# Patient Record
Sex: FEMALE | Race: WHITE | Employment: OTHER | URBAN - METROPOLITAN AREA
[De-identification: names, ages, dates, MRNs, and addresses within clinical notes are randomized per-mention and may not be internally consistent; named-entity substitution may affect disease eponyms.]

---

## 2017-11-10 ENCOUNTER — HOSPITAL ENCOUNTER (OUTPATIENT)
Dept: MAMMOGRAPHY | Age: 77
Discharge: HOME | End: 2017-11-10
Attending: PHYSICIAN ASSISTANT
Payer: MEDICARE

## 2017-11-10 DIAGNOSIS — N63.10 BREAST MASS, RIGHT: ICD-10-CM

## 2017-11-10 DIAGNOSIS — Z87.898 HISTORY OF ABNORMAL MAMMOGRAM: ICD-10-CM

## 2017-11-10 DIAGNOSIS — N60.01 BREAST CYST, RIGHT: ICD-10-CM

## 2017-11-10 DIAGNOSIS — Z87.2 HISTORY OF CYST OF BREAST: ICD-10-CM

## 2017-11-10 PROCEDURE — 76642 ULTRASOUND BREAST LIMITED: CPT

## 2017-11-10 PROCEDURE — 77066 DX MAMMO INCL CAD BI: CPT

## 2017-11-12 NOTE — PROGRESS NOTES
Patient notified of results per radiology, appointment with general surgery already scheduled at this time.

## 2018-01-02 PROBLEM — N64.59 ABNORMAL BREAST EXAM: Status: ACTIVE | Noted: 2018-01-02

## 2018-02-20 ENCOUNTER — HOSPITAL ENCOUNTER (OUTPATIENT)
Dept: SURGERY | Age: 78
Discharge: HOME | End: 2018-02-20
Payer: MEDICARE

## 2018-02-20 ENCOUNTER — HOSPITAL ENCOUNTER (OUTPATIENT)
Dept: PHYSICAL THERAPY | Age: 78
Discharge: HOME | End: 2018-02-20
Payer: MEDICARE

## 2018-02-20 VITALS
WEIGHT: 173.1 LBS | DIASTOLIC BLOOD PRESSURE: 72 MMHG | BODY MASS INDEX: 27.82 KG/M2 | SYSTOLIC BLOOD PRESSURE: 149 MMHG | OXYGEN SATURATION: 97 % | HEIGHT: 66 IN | HEART RATE: 69 BPM | TEMPERATURE: 96.3 F

## 2018-02-20 LAB
ANION GAP SERPL CALC-SCNC: 14 MMOL/L (ref 7–16)
APPEARANCE UR: CLEAR
APTT PPP: 39.8 SEC (ref 23.2–35.3)
ATRIAL RATE: 66 BPM
BACTERIA SPEC CULT: NORMAL
BASOPHILS # BLD: 0 K/UL (ref 0–0.2)
BASOPHILS NFR BLD: 0 % (ref 0–2)
BILIRUB UR QL: NEGATIVE
BUN SERPL-MCNC: 12 MG/DL (ref 8–23)
CALCIUM SERPL-MCNC: 9.6 MG/DL (ref 8.3–10.4)
CALCULATED P AXIS, ECG09: 57 DEGREES
CALCULATED R AXIS, ECG10: -12 DEGREES
CALCULATED T AXIS, ECG11: 12 DEGREES
CHLORIDE SERPL-SCNC: 101 MMOL/L (ref 98–107)
CO2 SERPL-SCNC: 24 MMOL/L (ref 21–32)
COLOR UR: YELLOW
CREAT SERPL-MCNC: 0.77 MG/DL (ref 0.6–1)
DIAGNOSIS, 93000: NORMAL
DIFFERENTIAL METHOD BLD: ABNORMAL
EOSINOPHIL # BLD: 0.2 K/UL (ref 0–0.8)
EOSINOPHIL NFR BLD: 2 % (ref 0.5–7.8)
ERYTHROCYTE [DISTWIDTH] IN BLOOD BY AUTOMATED COUNT: 13.7 % (ref 11.9–14.6)
GLUCOSE SERPL-MCNC: 106 MG/DL (ref 65–100)
GLUCOSE UR STRIP.AUTO-MCNC: NEGATIVE MG/DL
HCT VFR BLD AUTO: 44.9 % (ref 35.8–46.3)
HGB BLD-MCNC: 14.5 G/DL (ref 11.7–15.4)
HGB UR QL STRIP: NEGATIVE
IMM GRANULOCYTES # BLD: 0 K/UL (ref 0–0.5)
IMM GRANULOCYTES NFR BLD AUTO: 0 % (ref 0–5)
INR PPP: 1
KETONES UR QL STRIP.AUTO: NEGATIVE MG/DL
LEUKOCYTE ESTERASE UR QL STRIP.AUTO: NEGATIVE
LYMPHOCYTES # BLD: 2.5 K/UL (ref 0.5–4.6)
LYMPHOCYTES NFR BLD: 36 % (ref 13–44)
MCH RBC QN AUTO: 29.1 PG (ref 26.1–32.9)
MCHC RBC AUTO-ENTMCNC: 32.3 G/DL (ref 31.4–35)
MCV RBC AUTO: 90.2 FL (ref 79.6–97.8)
MONOCYTES # BLD: 0.4 K/UL (ref 0.1–1.3)
MONOCYTES NFR BLD: 5 % (ref 4–12)
NEUTS SEG # BLD: 3.9 K/UL (ref 1.7–8.2)
NEUTS SEG NFR BLD: 57 % (ref 43–78)
NITRITE UR QL STRIP.AUTO: NEGATIVE
P-R INTERVAL, ECG05: 182 MS
PH UR STRIP: 6.5 [PH] (ref 5–9)
PLATELET # BLD AUTO: 261 K/UL (ref 150–450)
PMV BLD AUTO: 9.4 FL (ref 10.8–14.1)
POTASSIUM SERPL-SCNC: 4.5 MMOL/L (ref 3.5–5.1)
PROT UR STRIP-MCNC: NEGATIVE MG/DL
PROTHROMBIN TIME: 12.8 SEC (ref 11.5–14.5)
Q-T INTERVAL, ECG07: 448 MS
QRS DURATION, ECG06: 128 MS
QTC CALCULATION (BEZET), ECG08: 469 MS
RBC # BLD AUTO: 4.98 M/UL (ref 4.05–5.25)
SERVICE CMNT-IMP: NORMAL
SODIUM SERPL-SCNC: 139 MMOL/L (ref 136–145)
SP GR UR REFRACTOMETRY: 1.01 (ref 1–1.02)
UROBILINOGEN UR QL STRIP.AUTO: 0.2 EU/DL (ref 0.2–1)
VENTRICULAR RATE, ECG03: 66 BPM
WBC # BLD AUTO: 7 K/UL (ref 4.3–11.1)

## 2018-02-20 PROCEDURE — 87641 MR-STAPH DNA AMP PROBE: CPT | Performed by: PHYSICIAN ASSISTANT

## 2018-02-20 PROCEDURE — 85730 THROMBOPLASTIN TIME PARTIAL: CPT | Performed by: PHYSICIAN ASSISTANT

## 2018-02-20 PROCEDURE — 85025 COMPLETE CBC W/AUTO DIFF WBC: CPT | Performed by: PHYSICIAN ASSISTANT

## 2018-02-20 PROCEDURE — 77030027138 HC INCENT SPIROMETER -A

## 2018-02-20 PROCEDURE — 93005 ELECTROCARDIOGRAM TRACING: CPT | Performed by: PHYSICIAN ASSISTANT

## 2018-02-20 PROCEDURE — 81003 URINALYSIS AUTO W/O SCOPE: CPT | Performed by: PHYSICIAN ASSISTANT

## 2018-02-20 PROCEDURE — G8979 MOBILITY GOAL STATUS: HCPCS

## 2018-02-20 PROCEDURE — 80048 BASIC METABOLIC PNL TOTAL CA: CPT | Performed by: PHYSICIAN ASSISTANT

## 2018-02-20 PROCEDURE — 85610 PROTHROMBIN TIME: CPT | Performed by: PHYSICIAN ASSISTANT

## 2018-02-20 PROCEDURE — G8980 MOBILITY D/C STATUS: HCPCS

## 2018-02-20 PROCEDURE — G8978 MOBILITY CURRENT STATUS: HCPCS

## 2018-02-20 PROCEDURE — 97162 PT EVAL MOD COMPLEX 30 MIN: CPT

## 2018-02-20 RX ORDER — MELATONIN
2000 DAILY
COMMUNITY
End: 2022-02-17

## 2018-02-20 NOTE — ADVANCED PRACTICE NURSE
Total Joint Surgery Preoperative Chart Review      Patient ID:  Ashlyn Kiran  [id number]  87 y.o.  1940  Surgeon: Dr. Sarah Herron  Date of Surgery: 3/6/2018  Procedure: Total Left Hip Arthroplasty  Primary Care Physician: Jane Davis [phone]  Specialty Physician(s):      Subjective:   Ashlyn Kiran is a 68 y.o. WHITE OR  female who presents for preoperative evaluation for Total Left Hip arthroplasty. This is a preoperative chart review note based on data collected by the nurse at the surgical Pre-Assessment visit. Past Medical History:   Diagnosis Date    Anemia, unspecified     patient denies     Arthritis     osteoarthritis    Autoimmune disease ([facility]     rheumatoid arthritis; saw Dr. Quincy Chavez in the past. Patient does not currently see rheumatologist.     [address] [place] [address] Breast cancer ([facility] [address]     Cancer ([facility] 07/2013    right breast    Chronic pain     d/t rheumatoid arthritis    HTN (hypertension) 03/18/2012    patient states not a current problem, no medication.  Hypercholesterolemia     no meds    Ill-defined condition     \"I can't take anything strong\"    Menopause     Nausea & vomiting     patient denies post-op N/V    Osteoarthritis of both shoulders     Osteoarthritis of right hip     Followed by Dr. Eleanor Shannon Rheumatoid arthritis [facility])     Patient does not see Rheumatologist; no prescription medication. 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        Past Surgical History:   Procedure Laterality Date    HX APPENDECTOMY      HX BREAST LUMPECTOMY  7/31/2013    BREAST NEEDLE LOCALIZATION WITH MASS EXCISION AND SENTINAL NODE BIOPSY performed by Leandra Batista MD at [address] [place] [address] AND CURETTAGE      HX HIP REPLACEMENT Right 2016    HX HYSTERECTOMY  1987    HX KNEE REPLACEMENT Left 8/2010    left    HX KNEE REPLACEMENT Right 3/2012    right     Family History   Problem Relation Age of Onset   [facility] Arthritis-osteo Mother     Hypertension Mother     No Known Problems Father     Other Other      family hx of HTN    Breast Cancer Neg Hx       Social History   Substance Use Topics    Smoking status: Never Smoker    Smokeless tobacco: Never Used    Alcohol use No       Prior to Admission medications    Medication Sig Start Date End Date Taking? Authorizing Provider   cholecalciferol (VITAMIN D3) 1,000 unit tablet Take 2,000 Units by mouth daily. Yes Historical Provider   ACETAMINOPHEN/DIPHENHYDRAMINE (TYLENOL PM PO) Take 2 Tabs by mouth nightly. Yes Historical Provider   ammonium lactate (LAC-HYDRIN) 12 % lotion rub in to affected area well daily after bathing 9/25/17  Yes Melanie Garnett PA-C   magnesium oxide (MAG-OX) 400 mg tablet Take 800 mg by mouth daily. Yes Historical Provider   DISABLED PLACARD (DISABLED PLACARD) DMV To use daily 5/18/17  Yes Roselia [employer], DO     Allergies   Allergen Reactions    Ciprofloxacin Other (comments)     Elevated BP    Codeine Palpitations    Demerol [Meperidine] Unknown (comments)     hallucinations    Dilaudid [Hydromorphone (Bulk)] Other (comments)     hallucinations    Gluten Other (comments)    Hydromorphone Other (comments)    Ketoconazole Unknown (comments)     Intolerance/caused burning sensation    Plaquenil [Hydroxychloroquine] Other (comments)     Pt could stand up or get up. Eyes got really big but pt could not see ? Pt states that it was 2-3 days before she got her senses back.      Prednisone Other (comments)     Irritability      Yellow Dye Itching     Yellow  Dye in cheddar cheese- can eat white cheddar          Objective:     Physical Exam:   Patient Vitals for the past 24 hrs:   Temp Pulse BP SpO2   02/20/18 1241 - 69 149/72 -   02/20/18 1209 96.3 °F (35.7 °C) 74 (!) 188/94 97 %       ECG:    EKG Results     Procedure 720 Value Units Date/Time    EKG, 12 LEAD, INITIAL [[id number]] Collected:  02/20/18 1249    Order Status:  Completed Updated:  02/20/18 1254     Ventricular Rate 66 BPM      Atrial Rate 66 BPM      P-R Interval 182 ms      QRS Duration 128 ms      Q-T Interval 448 ms      QTC Calculation (Bezet) 469 ms      Calculated P Axis 57 degrees      Calculated R Axis -12 degrees      Calculated T Axis 12 degrees      Diagnosis --     Normal sinus rhythm  Right bundle branch block  Abnormal ECG  When compared with ECG of 29-AUG-2016 13:49,  No significant change was found            Data Review:   Labs:   Recent Results (from the past 24 hour(s))   CBC WITH AUTOMATED DIFF    Collection Time: 02/20/18 10:30 AM   Result Value Ref Range    WBC 7.0 4.3 - 11.1 K/uL    RBC 4.98 4.05 - 5.25 M/uL    HGB 14.5 11.7 - 15.4 g/dL    HCT 44.9 35.8 - 46.3 %    MCV 90.2 79.6 - 97.8 FL    MCH 29.1 26.1 - 32.9 PG    MCHC 32.3 31.4 - 35.0 g/dL    RDW 13.7 11.9 - 14.6 %    PLATELET 595 803 - 469 K/uL    MPV 9.4 (L) 10.8 - 14.1 FL    DF AUTOMATED      NEUTROPHILS 57 43 - 78 %    LYMPHOCYTES 36 13 - 44 %    MONOCYTES 5 4.0 - 12.0 %    EOSINOPHILS 2 0.5 - 7.8 %    BASOPHILS 0 0.0 - 2.0 %    IMMATURE GRANULOCYTES 0 0.0 - 5.0 %    ABS. NEUTROPHILS 3.9 1.7 - 8.2 K/UL    ABS. LYMPHOCYTES 2.5 0.5 - 4.6 K/UL    ABS. MONOCYTES 0.4 0.1 - 1.3 K/UL    ABS. EOSINOPHILS 0.2 0.0 - 0.8 K/UL    ABS. BASOPHILS 0.0 0.0 - 0.2 K/UL    ABS. IMM.  GRANS. 0.0 0.0 - 0.5 K/UL   PROTHROMBIN TIME + INR    Collection Time: 02/20/18 10:30 AM   Result Value Ref Range    Prothrombin time 12.8 11.5 - 14.5 sec    INR 1.0     PTT    Collection Time: 02/20/18 10:30 AM   Result Value Ref Range    aPTT 39.8 (H) 23.2 - 75.8 SEC   METABOLIC PANEL, BASIC    Collection Time: 02/20/18 10:30 AM   Result Value Ref Range    Sodium 139 136 - 145 mmol/L    Potassium 4.5 3.5 - 5.1 mmol/L    Chloride 101 98 - 107 mmol/L    CO2 24 21 - 32 mmol/L    Anion gap 14 7 - 16 mmol/L    Glucose 106 (H) 65 - 100 mg/dL    BUN 12 8 - 23 MG/DL    Creatinine 0.77 0.6 - 1.0 MG/DL    GFR est AA >60 >60 ml/min/1.73m2    GFR est non-AA >60 >60 ml/min/1.73m2    Calcium 9.6 8.3 - 10.4 MG/DL   URINALYSIS W/ RFLX MICROSCOPIC    Collection Time: 02/20/18 10:30 AM   Result Value Ref Range    Color YELLOW      Appearance CLEAR      Specific gravity 1.006 1.001 - 1.023      pH (UA) 6.5 5.0 - 9.0      Protein NEGATIVE  NEG mg/dL    Glucose NEGATIVE  mg/dL    Ketone NEGATIVE  NEG mg/dL    Bilirubin NEGATIVE  NEG      Blood NEGATIVE  NEG      Urobilinogen 0.2 0.2 - 1.0 EU/dL    Nitrites NEGATIVE  NEG      Leukocyte Esterase NEGATIVE  NEG     MSSA/MRSA SC BY PCR, NASAL SWAB    Collection Time: 02/20/18 12:39 PM   Result Value Ref Range    Special Requests: NO SPECIAL REQUESTS      Culture result:        SA target not detected. A MRSA NEGATIVE, SA NEGATIVE test result does not preclude MRSA or SA nasal colonization.    EKG, 12 LEAD, INITIAL    Collection Time: 02/20/18 12:49 PM   Result Value Ref Range    Ventricular Rate 66 BPM    Atrial Rate 66 BPM    P-R Interval 182 ms    QRS Duration 128 ms    Q-T Interval 448 ms    QTC Calculation (Bezet) 469 ms    Calculated P Axis 57 degrees    Calculated R Axis -12 degrees    Calculated T Axis 12 degrees    Diagnosis       Normal sinus rhythm  Right bundle branch block  Abnormal ECG  When compared with ECG of 29-AUG-2016 13:49,  No significant change was found           Problem List:  )  Patient Active Problem List   Diagnosis Code    Osteoarthritis of left knee M17.12    Total knee replacement status Z96.659    Osteoarthritis of right knee M17.11    Right Total knee replacement status Z96.659    Rheumatoid arthritis involving multiple sites with positive rheumatoid factor (HCC) M05.79    Osteoarthritis of both shoulders M19.011, M19.012    Anemia, unspecified D64.9    Hormone receptor positive breast cancer, unspecified laterality (HCC) C50.919    Low back pain M54.5    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

## 2018-02-20 NOTE — PROGRESS NOTES
02/20/18 1030   Oxygen Therapy   O2 Sat (%) 98 %   Pulse via Oximetry 73 beats per minute   O2 Device Room air   Pre-Treatment   Breath Sounds Bilateral Clear   Pre FEV1 (liters) 1.6 liters   % Predicted 70     Initial respiratory Assessment completed with pt. Pt was interviewed and evaluated in Joint camp prior to surgery. Patient ID:  Leonel Stahl  [id number]  82 y.o.  1940  Surgeon: Dr. Hortencia Mcpherson  Date of Surgery: 3/6/2018  Procedure: Total Left Hip Arthroplasty  Primary Care Physician: Yuliet Medina [phone]  Specialists:                                  Pt instructed in the use of Incentive Spirometry. Pt instructed to bring Incentive Spirometer back on date of surgery & to start using Is upon return to pt room. Pt taught proper cough technique    History of smoking:   NONE                                                       Quit date:            Secondhand smoke:      Past procedures with Oxygen desaturation:NONE    Past Medical History:   Diagnosis Date    Anemia, unspecified     patient denies     Arthritis     osteoarthritis    Autoimmune disease ([facility] [address]     rheumatoid arthritis; saw Dr. Pilar Jaquez in the past. Patient does not currently see rheumatologist.      Breast cancer ([facility]     Cancer ([facility] [address] 07/2013    right breast    Chronic pain     d/t rheumatoid arthritis    HTN (hypertension) 03/18/2012    patient states not a current problem, no medication.  Hypercholesterolemia     no meds    Ill-defined condition     \"I can't take anything strong\"    Menopause     Nausea & vomiting     patient denies post-op N/V    Osteoarthritis of both shoulders     Osteoarthritis of right hip     Followed by Dr. Orville Sheridan Rheumatoid arthritis [facility])     Patient does not see Rheumatologist; no prescription medication.     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 HX OF PNA AFTER THE FLU                                                                                                            Respiratory history:DENIES SOB                                                                Respiratory meds:                                         FAMILY PRESENT:            SPOUSE,                                                                                 PAST SLEEP STUDY:                            NO  HX OF GINETTE:                                          NO                                     GINETTE assessment:                                               SLEEPS ON         BACK                                                              PHYSICAL EXAM   Body mass index is 27.94 kg/(m^2).    Visit Vitals    /72    Pulse 69    Temp 96.3 °F (35.7 °C)    Ht 5' 6\" (1.676 m)    Wt 78.5 kg (173 lb 1.6 oz)    SpO2 97%    BMI 27.94 kg/m2     Neck circumference:  37    cm    Loud snoring:                           MINIMAL  Witnessed apnea or wakening gasping or choking:,             DENIES,                                                                                       Awakens with headaches:                                                  DENIES    Morning or daytime tiredness/ sleepiness:                    DENIES                                                                                       Dry mouth or sore throat in morning:                                                                                DENIES    Jensen stage:  4    SACS score:9    STOP/BAN                              CPAP:                       NONE                                                Referrals:    Pt. Phone Number:

## 2018-02-20 NOTE — PERIOP NOTES
Mee Howell PA-C    Your patient recently had labs drawn during a hospital appointment due to an upcoming surgery. The results are attached. If you have any questions or concerns please reach out to your patient for a follow-up in your office. Please do not respond to this message as my mailbox is not monitored. You may call [phone] with questions or concerns. Recent Results (from the past 12 hour(s))   CBC WITH AUTOMATED DIFF    Collection Time: 02/20/18 10:30 AM   Result Value Ref Range    WBC 7.0 4.3 - 11.1 K/uL    RBC 4.98 4.05 - 5.25 M/uL    HGB 14.5 11.7 - 15.4 g/dL    HCT 44.9 35.8 - 46.3 %    MCV 90.2 79.6 - 97.8 FL    MCH 29.1 26.1 - 32.9 PG    MCHC 32.3 31.4 - 35.0 g/dL    RDW 13.7 11.9 - 14.6 %    PLATELET 870 578 - 027 K/uL    MPV 9.4 (L) 10.8 - 14.1 FL    DF AUTOMATED      NEUTROPHILS 57 43 - 78 %    LYMPHOCYTES 36 13 - 44 %    MONOCYTES 5 4.0 - 12.0 %    EOSINOPHILS 2 0.5 - 7.8 %    BASOPHILS 0 0.0 - 2.0 %    IMMATURE GRANULOCYTES 0 0.0 - 5.0 %    ABS. NEUTROPHILS 3.9 1.7 - 8.2 K/UL    ABS. LYMPHOCYTES 2.5 0.5 - 4.6 K/UL    ABS. MONOCYTES 0.4 0.1 - 1.3 K/UL    ABS. EOSINOPHILS 0.2 0.0 - 0.8 K/UL    ABS. BASOPHILS 0.0 0.0 - 0.2 K/UL    ABS. IMM.  GRANS. 0.0 0.0 - 0.5 K/UL   PROTHROMBIN TIME + INR    Collection Time: 02/20/18 10:30 AM   Result Value Ref Range    Prothrombin time 12.8 11.5 - 14.5 sec    INR 1.0     PTT    Collection Time: 02/20/18 10:30 AM   Result Value Ref Range    aPTT 39.8 (H) 23.2 - 20.5 SEC   METABOLIC PANEL, BASIC    Collection Time: 02/20/18 10:30 AM   Result Value Ref Range    Sodium 139 136 - 145 mmol/L    Potassium 4.5 3.5 - 5.1 mmol/L    Chloride 101 98 - 107 mmol/L    CO2 24 21 - 32 mmol/L    Anion gap 14 7 - 16 mmol/L    Glucose 106 (H) 65 - 100 mg/dL    BUN 12 8 - 23 MG/DL    Creatinine 0.77 0.6 - 1.0 MG/DL    GFR est AA >60 >60 ml/min/1.73m2    GFR est non-AA >60 >60 ml/min/1.73m2    Calcium 9.6 8.3 - 10.4 MG/DL   URINALYSIS W/ RFLX MICROSCOPIC    Collection Time: 02/20/18 10:30 AM   Result Value Ref Range    Color YELLOW      Appearance CLEAR      Specific gravity 1.006 1.001 - 1.023      pH (UA) 6.5 5.0 - 9.0      Protein NEGATIVE  NEG mg/dL    Glucose NEGATIVE  mg/dL    Ketone NEGATIVE  NEG mg/dL    Bilirubin NEGATIVE  NEG      Blood NEGATIVE  NEG      Urobilinogen 0.2 0.2 - 1.0 EU/dL    Nitrites NEGATIVE  NEG      Leukocyte Esterase NEGATIVE  NEG     EKG, 12 LEAD, INITIAL    Collection Time: 02/20/18 12:49 PM   Result Value Ref Range    Ventricular Rate 66 BPM    Atrial Rate 66 BPM    P-R Interval 182 ms    QRS Duration 128 ms    Q-T Interval 448 ms    QTC Calculation (Bezet) 469 ms    Calculated P Axis 57 degrees    Calculated R Axis -12 degrees    Calculated T Axis 12 degrees    Diagnosis       Normal sinus rhythm  Right bundle branch block  Abnormal ECG  When compared with ECG of 29-AUG-2016 13:49,  No significant change was found

## 2018-02-20 NOTE — PERIOP NOTES
Patient verified name, , and surgery as listed in [facility]. Type 3 surgery, walk in assessment complete. Labs per surgeon: CBC, BMP, U/A, PT ; results within [facility] protocols. PTT level elevated; MDA to review. MRSA nasal swab pending. Labs per anesthesia protocol: included in surgeons orders. EKG: done today; within [facility] protocols. Hibiclens and instructions to return bottle on DOS given per hospital policy. Nasal Swab collected per MD order and instructions for Mupirocin nasal ointment if required. Patient provided with handouts including Guide to Surgery, Pain Management, Hand Hygiene, Blood Transfusion Education, and [facility] Brochure. Patient answered medical/surgical history questions at their best of ability. All prior to admission medications documented in [facility]. Original medication prescription bottles visualized during patient appointment. Patient instructed to hold all vitamins 7 days prior to surgery and NSAIDS 5 days prior to surgery. Medications to be held: vitamins. Patient instructed to continue previous medications as prescribed prior to surgery and to take the following medications the day of surgery according to anesthesia guidelines with a small sip of water: none. Patient teach back successful and patient demonstrates knowledge of instruction.

## 2018-02-20 NOTE — PROGRESS NOTES
Yeimi Sultana  : (86 y.o.) [address] at [place] [address], [address] [facility]  [phone]       Physical Therapy Prehab Plan of Treatment and Evaluation Summary:2018    ICD-10: Treatment Diagnosis:   · Pain in left hip (M25.552)  · Stiffness of Left Hip, Not elsewhere classified (M25.652)  Precautions/Allergies:   Ciprofloxacin; Codeine; Demerol [meperidine]; Dilaudid [hydromorphone (bulk)]; Gluten; Hydromorphone; Ketoconazole; Plaquenil [hydroxychloroquine]; Prednisone; and Yellow dye  MEDICAL/REFERRING DIAGNOSIS:  Unilateral primary osteoarthritis, left hip [M16.12]  REFERRING PHYSICIAN: Randell Alfaro,*  DATE OF SURGERY: 3/6/18   Assessment:   Comments:  She has RA and has multiple deformities in hands and feet. She is here with her spouse who is supportive. He assists her with transfers and bathing. She has had B TKas and R Salima in the past. She plans on going home at [place] with his assist.     PROBLEM LIST (Impacting functional limitations):  Ms. Horacio Monsalve presents with the following left lower extremity(s) problems:  1. Strength  2. Range of Motion  3. Home Exercise Program  4. Pain   INTERVENTIONS PLANNED:  1. Home Exercise Program  2. Educational Discussion     TREATMENT PLAN: Effective Dates: 2018 TO 2018. Frequency/Duration: Patient to continue to perform home exercise program at least twice per day up until her surgery. GOALS: (Goals have been discussed and agreed upon with patient.)  Discharge Goals: Time Frame: 1 Day  1. Patient will demonstrate independence with a home exercise program designed to increase strength, range of motion and pain control to minimize functional deficits and optimize patient for total joint replacement. Rehabilitation Potential For Stated Goals: 65 Ana Luisa Ferrari's therapy, I certify that the treatment plan above will be carried out by a therapist or under their direction.   Thank you for this referral,  William Cárdenas PT               HISTORY:   Present Symptoms:  Pain Intensity 1: 8 (at its worst)  Pain Location 1: Groin, Hip  Pain Orientation 1: Anterior, Medial, Left   History of Present Injury/Illness (Reason for Referral):  Medical/Referring Diagnosis: Unilateral primary osteoarthritis, left hip [M16.12]   Past Medical History/Comorbidities:   Ms. Claire Bazan  has a past medical history of Anemia, unspecified; Arthritis; Autoimmune disease ([address] Breast cancer ([address] Cancer ([address] (07/2013); Chronic pain; HTN (hypertension) (03/18/2012); Hypercholesterolemia; Menopause; Nausea & vomiting; Osteoarthritis of both shoulders; Osteoarthritis of right hip; Poor historian; Rheumatoid arthritis ([address] Right Total knee replacement status (3/15/2012); Unspecified adverse effect of anesthesia; and Vitamin D deficiency disease. She also has no past medical history of Adverse effect of anesthesia; Aneurysm ([address] Coagulation defects; Difficult intubation; Endocarditis; Heart failure ([address] Malignant hyperthermia due to anesthesia; Morbid obesity ([address] Pseudocholinesterase deficiency; Psychiatric disorder; Rheumatic fever; or Unspecified sleep apnea. Ms. Claire Bazan  has a past surgical history that includes hx hysterectomy (1987); hx dilation and curettage; hx cholecystectomy (1987); hx appendectomy; hx breast lumpectomy (7/31/2013); hx knee replacement (Left, 8/2010); hx knee replacement (Right, 3/2012); hx orthopaedic (Right); and hx orthopaedic (Right).   Social History/Living Environment:   Home Environment: Private residence  # Steps to Enter: 3  Hand Rails : Right  One/Two Story Residence: One story  Living Alone: No  Support Systems: Spouse/Significant Other/Partner  Patient Expects to be Discharged to[de-identified] Private residence  Current DME Used/Available at Home: Yemi Ladd, Erica Plascencia, straight, [employer], bedside  Tub or Shower Type: Shower  Work/Activity:  retired  Dominant Side:  RIGHT  Current Medications:  See Pre-assessment nursing note   Number of Personal Factors/Comorbidities that affect the Plan of Care: 3+: HIGH COMPLEXITY   EXAMINATION:   ADLs (Current Functional Status):   Ambulation:  [] Independent  [x] Walk Indoors Only  [] Walk Outdoors  [] Use Assistive Device  [] Use Wheelchair Only Dressing:  [] Independent  Requires Assistance from Someone for:  [] Sock/Shoes  [] Pants  [x] Everything   Bathing/Showering:   [] Independent  [x] Requires Assistance from Someone  [] Sponge Bath Only Household Activities:  [] Routine house and yard work  [x] Light Housework Only  [] None   Observation/Orthostatic Postural Assessment:    Leg length discrepancy, left (L LE 1/8\" shorter than R)  ROM/Flexibility:   AROM: Generally decreased, functional (R LE decreased R knee flexion)                LLE AROM  L Hip Flexion: 80  L Hip ABduction: 10          Strength:   Strength: Generally decreased, functional (R LE 3+/5)       LLE Strength  L Hip Flexion: 3  L Hip ABduction: 3  L Knee Extension: 3  L Ankle Dorsiflexion: 3+          Functional Mobility:    Sensation: Intact (R LE)    Stand to Sit: Contact guard assistance  Sit to Stand: Moderate assistance  Stand Pivot Transfers: Contact guard assistance  Distance (ft): 10 Feet (ft)  Ambulation - Level of Assistance: Supervision  Base of Support: Narrowed  Speed/Chen: Shuffled; Slow  Gait Abnormalities: Antalgic; Shuffling gait          Balance:    Sitting: Intact  Standing: Pull to stand   Body Structures Involved:  1. Bones  2. Joints  3. Muscles Body Functions Affected:  1. Neuromusculoskeletal  2. Movement Related Activities and Participation Affected:  1. Mobility  2. Self Care   Number of elements that affect the Plan of Care: 4+: HIGH COMPLEXITY   CLINICAL PRESENTATION:   Presentation: Evolving clinical presentation with changing clinical characteristics: MODERATE COMPLEXITY   CLINICAL DECISION MAKING:   Outcome Measure:    Tool Used: Lower Extremity Functional Scale (LEFS)  Score:  Initial: 22/80 Most Recent: X/80 (Date: -- )   Interpretation of Score: 20 questions each scored on a 5 point scale with 0 representing \"extreme difficulty or unable to perform\" and 4 representing \"no difficulty\". The lower the score, the greater the functional disability. 80/80 represents no disability. Minimal detectable change is 9 points. Score 80 79-65 64-49 48-33 32-17 16-1 0   Modifier CH CI CJ CK CL CM CN     ? Mobility - Walking and Moving Around:     - CURRENT STATUS: CL - 60%-79% impaired, limited or restricted    - GOAL STATUS: CL - 60%-79% impaired, limited or restricted    - D/C STATUS:  CL - 60%-79% impaired, limited or restricted  Medical Necessity:   · Ms. Dunia Justin is expected to optimize her lower extremity strength and ROM in preparation for joint replacement surgery. Reason for Services/Other Comments:  · Achieve baseline assesment of musculoskeletal system, functional mobility and home environment. , educate in PT HEP in preparation for surgery, educate in hospital plan of care. Use of outcome tool(s) and clinical judgement create a POC that gives a: Questionable prediction of patient's progress: MODERATE COMPLEXITY   TREATMENT:   Treatment/Session Assessment:  Patient was instructed in PT- HEP to increase strength and ROM in LEs. Answered all questions. · Post session pain:  1  · Compliance with Program/Exercises: compliant most of the time.   Total Treatment Duration:  PT Patient Time In/Time Out  Time In: 1015  Time Out: [address] [place], [place]

## 2018-02-26 ENCOUNTER — ANESTHESIA EVENT (OUTPATIENT)
Dept: SURGERY | Age: 78
DRG: 470 | End: 2018-02-26
Payer: MEDICARE

## 2018-02-26 NOTE — H&P
[address] [facility]  History and Physical Exam    Patient ID:  Maryellen Kim  [id number]    45 y.o.  1940    Today: February 26, 2018    Vitals Signs: Reviewed as noted in medical record. Allergies: Allergies   Allergen Reactions    Ciprofloxacin Other (comments)     Elevated BP    Codeine Palpitations    Demerol [Meperidine] Unknown (comments)     hallucinations    Dilaudid [Hydromorphone (Bulk)] Other (comments)     hallucinations    Gluten Other (comments)    Hydromorphone Other (comments)    Ketoconazole Unknown (comments)     Intolerance/caused burning sensation    Plaquenil [Hydroxychloroquine] Other (comments)     Pt could stand up or get up. Eyes got really big but pt could not see ? Pt states that it was 2-3 days before she got her senses back.  Prednisone Other (comments)     Irritability      Yellow Dye Itching     Yellow  Dye in cheddar cheese- can eat white cheddar       CC: Left hip pain    HPI:  Pt complains of left hip pain and with difficulty ambulating. Relevant PMH:   Past Medical History:   Diagnosis Date    Anemia, unspecified     patient denies     Arthritis     osteoarthritis    Autoimmune disease ([facility]     rheumatoid arthritis; saw Dr. Homa Lynne in the past. Patient does not currently see rheumatologist.      Breast cancer ([facility]     Cancer ([facility] 07/2013    right breast    Chronic pain     d/t rheumatoid arthritis    HTN (hypertension) 03/18/2012    patient states not a current problem, no medication.  Hypercholesterolemia     no meds    Ill-defined condition     \"I can't take anything strong\"    Menopause     Nausea & vomiting     patient denies post-op N/V    Osteoarthritis of both shoulders     Osteoarthritis of right hip     Followed by Dr. Nicholas Barlow Rheumatoid arthritis [facility])     Patient does not see Rheumatologist; no prescription medication.     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 Objective:                    HEENT: NC/AT                   Lungs:  clear                   Heart:   rrr                   Abdomen: soft                   Extremities:  Pain with rom of the left hip joint    Radiographs: reveal osteoarthritis with loss of joint space and bone spurs. Assessment: Unilateral primary osteoarthritis, left hip [M16.12]    Plan:  Proceed with scheduled Procedure(s) (LRB):  LEFT HIP ARTHROPLASTY TOTAL / Dari Rickers (Left) . The patient has failed conservative treatment including NSAIDS, and injections. Due to the amount of pain the patient is experiencing we will proceed with scheduled procedure.       Signed By: ERICA Umanzor  February 26, 2018

## 2018-02-27 ENCOUNTER — HOME HEALTH ADMISSION (OUTPATIENT)
Dept: HOME HEALTH SERVICES | Facility: HOME HEALTH | Age: 78
End: 2018-02-27
Payer: MEDICARE

## 2018-02-27 ENCOUNTER — ANESTHESIA (OUTPATIENT)
Dept: SURGERY | Age: 78
DRG: 470 | End: 2018-02-27
Payer: MEDICARE

## 2018-02-27 ENCOUNTER — HOSPITAL ENCOUNTER (INPATIENT)
Age: 78
LOS: 1 days | Discharge: HOME HEALTH | DRG: 470 | End: 2018-02-28
Attending: ORTHOPAEDIC SURGERY | Admitting: ORTHOPAEDIC SURGERY
Payer: MEDICARE

## 2018-02-27 ENCOUNTER — APPOINTMENT (OUTPATIENT)
Dept: GENERAL RADIOLOGY | Age: 78
DRG: 470 | End: 2018-02-27
Attending: PHYSICIAN ASSISTANT
Payer: MEDICARE

## 2018-02-27 DIAGNOSIS — Z96.642 STATUS POST TOTAL REPLACEMENT OF LEFT HIP: Primary | ICD-10-CM

## 2018-02-27 LAB
ABO + RH BLD: NORMAL
BLOOD GROUP ANTIBODIES SERPL: NORMAL
GLUCOSE BLD STRIP.AUTO-MCNC: 110 MG/DL (ref 65–100)
HGB BLD-MCNC: 12.7 G/DL (ref 11.7–15.4)
SPECIMEN EXP DATE BLD: NORMAL

## 2018-02-27 PROCEDURE — 94760 N-INVAS EAR/PLS OXIMETRY 1: CPT

## 2018-02-27 PROCEDURE — 74011250637 HC RX REV CODE- 250/637: Performed by: ANESTHESIOLOGY

## 2018-02-27 PROCEDURE — 74011000302 HC RX REV CODE- 302: Performed by: ORTHOPAEDIC SURGERY

## 2018-02-27 PROCEDURE — 74011000250 HC RX REV CODE- 250

## 2018-02-27 PROCEDURE — 97161 PT EVAL LOW COMPLEX 20 MIN: CPT

## 2018-02-27 PROCEDURE — 77030013727 HC IRR FAN PULSVC ZIMM -B: Performed by: ORTHOPAEDIC SURGERY

## 2018-02-27 PROCEDURE — 77030018074 HC RTVR SUT ARTH4 S&N -B: Performed by: ORTHOPAEDIC SURGERY

## 2018-02-27 PROCEDURE — 77030003602 HC NDL NRV BLK BBMI -B: Performed by: ANESTHESIOLOGY

## 2018-02-27 PROCEDURE — 74011000250 HC RX REV CODE- 250: Performed by: ORTHOPAEDIC SURGERY

## 2018-02-27 PROCEDURE — 74011250636 HC RX REV CODE- 250/636: Performed by: ANESTHESIOLOGY

## 2018-02-27 PROCEDURE — 77030032490 HC SLV COMPR SCD KNE COVD -B

## 2018-02-27 PROCEDURE — 74011000250 HC RX REV CODE- 250: Performed by: ANESTHESIOLOGY

## 2018-02-27 PROCEDURE — 77030018836 HC SOL IRR NACL ICUM -A: Performed by: ORTHOPAEDIC SURGERY

## 2018-02-27 PROCEDURE — 74011250636 HC RX REV CODE- 250/636: Performed by: ORTHOPAEDIC SURGERY

## 2018-02-27 PROCEDURE — 74011250636 HC RX REV CODE- 250/636

## 2018-02-27 PROCEDURE — 76010000161 HC OR TIME 1 TO 1.5 HR INTENSV-TIER 1: Performed by: ORTHOPAEDIC SURGERY

## 2018-02-27 PROCEDURE — 77030018547 HC SUT ETHBND1 J&J -B: Performed by: ORTHOPAEDIC SURGERY

## 2018-02-27 PROCEDURE — 85018 HEMOGLOBIN: CPT | Performed by: PHYSICIAN ASSISTANT

## 2018-02-27 PROCEDURE — 82962 GLUCOSE BLOOD TEST: CPT

## 2018-02-27 PROCEDURE — 77030011640 HC PAD GRND REM COVD -A: Performed by: ORTHOPAEDIC SURGERY

## 2018-02-27 PROCEDURE — 74011000258 HC RX REV CODE- 258: Performed by: ORTHOPAEDIC SURGERY

## 2018-02-27 PROCEDURE — 86900 BLOOD TYPING SEROLOGIC ABO: CPT | Performed by: PHYSICIAN ASSISTANT

## 2018-02-27 PROCEDURE — 77030020782 HC GWN BAIR PAWS FLX 3M -B: Performed by: ANESTHESIOLOGY

## 2018-02-27 PROCEDURE — 74011250637 HC RX REV CODE- 250/637: Performed by: PHYSICIAN ASSISTANT

## 2018-02-27 PROCEDURE — 76210000016 HC OR PH I REC 1 TO 1.5 HR: Performed by: ORTHOPAEDIC SURGERY

## 2018-02-27 PROCEDURE — 77030034479 HC ADH SKN CLSR PRINEO J&J -B: Performed by: ORTHOPAEDIC SURGERY

## 2018-02-27 PROCEDURE — 72170 X-RAY EXAM OF PELVIS: CPT

## 2018-02-27 PROCEDURE — 77030002933 HC SUT MCRYL J&J -A: Performed by: ORTHOPAEDIC SURGERY

## 2018-02-27 PROCEDURE — 0SRB04A REPLACEMENT OF LEFT HIP JOINT WITH CERAMIC ON POLYETHYLENE SYNTHETIC SUBSTITUTE, UNCEMENTED, OPEN APPROACH: ICD-10-PCS | Performed by: ORTHOPAEDIC SURGERY

## 2018-02-27 PROCEDURE — 86580 TB INTRADERMAL TEST: CPT | Performed by: ORTHOPAEDIC SURGERY

## 2018-02-27 PROCEDURE — 74011250636 HC RX REV CODE- 250/636: Performed by: PHYSICIAN ASSISTANT

## 2018-02-27 PROCEDURE — 77030035236 HC SUT PDS STRATFX BARB J&J -B: Performed by: ORTHOPAEDIC SURGERY

## 2018-02-27 PROCEDURE — 36415 COLL VENOUS BLD VENIPUNCTURE: CPT | Performed by: PHYSICIAN ASSISTANT

## 2018-02-27 PROCEDURE — 77030006790 HC BLD SAW OSC ZIMM -B: Performed by: ORTHOPAEDIC SURGERY

## 2018-02-27 PROCEDURE — 76060000034 HC ANESTHESIA 1.5 TO 2 HR: Performed by: ORTHOPAEDIC SURGERY

## 2018-02-27 PROCEDURE — 65270000029 HC RM PRIVATE

## 2018-02-27 PROCEDURE — 97165 OT EVAL LOW COMPLEX 30 MIN: CPT

## 2018-02-27 PROCEDURE — C1776 JOINT DEVICE (IMPLANTABLE): HCPCS | Performed by: ORTHOPAEDIC SURGERY

## 2018-02-27 PROCEDURE — 77030034849: Performed by: ORTHOPAEDIC SURGERY

## 2018-02-27 PROCEDURE — 77030002966 HC SUT PDS J&J -A: Performed by: ORTHOPAEDIC SURGERY

## 2018-02-27 PROCEDURE — 77030003665 HC NDL SPN BBMI -A: Performed by: ANESTHESIOLOGY

## 2018-02-27 PROCEDURE — 77030007880 HC KT SPN EPDRL BBMI -B: Performed by: ANESTHESIOLOGY

## 2018-02-27 DEVICE — PINNACLE CANCELLOUS BONE SCREW 6.5MM X 25MM
Type: IMPLANTABLE DEVICE | Site: HIP | Status: FUNCTIONAL
Brand: PINNACLE

## 2018-02-27 DEVICE — PINNACLE GRIPTION ACETABULAR SHELL MULTI-HOLE 52MM OD
Type: IMPLANTABLE DEVICE | Site: HIP | Status: FUNCTIONAL
Brand: PINNACLE GRIPTION

## 2018-02-27 DEVICE — BIOLOX DELTA CERAMIC FEMORAL HEAD +5.0 36MM DIA 12/14 TAPER
Type: IMPLANTABLE DEVICE | Site: HIP | Status: FUNCTIONAL
Brand: BIOLOX DELTA

## 2018-02-27 DEVICE — PINNACLE HIP SOLUTIONS ALTRX POLYETHYLENE ACETABULAR LINER NEUTRAL 36MM ID 52MM OD
Type: IMPLANTABLE DEVICE | Site: HIP | Status: FUNCTIONAL
Brand: PINNACLE ALTRX

## 2018-02-27 DEVICE — SUMMIT FEMORAL STEM 12/14 TAPER TAPER ED W/POROCOAT SIZE 5 STD 145MM
Type: IMPLANTABLE DEVICE | Site: HIP | Status: FUNCTIONAL
Brand: SUMMIT POROCOAT

## 2018-02-27 RX ORDER — SODIUM CHLORIDE 0.9 % (FLUSH) 0.9 %
5-10 SYRINGE (ML) INJECTION AS NEEDED
Status: DISCONTINUED | OUTPATIENT
Start: 2018-02-27 | End: 2018-02-27 | Stop reason: HOSPADM

## 2018-02-27 RX ORDER — ACETAMINOPHEN 500 MG
1000 TABLET ORAL EVERY 6 HOURS
Status: DISCONTINUED | OUTPATIENT
Start: 2018-02-28 | End: 2018-02-28 | Stop reason: HOSPADM

## 2018-02-27 RX ORDER — ZOLPIDEM TARTRATE 5 MG/1
5 TABLET ORAL
Status: DISCONTINUED | OUTPATIENT
Start: 2018-02-27 | End: 2018-02-28 | Stop reason: HOSPADM

## 2018-02-27 RX ORDER — MIDAZOLAM HYDROCHLORIDE 1 MG/ML
2 INJECTION, SOLUTION INTRAMUSCULAR; INTRAVENOUS
Status: DISCONTINUED | OUTPATIENT
Start: 2018-02-27 | End: 2018-02-27 | Stop reason: HOSPADM

## 2018-02-27 RX ORDER — PROPOFOL 10 MG/ML
INJECTION, EMULSION INTRAVENOUS AS NEEDED
Status: DISCONTINUED | OUTPATIENT
Start: 2018-02-27 | End: 2018-02-27 | Stop reason: HOSPADM

## 2018-02-27 RX ORDER — ASPIRIN 81 MG/1
81 TABLET ORAL EVERY 12 HOURS
Status: DISCONTINUED | OUTPATIENT
Start: 2018-02-27 | End: 2018-02-28 | Stop reason: HOSPADM

## 2018-02-27 RX ORDER — SODIUM CHLORIDE 9 MG/ML
50 INJECTION, SOLUTION INTRAVENOUS CONTINUOUS
Status: DISCONTINUED | OUTPATIENT
Start: 2018-02-27 | End: 2018-02-27 | Stop reason: HOSPADM

## 2018-02-27 RX ORDER — NALOXONE HYDROCHLORIDE 0.4 MG/ML
.2-.4 INJECTION, SOLUTION INTRAMUSCULAR; INTRAVENOUS; SUBCUTANEOUS
Status: DISCONTINUED | OUTPATIENT
Start: 2018-02-27 | End: 2018-02-28 | Stop reason: HOSPADM

## 2018-02-27 RX ORDER — SODIUM CHLORIDE 0.9 % (FLUSH) 0.9 %
5-10 SYRINGE (ML) INJECTION EVERY 8 HOURS
Status: DISCONTINUED | OUTPATIENT
Start: 2018-02-27 | End: 2018-02-27 | Stop reason: HOSPADM

## 2018-02-27 RX ORDER — SODIUM CHLORIDE 9 MG/ML
100 INJECTION, SOLUTION INTRAVENOUS CONTINUOUS
Status: DISCONTINUED | OUTPATIENT
Start: 2018-02-27 | End: 2018-02-28 | Stop reason: HOSPADM

## 2018-02-27 RX ORDER — AMOXICILLIN 250 MG
2 CAPSULE ORAL DAILY
Status: DISCONTINUED | OUTPATIENT
Start: 2018-02-28 | End: 2018-02-28 | Stop reason: HOSPADM

## 2018-02-27 RX ORDER — OXYCODONE AND ACETAMINOPHEN 5; 325 MG/1; MG/1
1 TABLET ORAL AS NEEDED
Status: DISCONTINUED | OUTPATIENT
Start: 2018-02-27 | End: 2018-02-27 | Stop reason: HOSPADM

## 2018-02-27 RX ORDER — DIPHENHYDRAMINE HCL 25 MG
25 CAPSULE ORAL
Status: DISCONTINUED | OUTPATIENT
Start: 2018-02-27 | End: 2018-02-28 | Stop reason: HOSPADM

## 2018-02-27 RX ORDER — ONDANSETRON 2 MG/ML
INJECTION INTRAMUSCULAR; INTRAVENOUS AS NEEDED
Status: DISCONTINUED | OUTPATIENT
Start: 2018-02-27 | End: 2018-02-27 | Stop reason: HOSPADM

## 2018-02-27 RX ORDER — ROPIVACAINE HYDROCHLORIDE 5 MG/ML
INJECTION, SOLUTION EPIDURAL; INFILTRATION; PERINEURAL AS NEEDED
Status: DISCONTINUED | OUTPATIENT
Start: 2018-02-27 | End: 2018-02-27 | Stop reason: HOSPADM

## 2018-02-27 RX ORDER — FAMOTIDINE 20 MG/1
20 TABLET, FILM COATED ORAL ONCE
Status: COMPLETED | OUTPATIENT
Start: 2018-02-27 | End: 2018-02-27

## 2018-02-27 RX ORDER — DIPHENHYDRAMINE HYDROCHLORIDE 50 MG/ML
12.5 INJECTION, SOLUTION INTRAMUSCULAR; INTRAVENOUS ONCE
Status: DISCONTINUED | OUTPATIENT
Start: 2018-02-27 | End: 2018-02-27 | Stop reason: HOSPADM

## 2018-02-27 RX ORDER — LIDOCAINE HYDROCHLORIDE 10 MG/ML
0.1 INJECTION INFILTRATION; PERINEURAL AS NEEDED
Status: DISCONTINUED | OUTPATIENT
Start: 2018-02-27 | End: 2018-02-27 | Stop reason: HOSPADM

## 2018-02-27 RX ORDER — BUPIVACAINE HYDROCHLORIDE 7.5 MG/ML
INJECTION, SOLUTION EPIDURAL; RETROBULBAR AS NEEDED
Status: DISCONTINUED | OUTPATIENT
Start: 2018-02-27 | End: 2018-02-27 | Stop reason: HOSPADM

## 2018-02-27 RX ORDER — TRANEXAMIC ACID 100 MG/ML
INJECTION, SOLUTION INTRAVENOUS AS NEEDED
Status: DISCONTINUED | OUTPATIENT
Start: 2018-02-27 | End: 2018-02-27 | Stop reason: HOSPADM

## 2018-02-27 RX ORDER — CELECOXIB 200 MG/1
200 CAPSULE ORAL EVERY 12 HOURS
Status: DISCONTINUED | OUTPATIENT
Start: 2018-02-27 | End: 2018-02-28 | Stop reason: HOSPADM

## 2018-02-27 RX ORDER — OXYCODONE HYDROCHLORIDE 5 MG/1
5 TABLET ORAL
Status: DISCONTINUED | OUTPATIENT
Start: 2018-02-27 | End: 2018-02-27 | Stop reason: HOSPADM

## 2018-02-27 RX ORDER — SODIUM CHLORIDE, SODIUM LACTATE, POTASSIUM CHLORIDE, CALCIUM CHLORIDE 600; 310; 30; 20 MG/100ML; MG/100ML; MG/100ML; MG/100ML
100 INJECTION, SOLUTION INTRAVENOUS CONTINUOUS
Status: DISCONTINUED | OUTPATIENT
Start: 2018-02-27 | End: 2018-02-27 | Stop reason: HOSPADM

## 2018-02-27 RX ORDER — NEOMYCIN AND POLYMYXIN B SULFATES 40; 200000 MG/ML; [USP'U]/ML
SOLUTION IRRIGATION AS NEEDED
Status: DISCONTINUED | OUTPATIENT
Start: 2018-02-27 | End: 2018-02-27 | Stop reason: HOSPADM

## 2018-02-27 RX ORDER — MIDAZOLAM HYDROCHLORIDE 1 MG/ML
2 INJECTION, SOLUTION INTRAMUSCULAR; INTRAVENOUS ONCE
Status: DISCONTINUED | OUTPATIENT
Start: 2018-02-27 | End: 2018-02-27 | Stop reason: HOSPADM

## 2018-02-27 RX ORDER — DEXAMETHASONE SODIUM PHOSPHATE 4 MG/ML
INJECTION, SOLUTION INTRA-ARTICULAR; INTRALESIONAL; INTRAMUSCULAR; INTRAVENOUS; SOFT TISSUE AS NEEDED
Status: DISCONTINUED | OUTPATIENT
Start: 2018-02-27 | End: 2018-02-27 | Stop reason: HOSPADM

## 2018-02-27 RX ORDER — HYDROMORPHONE HYDROCHLORIDE 2 MG/ML
0.5 INJECTION, SOLUTION INTRAMUSCULAR; INTRAVENOUS; SUBCUTANEOUS
Status: DISCONTINUED | OUTPATIENT
Start: 2018-02-27 | End: 2018-02-27 | Stop reason: HOSPADM

## 2018-02-27 RX ORDER — MORPHINE SULFATE 8 MG/ML
6 INJECTION, SOLUTION INTRAMUSCULAR; INTRAVENOUS
Status: DISCONTINUED | OUTPATIENT
Start: 2018-02-27 | End: 2018-02-28 | Stop reason: HOSPADM

## 2018-02-27 RX ORDER — ACETAMINOPHEN 10 MG/ML
1000 INJECTION, SOLUTION INTRAVENOUS ONCE
Status: COMPLETED | OUTPATIENT
Start: 2018-02-27 | End: 2018-02-27

## 2018-02-27 RX ORDER — PROPOFOL 10 MG/ML
INJECTION, EMULSION INTRAVENOUS
Status: DISCONTINUED | OUTPATIENT
Start: 2018-02-27 | End: 2018-02-27 | Stop reason: HOSPADM

## 2018-02-27 RX ORDER — OXYCODONE HYDROCHLORIDE 5 MG/1
10 TABLET ORAL
Status: DISCONTINUED | OUTPATIENT
Start: 2018-02-27 | End: 2018-02-28 | Stop reason: HOSPADM

## 2018-02-27 RX ORDER — FENTANYL CITRATE 50 UG/ML
25 INJECTION, SOLUTION INTRAMUSCULAR; INTRAVENOUS ONCE
Status: DISCONTINUED | OUTPATIENT
Start: 2018-02-27 | End: 2018-02-27 | Stop reason: HOSPADM

## 2018-02-27 RX ORDER — CEFAZOLIN SODIUM/WATER 2 G/20 ML
2 SYRINGE (ML) INTRAVENOUS ONCE
Status: COMPLETED | OUTPATIENT
Start: 2018-02-27 | End: 2018-02-27

## 2018-02-27 RX ORDER — KETOROLAC TROMETHAMINE 30 MG/ML
INJECTION, SOLUTION INTRAMUSCULAR; INTRAVENOUS AS NEEDED
Status: DISCONTINUED | OUTPATIENT
Start: 2018-02-27 | End: 2018-02-27 | Stop reason: HOSPADM

## 2018-02-27 RX ORDER — CEFAZOLIN SODIUM/WATER 2 G/20 ML
2 SYRINGE (ML) INTRAVENOUS EVERY 8 HOURS
Status: COMPLETED | OUTPATIENT
Start: 2018-02-27 | End: 2018-02-28

## 2018-02-27 RX ORDER — SODIUM CHLORIDE 0.9 % (FLUSH) 0.9 %
5-10 SYRINGE (ML) INJECTION EVERY 8 HOURS
Status: CANCELLED | OUTPATIENT
Start: 2018-02-27

## 2018-02-27 RX ORDER — DEXAMETHASONE SODIUM PHOSPHATE 100 MG/10ML
10 INJECTION INTRAMUSCULAR; INTRAVENOUS ONCE
Status: DISCONTINUED | OUTPATIENT
Start: 2018-02-28 | End: 2018-02-28 | Stop reason: HOSPADM

## 2018-02-27 RX ORDER — SODIUM CHLORIDE 0.9 % (FLUSH) 0.9 %
5-10 SYRINGE (ML) INJECTION AS NEEDED
Status: DISCONTINUED | OUTPATIENT
Start: 2018-02-27 | End: 2018-02-28 | Stop reason: HOSPADM

## 2018-02-27 RX ADMIN — TUBERCULIN PURIFIED PROTEIN DERIVATIVE 5 UNITS: 5 INJECTION, SOLUTION INTRADERMAL at 09:47

## 2018-02-27 RX ADMIN — TRANEXAMIC ACID 1000 MG: 100 INJECTION, SOLUTION INTRAVENOUS at 12:11

## 2018-02-27 RX ADMIN — Medication 2 G: at 20:23

## 2018-02-27 RX ADMIN — FAMOTIDINE 20 MG: 20 TABLET ORAL at 09:46

## 2018-02-27 RX ADMIN — SODIUM CHLORIDE, SODIUM LACTATE, POTASSIUM CHLORIDE, AND CALCIUM CHLORIDE 1000 ML: 600; 310; 30; 20 INJECTION, SOLUTION INTRAVENOUS at 09:47

## 2018-02-27 RX ADMIN — LIDOCAINE HYDROCHLORIDE 0.1 ML: 10 INJECTION, SOLUTION INFILTRATION; PERINEURAL at 09:46

## 2018-02-27 RX ADMIN — SODIUM CHLORIDE 100 ML/HR: 900 INJECTION, SOLUTION INTRAVENOUS at 15:00

## 2018-02-27 RX ADMIN — ASPIRIN 81 MG: 81 TABLET, COATED ORAL at 22:02

## 2018-02-27 RX ADMIN — SODIUM CHLORIDE, SODIUM LACTATE, POTASSIUM CHLORIDE, AND CALCIUM CHLORIDE: 600; 310; 30; 20 INJECTION, SOLUTION INTRAVENOUS at 11:58

## 2018-02-27 RX ADMIN — DEXAMETHASONE SODIUM PHOSPHATE 5 MG: 4 INJECTION, SOLUTION INTRA-ARTICULAR; INTRALESIONAL; INTRAMUSCULAR; INTRAVENOUS; SOFT TISSUE at 12:24

## 2018-02-27 RX ADMIN — PROPOFOL 40 MG: 10 INJECTION, EMULSION INTRAVENOUS at 12:17

## 2018-02-27 RX ADMIN — PROPOFOL 100 MCG/KG/MIN: 10 INJECTION, EMULSION INTRAVENOUS at 12:17

## 2018-02-27 RX ADMIN — Medication 2 G: at 12:05

## 2018-02-27 RX ADMIN — BUPIVACAINE HYDROCHLORIDE 1.4 ML: 7.5 INJECTION, SOLUTION EPIDURAL; RETROBULBAR at 12:06

## 2018-02-27 RX ADMIN — CELECOXIB 200 MG: 200 CAPSULE ORAL at 22:02

## 2018-02-27 RX ADMIN — ACETAMINOPHEN 1000 MG: 10 INJECTION, SOLUTION INTRAVENOUS at 18:13

## 2018-02-27 RX ADMIN — SODIUM CHLORIDE, SODIUM LACTATE, POTASSIUM CHLORIDE, AND CALCIUM CHLORIDE: 600; 310; 30; 20 INJECTION, SOLUTION INTRAVENOUS at 12:38

## 2018-02-27 RX ADMIN — ONDANSETRON 4 MG: 2 INJECTION INTRAMUSCULAR; INTRAVENOUS at 12:21

## 2018-02-27 RX ADMIN — OXYCODONE HYDROCHLORIDE 10 MG: 5 TABLET ORAL at 22:06

## 2018-02-27 RX ADMIN — OXYCODONE HYDROCHLORIDE 10 MG: 5 TABLET ORAL at 18:14

## 2018-02-27 NOTE — PERIOP NOTES
TRANSFER - IN REPORT:    Verbal report received from [id number]  on Nancy Barrier  being received from 3rd floor for routine progression of care      Report consisted of patients Situation, Background, Assessment and   Recommendations(SBAR). Information from the following report(s) Kardex was reviewed with the receiving nurse. Opportunity for questions and clarification was provided. Assessment completed upon patients arrival to unit and care assumed.

## 2018-02-27 NOTE — IP AVS SNAPSHOT
Lavaun Jeans 
 
 
 [address] [place]  
[phone] Patient: Jordi San MRN: [id number] About your hospitalization You were admitted on:  February 27, 2018 You last received care in the:  [facility] You were discharged on:  February 28, 2018 Why you were hospitalized Your primary diagnosis was:  Not on File Your diagnoses also included: Arthritis Of Left Hip Follow-up Information Follow up With Details Comments Contact Info En Tavarez PA-C  As needed [address] [place] [facility] [address] 
[phone] Jyothi Dover MD  As scheduled by office [address] [address] 
[phone] [address]  Will contact you within 48 hrs 27[address] [facility] [address] [place] [address] 
[phone] Discharge Orders None A check sid indicates which time of day the medication should be taken. My Medications START taking these medications Instructions Each Dose to Equal  
 Morning Noon Evening Bedtime  
 aspirin delayed-release 81 mg tablet Your next dose is: Today Take 1 Tab by mouth every twelve (12) hours every twelve (12) hours. 81 mg  
    
   
   
  
   
  
 oxyCODONE IR 10 mg Tab immediate release tablet Commonly known as:  Dundas Boyds Your next dose is: Today Take 1 Tab by mouth every three (3) hours as needed. Max Daily Amount: 80 mg.  
 10 mg CONTINUE taking these medications Instructions Each Dose to Equal  
 Morning Noon Evening Bedtime  
 ammonium lactate 12 % lotion Commonly known as:  LAC-HYDRIN Your next dose is:  Tomorrow  
   
 rub in to affected area well daily after bathing DISABLED PLACARD DMV Commonly known as:  DISABLED PLACARD To use daily magnesium oxide 400 mg tablet Commonly known as:  MAG-OX Your next dose is:  Tomorrow Take 800 mg by mouth daily. 800 mg  
    
  
   
   
   
  
 TYLENOL PM PO Your next dose is: Today Take 2 Tabs by mouth nightly. 2 Tab  
    
   
   
   
  
  
 VITAMIN D3 1,000 unit tablet Generic drug:  cholecalciferol Your next dose is:  Tomorrow Take 2,000 Units by mouth daily. 2000 Units Where to Get Your Medications These medications were sent to [facility],  [address], [address] [facility] [address], [address] [facility] [place] Phone:  [phone]  
  aspirin delayed-release 81 mg tablet Information on where to get these meds will be given to you by the nurse or doctor. ! Ask your nurse or doctor about these medications  
  oxyCODONE IR 10 mg Tab immediate release tablet Discharge Instructions [facility] [employer] Patient Discharge Instructions Mallory Nicholas / [id number] : 1940 Admitted 2018 Discharged: 2018 IF YOU HAVE ANY PROBLEMS ONCE YOU ARE AT HOME CALL THE FOLLOWING NUMBERS:  
Main office number: [phone] Take Home Medications · It is important that you take the medication exactly as they are prescribed. · Keep your medication in the bottles provided by the pharmacist and keep a list of the medication names, dosages, and times to be taken in your wallet. · Do not take other medications without consulting your doctor. What to do at [facility] Resume your prehospital diet. If you have excessive nausea or vomitting call your doctor's office Home Physical Therapy is arranged. Use rolling walker when walking. Patients who have had a joint replacement should not drive until you are seen for your follow up appointment by Dr. Ally Weller. When to Call - Call if you have a temperature greater then 101 - Unable to keep food down - Loose control of your bladder or bowel function - Are unable to bear any weight  
- Need a pain medication refill DISCHARGE SUMMARY from Nurse The following personal items collected during your admission are returned to you:  
Dental Appliance: Dental Appliances: At home, Lowers, Uppers Vision: Visual Aid: None Hearing Aid:   na 
Jewelry: Jewelry: None Clothing:   self Other Valuables: Other Valuables: None (pt denies valuables except ID) Valuables sent to safe:   na 
 
PATIENT INSTRUCTIONS: 
 
After general anesthesia or intravenous sedation, for 24 hours or while taking prescription Narcotics: · Limit your activities · Do not drive and operate hazardous machinery · Do not make important personal or business decisions · Do  not drink alcoholic beverages · If you have not urinated within 8 hours after discharge, please contact your surgeon on call. Report the following to your surgeon: 
· Excessive pain, swelling, redness or odor of or around the surgical area · Temperature over 101 · Nausea and vomiting lasting longer than 4 hours or if unable to take medications · Any signs of decreased circulation or nerve impairment to extremity: change in color, persistent  numbness, tingling, coldness or increase pain · Follow hip precautions @ all times! · Any questions, call office @ [phone] Keep scheduled follow up appointment. If need to change, call office @ [phone]. *  Please give a list of your current medications to your Primary Care Provider. *  Please update this list whenever your medications are discontinued, doses are 
    changed, or new medications (including over-the-counter products) are added. *  Please carry medication information at all times in case of emergency situations. Hip Replacement Surgery (Posterior): What to Expect at [facility] Your Recovery Hip replacement surgery replaces the worn parts of your hip joint.  When you leave the hospital, you will probably be walking with crutches or a walker. You may be able to climb a few stairs and get in and out of bed and chairs. But you will need someone to help you at home for the next few weeks or until you have more energy and can move around better. If there is no one to help you at home, you may go to a rehabilitation center or long-term care center. You will go home with a bandage and stitches or staples. You can remove the bandage when your doctor tells you to. Your doctor will remove your stitches or staples 10 days to 3 weeks after your surgery. You may still have some mild pain, and the area may be swollen for 3 to 4 months after surgery. Your doctor will give you medicine for the pain. You will continue the rehabilitation program (rehab) you started in the hospital. The better you do with your rehab exercises, the sooner you will get your strength and movement back. Most people are able to return to work 4 weeks to 4 months after surgery. This care sheet gives you a general idea about how long it will take for you to recover. But each person recovers at a different pace. Follow the steps below to get better as quickly as possible. How can you care for yourself at home? Activity ? · Your doctor may not want your affected leg to cross the center of your body toward the other leg. If so, your therapist may suggest these ideas: ¨ Do not cross your legs. ¨ Be very careful as you get in or out of bed or a car, so your leg does not cross that imaginary line in the middle of your body. ? · Rest when you feel tired. You may take a nap, but do not stay in bed all day. ? · Work with your physical therapist to learn the best way to exercise. You may be able to take frequent, short walks using crutches or a walker. You will probably have to use crutches or a walker for at least 4 to 6 weeks.   
? · Your doctor may advise you to stay away from activities that put stress on the joint. This includes sports such as tennis, football, and jogging. ? · Try not to sit for too long at one time. You will feel less stiff if you take a short walk about every hour. When you sit, use chairs with arms, and do not sit in low chairs. ? · Do not bend over more than 90 degrees (like the angle in a letter \"L\"). ? · Sleep on your back with your legs slightly apart or on your side with a pillow between your knees for about 6 weeks or as your doctor tells you. Do not sleep on your stomach or affected leg. ? · You may need to take sponge baths until your stitches or staples have been removed. You will probably be able to shower 24 hours after they are removed. ? · Ask your doctor when you can drive again. ? · Most people are able to return to work 4 weeks to 4 months after surgery. ? · Ask your doctor when it is okay for you to have sex. Diet ? · By the time you leave the hospital, you will probably be eating your normal diet. If your stomach is upset, try bland, low-fat foods like plain rice, broiled chicken, toast, and yogurt. Your doctor may recommend that you take iron and vitamin supplements. ? · Drink plenty of fluids (unless your doctor tells you not to). ? · Eat healthy foods, and watch your portion sizes. Try to stay at your ideal weight. Too much weight puts more stress on your new hip joint. ? · You may notice that your bowel movements are not regular right after your surgery. This is common. Try to avoid constipation and straining with bowel movements. You may want to take a fiber supplement every day. If you have not had a bowel movement after a couple of days, ask your doctor about taking a mild laxative. Medicines ? · Your doctor will tell you if and when you can restart your medicines. He or she will also give you instructions about taking any new medicines.   
? · If you take blood thinners, such as warfarin (Coumadin), clopidogrel (Plavix), or aspirin, be sure to talk to your doctor. He or she will tell you if and when to start taking those medicines again. Make sure that you understand exactly what your doctor wants you to do.  
? · Your doctor may give you a blood-thinning medicine to prevent blood clots. If you take a blood thinner, be sure you get instructions about how to take your medicine safely. Blood thinners can cause serious bleeding problems. This medicine could be in pill form or as a shot (injection). If a shot is necessary, your doctor will tell you how to do this. ? · Be safe with medicines. Take pain medicines exactly as directed. ¨ If the doctor gave you a prescription medicine for pain, take it as prescribed. ¨ If you are not taking a prescription pain medicine, ask your doctor if you can take an over-the-counter medicine. ? · If you think your pain medicine is making you sick to your stomach: 
¨ Take your medicine after meals (unless your doctor has told you not to). ¨ Ask your doctor for a different pain medicine. ? · If your doctor prescribed antibiotics, take them as directed. Do not stop taking them just because you feel better. You need to take the full course of antibiotics. Incision care ? · You will have a bandage over the cut (incision) and staples or stitches. Follow your doctor's instructions on when to take the bandage off. Giving the incision air will help it heal.  
? · Your doctor will remove the staples or stitches 10 days to 3 weeks after the surgery and replace them with strips of tape. Leave the strips on for a week or until they fall off. Exercise ? · Your rehab program will include a number of exercises to do. Always do them as your therapist tells you. Ice and elevation ? · For pain, put ice or a cold pack on the area for 10 to 20 minutes at a time. Put a thin cloth between the ice and your skin. ? · Your ankle may swell for about 3 months.  Prop up your ankle when you ice it or anytime you sit or lie down. Try to keep it above the level of your heart. This will help reduce swelling. Other instructions ? Continue to wear your support stockings as your doctor says. These help to prevent blood clots. The length of time that you will have to wear them depends on your activity level and the amount of swelling you have. Most people wear these stockings for 4 to 6 weeks after surgery. ?Preventing falls is also very important. To prevent falls: 
? · Arrange furniture so that you will not trip on it. ? · Get rid of throw rugs, and move electrical cords out of the way. ? · Walk only in areas with plenty of light. ? · Put grab bars in showers and bathtubs. ? · Avoid icy or snowy sidewalks. ? · Wear shoes with sturdy, flat soles. Follow-up care is a key part of your treatment and safety. Be sure to make and go to all appointments, and call your doctor if you are having problems. It's also a good idea to know your test results and keep a list of the medicines you take. When should you call for help? Call 911 anytime you think you may need emergency care. For example, call if: 
? · You passed out (lost consciousness). ? · You have severe trouble breathing. ? · You have sudden chest pain and shortness of breath, or you cough up blood. ?Call your doctor now or seek immediate medical care if: 
? · You have signs that your hip may be dislocated, including: ¨ Severe pain and not being able to stand. ¨ A crooked leg that looks like your hip is out of position. ¨ Not being able to bend or straighten your leg. ? · Your leg or foot is cool or pale or changes color. ? · You cannot feel or move your leg. ? · You have signs of a blood clot, such as: 
¨ Pain in your calf, back of the knee, thigh, or groin. ¨ Redness and swelling in your leg or groin. ? · Your incision comes open and begins to bleed, or the bleeding increases. ? · You feel like your heart is racing or beating irregularly. ? · You have signs of infection, such as: 
¨ Increased pain, swelling, warmth, or redness. ¨ Red streaks leading from the incision. ¨ Pus draining from the incision. ¨ A fever. ? Watch closely for changes in your health, and be sure to contact your doctor if: 
? · You do not have a bowel movement after taking a laxative. ? · You do not get better as expected. Where can you learn more? Go to http://luna-esa.info/. Enter V264 in the search box to learn more about \"Hip Replacement Surgery (Posterior): What to Expect at Home. \" Current as of: March 21, 2017 Content Version: 11.4 © 8285-6838 Access Point. Care instructions adapted under license by [employer] (which disclaims liability or warranty for this information). If you have questions about a medical condition or this instruction, always ask your healthcare professional. [place] [address] any warranty or liability for your use of this information. These are general instructions for a healthy lifestyle: No smoking/ No tobacco products/ Avoid exposure to second hand smoke Surgeon General's Warning:  Quitting smoking now greatly reduces serious risk to your health. Obesity, smoking, and sedentary lifestyle greatly increases your risk for illness A healthy diet, regular physical exercise & weight monitoring are important for maintaining a healthy lifestyle You may be retaining fluid if you have a history of heart failure or if you experience any of the following symptoms:  Weight gain of 3 pounds or more overnight or 5 pounds in a week, increased swelling in our hands or feet or shortness of breath while lying flat in bed. Please call your doctor as soon as you notice any of these symptoms; do not wait until your next office visit. Recognize signs and symptoms of STROKE: 
 
F-face looks uneven A-arms unable to move or move even S-speech slurred or non-existent T-time-call 911 as soon as signs and symptoms begin-DO NOT go Back to bed or wait to see if you get better-TIME IS BRAIN. The discharge information has been reviewed with the patient. The patient verbalized understanding. Information obtained by : 
I understand that if any problems occur once I am at home I am to contact my physician. I understand and acknowledge receipt of the instructions indicated above. Physician's or R.N.'s Signature                                                                  Date/Time Patient or Representative Signature                                                          Date/Time 
 
 
 
ACO Transitions of Care Introducing [employer] [address] Stella Westley offers a voluntary care coordination program to provide high quality service and care to [facility] fee-for-service beneficiaries. Author Cullen was designed to help you enhance your health and well-being through the following services: ? Transitions of Care  support for individuals who are transitioning from one care setting to another (example: Hospital to home). ? Chronic and Complex Care Coordination  support for individuals and caregivers of those with serious or chronic illnesses or with more than one chronic (ongoing) condition and those who take a number of different medications. If you meet specific medical criteria, a [facility] [address] may call you directly to coordinate your care with your primary care physician and your other care providers. For questions about the [facility] programs, please, contact your physicians office. For general questions or additional information about Accountable Care Organizations: 
Please visit www.medicare.gov/acos. html or call 1-800-MEDICARE (6-763.138.8535) TTY users should call 2-591.111.4593. Introducing [facility] SERVICES! [place] introduces [employer] Best patient portal. Now you can access parts of your medical record, email your doctor's office, and request medication refills online. 1. In your internet browser, go to [email or web address] 2. Click on the First Time User? Click Here link in the Sign In box. You will see the New Member Sign Up page. 3. Enter your CAD Best Access Code exactly as it appears below. You will not need to use this code after youve completed the sign-up process. If you do not sign up before the expiration date, you must request a new code. · CAD Best Access Code: [email or web address] Expires: 5/13/2018 11:02 AM 
 
4. Enter the last four digits of your Social Security Number (xxxx) and Date of Birth (mm/dd/yyyy) as indicated and click Submit. You will be taken to the next sign-up page. 5. Create a CAD Best ID. This will be your CAD Best login ID and cannot be changed, so think of one that is secure and easy to remember. 6. Create a CAD Best password. You can change your password at any time. 7. Enter your Password Reset Question and Answer. This can be used at a later time if you forget your password. 8. Enter your e-mail address. You will receive e-mail notification when new information is available in [address] 9. Click Sign Up. You can now view and download portions of your medical record. 10. Click the Download Summary menu link to download a portable copy of your medical information. If you have questions, please visit the Frequently Asked Questions section of the CAD Best website.  Remember, CAD Best is NOT to be used for urgent needs. For medical emergencies, dial 911. Now available from your iPhone and Android! Providers Seen During Your Hospitalization Provider Specialty Primary office phone Krishan Thomas MD Orthopedic Surgery [phone] Immunizations Administered for This Admission Name Date  
 TB Skin Test (PPD) Intradermal 2/27/2018 Your Primary Care Physician (PCP) Primary Care Physician Office Phone Office Fax Diane Solis [phone] You are allergic to the following Allergen Reactions Ciprofloxacin Other (comments) Elevated BP Codeine Palpitations Demerol (Meperidine) Unknown (comments)  
 hallucinations Dilaudid (Hydromorphone (Bulk)) Other (comments)  
 hallucinations Gluten Other (comments) Hydromorphone Other (comments) Ketoconazole Unknown (comments) Intolerance/caused burning sensation Plaquenil (Hydroxychloroquine) Other (comments) Pt could stand up or get up. Eyes got really big but pt could not see ? Pt states that it was 2-3 days before she got her senses back. Prednisone Other (comments) Irritability Yellow Dye Itching Yellow  Dye in cheddar cheese- can eat white cheddar Recent Documentation Height Weight BMI OB Status Smoking Status 1.676 m 78.5 kg 27.94 kg/m2 Hysterectomy Never Smoker Emergency Contacts Name Discharge Info Relation Home Work Mobile [address] CAREGIVER [3] Spouse [3] [phone] Current  Child [2] [phone] Patient Belongings The following personal items are in your possession at time of discharge: 
  Dental Appliances: At home, Lowers, Uppers  Visual Aid: None      Home Medications: None   Jewelry: None       Other Valuables: None (pt denies valuables except ID) Please provide this summary of care documentation to your next provider. Signatures-by signing, you are acknowledging that this After Visit Summary has been reviewed with you and you have received a copy. Patient Signature:  ____________________________________________________________ Date:  ____________________________________________________________  
  
[facility] Esteban Provider Signature:  ____________________________________________________________ Date:  ____________________________________________________________

## 2018-02-27 NOTE — PERIOP NOTES
TRANSFER - OUT REPORT:    Verbal report given to receiving nurse Donaldo(name) on Jovita Champange  being transferred to [facility](unit) for routine progression of care       Report consisted of patients Situation, Background, Assessment and   Recommendations(SBAR). Information from the following report(s) OR Summary, Procedure Summary, Intake/Output and MAR was reviewed with the receiving nurse. Opportunity for questions and clarification was provided.       Patient transported with:   [employer]

## 2018-02-27 NOTE — OP NOTES
Estrada Yanez, [id number], 1940    Pre-operative Diagnosis: Unilateral primary rheumatoid arthritis, left hip [M16.12]      Post-operative Diagnosis: Unilateral rheumatoid, left hip [M16.12]     Procedure: Left Total Hip Arthroplasty     BMI: Body mass index is 27.94 kg/(m^2). Jesica Rand Location: [address] [place] [address]      Surgeon: Bairon Jackson MD      Assistant:  ERICA Carmen    Anesthesia: Spinal      EBL: 250  cc     Procedure: Total Hip Arthroplasty    The complexity of total joint surgery requires the use of a skilled first assistant for positioning, retraction and assistance in closure. Estrada Yanez was brought to the operating room and positioned on the operating table. Anesthesia was administered. A savage catheter was placed preoperatively and IV antibiotics were administered, along with IV transexamic acid. A time out identifying the patient, procedure, operative side and surgeon was administered and charted by the OR Nurse. Estrada Yanez was positioned on left lateral decubitus position. The limb was prepped and draped in the usual sterile fashion. The Posterior approach was utilized to expose the hip. The incision was carried through the subcutaneous tissue and underlying fascia with hemostasis obtained using the bovie cauterization. A Charmley retractor was inserted. The short external rotators were divided at their insertion. The sciatic nerve was palpated and identified. Next, a T-shaped capsular incision was accomplished and femoral head was dislocated. The articular surface revealed loss of cartilage, exposed bone and bone spurring. The neck was osteotomized in the appropriate position just above the lesser trochanteric region. The neck cut was measured to be 11 mm. Acetabular retractors were placed and the appropriate capsulotomy performed. Soft tissue was removed from the acetabulum. The acetabular surface revealed loss of cartilage with exposed bone.  The acetabulum was sequentially reamed. Using trial components, the acetabulum was sized to a 52 mm [place] acetabular cup. The acetabular component was impacted to achieve appropriate horizontal tilt, anteversion, coverage, and stability. 1 screw was  used to stabilize the cup. The polyethelene liner was impacted into position. Utilizing the femoral retractor, the canal was prepared with appropriate lateralization and reamed with the starter reamer. The canal was then broached progressively. The broach was positioned with the appropriate anatomic femoral anteversion. A calcar planar was utilized. A trial reduction with a +5 neck length was utilized. This was found to be the most stable to flexion greater than 90 degrees and on internal and external rotation. Limb lengths were thought to be equilibrated and with appropriate stability as mentioned above. All trial components were removed. The cementless permanent size 5 std [place] stem was impacted into place. A trial reduction was performed and the above neck length was selected. The permanent femoral head was impacted into place. Eliel Ferrari's hip was reduced and stability was as mentioned above. Copious irrigation was accomplished about the surgical site. The sciatic nerve was palpated and noted to be intact. The capsule was repaired with a #1 PDS and the external rotators were reattached with a #5 Mersilene. A lavage of diluted betadine solution of 17.5 ml Betadine in 500 of 0.9% Normal Saline was allowed to soak in the wound for 3 minutes after implanting of the prosthesis. The wound was irrigated with Saline again before closure. Prior to the final skin closure, full strength betadine was applied to the skin surrounding the skin incision. The operative hip was injected prior to closure for post operative pain management. A #1 PDS suture was used to re-approximate the fascia.  60 cc of transexamic acid was injected under the fascia for hemostasis. Monocryl sutures were used to approximate the subcutaneous layers. Staples were used to reapproximate the skin edges and then a sterile bandage was placed. The patient was then rolled to a supine position. The sponge and needle counts were correct. The patient tolerated the procedure without difficulty and left the operating room in satisfactory condition. Implants:   Implant Name Type Inv.  Item Serial No.  Lot No. LRB No. Used   SCR ACET CANC PINN [id number] SS --  - [id number]  SCR ACET CANC PINN 6.5X25MM SS --  [id number] [facility] [id number] Left 1   LINER ACET PINN NEUT [id number] -- [employer] - [id number]   LINER ACET PINN NEUT [id number] -- [employer]TR[employer] [id number] [facility] [id number] Left 1        By: Johann Waddell MD

## 2018-02-27 NOTE — ANESTHESIA POSTPROCEDURE EVALUATION
Post-Anesthesia Evaluation and Assessment    Patient: Ann Dubon MRN: [id number]  SSN: [id number]    YOB: 1940  Age: 68 y.o. Sex: female       Cardiovascular Function/Vital Signs  Visit Vitals    /68    Pulse (!) 55    Temp 36.8 °C (98.2 °F)    Resp 15    Ht 5' 6\" (1.676 m)    Wt 78.5 kg (173 lb 1.6 oz)    SpO2 95%    BMI 27.94 kg/m2       Patient is status post spinal anesthesia for Procedure(s):  LEFT HIP ARTHROPLASTY TOTAL / Hobert Browner. Nausea/Vomiting: None    Postoperative hydration reviewed and adequate. Pain:  Pain Scale 1: Numeric (0 - 10) (02/27/18 1411)  Pain Intensity 1: 0 (02/27/18 1411)   Managed    Neurological Status:   Neuro (WDL): Within Defined Limits (02/27/18 1411)  Neuro  Neurologic State: Alert (02/27/18 1411)  Orientation Level: Oriented X4 (02/27/18 1411)  Speech: Clear (02/27/18 1411)  LUE Motor Response: Purposeful (02/27/18 1326)  LLE Motor Response: Pharmacologically paralyzed (02/27/18 1326)  RUE Motor Response: Purposeful (02/27/18 1326)  RLE Motor Response: Pharmacologically paralyzed (02/27/18 1326)   At baseline    Mental Status and Level of Consciousness: Arousable    Pulmonary Status:   O2 Device: Room air (02/27/18 1426)   Adequate oxygenation and airway patent    Complications related to anesthesia: None    Post-anesthesia assessment completed.  No concerns    Signed By: Jyoti Mcintosh MD     February 27, 2018

## 2018-02-27 NOTE — ANESTHESIA PREPROCEDURE EVALUATION
Anesthetic History     PONV          Review of Systems / Medical History  Patient summary reviewed and pertinent labs reviewed    Pulmonary                   Neuro/Psych              Cardiovascular    Hypertension          Hyperlipidemia         GI/Hepatic/Renal  Within defined limits              Endo/Other        Arthritis, cancer (breast) and anemia     Other Findings                 Anesthetic Plan    ASA: 2  Anesthesia type: spinal

## 2018-02-27 NOTE — PROGRESS NOTES
TRANSFER - IN REPORT:    Verbal report received from Milan RN(name) on Vanessa Burnett  being received from PACU (unit) for routine progression of care      Report consisted of patients Situation, Background, Assessment and   Recommendations(SBAR). Information from the following report(s) SBAR, Kardex, OR Summary, Procedure Summary, Intake/Output, MAR, Accordion, Recent Results and Med Rec Status was reviewed with the receiving nurse. Opportunity for questions and clarification was provided. Assessment completed upon patients arrival to unit and care assumed.

## 2018-02-27 NOTE — PROGRESS NOTES
Problem: Mobility Impaired (Adult and Pediatric)  Goal: *Acute Goals and Plan of Care (Insert Text)  GOALS (1-4 days):  (1.)Ms. Edith Coffey will move from supine to sit and sit to supine  in bed with CONTACT GUARD ASSIST.    (2.)Ms. Edith Coffey will transfer from bed to chair and chair to bed with CONTACT GUARD ASSIST using the least restrictive device. (3.)Ms. Edith Coffey will ambulate with CONTACT GUARD ASSIST for 75 feet with the least restrictive device. (4.)Ms. Edith Coffey will state/observe RHINA precautions with 0 verbal cues. ________________________________________________________________________________________________      PHYSICAL THERAPY Joint camp Rhina: Initial Assessment, Treatment Day: Day of Assessment, PM 2/27/2018  INPATIENT: Hospital Day: 1  [place] / Product Type: Medicare /      NAME/AGE/GENDER: Adam Marmolejo is a 68 y.o. female   PRIMARY DIAGNOSIS:  Unilateral primary osteoarthritis, left hip [M16.12]   Procedure(s) and Anesthesia Type:     * LEFT HIP ARTHROPLASTY TOTAL / Arva Furnish - Spinal (Left)  ICD-10: Treatment Diagnosis:    · Pain in left hip (M25.552)  · Stiffness of Left Hip, Not elsewhere classified (M25.652)  · Difficulty in walking, Not elsewhere classified (R26.2)      ASSESSMENT:     Ms. Edith Coffey presents supine on contact and agreeable to therapy assessment. She has RA and has multiple deformities in hands and feet. She is here with her spouse who is supportive. At home, he provided assist with her with transfers and bathing. She has had B TKas and R Rhina in the past. She plans on going home at [place] with his assist. She was still a little numb from surgery but was able to stand and take one step at bedside. Used hand held assist due to her hands, may try a bilateral platform walker in the morning. When returning supine she asked her spouse to get out the plastic bag to use to help her slide easier into supine.  She will benefit from skilled PT interventions to maximize independence with functional mobility, ensure safety and help increase her independence with RHINA HEP. Hope to progress in the morning. This section established at most recent assessment   PROBLEM LIST (Impairments causing functional limitations):  1. Decreased Strength  2. Decreased ADL/Functional Activities  3. Decreased Transfer Abilities  4. Decreased Ambulation Ability/Technique  5. Increased Pain  6. Decreased Activity Tolerance  7. Decreased Flexibility/Joint Mobility  8. Edema/Girth  9. Decreased [place] with Home Exercise Program   INTERVENTIONS PLANNED: (Benefits and precautions of physical therapy have been discussed with the patient.)  1. Bed Mobility  2. Cold  3. Gait Training  4. Home Exercise Program (HEP)  5. Therapeutic Exercise/Strengthening  6. Transfer Training  7. Range of Motion: active/assisted/passive  8. Therapeutic Activities  9. Group Therapy     TREATMENT PLAN: Frequency/Duration: Follow patient BID   to address above goals. Rehabilitation Potential For Stated Goals: Good     RECOMMENDED REHABILITATION/EQUIPMENT: (at time of discharge pending progress): Continue Skilled Therapy and Home Health: Physical Therapy. HISTORY:   History of Present Injury/Illness (Reason for Referral):  Pt s/p left RHINA on 2/27/18  Past Medical History/Comorbidities:   Ms. Radha Esqueda  has a past medical history of Anemia, unspecified; Arthritis; Autoimmune disease ([address] Breast cancer ([address] Cancer ([address] (07/2013); Chronic pain; HTN (hypertension) (03/18/2012); Hypercholesterolemia; Ill-defined condition; Menopause; Nausea & vomiting; Osteoarthritis of both shoulders; Osteoarthritis of right hip; Poor historian; Rheumatoid arthritis ([address] Right Total knee replacement status (3/15/2012); Unspecified adverse effect of anesthesia; and Vitamin D deficiency disease. She also has no past medical history of Adverse effect of anesthesia; Aneurysm ([address] Arrhythmia;  Asthma; CAD (coronary artery disease); Chronic kidney disease; Chronic obstructive pulmonary disease ([facility] Coagulation defects; Coagulation disorder ([facility] Diabetes ([facility] Difficult intubation; Endocarditis; GERD (gastroesophageal reflux disease); Heart failure ([facility] Liver disease; Malignant hyperthermia due to anesthesia; Morbid obesity ([facility] Nicotine vapor product user; Non-nicotine vapor product user; Pseudocholinesterase deficiency; Psychiatric disorder; PUD (peptic ulcer disease); Rheumatic fever; Seizures ([facility] Stroke [facility]); Thromboembolus ([facility] Thyroid disease; or Unspecified sleep apnea. Ms. Syed Hendrickson  has a past surgical history that includes hx hysterectomy (1987); hx dilation and curettage; hx cholecystectomy (1987); hx appendectomy; hx breast lumpectomy (7/31/2013); hx knee replacement (Left, 8/2010); hx knee replacement (Right, 3/2012); and hx hip replacement (Right, 2016). Social History/Living Environment:   Home Environment: Private residence  # Steps to Enter: 3  Hand Rails : Right  One/Two Story Residence: One story  Living Alone: No  Support Systems: Spouse/Significant Other/Partner  Patient Expects to be Discharged to[de-identified] Private residence  Current DME Used/Available at Home: Mulu Nestle, straight, Commode, bedside, Walker, rolling  Tub or Shower Type: Shower  Prior Level of Function/Work/Activity:  Pt with RA, has assist with transfers and bathing and dressing from her spouse   Number of Personal Factors/Comorbidities that affect the Plan of Care: 1-2: MODERATE COMPLEXITY   EXAMINATION:   Most Recent Physical Functioning:   Gross Assessment: Yes  Gross Assessment  AROM: Generally decreased, functional (right lower extremity, decreased knee flexion)  Strength: Generally decreased, functional                     Bed Mobility  Supine to Sit: Moderate assistance  Sit to Supine: Moderate assistance    Transfers  Sit to Stand: Minimum assistance; Moderate assistance;Assist x2  Stand to Sit: Minimum assistance; Moderate assistance;Assist x2    Balance  Sitting: Intact  Standing: Pull to stand; With support    Posture  Posture (WDL): Exceptions to WDL  Posture Assessment: Forward head;Rounded shoulders         Weight Bearing Status  Left Side Weight Bearing: As tolerated  Distance (ft): 1 Feet (ft)  Ambulation - Level of Assistance: Minimal assistance;Assist x2  Assistive Device:  (hand held assist)  Base of Support: Center of gravity altered;Narrowed  Speed/Chen: Shuffled; Slow  Step Length: Left shortened;Right shortened  Stance: Left decreased  Gait Abnormalities: Antalgic;Decreased step clearance; Step to gait        Braces/Orthotics: none           Body Structures Involved:  1. Bones  2. Joints  3. Muscles Body Functions Affected:  1. Movement Related Activities and Participation Affected:  1. Mobility  2. Self Care   Number of elements that affect the Plan of Care: 4+: HIGH COMPLEXITY   CLINICAL PRESENTATION:   Presentation: Stable and uncomplicated: LOW COMPLEXITY   CLINICAL DECISION MAKIN[address] [place] [id number] AM-PAC 6 Clicks   Basic Mobility Inpatient Short Form  How much difficulty does the patient currently have. .. Unable A Lot A Little None   1. Turning over in bed (including adjusting bedclothes, sheets and blankets)? [] 1   [x] 2   [] 3   [] 4   2. Sitting down on and standing up from a chair with arms ( e.g., wheelchair, bedside commode, etc.)   [] 1   [x] 2   [] 3   [] 4   3. Moving from lying on back to sitting on the side of the bed? [] 1   [x] 2   [] 3   [] 4   How much help from another person does the patient currently need. .. Total A Lot A Little None   4. Moving to and from a bed to a chair (including a wheelchair)? [] 1   [x] 2   [] 3   [] 4   5. Need to walk in hospital room? [] 1   [x] 2   [] 3   [] 4   6. Climbing 3-5 steps with a railing? [] 1   [x] 2   [] 3   [] 4   © , Trustees of [address] [place] [address], under license to [employer].  All rights reserved Score:  Initial: 12 Most Recent: X (Date: -- )    Interpretation of Tool:  Represents activities that are increasingly more difficult (i.e. Bed mobility, Transfers, Gait). Score 24 23 22-20 19-15 14-10 9-7 6     Modifier CH CI CJ CK CL CM CN      ? Mobility - Walking and Moving Around:     - CURRENT STATUS: CL - 60%-79% impaired, limited or restricted    - GOAL STATUS: CJ - 20%-39% impaired, limited or restricted    - D/C STATUS:  ---------------To be determined---------------  [place] / Product Type: Medicare /      Medical Necessity:     · Patient is expected to demonstrate progress in strength, range of motion and functional technique to decrease assistance required with functional mobility and RHINA exercises. Reason for Services/Other Comments:  · Patient continues to require skilled intervention due to inability to complete functional mobility and RHINA exercises independently. Use of outcome tool(s) and clinical judgement create a POC that gives a: Clear prediction of patient's progress: LOW COMPLEXITY            TREATMENT:   (In addition to Assessment/Re-Assessment sessions the following treatments were rendered)     Pre-treatment Symptoms/Complaints:  none  Pain: Initial:   Pain Intensity 1: 2  Post Session:  2/10     Assessment/Reassessment only, no treatment provided today    Date:   Date:   Date:     ACTIVITY/EXERCISE AM PM AM PM AM PM   GROUP THERAPY  []  []  []  []  []  []   Ankle Pumps         Quad Sets         Gluteal Sets         Hip ABd/ADduction         Straight Leg Raises         Knee Slides         Short Arc Quads         Long Arc Quads         Chair Slides                  B = bilateral; AA = active assistive; A = active; P = passive      Treatment/Session Assessment:     Response to Treatment:  Tolerated well although still too numb to take lots of steps.     Education:  [] Home Exercises  [x] Fall Precautions  [] Hip Precautions [] D/C Instruction Review  [] Knee/Hip Prosthesis Review  [] Walker Management/Safety [] Adaptive Equipment as Needed       Interdisciplinary Collaboration:   o Occupational Therapist  o Registered Nurse    After treatment position/precautions:   o Supine in bed  o Bed/Chair-wheels locked  o Bed in low position  o Call light within reach  o Family at bedside    Compliance with Program/Exercises: compliant all of the time. Recommendations/Intent for next treatment session:  Treatment next visit will focus on increasing Ms. Ferrari's independence with bed mobility, transfers, gait training, strength/ROM exercises, modalities for pain, and patient education.       Total Treatment Duration:  PT Patient Time In/Time Out  Time In: 1600  Time Out: Jaiden Somers 70, PT

## 2018-02-27 NOTE — PROGRESS NOTES
02/27/18 1651   Oxygen Therapy   O2 Sat (%) 97 %   Pulse via Oximetry 77 beats per minute   O2 Device Room air   Incentive Spirometry Treatment   Actual Volume (ml) 1500 ml   Number of Attempts 2   Patient achieved 1500 Ml/sec on IS. Patient encouraged to do 10 breaths every hour while awake-patient agreed and demonstrated. No shortness of breath or distress noted. BS are clear b/l. Joint Camp notes reviewed- no other Respiratory orders noted.

## 2018-02-27 NOTE — ANESTHESIA PREPROCEDURE EVALUATION
Anesthetic History     PONV          Review of Systems / Medical History  Patient summary reviewed and pertinent labs reviewed    Pulmonary  Within defined limits                 Neuro/Psych   Within defined limits           Cardiovascular    Hypertension: well controlled          Hyperlipidemia    Exercise tolerance: <4 METS     GI/Hepatic/Renal  Within defined limits              Endo/Other        Arthritis and cancer (breast)     Other Findings              Physical Exam    Airway  Mallampati: I  TM Distance: 4 - 6 cm  Neck ROM: normal range of motion   Mouth opening: Normal     Cardiovascular  Regular rate and rhythm,  S1 and S2 normal,  no murmur, click, rub, or gallop  Rhythm: regular  Rate: normal         Dental  No notable dental hx       Pulmonary  Breath sounds clear to auscultation               Abdominal  GI exam deferred       Other Findings            Anesthetic Plan    ASA: 2  Anesthesia type: spinal            Anesthetic plan and risks discussed with: Patient

## 2018-02-27 NOTE — ANESTHESIA PROCEDURE NOTES
Spinal Block    Start time: 2/27/2018 12:03 PM  End time: 2/27/2018 12:06 PM  Performed by: James Roy  Authorized by: James Roy     Pre-procedure:   Indications: at surgeon's request and primary anesthetic  Preanesthetic Checklist: patient identified, risks and benefits discussed, anesthesia consent, patient being monitored and timeout performed    Timeout Time: 12:03          Spinal Block:   Patient Position:  Seated  Prep Region:  Lumbar  Prep: chlorhexidine      Location:  L3-4  Technique:  Single shot  Local:  Lidocaine 1%  Local Dose (mL):  5    Needle:   Needle Type:  Pencan  Needle Gauge:  25 G  Attempts:  1      Events: CSF confirmed, no blood with aspiration and no paresthesia        Assessment:  Insertion:  Uncomplicated  Patient tolerance:  Patient tolerated the procedure well with no immediate complications

## 2018-02-27 NOTE — PROGRESS NOTES
Problem: Self Care Deficits Care Plan (Adult)  Goal: *Acute Goals and Plan of Care (Insert Text)  GOALS:   DISCHARGE GOALS (in preparation for going home/rehab):  3 days  1. Ms. Ilir Stauffer will perform toileting/toilet transfer with contact guard assistance with adaptive equipment to demonstrate improved functional mobility and safety. 2.  Ms. Ilir Stauffer will perform shower transfer with contact guard assistance with adaptive equipment to demonstrate improved functional mobility and safety. 3.  Ms. Ilir Stauffer will state RHINA precautions with two verbal cues to demonstrate improved functional mobility and safety. JOINT CAMP OCCUPATIONAL THERAPY RHINA: Initial Assessment 2/27/2018  INPATIENT: Hospital Day: 1  [place] / Product Type: Medicare /      NAME/AGE/GENDER: Sunny Cao is a 68 y.o. female   PRIMARY DIAGNOSIS:  Unilateral primary osteoarthritis, left hip [M16.12]   Procedure(s) and Anesthesia Type:     * LEFT HIP ARTHROPLASTY TOTAL / Barnes Deysi - Spinal (Left)  ICD-10: Treatment Diagnosis:    · Pain in left hip (M25.552)  · Stiffness of Left Hip, Not elsewhere classified (I86.881)      ASSESSMENT:     Ms. Ilir Stauffer is s/p left RHINA and presents with decreased weight bearing on left LE and decreased independence with functional mobility and activities of daily living. Patient has RA with deformities in bilateral hands.  completes bathing and dressing. Patient would benefit from skilled Occupational Therapy to maximize independence and safety during functional mobility. Patient plans for further rehab at home with home health services and good family support, . OT reviewed therapy schedule and plan of care with patient. Patient was able to transfer as charted below. Patient instructed to call for assistance when needing to get up from the bed and all needs in reach. Patient verbalized understanding of call light.     This section established at most recent assessment   PROBLEM LIST (Impairments causing functional limitations):  1. Decreased Strength  2. Decreased ADL/Functional Activities  3. Decreased Transfer Abilities  4. Increased Pain  5. Increased Fatigue  6. Decreased Flexibility/Joint Mobility  7. Decreased Knowledge of Precautions   INTERVENTIONS PLANNED: (Benefits and precautions of occupational therapy have been discussed with the patient.)  1. Activities of daily living training  2. Adaptive equipment training  3. Balance training  4. Clothing management  5. Donning&doffing training  6. Theraputic activity     TREATMENT PLAN: Frequency/Duration: Follow patient 1-2 times to address above goals. Rehabilitation Potential For Stated Goals: Good     RECOMMENDED REHABILITATION/EQUIPMENT: (at time of discharge pending progress): Continue Skilled Therapy and Home Health: Physical Therapy. OCCUPATIONAL PROFILE AND HISTORY:   History of Present Injury/Illness (Reason for Referral): Pt presents this date s/p (left) RHINA. Past Medical History/Comorbidities:   Ms. Nhi Gross  has a past medical history of Anemia, unspecified; Arthritis; Autoimmune disease ([address] Breast cancer ([address] Cancer ([address] (07/2013); Chronic pain; HTN (hypertension) (03/18/2012); Hypercholesterolemia; Ill-defined condition; Menopause; Nausea & vomiting; Osteoarthritis of both shoulders; Osteoarthritis of right hip; Poor historian; Rheumatoid arthritis ([address] Right Total knee replacement status (3/15/2012); Unspecified adverse effect of anesthesia; and Vitamin D deficiency disease. She also has no past medical history of Adverse effect of anesthesia; Aneurysm ([address] Arrhythmia; Asthma; CAD (coronary artery disease); Chronic kidney disease; Chronic obstructive pulmonary disease ([address] Coagulation defects; Coagulation disorder ([address] Diabetes ([address] Difficult intubation; Endocarditis; GERD (gastroesophageal reflux disease); Heart failure ([address]  Liver disease; Malignant hyperthermia due to anesthesia; Morbid obesity ([facility] Nicotine vapor product user; Non-nicotine vapor product user; Pseudocholinesterase deficiency; Psychiatric disorder; PUD (peptic ulcer disease); Rheumatic fever; Seizures ([facility] Stroke [facility]); Thromboembolus ([address] Thyroid disease; or Unspecified sleep apnea. Ms. Kate Dye  has a past surgical history that includes hx hysterectomy (1987); hx dilation and curettage; hx cholecystectomy (1987); hx appendectomy; hx breast lumpectomy (7/31/2013); hx knee replacement (Left, 8/2010); hx knee replacement (Right, 3/2012); and hx hip replacement (Right, 2016). Social History/Living Environment:   Home Environment: Private residence  # Steps to Enter: 3  Hand Rails : Right  One/Two Story Residence: One story  Living Alone: No  Support Systems: Spouse/Significant Other/Partner  Patient Expects to be Discharged to[de-identified] Private residence  Current DME Used/Available at Home: Cane, straight, Commode, bedside, Walker, rolling  Tub or Shower Type: Shower  Prior Level of Function/Work/Activity:  Pt has RA that limits her ability to complete bathing and dressing,  assist with that but she transfers and ambulates on her own      Number of Personal Factors/Comorbidities that affect the Plan of Care: Brief history (0):  LOW COMPLEXITY   ASSESSMENT OF OCCUPATIONAL PERFORMANCE[de-identified]   Most Recent Physical Functioning:   Balance  Sitting: Intact  Standing: Pull to stand; With support       Patient Vitals for the past 6 hrs:   BP BP Patient Position SpO2 O2 Flow Rate (L/min) Pulse   02/27/18 1326 136/60 Supine 97 % - 82   02/27/18 1331 127/55 - 100 % 3 l/min 79   02/27/18 1336 126/57 - 100 % 3 l/min 77   02/27/18 1341 128/60 - 100 % 3 l/min 70   02/27/18 1356 129/60 - 100 % 2 l/min 75   02/27/18 1411 137/53 - 100 % 1 l/min 68   02/27/18 1426 148/68 - 95 % - (!) 55   02/27/18 1441 139/56 - 97 % - 72   02/27/18 1522 150/74 - 94 % - 80   02/27/18 1651 - - 97 % - -       Gross Assessment: Yes  Gross Assessment  AROM: Generally decreased, functional (right lower extremity, decreased knee flexion)  Strength: Generally decreased, functional                   Mental Status  Neurologic State: Alert  Orientation Level: Oriented X4  Cognition: Appropriate decision making  Perception: Appears intact  Perseveration: No perseveration noted  Safety/Judgement: Awareness of environment                Basic ADLs (From Assessment) Complex ADLs (From Assessment)   Basic ADL  Feeding: Setup  Oral Facial Hygiene/Grooming: Moderate assistance  Bathing: Total assistance  Upper Body Dressing: Total assistance  Lower Body Dressing: Total assistance  Toileting: Maximum assistance     Grooming/Bathing/Dressing Activities of Daily Living     Cognitive Retraining  Safety/Judgement: Awareness of environment                 Functional Transfers  Toilet Transfer : Maximum assistance  Shower Transfer: Maximum assistance     Bed/Mat Mobility  Supine to Sit: Moderate assistance  Sit to Supine: Moderate assistance  Sit to Stand: Minimum assistance; Moderate assistance;Assist x2         Physical Skills Involved:  1. Range of Motion  2. Balance  3. Strength Cognitive Skills Affected (resulting in the inability to perform in a timely and safe manner): 1. none Psychosocial Skills Affected:  1. Environmental Adaptation   Number of elements that affect the Plan of Care: 3-5:  MODERATE COMPLEXITY   CLINICAL DECISION MAKIN[address] [place] [address] AM-PAC 6 Clicks   Daily Activity Inpatient Short Form  How much help from another person does the patient currently need. .. Total A Lot A Little None   1. Putting on and taking off regular lower body clothing? [x] 1   [] 2   [] 3   [] 4   2. Bathing (including washing, rinsing, drying)? [x] 1   [] 2   [] 3   [] 4   3. Toileting, which includes using toilet, bedpan or urinal?   [] 1   [x] 2   [] 3   [] 4   4. Putting on and taking off regular upper body clothing?    [x] 1   [] 2   [] 3   [] 4   5.  Taking care of personal grooming such as brushing teeth? [] 1   [] 2   [x] 3   [] 4   6. Eating meals? [] 1   [] 2   [x] 3   [] 4   © 2007, Trustees of [address] [place] [address] [address] [address], under license to [employer]. All rights reserved     Score:  Initial:11 Most Recent: X (Date: -- )    Interpretation of Tool:  Represents activities that are increasingly more difficult (i.e. Bed mobility, Transfers, Gait). Score 24 23 22-20 19-15 14-10 9-7 6     Modifier CH CI CJ CK CL CM CN      ? Self Care:     - CURRENT STATUS: CL - 60%-79% impaired, limited or restricted    - GOAL STATUS: CL - 60%-79% impaired, limited or restricted    - D/C STATUS:  ---------------To be determined---------------  [place] / Product Type: Medicare /      Medical Necessity:     · Patient is expected to demonstrate progress in balance, coordination and functional technique to increase independence with functional mobility . Reason for Services/Other Comments:  · Patient  would benefit from skilled Occupational Therapy to maximize independence and safety during functional mobility. .   Use of outcome tool(s) and clinical judgement create a POC that gives a: LOW COMPLEXITY            TREATMENT:   (In addition to Assessment/Re-Assessment sessions the following treatments were rendered)     Pre-treatment Symptoms/Complaints:  Pt without complaint  Pain: Initial:      Post Session:  0     Assessment/Reassessment only, no treatment provided today    Treatment/Session Assessment:     Response to Treatment:  Pt up to edge of bed  and therapist assisted pt tolerated well.     Education:  [] Home Exercises  [x] Fall Precautions  [x] Hip Precautions [] Going Home Video  [] Knee/Hip Prosthesis Review  [x] Walker Management/Safety [x] Adaptive Equipment as Needed       Interdisciplinary Collaboration:   o Physical Therapist  o Occupational Therapist  o Registered Nurse    After treatment position/precautions:   o Supine in bed  o Bed/Chair-wheels locked  o Call light within reach  o RN notified  o Family at bedside     Compliance with Program/Exercises: compliant all of the time. Recommendations/Intent for next treatment session:  Treatment next visit will focus on increasing Ms. Vega's independence with bed mobility, transfers, functional mobility, modalities for pain, and patient education.       Total Treatment Duration:  OT Patient Time In/Time Out  Time In: 1610  Time Out: [address]

## 2018-02-27 NOTE — PERIOP NOTES
TRANSFER - OUT REPORT:    Verbal report given to Britta Souza RN on sIabella Dupont  being transferred to preop [facility] for routine progression of care       Report consisted of patients Situation, Background, Assessment and   Recommendations(SBAR). Information from the following report(s) SBAR, MAR and Recent Results was reviewed with the receiving nurse. Lines:   Peripheral IV 02/27/18 Right Hand (Active)   Site Assessment Clean, dry, & intact 2/27/2018  9:43 AM   Phlebitis Assessment 0 2/27/2018  9:43 AM   Dressing Status Clean, dry, & intact 2/27/2018  9:43 AM   Dressing Type Transparent;Tape 2/27/2018  9:43 AM   Hub Color/Line Status Pink; Infusing 2/27/2018  9:43 AM   Action Taken Blood drawn 2/27/2018  9:43 AM        Opportunity for questions and clarification was provided.       Patient transported with:   [employer]

## 2018-02-27 NOTE — PROGRESS NOTES
Admission Assessment Complete. Pt . Pt is A&Ox 3.  +2 pedal pulses. BLE pharmacologically paralyzed. Dressing is clean, dry and intact. IVF inusing. Barraza is draining straw yellow urine. Pt denies any pain or need at this time. Bed low and locked. Side rails x3. Call light with in reach. Pt verbalizes understanding of call light.

## 2018-02-27 NOTE — H&P
The patient has end stage arthritis of the left hip. The patient was seen and examined and there are no changes to the patient's orthopedic condition. They have tried conservative treatment for this condition; including antiinflammatories and lifestyle modifications and have failed. The necessity for the joint replacement is still present, and the H&P from the office is still current. The patient will be admitted today forProcedure(s) (LRB):  LEFT HIP ARTHROPLASTY TOTAL / Kyle Maynard (Left) .

## 2018-02-28 VITALS
HEART RATE: 72 BPM | DIASTOLIC BLOOD PRESSURE: 70 MMHG | HEIGHT: 66 IN | WEIGHT: 173.1 LBS | TEMPERATURE: 98 F | BODY MASS INDEX: 27.82 KG/M2 | SYSTOLIC BLOOD PRESSURE: 120 MMHG | OXYGEN SATURATION: 97 % | RESPIRATION RATE: 16 BRPM

## 2018-02-28 LAB
ANION GAP SERPL CALC-SCNC: 10 MMOL/L (ref 7–16)
BUN SERPL-MCNC: 10 MG/DL (ref 8–23)
CALCIUM SERPL-MCNC: 8.4 MG/DL (ref 8.3–10.4)
CHLORIDE SERPL-SCNC: 106 MMOL/L (ref 98–107)
CO2 SERPL-SCNC: 26 MMOL/L (ref 21–32)
CREAT SERPL-MCNC: 0.69 MG/DL (ref 0.6–1)
GLUCOSE SERPL-MCNC: 116 MG/DL (ref 65–100)
HGB BLD-MCNC: 11.2 G/DL (ref 11.7–15.4)
POTASSIUM SERPL-SCNC: 4.5 MMOL/L (ref 3.5–5.1)
SODIUM SERPL-SCNC: 142 MMOL/L (ref 136–145)

## 2018-02-28 PROCEDURE — 97116 GAIT TRAINING THERAPY: CPT

## 2018-02-28 PROCEDURE — 74011250637 HC RX REV CODE- 250/637: Performed by: PHYSICIAN ASSISTANT

## 2018-02-28 PROCEDURE — 97110 THERAPEUTIC EXERCISES: CPT

## 2018-02-28 PROCEDURE — 80048 BASIC METABOLIC PNL TOTAL CA: CPT | Performed by: PHYSICIAN ASSISTANT

## 2018-02-28 PROCEDURE — 85018 HEMOGLOBIN: CPT | Performed by: PHYSICIAN ASSISTANT

## 2018-02-28 PROCEDURE — 36415 COLL VENOUS BLD VENIPUNCTURE: CPT | Performed by: PHYSICIAN ASSISTANT

## 2018-02-28 PROCEDURE — 74011250636 HC RX REV CODE- 250/636: Performed by: PHYSICIAN ASSISTANT

## 2018-02-28 PROCEDURE — 97535 SELF CARE MNGMENT TRAINING: CPT

## 2018-02-28 PROCEDURE — 97150 GROUP THERAPEUTIC PROCEDURES: CPT

## 2018-02-28 RX ORDER — ASPIRIN 81 MG/1
81 TABLET ORAL EVERY 12 HOURS
Qty: 120 TAB | Refills: 0 | Status: SHIPPED | OUTPATIENT
Start: 2018-02-28 | End: 2022-02-17

## 2018-02-28 RX ORDER — OXYCODONE HYDROCHLORIDE 10 MG/1
10 TABLET ORAL
Qty: 50 TAB | Refills: 0 | Status: SHIPPED | OUTPATIENT
Start: 2018-02-28 | End: 2019-03-26 | Stop reason: CLARIF

## 2018-02-28 RX ADMIN — OXYCODONE HYDROCHLORIDE 10 MG: 5 TABLET ORAL at 08:37

## 2018-02-28 RX ADMIN — CELECOXIB 200 MG: 200 CAPSULE ORAL at 08:37

## 2018-02-28 RX ADMIN — SENNOSIDES AND DOCUSATE SODIUM 2 TABLET: 8.6; 5 TABLET ORAL at 08:37

## 2018-02-28 RX ADMIN — Medication 2 G: at 04:41

## 2018-02-28 RX ADMIN — ASPIRIN 81 MG: 81 TABLET, COATED ORAL at 08:37

## 2018-02-28 RX ADMIN — ACETAMINOPHEN 1000 MG: 500 TABLET, FILM COATED ORAL at 06:55

## 2018-02-28 RX ADMIN — OXYCODONE HYDROCHLORIDE 10 MG: 5 TABLET ORAL at 14:17

## 2018-02-28 NOTE — PROGRESS NOTES
Post op interview conducted following left total hip arthroplasty dated 2/27/18, no anesthetic complications noted

## 2018-02-28 NOTE — PROGRESS NOTES
Problem: Self Care Deficits Care Plan (Adult)  Goal: *Acute Goals and Plan of Care (Insert Text)  GOALS:   DISCHARGE GOALS (in preparation for going home/rehab):  3 days  1. Ms. Saint Clair will perform toileting/toilet transfer with contact guard assistance with adaptive equipment to demonstrate improved functional mobility and safety. 2.  Ms. Saint Clair will perform shower transfer with contact guard assistance with adaptive equipment to demonstrate improved functional mobility and safety. 3.  Ms. Saint Clair will state RHINA precautions with two verbal cues to demonstrate improved functional mobility and safety. JOINT CAMP OCCUPATIONAL THERAPY RHINA: Daily Note, Treatment Day: 1st and AM 2/28/2018  INPATIENT: Hospital Day: 2  [place] / Product Type: Medicare /      NAME/AGE/GENDER: Fernando Nguyen is a 68 y.o. female   PRIMARY DIAGNOSIS:  Unilateral primary osteoarthritis, left hip [M16.12]   Procedure(s) and Anesthesia Type:     * LEFT HIP ARTHROPLASTY TOTAL / Soto Alexandra - Spinal (Left)  ICD-10: Treatment Diagnosis:    · Pain in left hip (M25.552)  · Stiffness of Left Hip, Not elsewhere classified (A47.333)      ASSESSMENT:     Ms. Saint Clair is s/p left RHINA and presents with decreased weight bearing on left LE and decreased independence with functional mobility and activities of daily living. Patient has RA with deformities in bilateral hands. Pt required the assistance listed below for functional mobility and functional transfers. Pt's wife completed bath and dressing. Continue POC. This section established at most recent assessment   PROBLEM LIST (Impairments causing functional limitations):  1. Decreased Strength  2. Decreased ADL/Functional Activities  3. Decreased Transfer Abilities  4. Increased Pain  5. Increased Fatigue  6. Decreased Flexibility/Joint Mobility  7.  Decreased Knowledge of Precautions   INTERVENTIONS PLANNED: (Benefits and precautions of occupational therapy have been discussed with the patient.)  1. Activities of daily living training  2. Adaptive equipment training  3. Balance training  4. Clothing management  5. Donning&doffing training  6. Theraputic activity     TREATMENT PLAN: Frequency/Duration: Follow patient 1-2 times to address above goals. Rehabilitation Potential For Stated Goals: Good     RECOMMENDED REHABILITATION/EQUIPMENT: (at time of discharge pending progress): Continue Skilled Therapy and Home Health: Physical Therapy. OCCUPATIONAL PROFILE AND HISTORY:   History of Present Injury/Illness (Reason for Referral): Pt presents this date s/p (left) RHINA. Past Medical History/Comorbidities:   Ms. [facility]  has a past medical history of Anemia, unspecified; Arthritis; Autoimmune disease ([address] Breast cancer ([address] Cancer ([address] (07/2013); Chronic pain; HTN (hypertension) (03/18/2012); Hypercholesterolemia; Ill-defined condition; Menopause; Nausea & vomiting; Osteoarthritis of both shoulders; Osteoarthritis of right hip; Poor historian; Rheumatoid arthritis ([address] Right Total knee replacement status (3/15/2012); Unspecified adverse effect of anesthesia; and Vitamin D deficiency disease. She also has no past medical history of Adverse effect of anesthesia; Aneurysm ([address] Arrhythmia; Asthma; CAD (coronary artery disease); Chronic kidney disease; Chronic obstructive pulmonary disease ([address] Coagulation defects; Coagulation disorder ([address] Diabetes ([address] Difficult intubation; Endocarditis; GERD (gastroesophageal reflux disease); Heart failure ([address] Liver disease; Malignant hyperthermia due to anesthesia; Morbid obesity ([address] Nicotine vapor product user; Non-nicotine vapor product user; Pseudocholinesterase deficiency; Psychiatric disorder; PUD (peptic ulcer disease); Rheumatic fever; Seizures ([address] Stroke [facility]); Thromboembolus ([address] Thyroid disease; or Unspecified sleep apnea. Ms.  [facility]  has a past surgical history that includes hx hysterectomy (1987); hx dilation and curettage; hx cholecystectomy (1987); hx appendectomy; hx breast lumpectomy (7/31/2013); hx knee replacement (Left, 8/2010); hx knee replacement (Right, 3/2012); and hx hip replacement (Right, 2016). Social History/Living Environment:   Home Environment: Private residence  # Steps to Enter: 3  Hand Rails : Right  One/Two Story Residence: One story  Living Alone: No  Support Systems: Spouse/Significant Other/Partner  Patient Expects to be Discharged to[de-identified] Private residence  Current DME Used/Available at Home: Cane, straight, Commode, bedside, Walker, rolling  Tub or Shower Type: Shower  Prior Level of Function/Work/Activity:  Pt has RA that limits her ability to complete bathing and dressing,  assist with that but she transfers and ambulates on her own      Number of Personal Factors/Comorbidities that affect the Plan of Care: Brief history (0):  LOW COMPLEXITY   ASSESSMENT OF OCCUPATIONAL PERFORMANCE[de-identified]   Most Recent Physical Functioning:   Balance  Sitting: Intact  Standing: With support;Pull to stand       Patient Vitals for the past 6 hrs:   BP BP Patient Position SpO2 Pulse   02/28/18 0637 136/74 At rest 96 % 73                           Mental Status  Neurologic State: Alert  Orientation Level: Oriented to person  Cognition: Follows commands  Perception: Verbal;Tactile  Perseveration: Tactile cues provided;Verbal cues provided  Safety/Judgement: Fall prevention                Basic ADLs (From Assessment) Complex ADLs (From Assessment)   Basic ADL  Feeding: Setup  Oral Facial Hygiene/Grooming: Moderate assistance  Bathing: Total assistance  Upper Body Dressing: Total assistance  Lower Body Dressing: Total assistance  Toileting: Maximum assistance     Grooming/Bathing/Dressing Activities of Daily Living     Cognitive Retraining  Safety/Judgement: Fall prevention                 Functional Transfers  Shower Transfer:  Moderate assistance;Assist x2     Bed/Mat Mobility  Supine to Sit: Maximum assistance  Sit to Stand: Maximum assistance  Bed to Chair: Minimum assistance;Assist x2         Physical Skills Involved:  1. Range of Motion  2. Balance  3. Strength Cognitive Skills Affected (resulting in the inability to perform in a timely and safe manner): 1. none Psychosocial Skills Affected:  1. Environmental Adaptation   Number of elements that affect the Plan of Care: 3-5:  MODERATE COMPLEXITY   CLINICAL DECISION MAKIN[address] [place] [address] AM-PAC 6 Clicks   Daily Activity Inpatient Short Form  How much help from another person does the patient currently need. .. Total A Lot A Little None   1. Putting on and taking off regular lower body clothing? [x] 1   [] 2   [] 3   [] 4   2. Bathing (including washing, rinsing, drying)? [x] 1   [] 2   [] 3   [] 4   3. Toileting, which includes using toilet, bedpan or urinal?   [] 1   [x] 2   [] 3   [] 4   4. Putting on and taking off regular upper body clothing? [x] 1   [] 2   [] 3   [] 4   5. Taking care of personal grooming such as brushing teeth? [] 1   [] 2   [x] 3   [] 4   6. Eating meals? [] 1   [] 2   [x] 3   [] 4   © , Trustees of [address] [place] [address], under license to [employer]. All rights reserved     Score:  Initial:11 Most Recent: X (Date: -- )    Interpretation of Tool:  Represents activities that are increasingly more difficult (i.e. Bed mobility, Transfers, Gait). Score 24 23 22-20 19-15 14-10 9-7 6     Modifier CH CI CJ CK CL CM CN      ?  Self Care:     - CURRENT STATUS: CL - 60%-79% impaired, limited or restricted    - GOAL STATUS: CL - 60%-79% impaired, limited or restricted    - D/C STATUS:  ---------------To be determined---------------  [place] / Product Type: Medicare /      Medical Necessity:     · Patient is expected to demonstrate progress in balance, coordination and functional technique to increase independence with functional mobility . Reason for Services/Other Comments:  · Patient  would benefit from skilled Occupational Therapy to maximize independence and safety during functional mobility. .   Use of outcome tool(s) and clinical judgement create a POC that gives a: LOW COMPLEXITY            TREATMENT:   (In addition to Assessment/Re-Assessment sessions the following treatments were rendered)     Pre-treatment Symptoms/Complaints:  Pt without complaint  Pain: Initial:   Pain Intensity 1: 3  Pain Location 1: Hip  Pain Intervention(s) 1: Repositioned, Shower  Post Session:  0     Self Care: (25): Procedure(s) (per grid) utilized to improve and/or restore self-care/home management as related to dressing and bathing. Required max verbal and manual cueing to facilitate activities of daily living skills. Treatment/Session Assessment:     Response to Treatment:  Pt up to edge of bed  and therapist assisted pt tolerated well. Education:  [] Home Exercises  [x] Fall Precautions  [x] Hip Precautions [] Going Home Video  [] Knee/Hip Prosthesis Review  [x] Walker Management/Safety [x] Adaptive Equipment as Needed       Interdisciplinary Collaboration:   o Physical Therapist  o Certified Occupational Therapy Assistant  o Registered Nurse    After treatment position/precautions:   o Up in chair  o Bed/Chair-wheels locked  o Call light within reach  o RN notified  o Family at bedside     Compliance with Program/Exercises: compliant all of the time. Recommendations/Intent for next treatment session:  Treatment next visit will focus on increasing Ms. Ferrari's independence with bed mobility, transfers, functional mobility, modalities for pain, and patient education.       Total Treatment Duration:  OT Patient Time In/Time Out  Time In: 1010  Time Out: 1 [facility] Zuleika Cervantes

## 2018-02-28 NOTE — PROGRESS NOTES
Pt resting quietly in the bed  is at the bedside. Pt denies pian at t his ti me  Dressing to left  Hip clean dry and intact. NV status WNL's bilaterally. No  Noted distress. Bed  In low locked position and call light within reach.

## 2018-02-28 NOTE — PROGRESS NOTES
Care Management Interventions  Transition of Care Consult (CM Consult): [address]: Yes  Physical Therapy Consult: Yes  Occupational Therapy Consult: Yes  Current Support Network: Lives with Spouse  Confirm Follow Up Transport: Family  Plan discussed with Pt/Family/Caregiver: Yes  Freedom of Choice Offered: Yes  Discharge Location  Discharge Placement: Home with home health  Patient is a 68y.o. year old female admitted for Left RHINA . Patient lives with Her spouse and plans to return home on discharge. Order received to arrange home health. Patient without preference towards agency. Referral sent to [facility]. Patient denies any equipment needs as he has a walker and bedside commode. Will follow until discharge.   Nikolas Roca

## 2018-02-28 NOTE — PROGRESS NOTES
2018         Post Op day: 1 Day Post-Op   Admit Diagnosis: Unilateral primary osteoarthritis, left hip [M16.12]  LAB:    Recent Results (from the past 24 hour(s))   TYPE & SCREEN    Collection Time: 18  9:44 AM   Result Value Ref Range    Crossmatch Expiration 2018     ABO/Rh(D) Venida Peto POSITIVE     Antibody screen NEG    GLUCOSE, POC    Collection Time: 18  9:58 AM   Result Value Ref Range    Glucose (POC) 110 (H) 65 - 100 mg/dL   HEMOGLOBIN    Collection Time: 18  7:58 PM   Result Value Ref Range    HGB 12.7 11.7 - 15.4 g/dL   HEMOGLOBIN    Collection Time: 18  5:43 AM   Result Value Ref Range    HGB 11.2 (L) 11.7 - 66.6 g/dL   METABOLIC PANEL, BASIC    Collection Time: 18  5:43 AM   Result Value Ref Range    Sodium 142 136 - 145 mmol/L    Potassium 4.5 3.5 - 5.1 mmol/L    Chloride 106 98 - 107 mmol/L    CO2 26 21 - 32 mmol/L    Anion gap 10 7 - 16 mmol/L    Glucose 116 (H) 65 - 100 mg/dL    BUN 10 8 - 23 MG/DL    Creatinine 0.69 0.6 - 1.0 MG/DL    GFR est AA >60 >60 ml/min/1.73m2    GFR est non-AA >60 >60 ml/min/1.73m2    Calcium 8.4 8.3 - 10.4 MG/DL     Vital Signs:    Patient Vitals for the past 8 hrs:   BP Temp Pulse Resp SpO2   18 0637 136/74 97.5 °F (36.4 °C) 73 16 96 %   18 0443 139/74 96 °F (35.6 °C) 74 16 92 %     Temp (24hrs), Av.3 °F (36.3 °C), Min:95.4 °F (35.2 °C), Max:98.3 °F (36.8 °C)    Pain Control:   Pain Assessment  Pain Scale 1: Numeric (0 - 10)  Pain Intensity 1: 6  Pain Onset 1: years ago  Pain Location 1: Hip  Pain Orientation 1: Left  Pain Description 1: Aching  Pain Intervention(s) 1: Medication (see MAR)  Subjective: Doing well, pain is well controlled, no complaints     Objective:  No Acute Distress, Alert and Oriented, left hip dressing is C/D/I. Posterior thigh and calves are soft, NT.   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 ASA/SCDs for postop DVT prophylaxis. Plan to d/c to home with home health today.    Signed By: ERICA Prajapati

## 2018-02-28 NOTE — PROGRESS NOTES
[facility] - INPATIENT  Face to Face Encounter    Patients Name: Ashlyn Kiran    YOB: 1940    Ordering Physician: Dr. Sarah Herron    Primary Diagnosis: Lt RHINA    Date of Face to Face:   2/27/2018                               Face to Face Encounter findings are related to primary reason for home care:   yes. 1. I certify that the patient needs intermittent care as follows: physical therapy: gait/stair training    2. I certify that this patient is homebound, that is: 1) patient requires the use of a walker device, special transportation, or assistance of another to leave the home; or 2) patient's condition makes leaving the home medically contraindicated; and 3) patient has a normal inability to leave the home and leaving the home requires considerable and taxing effort. Patient may leave the home for infrequent and short duration for medical reasons, and occasional absences for non-medical reasons. Homebound status is due to the following functional limitations: Patient's ambulation limited secondary to severe pain and requires the use of an assistive device and the assistance of a caregiver for safe completion. Patient with strength and ROM deficits limiting ambulation endurance requiring the use of an assistive device and the assistance of a caregiver. Patient deemed temporarily homebound secondary to increased risk for infection when leaving home and going out into the community. 3. I certify that this patient is under my care and that I, or a nurse practitioner or  [id number], or clinical nurse specialist, or certified nurse midwife, working with me, had a Face-to-Face Encounter that meets the physician Face-to-Face Encounter requirements.   The following are the clinical findings from the [address] [place] [address] encounter that support the need for skilled services and is a summary of the encounter: see hospital chart          Stacey Shelton RN  2/28/2018      THE FOLLOWING TO BE COMPLETED BY THE COMMUNITY PHYSICIAN:    I concur with the findings described above from the F2F encounter that this patient is homebound and in need of a skilled service.     Certifying Physician: _____________________________________      Printed Certifying Physician Name: _____________________________________    Date: _________________

## 2018-02-28 NOTE — PROGRESS NOTES
Problem: Mobility Impaired (Adult and Pediatric)  Goal: *Acute Goals and Plan of Care (Insert Text)  GOALS (1-4 days):  (1.)Ms. Justo Loza will move from supine to sit and sit to supine  in bed with CONTACT GUARD ASSIST.    (2.)Ms. Justo Loza will transfer from bed to chair and chair to bed with CONTACT GUARD ASSIST using the least restrictive device. (3.)Ms. Justo Loza will ambulate with CONTACT GUARD ASSIST for 75 feet with the least restrictive device. (4.)Ms. Justo Loza will state/observe RHINA precautions with 0 verbal cues. ________________________________________________________________________________________________      PHYSICAL THERAPY [facility] Rhina: Daily Note, Treatment Day: 1st, PM 2/28/2018  INPATIENT: Hospital Day: 2  [place] / Product Type: Medicare /      NAME/AGE/GENDER: Ana Lilia Carrasco is a 68 y.o. female   PRIMARY DIAGNOSIS:  Unilateral primary osteoarthritis, left hip [M16.12]   Procedure(s) and Anesthesia Type:     * LEFT HIP ARTHROPLASTY TOTAL / Dari Rickers - Spinal (Left)  ICD-10: Treatment Diagnosis:    · Pain in left hip (M25.552)  · Stiffness of Left Hip, Not elsewhere classified (M25.652)  · Difficulty in walking, Not elsewhere classified (R26.2)      ASSESSMENT:     Ms. Justo Loza presents supine on contact and agreeable to therapy assessment. She has RA and has multiple deformities in hands and feet. She is here with her spouse who is supportive. At home, he provided assist with her with transfers and bathing. She has had B TKas and R Rhina in the past. She plans on going home at [place] with his assist. She was still a little numb from surgery but was able to stand and take one step at bedside. Used hand held assist due to her hands, may try a bilateral platform walker in the morning. When returning supine she asked her spouse to get out the plastic bag to use to help her slide easier into supine.  She will benefit from skilled PT interventions to maximize independence with functional mobility, ensure safety and help increase her independence with RHINA HEP. Hope to progress in the morning. Pt progressing with ambulation. Pt progressing with out of bed activity. This section established at most recent assessment   PROBLEM LIST (Impairments causing functional limitations):  1. Decreased Strength  2. Decreased ADL/Functional Activities  3. Decreased Transfer Abilities  4. Decreased Ambulation Ability/Technique  5. Increased Pain  6. Decreased Activity Tolerance  7. Decreased Flexibility/Joint Mobility  8. Edema/Girth  9. Decreased [place] with Home Exercise Program   INTERVENTIONS PLANNED: (Benefits and precautions of physical therapy have been discussed with the patient.)  1. Bed Mobility  2. Cold  3. Gait Training  4. Home Exercise Program (HEP)  5. Therapeutic Exercise/Strengthening  6. Transfer Training  7. Range of Motion: active/assisted/passive  8. Therapeutic Activities  9. Group Therapy     TREATMENT PLAN: Frequency/Duration: Follow patient BID   to address above goals. Rehabilitation Potential For Stated Goals: Good     RECOMMENDED REHABILITATION/EQUIPMENT: (at time of discharge pending progress): Continue Skilled Therapy and Home Health: Physical Therapy. HISTORY:   History of Present Injury/Illness (Reason for Referral):  Pt s/p left RHINA on 2/27/18  Past Medical History/Comorbidities:   Ms. Benita Schneider  has a past medical history of Anemia, unspecified; Arthritis; Autoimmune disease ([address] Breast cancer ([address] Cancer ([address] (07/2013); Chronic pain; HTN (hypertension) (03/18/2012); Hypercholesterolemia; Ill-defined condition; Menopause; Nausea & vomiting; Osteoarthritis of both shoulders; Osteoarthritis of right hip; Poor historian; Rheumatoid arthritis ([address] Right Total knee replacement status (3/15/2012); Unspecified adverse effect of anesthesia; and Vitamin D deficiency disease.  She also has no past medical history of Adverse effect of anesthesia; Aneurysm ([facility] Arrhythmia; Asthma; CAD (coronary artery disease); Chronic kidney disease; Chronic obstructive pulmonary disease ([facility] Coagulation defects; Coagulation disorder ([facility] Diabetes ([facility] Difficult intubation; Endocarditis; GERD (gastroesophageal reflux disease); Heart failure ([facility] Liver disease; Malignant hyperthermia due to anesthesia; Morbid obesity ([facility] Nicotine vapor product user; Non-nicotine vapor product user; Pseudocholinesterase deficiency; Psychiatric disorder; PUD (peptic ulcer disease); Rheumatic fever; Seizures ([facility] Stroke [facility]); Thromboembolus ([facility] Thyroid disease; or Unspecified sleep apnea. Ms. Whit Biswas  has a past surgical history that includes hx hysterectomy (1987); hx dilation and curettage; hx cholecystectomy (1987); hx appendectomy; hx breast lumpectomy (7/31/2013); hx knee replacement (Left, 8/2010); hx knee replacement (Right, 3/2012); and hx hip replacement (Right, 2016). Social History/Living Environment:   Home Environment: Private residence  # Steps to Enter: 3  Hand Rails : Right  One/Two Story Residence: One story  Living Alone: No  Support Systems: Spouse/Significant Other/Partner  Patient Expects to be Discharged to[de-identified] Private residence  Current DME Used/Available at Home: Cane, straight, Commode, bedside, Walker, rolling  Tub or Shower Type: Shower  Prior Level of Function/Work/Activity:  Pt with RA, has assist with transfers and bathing and dressing from her spouse   Number of Personal Factors/Comorbidities that affect the Plan of Care: 1-2: MODERATE COMPLEXITY   EXAMINATION:   Most Recent Physical Functioning:                 LLE AROM  L Hip Flexion: 90          Bed Mobility  Supine to Sit: Maximum assistance    Transfers  Sit to Stand: Minimum assistance  Stand to Sit: Minimum assistance  Bed to Chair: Minimum assistance;Assist x2    Balance  Sitting: Intact  Standing: Pull to stand; With support              Weight Bearing Status  Left Side Weight Bearing: As tolerated  Distance (ft): 45 Feet (ft)  Ambulation - Level of Assistance: Minimal assistance  Assistive Device: Walker, rolling; Other (comment) (with bilateral platforms)  Gait Abnormalities: Antalgic        Braces/Orthotics: none           Body Structures Involved:  1. Bones  2. Joints  3. Muscles Body Functions Affected:  1. Movement Related Activities and Participation Affected:  1. Mobility  2. Self Care   Number of elements that affect the Plan of Care: 4+: HIGH COMPLEXITY   CLINICAL PRESENTATION:   Presentation: Stable and uncomplicated: LOW COMPLEXITY   CLINICAL DECISION MAKIN[address] [place] [address] AM-PAC 6 Clicks   Basic Mobility Inpatient Short Form  How much difficulty does the patient currently have. .. Unable A Lot A Little None   1. Turning over in bed (including adjusting bedclothes, sheets and blankets)? [] 1   [x] 2   [] 3   [] 4   2. Sitting down on and standing up from a chair with arms ( e.g., wheelchair, bedside commode, etc.)   [] 1   [x] 2   [] 3   [] 4   3. Moving from lying on back to sitting on the side of the bed? [] 1   [x] 2   [] 3   [] 4   How much help from another person does the patient currently need. .. Total A Lot A Little None   4. Moving to and from a bed to a chair (including a wheelchair)? [] 1   [x] 2   [] 3   [] 4   5. Need to walk in hospital room? [] 1   [x] 2   [] 3   [] 4   6. Climbing 3-5 steps with a railing? [] 1   [x] 2   [] 3   [] 4   © , Trustees of [address] [place] [id number], under license to [employer]. All rights reserved        Score:  Initial: 12 Most Recent: X (Date: -- )    Interpretation of Tool:  Represents activities that are increasingly more difficult (i.e. Bed mobility, Transfers, Gait). Score 24 23 22-20 19-15 14-10 9-7 6     Modifier CH CI CJ CK CL CM CN      ?  Mobility - Walking and Moving Around:     - CURRENT STATUS: CL - 60%-79% impaired, limited or restricted    - GOAL STATUS: CJ - 20%-39% impaired, limited or restricted    - D/C STATUS:  ---------------To be determined---------------  [place] / Product Type: Medicare /      Medical Necessity:     · Patient is expected to demonstrate progress in strength, range of motion and functional technique to decrease assistance required with functional mobility and RHINA exercises. Reason for Services/Other Comments:  · Patient continues to require skilled intervention due to inability to complete functional mobility and RHINA exercises independently. Use of outcome tool(s) and clinical judgement create a POC that gives a: Clear prediction of patient's progress: LOW COMPLEXITY            TREATMENT:   (In addition to Assessment/Re-Assessment sessions the following treatments were rendered)     Pre-treatment Symptoms/Complaints:  none  Pain: Initial:   Pain Intensity 1: 4  Pain Location 1: Hip  Pain Orientation 1: Left  Post Session:  2/10     Gait Training (  15 minutes):  Gait training to improve and/or restore physical functioning as related to mobility. Ambulated 45 Feet (ft) with Minimal assistance using a Walker, rolling; Other (comment) (with bilateral platforms)   Therapeutic Exercise: (  45 minutes--group):  Exercises per grid below to improve mobility. Date:  2/28/18 Date:   Date:     ACTIVITY/EXERCISE AM PM AM PM AM PM   GROUP THERAPY  []  [x]  []  []  []  []   Ankle Pumps 10 15       Quad Sets 10 15       Gluteal Sets 10 15       Hip ABd/ADduction 10 15       Straight Leg Raises         Knee Slides 10 15       Short Arc Quads  15       Long Arc Quads  15       Chair Slides                  B = bilateral; AA = active assistive; A = active; P = passive      Treatment/Session Assessment:     Response to Treatment:  Pt agreeable to therapy.     Education:  [x] Home Exercises  [x] Fall Precautions  [x] Hip Precautions [] D/C Instruction Review  [x] Knee/Hip Prosthesis Review  [x] Walker Management/Safety [] Adaptive Equipment as Needed       Interdisciplinary Collaboration:   o Physical Therapist  o Registered Nurse  o Rehabilitation Attendant    After treatment position/precautions:   o Up in chair  o Bed/Chair-wheels locked  o Bed in low position  o Call light within reach  o RN notified  o Family at bedside    Compliance with Program/Exercises: compliant all of the time. Recommendations/Intent for next treatment session:  Treatment next visit will focus on increasing Ms. Ferrari's independence with bed mobility, transfers, gait training, strength/ROM exercises, modalities for pain, and patient education.       Total Treatment Duration:  PT Patient Time In/Time Out  Time In: 1300  Time Out: [address], PT

## 2018-02-28 NOTE — DISCHARGE INSTRUCTIONS
[facility]   Patient Discharge Instructions    Bharath Benites / [id number] : 1940    Admitted 2018 Discharged: 2018     IF YOU HAVE ANY PROBLEMS ONCE YOU ARE AT HOME CALL THE FOLLOWING NUMBERS:   Main office number: [phone]    Take Home Medications     · It is important that you take the medication exactly as they are prescribed. · Keep your medication in the bottles provided by the pharmacist and keep a list of the medication names, dosages, and times to be taken in your wallet. · Do not take other medications without consulting your doctor. What to do at [address] your prehospital diet. If you have excessive nausea or vomitting call your doctor's office     Home Physical Therapy is arranged. Use rolling walker when walking. Patients who have had a joint replacement should not drive until you are seen for your follow up appointment by Dr. Ana Laura Hayes. When to Call    - Call if you have a temperature greater then 101  - Unable to keep food down  - Loose control of your bladder or bowel function  - Are unable to bear any weight   - Need a pain medication refill       DISCHARGE SUMMARY from Nurse    The following personal items collected during your admission are returned to you:   Dental Appliance: Dental Appliances: At home, Lowers, Uppers  Vision: Visual Aid: None  Hearing Aid:   na  Jewelry: Jewelry: None  Clothing:   self  Other Valuables: Other Valuables: None (pt denies valuables except ID)  Valuables sent to safe:   na    PATIENT INSTRUCTIONS:    After general anesthesia or intravenous sedation, for 24 hours or while taking prescription Narcotics:  · Limit your activities  · Do not drive and operate hazardous machinery  · Do not make important personal or business decisions  · Do  not drink alcoholic beverages  · If you have not urinated within 8 hours after discharge, please contact your surgeon on call.     Report the following to your surgeon:  · Excessive pain, swelling, redness or odor of or around the surgical area  · Temperature over 101  · Nausea and vomiting lasting longer than 4 hours or if unable to take medications  · Any signs of decreased circulation or nerve impairment to extremity: change in color, persistent  numbness, tingling, coldness or increase pain  · Follow hip precautions @ all times! · Any questions, call office @ [phone]      Keep scheduled follow up appointment. If need to change, call office @ [phone]. *  Please give a list of your current medications to your Primary Care Provider. *  Please update this list whenever your medications are discontinued, doses are      changed, or new medications (including over-the-counter products) are added. *  Please carry medication information at all times in case of emergency situations. Hip Replacement Surgery (Posterior): What to Expect at Home  Your Recovery  Hip replacement surgery replaces the worn parts of your hip joint. When you leave the hospital, you will probably be walking with crutches or a walker. You may be able to climb a few stairs and get in and out of bed and chairs. But you will need someone to help you at home for the next few weeks or until you have more energy and can move around better. If there is no one to help you at home, you may go to a rehabilitation center or long-term care center. You will go home with a bandage and stitches or staples. You can remove the bandage when your doctor tells you to. Your doctor will remove your stitches or staples 10 days to 3 weeks after your surgery. You may still have some mild pain, and the area may be swollen for 3 to 4 months after surgery. Your doctor will give you medicine for the pain. You will continue the rehabilitation program (rehab) you started in the hospital. The better you do with your rehab exercises, the sooner you will get your strength and movement back.  Most people are able to return to work 4 weeks to 4 months after surgery. This care sheet gives you a general idea about how long it will take for you to recover. But each person recovers at a different pace. Follow the steps below to get better as quickly as possible. How can you care for yourself at home? Activity  ? · Your doctor may not want your affected leg to cross the center of your body toward the other leg. If so, your therapist may suggest these ideas:  ¨ Do not cross your legs. ¨ Be very careful as you get in or out of bed or a car, so your leg does not cross that imaginary line in the middle of your body. ? · Rest when you feel tired. You may take a nap, but do not stay in bed all day. ? · Work with your physical therapist to learn the best way to exercise. You may be able to take frequent, short walks using crutches or a walker. You will probably have to use crutches or a walker for at least 4 to 6 weeks. ? · Your doctor may advise you to stay away from activities that put stress on the joint. This includes sports such as tennis, football, and jogging. ? · Try not to sit for too long at one time. You will feel less stiff if you take a short walk about every hour. When you sit, use chairs with arms, and do not sit in low chairs. ? · Do not bend over more than 90 degrees (like the angle in a letter \"L\"). ? · Sleep on your back with your legs slightly apart or on your side with a pillow between your knees for about 6 weeks or as your doctor tells you. Do not sleep on your stomach or affected leg. ? · You may need to take sponge baths until your stitches or staples have been removed. You will probably be able to shower 24 hours after they are removed. ? · Ask your doctor when you can drive again. ? · Most people are able to return to work 4 weeks to 4 months after surgery. ? · Ask your doctor when it is okay for you to have sex. Diet  ?  · By the time you leave the hospital, you will probably be eating your normal diet. If your stomach is upset, try bland, low-fat foods like plain rice, broiled chicken, toast, and yogurt. Your doctor may recommend that you take iron and vitamin supplements. ? · Drink plenty of fluids (unless your doctor tells you not to). ? · Eat healthy foods, and watch your portion sizes. Try to stay at your ideal weight. Too much weight puts more stress on your new hip joint. ? · You may notice that your bowel movements are not regular right after your surgery. This is common. Try to avoid constipation and straining with bowel movements. You may want to take a fiber supplement every day. If you have not had a bowel movement after a couple of days, ask your doctor about taking a mild laxative. Medicines  ? · Your doctor will tell you if and when you can restart your medicines. He or she will also give you instructions about taking any new medicines. ? · If you take blood thinners, such as warfarin (Coumadin), clopidogrel (Plavix), or aspirin, be sure to talk to your doctor. He or she will tell you if and when to start taking those medicines again. Make sure that you understand exactly what your doctor wants you to do.   ? · Your doctor may give you a blood-thinning medicine to prevent blood clots. If you take a blood thinner, be sure you get instructions about how to take your medicine safely. Blood thinners can cause serious bleeding problems. This medicine could be in pill form or as a shot (injection). If a shot is necessary, your doctor will tell you how to do this. ? · Be safe with medicines. Take pain medicines exactly as directed. ¨ If the doctor gave you a prescription medicine for pain, take it as prescribed. ¨ If you are not taking a prescription pain medicine, ask your doctor if you can take an over-the-counter medicine.    ? · If you think your pain medicine is making you sick to your stomach:  ¨ Take your medicine after meals (unless your doctor has told you not to).  ¨ Ask your doctor for a different pain medicine. ? · If your doctor prescribed antibiotics, take them as directed. Do not stop taking them just because you feel better. You need to take the full course of antibiotics. Incision care  ? · You will have a bandage over the cut (incision) and staples or stitches. Follow your doctor's instructions on when to take the bandage off. Giving the incision air will help it heal.   ? · Your doctor will remove the staples or stitches 10 days to 3 weeks after the surgery and replace them with strips of tape. Leave the strips on for a week or until they fall off. Exercise  ? · Your rehab program will include a number of exercises to do. Always do them as your therapist tells you. Ice and elevation  ? · For pain, put ice or a cold pack on the area for 10 to 20 minutes at a time. Put a thin cloth between the ice and your skin. ? · Your ankle may swell for about 3 months. Prop up your ankle when you ice it or anytime you sit or lie down. Try to keep it above the level of your heart. This will help reduce swelling. Other instructions  ? Continue to wear your support stockings as your doctor says. These help to prevent blood clots. The length of time that you will have to wear them depends on your activity level and the amount of swelling you have. Most people wear these stockings for 4 to 6 weeks after surgery. ?Preventing falls is also very important. To prevent falls:  ? · Arrange furniture so that you will not trip on it. ? · Get rid of throw rugs, and move electrical cords out of the way. ? · Walk only in areas with plenty of light. ? · Put grab bars in showers and bathtubs. ? · Avoid icy or snowy sidewalks. ? · Wear shoes with sturdy, flat soles. Follow-up care is a key part of your treatment and safety. Be sure to make and go to all appointments, and call your doctor if you are having problems.  It's also a good idea to know your test results and keep a list of the medicines you take. When should you call for help? Call 911 anytime you think you may need emergency care. For example, call if:  ? · You passed out (lost consciousness). ? · You have severe trouble breathing. ? · You have sudden chest pain and shortness of breath, or you cough up blood. ?Call your doctor now or seek immediate medical care if:  ? · You have signs that your hip may be dislocated, including:  ¨ Severe pain and not being able to stand. ¨ A crooked leg that looks like your hip is out of position. ¨ Not being able to bend or straighten your leg. ? · Your leg or foot is cool or pale or changes color. ? · You cannot feel or move your leg. ? · You have signs of a blood clot, such as:  ¨ Pain in your calf, back of the knee, thigh, or groin. ¨ Redness and swelling in your leg or groin. ? · Your incision comes open and begins to bleed, or the bleeding increases. ? · You feel like your heart is racing or beating irregularly. ? · You have signs of infection, such as:  ¨ Increased pain, swelling, warmth, or redness. ¨ Red streaks leading from the incision. ¨ Pus draining from the incision. ¨ A fever. ? Watch closely for changes in your health, and be sure to contact your doctor if:  ? · You do not have a bowel movement after taking a laxative. ? · You do not get better as expected. Where can you learn more? Go to http://lnua-esa.info/. Enter B788 in the search box to learn more about \"Hip Replacement Surgery (Posterior): What to Expect at Home. \"  Current as of: March 21, 2017  Content Version: 11.4  © 8344-6111 [employer]. Care instructions adapted under license by [employer] (which disclaims liability or warranty for this information).  If you have questions about a medical condition or this instruction, always ask your healthcare professional. [address] any warranty or liability for your use of this information. These are general instructions for a healthy lifestyle:    No smoking/ No tobacco products/ Avoid exposure to second hand smoke    Surgeon General's Warning:  Quitting smoking now greatly reduces serious risk to your health. Obesity, smoking, and sedentary lifestyle greatly increases your risk for illness    A healthy diet, regular physical exercise & weight monitoring are important for maintaining a healthy lifestyle    You may be retaining fluid if you have a history of heart failure or if you experience any of the following symptoms:  Weight gain of 3 pounds or more overnight or 5 pounds in a week, increased swelling in our hands or feet or shortness of breath while lying flat in bed. Please call your doctor as soon as you notice any of these symptoms; do not wait until your next office visit. Recognize signs and symptoms of STROKE:    F-face looks uneven    A-arms unable to move or move even    S-speech slurred or non-existent    T-time-call 911 as soon as signs and symptoms begin-DO NOT go       Back to bed or wait to see if you get better-TIME IS BRAIN. The discharge information has been reviewed with the patient. The patient verbalized understanding. Information obtained by :  I understand that if any problems occur once I am at home I am to contact my physician. I understand and acknowledge receipt of the instructions indicated above.                                                                                                                                            Physician's or R.N.'s Signature                                                                  Date/Time                                                                                                                                              Patient or Representative Signature                                                          Date/Time

## 2018-02-28 NOTE — PROGRESS NOTES
Problem: Mobility Impaired (Adult and Pediatric)  Goal: *Acute Goals and Plan of Care (Insert Text)  GOALS (1-4 days):  (1.)Ms. Mary Jo Yin will move from supine to sit and sit to supine  in bed with CONTACT GUARD ASSIST.    (2.)Ms. Mary Jo Yin will transfer from bed to chair and chair to bed with CONTACT GUARD ASSIST using the least restrictive device. (3.)Ms. Mary Jo Yin will ambulate with CONTACT GUARD ASSIST for 75 feet with the least restrictive device. (4.)Ms. Mary Jo Yin will state/observe RHINA precautions with 0 verbal cues. ________________________________________________________________________________________________      PHYSICAL THERAPY Joint camp Rhina: Daily Note, Treatment Day: 1st, AM 2/28/2018  INPATIENT: Hospital Day: 2  [place] / Product Type: Medicare /      NAME/AGE/GENDER: Leonel Stahl is a 68 y.o. female   PRIMARY DIAGNOSIS:  Unilateral primary osteoarthritis, left hip [M16.12]   Procedure(s) and Anesthesia Type:     * LEFT HIP ARTHROPLASTY TOTAL / Rexine Abu - Spinal (Left)  ICD-10: Treatment Diagnosis:    · Pain in left hip (M25.552)  · Stiffness of Left Hip, Not elsewhere classified (M25.652)  · Difficulty in walking, Not elsewhere classified (R26.2)      ASSESSMENT:     Ms. Mary Jo Yin presents supine on contact and agreeable to therapy assessment. She has RA and has multiple deformities in hands and feet. She is here with her spouse who is supportive. At home, he provided assist with her with transfers and bathing. She has had B TKas and R Rhina in the past. She plans on going home at [place] with his assist. She was still a little numb from surgery but was able to stand and take one step at bedside. Used hand held assist due to her hands, may try a bilateral platform walker in the morning. When returning supine she asked her spouse to get out the plastic bag to use to help her slide easier into supine.  She will benefit from skilled PT interventions to maximize independence with functional mobility, ensure safety and help increase her independence with RHINA HEP. Hope to progress in the morning. Pt progressing with ambulation. This section established at most recent assessment   PROBLEM LIST (Impairments causing functional limitations):  1. Decreased Strength  2. Decreased ADL/Functional Activities  3. Decreased Transfer Abilities  4. Decreased Ambulation Ability/Technique  5. Increased Pain  6. Decreased Activity Tolerance  7. Decreased Flexibility/Joint Mobility  8. Edema/Girth  9. Decreased [place] with Home Exercise Program   INTERVENTIONS PLANNED: (Benefits and precautions of physical therapy have been discussed with the patient.)  1. Bed Mobility  2. Cold  3. Gait Training  4. Home Exercise Program (HEP)  5. Therapeutic Exercise/Strengthening  6. Transfer Training  7. Range of Motion: active/assisted/passive  8. Therapeutic Activities  9. Group Therapy     TREATMENT PLAN: Frequency/Duration: Follow patient BID   to address above goals. Rehabilitation Potential For Stated Goals: Good     RECOMMENDED REHABILITATION/EQUIPMENT: (at time of discharge pending progress): Continue Skilled Therapy and Home Health: Physical Therapy. HISTORY:   History of Present Injury/Illness (Reason for Referral):  Pt s/p left RHINA on 2/27/18  Past Medical History/Comorbidities:   Ms. 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 She also has no past medical history of Adverse effect of anesthesia; Aneurysm ([address]  Arrhythmia; Asthma; CAD (coronary artery disease); Chronic kidney disease; Chronic obstructive pulmonary disease ([facility] Coagulation defects; Coagulation disorder ([facility] Diabetes ([facility] Difficult intubation; Endocarditis; GERD (gastroesophageal reflux disease); Heart failure ([facility] Liver disease; Malignant hyperthermia due to anesthesia; Morbid obesity ([facility] Nicotine vapor product user; Non-nicotine vapor product user; Pseudocholinesterase deficiency; Psychiatric disorder; PUD (peptic ulcer disease); Rheumatic fever; Seizures ([facility] Stroke [facility]); Thromboembolus ([facility] Thyroid disease; or Unspecified sleep apnea. Ms. Saint Clair  has a past surgical history that includes hx hysterectomy (1987); hx dilation and curettage; hx cholecystectomy (1987); hx appendectomy; hx breast lumpectomy (7/31/2013); hx knee replacement (Left, 8/2010); hx knee replacement (Right, 3/2012); and hx hip replacement (Right, 2016).   Social History/Living Environment:   Home Environment: Private residence  # Steps to Enter: 3  Hand Rails : Right  One/Two Story Residence: One story  Living Alone: No  Support Systems: Spouse/Significant Other/Partner  Patient Expects to be Discharged to[de-identified] Private residence  Current DME Used/Available at Home: Cane, straight, Commode, bedside, Walker, rolling  Tub or Shower Type: Shower  Prior Level of Function/Work/Activity:  Pt with RA, has assist with transfers and bathing and dressing from her spouse   Number of Personal Factors/Comorbidities that affect the Plan of Care: 1-2: MODERATE COMPLEXITY   EXAMINATION:   Most Recent Physical Functioning:                            Bed Mobility  Supine to Sit: Maximum assistance    Transfers  Sit to Stand: Maximum assistance  Stand to Sit: Minimum assistance  Bed to Chair: Minimum assistance;Assist x2    Balance  Sitting: Intact  Standing: With support;Pull to stand              Weight Bearing Status  Left Side Weight Bearing: As tolerated  Distance (ft): 25 Feet (ft)  Ambulation - Level of Assistance: Minimal assistance  Assistive Device: Walker, rolling  Gait Abnormalities: Antalgic        Braces/Orthotics: none           Body Structures Involved:  1. Bones  2. Joints  3. Muscles Body Functions Affected:  1. Movement Related Activities and Participation Affected:  1. Mobility  2. Self Care   Number of elements that affect the Plan of Care: 4+: HIGH COMPLEXITY   CLINICAL PRESENTATION:   Presentation: Stable and uncomplicated: LOW COMPLEXITY   CLINICAL DECISION MAKIN[address] [place] [address] AM-PAC 6 Clicks   Basic Mobility Inpatient Short Form  How much difficulty does the patient currently have. .. Unable A Lot A Little None   1. Turning over in bed (including adjusting bedclothes, sheets and blankets)? [] 1   [x] 2   [] 3   [] 4   2. Sitting down on and standing up from a chair with arms ( e.g., wheelchair, bedside commode, etc.)   [] 1   [x] 2   [] 3   [] 4   3. Moving from lying on back to sitting on the side of the bed? [] 1   [x] 2   [] 3   [] 4   How much help from another person does the patient currently need. .. Total A Lot A Little None   4. Moving to and from a bed to a chair (including a wheelchair)? [] 1   [x] 2   [] 3   [] 4   5. Need to walk in hospital room? [] 1   [x] 2   [] 3   [] 4   6. Climbing 3-5 steps with a railing? [] 1   [x] 2   [] 3   [] 4   © , Trustees of [address] [place] [address], under license to [employer]. All rights reserved        Score:  Initial: 12 Most Recent: X (Date: -- )    Interpretation of Tool:  Represents activities that are increasingly more difficult (i.e. Bed mobility, Transfers, Gait). Score 24 23 22-20 19-15 14-10 9-7 6     Modifier CH CI CJ CK CL CM CN      ?  Mobility - Walking and Moving Around:     - CURRENT STATUS: CL - 60%-79% impaired, limited or restricted    - GOAL STATUS: CJ - 20%-39% impaired, limited or restricted    - D/C STATUS:  ---------------To be determined---------------  [place] / Product Type: Medicare /      Medical Necessity:     · Patient is expected to demonstrate progress in strength, range of motion and functional technique to decrease assistance required with functional mobility and RHINA exercises. Reason for Services/Other Comments:  · Patient continues to require skilled intervention due to inability to complete functional mobility and RHINA exercises independently. Use of outcome tool(s) and clinical judgement create a POC that gives a: Clear prediction of patient's progress: LOW COMPLEXITY            TREATMENT:   (In addition to Assessment/Re-Assessment sessions the following treatments were rendered)     Pre-treatment Symptoms/Complaints:  none  Pain: Initial:   Pain Intensity 1: 2  Pain Location 1: Hip  Pain Orientation 1: Left  Post Session:  2/10     Gait Training (  15 minutes):  Gait training to improve and/or restore physical functioning as related to mobility. Ambulated 25 Feet (ft) with Minimal assistance using a Walker, rolling   Therapeutic Exercise: (  10 minutes):  Exercises per grid below to improve mobility. Date:  2/28/18 Date:   Date:     ACTIVITY/EXERCISE AM PM AM PM AM PM   GROUP THERAPY  []  []  []  []  []  []   Ankle Pumps 10        Quad Sets 10        Gluteal Sets 10        Hip ABd/ADduction 10        Straight Leg Raises         Knee Slides 10        Short Arc Quads         Long Arc Quads         Chair Slides                  B = bilateral; AA = active assistive; A = active; P = passive      Treatment/Session Assessment:     Response to Treatment:  Pt agreeable to ambulate.     Education:  [x] Home Exercises  [x] Fall Precautions  [] Hip Precautions [] D/C Instruction Review  [] Knee/Hip Prosthesis Review  [x] Walker Management/Safety [] Adaptive Equipment as Needed       Interdisciplinary Collaboration:   o Physical Therapist  o Certified Occupational Therapy Assistant  o Registered Nurse    After treatment position/precautions:   o Up in chair  o Bed/Chair-wheels locked  o Bed in low position  o Call light within reach  o RN notified  o Family at bedside    Compliance with Program/Exercises: compliant all of the time. Recommendations/Intent for next treatment session:  Treatment next visit will focus on increasing Ms. Ferrari's independence with bed mobility, transfers, gait training, strength/ROM exercises, modalities for pain, and patient education.       Total Treatment Duration:  PT Patient Time In/Time Out  Time In: 1030  Time Out: Bertrand Martinez 6, PT

## 2018-03-01 ENCOUNTER — PATIENT OUTREACH (OUTPATIENT)
Dept: CASE MANAGEMENT | Age: 78
End: 2018-03-01

## 2018-03-01 ENCOUNTER — HOME CARE VISIT (OUTPATIENT)
Dept: SCHEDULING | Facility: HOME HEALTH | Age: 78
End: 2018-03-01
Payer: MEDICARE

## 2018-03-01 VITALS
DIASTOLIC BLOOD PRESSURE: 68 MMHG | SYSTOLIC BLOOD PRESSURE: 150 MMHG | HEART RATE: 72 BPM | TEMPERATURE: 98 F | RESPIRATION RATE: 19 BRPM

## 2018-03-01 PROCEDURE — G0151 HHCP-SERV OF PT,EA 15 MIN: HCPCS

## 2018-03-01 PROCEDURE — 400013 HH SOC

## 2018-03-01 PROCEDURE — 3331090002 HH PPS REVENUE DEBIT

## 2018-03-01 PROCEDURE — 3331090001 HH PPS REVENUE CREDIT

## 2018-03-01 NOTE — PROGRESS NOTES
This note will not be viewable in [address] KYA OUTREACH #1  Initial outreach attempt unsuccessful. Left message for call back. Will attempt 2nd outreach within 24 hrs.

## 2018-03-02 ENCOUNTER — PATIENT OUTREACH (OUTPATIENT)
Dept: CASE MANAGEMENT | Age: 78
End: 2018-03-02

## 2018-03-02 PROCEDURE — 3331090001 HH PPS REVENUE CREDIT

## 2018-03-02 PROCEDURE — 3331090002 HH PPS REVENUE DEBIT

## 2018-03-02 NOTE — PROGRESS NOTES
This note will not be viewable in [address] Transition of Care Discharge Follow-up Questionnaire     Date/Time of Call:     03/2/2018   10:25 am     What was the patient seen in the hospital for? Left RHINA   Does the patient understand his/her diagnosis and/or treatment and what happened during the hospitalization? This Care Coordinator spoke with patient who consented to completing KYA with spouse, Olesya Steven spouse verbalized understanding of treatment and diagnosis. Did the patient receive discharge instructions? Yes. Review any discharge instructions (see notes in [facility]). Ask patient if they have any questions? Patients spouse verbalized understanding of discharge instructions. Were home services ordered (nursing, PT, OT, ST, etc.)? Yes. [facility], PT     If so, has the first visit occurred? If not, why? (Assist with coordination of services if necessary.)   Yes  Spouse is unable to provide PT schedule , but states PT will be back on Monday. Was any DME ordered? No DME ordered. If so, has it been received? If not, why?  (Assist with coordination of arranging DME orders if necessary.)   n/a   Complete a review of all medications (new, continued and discontinued meds per the D/C instructions and medication tab in [facility]). [facility], confirmed and are being taken as directed upon discharge. Were all new prescriptions filled? If not, why?  (Assist with obtainment of medications if necessary Yes. New:  aspirin delayed-release 81 mg tab    Take 1 Tab by mouth every twelve (12) hours every twelve (12) hours. oxyCODONE IR/  ROXICODONE   10 mg immediate release tab  Take 1 Tab by mouth every three (3) hours as needed. Max Daily Amount: 80 mg. Does the patient understand the purpose and dosing instructions for all medications?   (If patient has questions, provide explanation and education.)   Patients spouse voiced understanding of medication dosing and instructions. Does the patient have any problems in performing ADLs? (If patient is unable to perform ADLs  what is the limiting factor(s)? Do they have a support system that can assist? If no support system is present, discuss possible assistance that they may be able to obtain.)   Patients spouse reports patient needing some assistance with ADLs. Patient has bedside commode and walker to help with transfers, eventual walking. Spouse & Family is available to help if/when needed. Does the patient have all follow-up appointments scheduled? 7 day f/up with PCP?    7-14 day f/up with specialist?    If f/up has not been made  what actions has the care coordinator made to accomplish this? Has transportation been arranged? [facility] follow-up should be within 7 days of discharge; all others should have PCP follow-up within 7 days of discharge; follow-ups with other specialists should be within 7-14 days of discharge.)  Yes  Patients spouse declines 3 way call with Care Coordinator to schedule hospital follow up appointment with PCP. This Care Coordinator also offered to obtain an appointment on behalf of the patient with a return call with appointment date and time. Spouse in agreement    Call placed to [facility] by this Care Coordinator. No answer. Detailed message regarding need for hospital follow up appointment left on voicemail. Call back with appointment information directly to patient requested. Patients spouse made aware. CONFIRMED APPTS:    Monday March 26, 2018 @ 10:30 AM   Dr. Sheryl Peguero     Patient has transportation to/from appointments. Any other questions or concerns expressed by the patient? Patients spouse had no questions at time of call. Contact information provided for any questions/concerns prior to follow up call.    Schedule next appointment with KAITLIN RUBIN Coordinator or refer to RN Case Manager/  per the workflow guidelines. If referred for CCM  what RN care manager was the referral assigned? This Care Coordinator will schedule chart review and/or follow up calls.      KYA Call Completed By:   Milagros Smiley, [address] [place] [address] Coordinator /  [facility]

## 2018-03-03 PROCEDURE — 3331090002 HH PPS REVENUE DEBIT

## 2018-03-03 PROCEDURE — 3331090001 HH PPS REVENUE CREDIT

## 2018-03-04 PROCEDURE — 3331090002 HH PPS REVENUE DEBIT

## 2018-03-04 PROCEDURE — 3331090001 HH PPS REVENUE CREDIT

## 2018-03-05 ENCOUNTER — HOME CARE VISIT (OUTPATIENT)
Dept: SCHEDULING | Facility: HOME HEALTH | Age: 78
End: 2018-03-05
Payer: MEDICARE

## 2018-03-05 VITALS
TEMPERATURE: 97.3 F | RESPIRATION RATE: 16 BRPM | SYSTOLIC BLOOD PRESSURE: 110 MMHG | HEART RATE: 81 BPM | DIASTOLIC BLOOD PRESSURE: 60 MMHG

## 2018-03-05 PROCEDURE — 3331090001 HH PPS REVENUE CREDIT

## 2018-03-05 PROCEDURE — 3331090002 HH PPS REVENUE DEBIT

## 2018-03-05 PROCEDURE — G0157 HHC PT ASSISTANT EA 15: HCPCS

## 2018-03-06 ENCOUNTER — PATIENT OUTREACH (OUTPATIENT)
Dept: CASE MANAGEMENT | Age: 78
End: 2018-03-06

## 2018-03-06 PROCEDURE — 3331090002 HH PPS REVENUE DEBIT

## 2018-03-06 PROCEDURE — 3331090001 HH PPS REVENUE CREDIT

## 2018-03-07 ENCOUNTER — HOME CARE VISIT (OUTPATIENT)
Dept: SCHEDULING | Facility: HOME HEALTH | Age: 78
End: 2018-03-07
Payer: MEDICARE

## 2018-03-07 VITALS
HEART RATE: 84 BPM | DIASTOLIC BLOOD PRESSURE: 64 MMHG | TEMPERATURE: 97.7 F | SYSTOLIC BLOOD PRESSURE: 120 MMHG | RESPIRATION RATE: 16 BRPM

## 2018-03-07 PROCEDURE — G0157 HHC PT ASSISTANT EA 15: HCPCS

## 2018-03-07 PROCEDURE — 3331090002 HH PPS REVENUE DEBIT

## 2018-03-07 PROCEDURE — 3331090001 HH PPS REVENUE CREDIT

## 2018-03-08 PROCEDURE — 3331090002 HH PPS REVENUE DEBIT

## 2018-03-08 PROCEDURE — 3331090001 HH PPS REVENUE CREDIT

## 2018-03-09 ENCOUNTER — HOME CARE VISIT (OUTPATIENT)
Dept: SCHEDULING | Facility: HOME HEALTH | Age: 78
End: 2018-03-09
Payer: MEDICARE

## 2018-03-09 VITALS
DIASTOLIC BLOOD PRESSURE: 64 MMHG | HEART RATE: 70 BPM | RESPIRATION RATE: 16 BRPM | SYSTOLIC BLOOD PRESSURE: 120 MMHG | TEMPERATURE: 97.7 F

## 2018-03-09 PROCEDURE — 3331090002 HH PPS REVENUE DEBIT

## 2018-03-09 PROCEDURE — 3331090001 HH PPS REVENUE CREDIT

## 2018-03-09 PROCEDURE — G0157 HHC PT ASSISTANT EA 15: HCPCS

## 2018-03-10 PROCEDURE — 3331090002 HH PPS REVENUE DEBIT

## 2018-03-10 PROCEDURE — 3331090001 HH PPS REVENUE CREDIT

## 2018-03-11 PROCEDURE — 3331090001 HH PPS REVENUE CREDIT

## 2018-03-11 PROCEDURE — 3331090002 HH PPS REVENUE DEBIT

## 2018-03-12 ENCOUNTER — PATIENT OUTREACH (OUTPATIENT)
Dept: CASE MANAGEMENT | Age: 78
End: 2018-03-12

## 2018-03-12 ENCOUNTER — HOME CARE VISIT (OUTPATIENT)
Dept: SCHEDULING | Facility: HOME HEALTH | Age: 78
End: 2018-03-12
Payer: MEDICARE

## 2018-03-12 VITALS
TEMPERATURE: 97.5 F | DIASTOLIC BLOOD PRESSURE: 68 MMHG | HEART RATE: 68 BPM | SYSTOLIC BLOOD PRESSURE: 122 MMHG | RESPIRATION RATE: 17 BRPM

## 2018-03-12 PROCEDURE — 3331090002 HH PPS REVENUE DEBIT

## 2018-03-12 PROCEDURE — G0157 HHC PT ASSISTANT EA 15: HCPCS

## 2018-03-12 PROCEDURE — 3331090001 HH PPS REVENUE CREDIT

## 2018-03-13 PROCEDURE — 3331090002 HH PPS REVENUE DEBIT

## 2018-03-13 PROCEDURE — 3331090001 HH PPS REVENUE CREDIT

## 2018-03-14 PROCEDURE — 3331090002 HH PPS REVENUE DEBIT

## 2018-03-14 PROCEDURE — 3331090001 HH PPS REVENUE CREDIT

## 2018-03-14 NOTE — PROGRESS NOTES
This note will not be viewable in [address] FOLLOW UP    Follow up call made. Patient reports following up with PCP and continuing with [facility] for PT. Patient declines assistance with scheduling hospital follow up appointment with PCP. Patient states hshe I can call. This Care Coordinator proceeded in educating the patient on the importance of the PCP follow up visit for continuity of care and safety, patient voiced acknowledgement. No other n eeds identified at this time. Contact information provided for any questions/concerns prior to follow up call. Will schedule final outreach call.

## 2018-03-15 ENCOUNTER — HOME CARE VISIT (OUTPATIENT)
Dept: SCHEDULING | Facility: HOME HEALTH | Age: 78
End: 2018-03-15
Payer: MEDICARE

## 2018-03-15 VITALS
RESPIRATION RATE: 16 BRPM | SYSTOLIC BLOOD PRESSURE: 116 MMHG | TEMPERATURE: 97.2 F | HEART RATE: 68 BPM | DIASTOLIC BLOOD PRESSURE: 70 MMHG

## 2018-03-15 PROCEDURE — 3331090001 HH PPS REVENUE CREDIT

## 2018-03-15 PROCEDURE — G0157 HHC PT ASSISTANT EA 15: HCPCS

## 2018-03-15 PROCEDURE — 3331090002 HH PPS REVENUE DEBIT

## 2018-03-15 NOTE — PROGRESS NOTES
This note will not be viewable in [address] Final outreach call for follow up. Patient reports feeling better and continues with PT @ home. No other needs or problems identified. Patient expressed gratitude for call. Will close case.

## 2018-03-16 PROCEDURE — 3331090002 HH PPS REVENUE DEBIT

## 2018-03-16 PROCEDURE — 3331090001 HH PPS REVENUE CREDIT

## 2018-03-17 PROCEDURE — 3331090001 HH PPS REVENUE CREDIT

## 2018-03-17 PROCEDURE — 3331090002 HH PPS REVENUE DEBIT

## 2018-03-18 PROCEDURE — 3331090002 HH PPS REVENUE DEBIT

## 2018-03-18 PROCEDURE — 3331090001 HH PPS REVENUE CREDIT

## 2018-03-19 ENCOUNTER — HOME CARE VISIT (OUTPATIENT)
Dept: HOME HEALTH SERVICES | Facility: HOME HEALTH | Age: 78
End: 2018-03-19
Payer: MEDICARE

## 2018-03-19 ENCOUNTER — HOME CARE VISIT (OUTPATIENT)
Dept: SCHEDULING | Facility: HOME HEALTH | Age: 78
End: 2018-03-19
Payer: MEDICARE

## 2018-03-19 PROCEDURE — G0157 HHC PT ASSISTANT EA 15: HCPCS

## 2018-03-19 PROCEDURE — 3331090001 HH PPS REVENUE CREDIT

## 2018-03-19 PROCEDURE — 3331090002 HH PPS REVENUE DEBIT

## 2018-03-20 VITALS
SYSTOLIC BLOOD PRESSURE: 116 MMHG | HEART RATE: 96 BPM | RESPIRATION RATE: 16 BRPM | TEMPERATURE: 97.6 F | DIASTOLIC BLOOD PRESSURE: 62 MMHG

## 2018-03-20 PROCEDURE — 3331090002 HH PPS REVENUE DEBIT

## 2018-03-20 PROCEDURE — 3331090001 HH PPS REVENUE CREDIT

## 2018-03-21 PROCEDURE — 3331090001 HH PPS REVENUE CREDIT

## 2018-03-21 PROCEDURE — 3331090002 HH PPS REVENUE DEBIT

## 2018-03-22 ENCOUNTER — HOME CARE VISIT (OUTPATIENT)
Dept: SCHEDULING | Facility: HOME HEALTH | Age: 78
End: 2018-03-22
Payer: MEDICARE

## 2018-03-22 VITALS
DIASTOLIC BLOOD PRESSURE: 70 MMHG | HEART RATE: 70 BPM | TEMPERATURE: 97.5 F | RESPIRATION RATE: 16 BRPM | SYSTOLIC BLOOD PRESSURE: 122 MMHG

## 2018-03-22 PROCEDURE — 3331090001 HH PPS REVENUE CREDIT

## 2018-03-22 PROCEDURE — G0157 HHC PT ASSISTANT EA 15: HCPCS

## 2018-03-22 PROCEDURE — 3331090002 HH PPS REVENUE DEBIT

## 2018-03-23 PROCEDURE — 3331090001 HH PPS REVENUE CREDIT

## 2018-03-23 PROCEDURE — 3331090002 HH PPS REVENUE DEBIT

## 2018-03-24 PROCEDURE — 3331090001 HH PPS REVENUE CREDIT

## 2018-03-24 PROCEDURE — 3331090002 HH PPS REVENUE DEBIT

## 2018-03-25 PROCEDURE — 3331090001 HH PPS REVENUE CREDIT

## 2018-03-25 PROCEDURE — 3331090002 HH PPS REVENUE DEBIT

## 2018-03-26 PROCEDURE — 3331090002 HH PPS REVENUE DEBIT

## 2018-03-26 PROCEDURE — 3331090001 HH PPS REVENUE CREDIT

## 2018-03-27 PROCEDURE — 3331090002 HH PPS REVENUE DEBIT

## 2018-03-27 PROCEDURE — 3331090001 HH PPS REVENUE CREDIT

## 2018-03-28 PROCEDURE — 3331090002 HH PPS REVENUE DEBIT

## 2018-03-28 PROCEDURE — 3331090001 HH PPS REVENUE CREDIT

## 2018-03-29 ENCOUNTER — HOME CARE VISIT (OUTPATIENT)
Dept: SCHEDULING | Facility: HOME HEALTH | Age: 78
End: 2018-03-29
Payer: MEDICARE

## 2018-03-29 VITALS
SYSTOLIC BLOOD PRESSURE: 154 MMHG | TEMPERATURE: 96.8 F | RESPIRATION RATE: 19 BRPM | HEART RATE: 68 BPM | DIASTOLIC BLOOD PRESSURE: 76 MMHG

## 2018-03-29 PROCEDURE — G0151 HHCP-SERV OF PT,EA 15 MIN: HCPCS

## 2018-03-29 PROCEDURE — 3331090002 HH PPS REVENUE DEBIT

## 2018-03-29 PROCEDURE — 3331090001 HH PPS REVENUE CREDIT

## 2019-02-07 ENCOUNTER — HOSPITAL ENCOUNTER (OUTPATIENT)
Dept: MAMMOGRAPHY | Age: 79
Discharge: HOME | End: 2019-02-07
Attending: FAMILY MEDICINE
Payer: MEDICARE

## 2019-02-07 DIAGNOSIS — N63.10 BREAST MASS, RIGHT: ICD-10-CM

## 2019-02-07 DIAGNOSIS — Z78.0 POSTMENOPAUSE: ICD-10-CM

## 2019-02-07 PROCEDURE — 77080 DXA BONE DENSITY AXIAL: CPT

## 2019-02-07 PROCEDURE — 76642 ULTRASOUND BREAST LIMITED: CPT

## 2019-02-26 ENCOUNTER — HOSPITAL ENCOUNTER (OUTPATIENT)
Dept: MAMMOGRAPHY | Age: 79
Discharge: HOME | End: 2019-02-26
Attending: FAMILY MEDICINE
Payer: MEDICARE

## 2019-02-26 ENCOUNTER — HOSPITAL ENCOUNTER (OUTPATIENT)
Dept: MAMMOGRAPHY | Age: 79
Discharge: HOME | End: 2019-02-26
Attending: SURGERY
Payer: MEDICARE

## 2019-02-26 VITALS — HEART RATE: 70 BPM | SYSTOLIC BLOOD PRESSURE: 148 MMHG | DIASTOLIC BLOOD PRESSURE: 65 MMHG

## 2019-02-26 DIAGNOSIS — R92.8 ABNORMAL MAMMOGRAM OF RIGHT BREAST: ICD-10-CM

## 2019-02-26 PROCEDURE — 19083 BX BREAST 1ST LESION US IMAG: CPT

## 2019-02-26 PROCEDURE — 77065 DX MAMMO INCL CAD UNI: CPT

## 2019-02-26 PROCEDURE — 88305 TISSUE EXAM BY PATHOLOGIST: CPT

## 2019-02-26 PROCEDURE — 88361 TUMOR IMMUNOHISTOCHEM/COMPUT: CPT

## 2019-02-26 PROCEDURE — 74011250636 HC RX REV CODE- 250/636

## 2019-02-26 RX ORDER — LIDOCAINE HYDROCHLORIDE 10 MG/ML
5 INJECTION INFILTRATION; PERINEURAL
Status: COMPLETED | OUTPATIENT
Start: 2019-02-26 | End: 2019-02-26

## 2019-02-26 RX ADMIN — LIDOCAINE HYDROCHLORIDE 5 ML: 10 INJECTION, SOLUTION INFILTRATION; PERINEURAL at 09:51

## 2019-02-27 NOTE — PROGRESS NOTES
Ms Jan Ryan will not be coming here for her results, she will be meeting with Dr Bindu Champagne for results and follow-up

## 2019-02-28 PROBLEM — Z17.0 MALIGNANT NEOPLASM OF LOWER-OUTER QUADRANT OF RIGHT BREAST OF FEMALE, ESTROGEN RECEPTOR POSITIVE (HCC): Status: ACTIVE | Noted: 2019-02-28

## 2019-02-28 PROBLEM — C50.511 MALIGNANT NEOPLASM OF LOWER-OUTER QUADRANT OF RIGHT BREAST OF FEMALE, ESTROGEN RECEPTOR POSITIVE (HCC): Status: ACTIVE | Noted: 2019-02-28

## 2019-03-13 RX ORDER — SODIUM CHLORIDE 0.9 % (FLUSH) 0.9 %
5-40 SYRINGE (ML) INJECTION AS NEEDED
Status: CANCELLED | OUTPATIENT
Start: 2019-03-13

## 2019-03-13 RX ORDER — SODIUM CHLORIDE 0.9 % (FLUSH) 0.9 %
5-40 SYRINGE (ML) INJECTION EVERY 8 HOURS
Status: CANCELLED | OUTPATIENT
Start: 2019-03-13

## 2019-03-26 ENCOUNTER — ANESTHESIA EVENT (OUTPATIENT)
Dept: SURGERY | Age: 79
End: 2019-03-26
Payer: MEDICARE

## 2019-03-27 ENCOUNTER — ANESTHESIA (OUTPATIENT)
Dept: SURGERY | Age: 79
End: 2019-03-27
Payer: MEDICARE

## 2019-03-27 ENCOUNTER — HOSPITAL ENCOUNTER (OUTPATIENT)
Age: 79
Setting detail: OBSERVATION
Discharge: HOME | End: 2019-03-28
Attending: SURGERY | Admitting: SURGERY
Payer: MEDICARE

## 2019-03-27 ENCOUNTER — APPOINTMENT (OUTPATIENT)
Dept: NUCLEAR MEDICINE | Age: 79
End: 2019-03-27
Attending: SURGERY
Payer: MEDICARE

## 2019-03-27 DIAGNOSIS — Z17.0 MALIGNANT NEOPLASM OF LOWER-OUTER QUADRANT OF RIGHT BREAST OF FEMALE, ESTROGEN RECEPTOR POSITIVE (HCC): ICD-10-CM

## 2019-03-27 DIAGNOSIS — C50.511 MALIGNANT NEOPLASM OF LOWER-OUTER QUADRANT OF RIGHT BREAST OF FEMALE, ESTROGEN RECEPTOR POSITIVE (HCC): ICD-10-CM

## 2019-03-27 DIAGNOSIS — Z90.11 S/P MASTECTOMY, RIGHT: Primary | ICD-10-CM

## 2019-03-27 PROBLEM — C50.911 BREAST CANCER, RIGHT BREAST (HCC): Status: ACTIVE | Noted: 2019-03-27

## 2019-03-27 LAB
ABO + RH BLD: NORMAL
BLOOD GROUP ANTIBODIES SERPL: NORMAL
SPECIMEN EXP DATE BLD: NORMAL

## 2019-03-27 PROCEDURE — 77030021678 HC GLIDESCP STAT DISP VERT -B: Performed by: ANESTHESIOLOGY

## 2019-03-27 PROCEDURE — 74011000250 HC RX REV CODE- 250

## 2019-03-27 PROCEDURE — 77030032490 HC SLV COMPR SCD KNE COVD -B: Performed by: SURGERY

## 2019-03-27 PROCEDURE — 86900 BLOOD TYPING SEROLOGIC ABO: CPT

## 2019-03-27 PROCEDURE — 88307 TISSUE EXAM BY PATHOLOGIST: CPT

## 2019-03-27 PROCEDURE — 77030003028 HC SUT VCRL J&J -A: Performed by: SURGERY

## 2019-03-27 PROCEDURE — 77030018846 HC SOL IRR STRL H20 ICUM -A: Performed by: SURGERY

## 2019-03-27 PROCEDURE — 77030008703 HC TU ET UNCUF COVD -A: Performed by: ANESTHESIOLOGY

## 2019-03-27 PROCEDURE — 74011250636 HC RX REV CODE- 250/636: Performed by: SURGERY

## 2019-03-27 PROCEDURE — 77030012406 HC DRN WND PENRS BARD -A: Performed by: SURGERY

## 2019-03-27 PROCEDURE — 76210000016 HC OR PH I REC 1 TO 1.5 HR: Performed by: SURGERY

## 2019-03-27 PROCEDURE — 77030002996 HC SUT SLK J&J -A: Performed by: SURGERY

## 2019-03-27 PROCEDURE — 76060000034 HC ANESTHESIA 1.5 TO 2 HR: Performed by: SURGERY

## 2019-03-27 PROCEDURE — 77030020263 HC SOL INJ SOD CL0.9% LFCR 1000ML

## 2019-03-27 PROCEDURE — 74011250636 HC RX REV CODE- 250/636

## 2019-03-27 PROCEDURE — 77030016570 HC BLNKT BAIR HGGR 3M -B: Performed by: ANESTHESIOLOGY

## 2019-03-27 PROCEDURE — 74011250636 HC RX REV CODE- 250/636: Performed by: ANESTHESIOLOGY

## 2019-03-27 PROCEDURE — 74011250637 HC RX REV CODE- 250/637: Performed by: SURGERY

## 2019-03-27 PROCEDURE — 99218 HC RM OBSERVATION: CPT

## 2019-03-27 PROCEDURE — A9541 TC99M SULFUR COLLOID: HCPCS

## 2019-03-27 PROCEDURE — 74011250637 HC RX REV CODE- 250/637: Performed by: ANESTHESIOLOGY

## 2019-03-27 PROCEDURE — 77030008467 HC STPLR SKN COVD -B: Performed by: SURGERY

## 2019-03-27 PROCEDURE — 76010000162 HC OR TIME 1.5 TO 2 HR INTENSV-TIER 1: Performed by: SURGERY

## 2019-03-27 PROCEDURE — 77030020782 HC GWN BAIR PAWS FLX 3M -B: Performed by: ANESTHESIOLOGY

## 2019-03-27 PROCEDURE — 77030002916 HC SUT ETHLN J&J -A: Performed by: SURGERY

## 2019-03-27 PROCEDURE — 77030019908 HC STETH ESOPH SIMS -A: Performed by: ANESTHESIOLOGY

## 2019-03-27 PROCEDURE — 77030013567 HC DRN WND RESERV BARD -A: Performed by: SURGERY

## 2019-03-27 RX ORDER — SODIUM CHLORIDE, SODIUM LACTATE, POTASSIUM CHLORIDE, CALCIUM CHLORIDE 600; 310; 30; 20 MG/100ML; MG/100ML; MG/100ML; MG/100ML
100 INJECTION, SOLUTION INTRAVENOUS CONTINUOUS
Status: DISCONTINUED | OUTPATIENT
Start: 2019-03-27 | End: 2019-03-27 | Stop reason: HOSPADM

## 2019-03-27 RX ORDER — TRAMADOL HYDROCHLORIDE 50 MG/1
50 TABLET ORAL
Status: DISCONTINUED | OUTPATIENT
Start: 2019-03-27 | End: 2019-03-28 | Stop reason: HOSPADM

## 2019-03-27 RX ORDER — OXYCODONE AND ACETAMINOPHEN 7.5; 325 MG/1; MG/1
1 TABLET ORAL
Status: DISCONTINUED | OUTPATIENT
Start: 2019-03-27 | End: 2019-03-28 | Stop reason: HOSPADM

## 2019-03-27 RX ORDER — OXYCODONE HYDROCHLORIDE 5 MG/1
10 TABLET ORAL
Status: COMPLETED | OUTPATIENT
Start: 2019-03-27 | End: 2019-03-27

## 2019-03-27 RX ORDER — MORPHINE SULFATE 2 MG/ML
2 INJECTION, SOLUTION INTRAMUSCULAR; INTRAVENOUS AS NEEDED
Status: DISCONTINUED | OUTPATIENT
Start: 2019-03-27 | End: 2019-03-27 | Stop reason: HOSPADM

## 2019-03-27 RX ORDER — DEXAMETHASONE SODIUM PHOSPHATE 4 MG/ML
INJECTION, SOLUTION INTRA-ARTICULAR; INTRALESIONAL; INTRAMUSCULAR; INTRAVENOUS; SOFT TISSUE AS NEEDED
Status: DISCONTINUED | OUTPATIENT
Start: 2019-03-27 | End: 2019-03-27 | Stop reason: HOSPADM

## 2019-03-27 RX ORDER — SODIUM CHLORIDE 9 MG/ML
75 INJECTION, SOLUTION INTRAVENOUS CONTINUOUS
Status: DISCONTINUED | OUTPATIENT
Start: 2019-03-27 | End: 2019-03-28 | Stop reason: HOSPADM

## 2019-03-27 RX ORDER — MORPHINE SULFATE 2 MG/ML
2 INJECTION, SOLUTION INTRAMUSCULAR; INTRAVENOUS
Status: DISCONTINUED | OUTPATIENT
Start: 2019-03-27 | End: 2019-03-28 | Stop reason: HOSPADM

## 2019-03-27 RX ORDER — PROPOFOL 10 MG/ML
INJECTION, EMULSION INTRAVENOUS AS NEEDED
Status: DISCONTINUED | OUTPATIENT
Start: 2019-03-27 | End: 2019-03-27 | Stop reason: HOSPADM

## 2019-03-27 RX ORDER — ACETAMINOPHEN 10 MG/ML
INJECTION, SOLUTION INTRAVENOUS AS NEEDED
Status: DISCONTINUED | OUTPATIENT
Start: 2019-03-27 | End: 2019-03-27 | Stop reason: HOSPADM

## 2019-03-27 RX ORDER — LIDOCAINE HYDROCHLORIDE 20 MG/ML
15 SOLUTION OROPHARYNGEAL AS NEEDED
Status: DISCONTINUED | OUTPATIENT
Start: 2019-03-27 | End: 2019-03-28 | Stop reason: HOSPADM

## 2019-03-27 RX ORDER — CYCLOBENZAPRINE HCL 10 MG
10 TABLET ORAL 3 TIMES DAILY
Status: DISCONTINUED | OUTPATIENT
Start: 2019-03-27 | End: 2019-03-28 | Stop reason: HOSPADM

## 2019-03-27 RX ORDER — FENTANYL CITRATE 50 UG/ML
INJECTION, SOLUTION INTRAMUSCULAR; INTRAVENOUS AS NEEDED
Status: DISCONTINUED | OUTPATIENT
Start: 2019-03-27 | End: 2019-03-27 | Stop reason: HOSPADM

## 2019-03-27 RX ORDER — HYDROMORPHONE HYDROCHLORIDE 2 MG/ML
0.5 INJECTION, SOLUTION INTRAMUSCULAR; INTRAVENOUS; SUBCUTANEOUS
Status: DISCONTINUED | OUTPATIENT
Start: 2019-03-27 | End: 2019-03-27 | Stop reason: HOSPADM

## 2019-03-27 RX ORDER — LIDOCAINE HYDROCHLORIDE 20 MG/ML
INJECTION, SOLUTION EPIDURAL; INFILTRATION; INTRACAUDAL; PERINEURAL AS NEEDED
Status: DISCONTINUED | OUTPATIENT
Start: 2019-03-27 | End: 2019-03-27 | Stop reason: HOSPADM

## 2019-03-27 RX ORDER — CEFAZOLIN SODIUM/WATER 2 G/20 ML
2 SYRINGE (ML) INTRAVENOUS ONCE
Status: COMPLETED | OUTPATIENT
Start: 2019-03-27 | End: 2019-03-27

## 2019-03-27 RX ORDER — ROCURONIUM BROMIDE 10 MG/ML
INJECTION, SOLUTION INTRAVENOUS AS NEEDED
Status: DISCONTINUED | OUTPATIENT
Start: 2019-03-27 | End: 2019-03-27 | Stop reason: HOSPADM

## 2019-03-27 RX ORDER — SODIUM CHLORIDE 0.9 % (FLUSH) 0.9 %
5-40 SYRINGE (ML) INJECTION AS NEEDED
Status: DISCONTINUED | OUTPATIENT
Start: 2019-03-27 | End: 2019-03-27 | Stop reason: HOSPADM

## 2019-03-27 RX ORDER — ONDANSETRON 2 MG/ML
INJECTION INTRAMUSCULAR; INTRAVENOUS AS NEEDED
Status: DISCONTINUED | OUTPATIENT
Start: 2019-03-27 | End: 2019-03-27 | Stop reason: HOSPADM

## 2019-03-27 RX ORDER — SUCCINYLCHOLINE CHLORIDE 20 MG/ML
INJECTION INTRAMUSCULAR; INTRAVENOUS AS NEEDED
Status: DISCONTINUED | OUTPATIENT
Start: 2019-03-27 | End: 2019-03-27 | Stop reason: HOSPADM

## 2019-03-27 RX ORDER — LIDOCAINE HYDROCHLORIDE 10 MG/ML
0.3 INJECTION INFILTRATION; PERINEURAL ONCE
Status: DISCONTINUED | OUTPATIENT
Start: 2019-03-27 | End: 2019-03-27 | Stop reason: HOSPADM

## 2019-03-27 RX ORDER — SODIUM CHLORIDE 0.9 % (FLUSH) 0.9 %
5-40 SYRINGE (ML) INJECTION EVERY 8 HOURS
Status: DISCONTINUED | OUTPATIENT
Start: 2019-03-27 | End: 2019-03-27 | Stop reason: HOSPADM

## 2019-03-27 RX ORDER — MIDAZOLAM HYDROCHLORIDE 1 MG/ML
2 INJECTION, SOLUTION INTRAMUSCULAR; INTRAVENOUS
Status: DISCONTINUED | OUTPATIENT
Start: 2019-03-27 | End: 2019-03-27 | Stop reason: HOSPADM

## 2019-03-27 RX ADMIN — SUCCINYLCHOLINE CHLORIDE 160 MG: 20 INJECTION INTRAMUSCULAR; INTRAVENOUS at 13:06

## 2019-03-27 RX ADMIN — SODIUM CHLORIDE, SODIUM LACTATE, POTASSIUM CHLORIDE, AND CALCIUM CHLORIDE 100 ML/HR: 600; 310; 30; 20 INJECTION, SOLUTION INTRAVENOUS at 07:45

## 2019-03-27 RX ADMIN — PROPOFOL 50 MG: 10 INJECTION, EMULSION INTRAVENOUS at 13:28

## 2019-03-27 RX ADMIN — Medication 2 G: at 13:22

## 2019-03-27 RX ADMIN — OXYCODONE HYDROCHLORIDE 10 MG: 5 TABLET ORAL at 15:07

## 2019-03-27 RX ADMIN — SODIUM CHLORIDE 75 ML/HR: 900 INJECTION, SOLUTION INTRAVENOUS at 16:00

## 2019-03-27 RX ADMIN — ACETAMINOPHEN 1000 MG: 10 INJECTION, SOLUTION INTRAVENOUS at 14:28

## 2019-03-27 RX ADMIN — CYCLOBENZAPRINE HYDROCHLORIDE 10 MG: 10 TABLET, FILM COATED ORAL at 22:06

## 2019-03-27 RX ADMIN — ROCURONIUM BROMIDE 10 MG: 10 INJECTION, SOLUTION INTRAVENOUS at 13:06

## 2019-03-27 RX ADMIN — LIDOCAINE HYDROCHLORIDE 100 MG: 20 INJECTION, SOLUTION EPIDURAL; INFILTRATION; INTRACAUDAL; PERINEURAL at 13:06

## 2019-03-27 RX ADMIN — PROPOFOL 150 MG: 10 INJECTION, EMULSION INTRAVENOUS at 13:06

## 2019-03-27 RX ADMIN — ONDANSETRON 4 MG: 2 INJECTION INTRAMUSCULAR; INTRAVENOUS at 14:16

## 2019-03-27 RX ADMIN — Medication 10 ML: at 14:00

## 2019-03-27 RX ADMIN — FENTANYL CITRATE 50 MCG: 50 INJECTION, SOLUTION INTRAMUSCULAR; INTRAVENOUS at 13:39

## 2019-03-27 RX ADMIN — PROPOFOL 40 MG: 10 INJECTION, EMULSION INTRAVENOUS at 13:39

## 2019-03-27 RX ADMIN — DEXAMETHASONE SODIUM PHOSPHATE 4 MG: 4 INJECTION, SOLUTION INTRA-ARTICULAR; INTRALESIONAL; INTRAMUSCULAR; INTRAVENOUS; SOFT TISSUE at 13:17

## 2019-03-27 RX ADMIN — FENTANYL CITRATE 50 MCG: 50 INJECTION, SOLUTION INTRAMUSCULAR; INTRAVENOUS at 13:28

## 2019-03-27 RX ADMIN — CYCLOBENZAPRINE HYDROCHLORIDE 10 MG: 10 TABLET, FILM COATED ORAL at 16:33

## 2019-03-27 RX ADMIN — SODIUM CHLORIDE 75 ML/HR: 900 INJECTION, SOLUTION INTRAVENOUS at 22:13

## 2019-03-27 RX ADMIN — FENTANYL CITRATE 100 MCG: 50 INJECTION, SOLUTION INTRAMUSCULAR; INTRAVENOUS at 13:06

## 2019-03-27 NOTE — PROGRESS NOTES
03/27/19 1601 Dual Skin Pressure Injury Assessment Dual Skin Pressure Injury Assessment [facility] Second Care Provider (Based on [address] [place] [address]) Elaina Verde RN Patient has no pressure injuries noted upon admission, patient has a right chest incision, all other skin is clean, dry, and intact.

## 2019-03-27 NOTE — H&P
[facility], [address] [place] [address] 
[phone] 
  
 History and Physical  
Rodríguez Mosley    Admit date: (Not on file) MRN: [id number]     : 1940     Age: 66 y.o. Attending Physician: Buddy Carlos MD 
  
HPI: Rodríguez Mosley is a 66 y.o. female who presents after breast biopsy with the findings of low grade breast cancer. She has a history of right breast cancer treated by lumpectomy and radiation to the upper outer quadrant 2007.  
  
  
    
Past Medical History:  
Diagnosis Date  Anemia, unspecified    
  patient denies  Arthritis    
  osteoarthritis  Autoimmune disease ([facility]    
  rheumatoid arthritis; saw Dr. Rae Christiansen in the past. Patient does not currently see rheumatologist.    
[place] Breast cancer [facility])    
 Cancer ([facility] 2013  
  right breast  
 Chronic pain    
  d/t rheumatoid arthritis  HTN (hypertension) 2012  
  patient states not a current problem, no medication.  Hypercholesterolemia    
  no meds  Ill-defined condition    
  \"I can't take anything strong\"  Menopause    
 Nausea & vomiting    
  patient denies post-op N/V  
 Osteoarthritis of both shoulders    
 Osteoarthritis of right hip    
  Followed by Dr. Andressa Pruitt historian    
 Rheumatoid arthritis ([facility]    
  Patient does not see Rheumatologist; no prescription medication. 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    
  headache after general anesthesia x 1  
 Vitamin D deficiency disease    
  pt unaware of issue,diagnosis doc prior to 3/9/12; managed with daily vitamin D supplement.   
  
    
Current Outpatient Medications Medication Sig  
 aspirin delayed-release 81 mg tablet Take 1 Tab by mouth every twelve (12) hours every twelve (12) hours.   
 oxyCODONE IR (ROXICODONE) 10 mg tab immediate release tablet Take 1 Tab by mouth every three (3) hours as needed. Max Daily Amount: 80 mg.  
 cholecalciferol (VITAMIN D3) 1,000 unit tablet Take 2,000 Units by mouth daily.  ACETAMINOPHEN/DIPHENHYDRAMINE (TYLENOL PM PO) Take 2 Tabs by mouth nightly.  ammonium lactate (LAC-HYDRIN) 12 % lotion rub in to affected area well daily after bathing  magnesium oxide (MAG-OX) 400 mg tablet Take 800 mg by mouth daily.  DISABLED PLACARD (DISABLED PLACARD) DMV To use daily  
  
No current facility-administered medications for this visit.   
  
ALERGIES:  Ciprofloxacin; Codeine; Demerol [meperidine]; Dilaudid [hydromorphone (bulk)]; Gluten; Hydromorphone; Ketoconazole; Plaquenil [hydroxychloroquine]; Prednisone; and Yellow dye 
  
Social  
Social History  
  
    
Tobacco Use  Smoking status: Never Smoker  Smokeless tobacco: Never Used Substance Use Topics  Alcohol use: No  
 Drug use: No  
  
     
Family History Problem Relation Age of Onset  Arthritis-osteo Mother    
 Hypertension Mother    
 No Known Problems Father    
 Other Other    
      family hx of HTN  
 Breast Cancer Neg Hx    
  
  
ROS: The patient has no difficulty with chest pain or shortness of breath. No fever or chills. Comprehensive review of systems was otherwise unremarkable except as noted above. 
  
Physical Exam:  
General: Alert oriented cooperative patient in no acute distress. Eyes:Sclera are clear. Resp: No JVD. Breathing is  non-labored. CV: RRR. Breast: Axillary:  
Abd: soft non-tender and non-distended without peritoneal signs. Extremities: unremarkable.   
Neuro:  No focal signs 
  
No results for input(s): WBC, HGB, PLT, NA, K, CL, CO2, BUN, CREA, GLU, INR, APTT, TBIL, SGOT, ALT, AP, AML, LPSE, HGBEXT, PLTEXT in the last 72 hours. 
  
No lab exists for component: PT, GTP, SGPT, INREXT 
  
Biopsy results 
  
 DIAGNOSIS  
A: \"RIGHT BREAST, 11:00 POSITION, 4 CM FROM NIPPLE, CORE BIOPSY\":  
 INFILTRATING DUCT CARCINOMA, LOW-GRADE (WELL DIFFERENTIATED). DEFINITE IN SITU COMPONENT AND LYMPHOVASCULAR INVASION ARE NOT IDENTIFIED.  
jtl/2/27/2019 Electronically signed out on 2/27/2019 07:20 by HUONG Espinosa M.D.  
  
Assessment/Plan:  Michael Charles is a 66 y.o. female who has reoccurring breast cancer. I have recommended a simple mastectomy and sentinel lymph node biopsy. I have discussed the risks, benefits and alternatives of surgery. Pt understands and wishes to proceed. Consent obtained

## 2019-03-27 NOTE — ANESTHESIA POSTPROCEDURE EVALUATION
Procedure(s): RIGHT BREAST MASTECTOMY SIMPLE SENTINEL NODE BIOPSY WITH LYMPHATIC MAPPING  PREOP @ 7:30 / NUC MED @ 9:00 / SURGERY @ 11:43. 
 
general 
 
Anesthesia Post Evaluation Multimodal analgesia: multimodal analgesia used between 6 hours prior to anesthesia start to PACU discharge Patient location during evaluation: bedside Patient participation: complete - patient participated Level of consciousness: awake Pain management: adequate Airway patency: patent Anesthetic complications: no 
Cardiovascular status: acceptable and stable Respiratory status: acceptable and nasal cannula Hydration status: acceptable Post anesthesia nausea and vomiting:  none Vitals Value Taken Time /73 3/27/2019  3:32 PM  
Temp 36.5 °C (97.7 °F) 3/27/2019  3:10 PM  
Pulse 82 3/27/2019  3:42 PM  
Resp 13 3/27/2019  3:41 PM  
SpO2 97 % 3/27/2019  3:43 PM  
Vitals shown include unvalidated device data.

## 2019-03-27 NOTE — PROGRESS NOTES
TRANSFER - IN REPORT: 
 
Verbal report received from Liam Boyd RN on Yas Tom  being received from PACU for routine post - op Report consisted of patients Situation, Background, Assessment and  
Recommendations(SBAR). Information from the following report(s) SBAR, ED Summary, Intake/Output, MAR and Recent Results was reviewed with the receiving nurse. Opportunity for questions and clarification was provided. Assessment completed upon patients arrival to unit and care assumed.

## 2019-03-27 NOTE — PERIOP NOTES
TRANSFER - OUT REPORT: 
 
Verbal report given to [place](name) on David Hobbs  being transferred to [facility](unit) for routine post - op Report consisted of patients Situation, Background, Assessment and  
Recommendations(SBAR). Information from the following report(s) SBAR, OR Summary, Procedure Summary, Intake/Output, MAR and Cardiac Rhythm NSR was reviewed with the receiving nurse. Lines:  
Peripheral IV 03/27/19 Left;Posterior Forearm (Active) Site Assessment Clean, dry, & intact 3/27/2019  3:10 PM  
Phlebitis Assessment 0 3/27/2019  3:10 PM  
Infiltration Assessment 0 3/27/2019  3:10 PM  
Dressing Status Clean, dry, & intact 3/27/2019  3:10 PM  
Dressing Type Transparent;Tape 3/27/2019  3:10 PM  
Hub Color/Line Status Infusing;Pink 3/27/2019  3:10 PM  
  
 
Opportunity for questions and clarification was provided. Patient transported with: 
 O2 @ 2 liters VTE prophylaxis orders have been written for David Hobbs. Patient and family given floor number and nurses name. Family updated re: pt status after security code verified.

## 2019-03-27 NOTE — ANESTHESIA PREPROCEDURE EVALUATION
Relevant Problems No relevant active problems Anesthetic History PONV Review of Systems / Medical History Patient summary reviewed and pertinent labs reviewed Pulmonary Within defined limits Neuro/Psych Within defined limits Cardiovascular Hypertension Exercise tolerance: <4 METS: Limited by arthritis GI/Hepatic/Renal 
Within defined limits Endo/Other Arthritis Other Findings Physical Exam 
 
Airway Mallampati: I Neck ROM: decreased range of motion Mouth opening: Normal 
 
Comments: Severely limited ROM neck Cardiovascular Rhythm: regular Rate: normal 
 
 
 
 Dental 
 
Dentition: Lower partial plate and Full upper dentures Comments: Two stand alone teeth on the bottom Pulmonary Breath sounds clear to auscultation Abdominal 
 
 
 
 Other Findings Anesthetic Plan ASA: 2 Anesthesia type: general 
 
 
 
 
Induction: Intravenous Anesthetic plan and risks discussed with: Patient and Spouse 
 
 
glidescope for intubation

## 2019-03-28 VITALS
RESPIRATION RATE: 14 BRPM | OXYGEN SATURATION: 95 % | WEIGHT: 180 LBS | DIASTOLIC BLOOD PRESSURE: 71 MMHG | TEMPERATURE: 97.3 F | HEART RATE: 76 BPM | BODY MASS INDEX: 28.93 KG/M2 | SYSTOLIC BLOOD PRESSURE: 126 MMHG | HEIGHT: 66 IN

## 2019-03-28 PROCEDURE — 77010033678 HC OXYGEN DAILY

## 2019-03-28 PROCEDURE — 94760 N-INVAS EAR/PLS OXIMETRY 1: CPT

## 2019-03-28 PROCEDURE — 74011250637 HC RX REV CODE- 250/637: Performed by: SURGERY

## 2019-03-28 PROCEDURE — 99218 HC RM OBSERVATION: CPT

## 2019-03-28 RX ORDER — OXYCODONE AND ACETAMINOPHEN 7.5; 325 MG/1; MG/1
1 TABLET ORAL
Qty: 30 TAB | Refills: 0 | Status: SHIPPED | OUTPATIENT
Start: 2019-03-28 | End: 2019-04-02

## 2019-03-28 RX ADMIN — CYCLOBENZAPRINE HYDROCHLORIDE 10 MG: 10 TABLET, FILM COATED ORAL at 07:54

## 2019-03-28 NOTE — DISCHARGE INSTRUCTIONS
Discharge Instructions/Follow-up Plans:   MD Instructions:     Follow-up with Dr. Froy Arenas 3/8/76. Keep incisions clean and dry, may remain uncovered. Do not apply lotions, creams or ointments to incisions.     Diet - as tolerated  Activity - ambulate - as tolerated - no heavy lifting >10lb. May shower - no tub baths or soaking/submerging.     No driving while taking narcotics. Do not drink alcohol while taking narcotics. Resume other home medications.      If problems or questions arise, please call our office at [phone].     Greater than 30 minutes were spent discharging the patient  Patient Education        Mastectomy: What to Expect at [address]    Right after the surgery you will probably feel weak, and you may feel sore for 2 to 3 days. You may have a pulling or stretching sensation near or under your arm. You may also have itching, tingling, and throbbing in the area. This will get better in a few days. You will likely have several drains near your incision. These help with healing. The drains will be removed when the fluid buildup slows. Drains are usually removed in the first few weeks after surgery. You may be able to go back to your normal routine or return to work in several weeks, but it may take longer. How long it takes you to recover will depend on the type of surgery you had. It also depends on whether you had breast reconstruction at the same time, or if you need other treatment. Your doctor or nurse will be able to give you an idea of what you can expect. When you find out that you have cancer, you may feel many emotions and may need some help coping. This is common. Seek out family, friends, and counselors for support. You also can do things at home to make yourself feel better while you go through treatment. Call the [employer] (8-281.288.1237) or visit its website at [address] for more information.   This care sheet gives you a general idea about how long it will take for you to recover. But each person recovers at a different pace. Follow the steps below to get better as quickly as possible. How can you care for yourself at home? Activity    Rest when you feel tired. Getting enough sleep will help you recover. After any activity, rest and raise your affected arm for a period of time equal to your activity time.     Try to walk each day. Start by walking a little more than you did the day before. Bit by bit, increase the amount you walk. Walking boosts blood flow and helps prevent pneumonia and constipation.     Avoid strenuous activities, such as biking, jogging, weightlifting, or aerobic exercise, until your doctor says it is okay. This includes housework, especially if you have to use your affected arm. You will probably be able to do your normal activities in 3 to 6 weeks. Avoid repeated motions with your affected arm, such as weed pulling, window cleaning, or vacuuming, for 6 months.     Avoid lifting anything over 10 to 15 pounds for 4 to 6 weeks. This may include a child, grocery bags, a heavy briefcase or backpack, cat litter or dog food bags, or a vacuum .     Ask your doctor when you can drive again.     You will probably be able to go back to work or your normal routine in 3 to 6 weeks. This depends on the type of work you do and any further treatment.     Your doctor will let you know how soon you can take showers or baths. Diet    You can eat your normal diet. If your stomach is upset, try bland, low-fat foods like plain rice, broiled chicken, toast, and yogurt.     Drink plenty of fluids (unless your doctor tells you not to).   You may notice that your bowels are not regular right after your surgery. This is common. Try to avoid constipation and straining with bowel movements. Take a fiber supplement such as Citrucel or Metamucil every day.  If you have not had a bowel movement after a couple of days, take a mild laxative like Milk of Magnesia or a stool softener like Colace. Medicines    Your doctor will tell you if and when you can restart your medicines. He or she will also give you instructions about taking any new medicines.     If you take blood thinners, such as warfarin (Coumadin), clopidogrel (Plavix), or aspirin, be sure to talk to your doctor. He or she will tell you if and when to start taking those medicines again. Make sure that you understand exactly what your doctor wants you to do.     Take pain medicines exactly as directed. If the doctor gave you a prescription medicine for pain, take it as prescribed. If you are not taking a prescription pain medicine, ask your doctor if you can take an over-the-counter medicine.     If your doctor prescribed antibiotics, take them as directed. Do not stop taking them just because you feel better. You need to take the full course of antibiotics.     If you think your pain medicine is making you sick to your stomach: Take your medicine after meals (unless your doctor has told you not to). Ask your doctor for a different pain medicine. Incision care    You will have a dressing over the cut (incision). A dressing helps the incision heal and protects it. Your doctor will tell you how to take care of this.     A woman may wear a special bra (surgi-bra) that holds the dressing in place after the surgery. The doctor will say when the bra is no longer needed. Drain care    You may have one or more drains near your incision. Your doctor will tell you how to take care of them.    Arm exercises    If you had any lymph nodes removed from under your arm, your doctor will advise you to do arm exercises. Do not do the exercises until your doctor says it is okay. Ice and elevation    Do not use ice for swelling or pain.     Prop up your arm on a pillow when you sit or lie down. Try to keep your arm above the level of your heart. This will help reduce swelling.    Follow-up care is a key part of your treatment and safety. Be sure to make and go to all appointments, and call your doctor if you are having problems. It's also a good idea to know your test results and keep a list of the medicines you take. When should you call for help? Call 911 anytime you think you may need emergency care. For example, call if:    You passed out (lost consciousness).     You have chest pain, are short of breath, or cough up blood.    Call your doctor now or seek immediate medical care if:    You are sick to your stomach or cannot drink fluids.     You cannot pass stools or gas.     You have pain that does not get better after you take your pain medicine.     You have loose stitches, or your incision comes open.     Bright red blood has soaked through the bandage over your incision.     You have signs of a blood clot in your leg (called a deep vein thrombosis), such as:  Pain in your calf, back of the knee, thigh, or groin. Redness or swelling in your leg.     You have signs of infection, such as: Increased pain, swelling, warmth, or redness. Red streaks leading from the incision. Pus draining from the incision. A fever.    Watch closely for changes in your health, and be sure to contact your doctor if:    You have any problems.     You have new or worse swelling or pain in your arm. Where can you learn more? Go to http://luna-esa.info/. Enter U201 in the search box to learn more about \"Mastectomy: What to Expect at Home. \"  Current as of: March 27, 2018  Content Version: 11.9  © 2679-1191 [employer]. Care instructions adapted under license by [employer] (which disclaims liability or warranty for this information). If you have questions about a medical condition or this instruction, always ask your healthcare professional. [place] [address] any warranty or liability for your use of this information.          DISCHARGE SUMMARY from Nurse    PATIENT INSTRUCTIONS:    After general anesthesia or intravenous sedation, for 24 hours or while taking prescription Narcotics:  · Limit your activities  · Do not drive and operate hazardous machinery  · Do not make important personal or business decisions  · Do  not drink alcoholic beverages  · If you have not urinated within 8 hours after discharge, please contact your surgeon on call. Report the following to your surgeon:  · Excessive pain, swelling, redness or odor of or around the surgical area  · Temperature over 100.5  · Nausea and vomiting lasting longer than 4 hours or if unable to take medications  · Any signs of decreased circulation or nerve impairment to extremity: change in color, persistent  numbness, tingling, coldness or increase pain  · Any questions    What to do at Home:  Recommended activity: Activity as tolerated. If you experience any of the following symptoms: fever greater than 100.5, redness/ warmth at incision site or drainage with a tan color/ foul odor, pain or nausea/ vomiting not controlled by medication, please follow up with your doctor. *  Please give a list of your current medications to your Primary Care Provider. *  Please update this list whenever your medications are discontinued, doses are      changed, or new medications (including over-the-counter products) are added. *  Please carry medication information at all times in case of emergency situations. These are general instructions for a healthy lifestyle:    No smoking/ No tobacco products/ Avoid exposure to second hand smoke  Surgeon General's Warning:  Quitting smoking now greatly reduces serious risk to your health.     Obesity, smoking, and sedentary lifestyle greatly increases your risk for illness    A healthy diet, regular physical exercise & weight monitoring are important for maintaining a healthy lifestyle    You may be retaining fluid if you have a history of heart failure or if you experience any of the following symptoms:  Weight gain of 3 pounds or more overnight or 5 pounds in a week, increased swelling in our hands or feet or shortness of breath while lying flat in bed. Please call your doctor as soon as you notice any of these symptoms; do not wait until your next office visit. Recognize signs and symptoms of STROKE:    F-face looks uneven    A-arms unable to move or move unevenly    S-speech slurred or non-existent    T-time-call 911 as soon as signs and symptoms begin-DO NOT go       Back to bed or wait to see if you get better-TIME IS BRAIN. Warning Signs of HEART ATTACK     Call 911 if you have these symptoms:   Chest discomfort. Most heart attacks involve discomfort in the center of the chest that lasts more than a few minutes, or that goes away and comes back. It can feel like uncomfortable pressure, squeezing, fullness, or pain.  Discomfort in other areas of the upper body. Symptoms can include pain or discomfort in one or both arms, the back, neck, jaw, or stomach.  Shortness of breath with or without chest discomfort.  Other signs may include breaking out in a cold sweat, nausea, or lightheadedness. Don't wait more than five minutes to call 911 - MINUTES MATTER! Fast action can save your life. Calling 911 is almost always the fastest way to get lifesaving treatment. Emergency Medical Services staff can begin treatment when they arrive -- up to an hour sooner than if someone gets to the hospital by car. The discharge information has been reviewed with the patient. The patient verbalized understanding. Discharge medications reviewed with the patient and appropriate educational materials and side effects teaching were provided.   ___________________________________________________________________________________________________________________________________    Patient Education        Surgical Drain Care: Care Instructions  Your Care Instructions    After a surgery, fluid may collect inside your body in the surgical area. This makes an infection or other problems more likely. A surgical drain allows the fluid to flow out. The doctor puts a thin, flexible rubber tube into the area of your body where the fluid is likely to collect. The rubber tube carries the fluid outside your body. The most common type of surgical drain carries the fluid into a collection bulb that you empty. This is called a Jelani-Clay (JESSA) drain. The drain uses suction created by the bulb to pull the fluid from your body into the bulb. The rubber tube will probably be held in place by one or two stitches in your skin. The bulb will probably be attached with a safety pin to your clothes or near the bandage so that it doesn't flip around or pull on the stitches. Another type of drain is called a Penrose drain. This type of drain doesn't have a bulb. Instead, the end of the tube is open. That allows the fluid to drain onto a dressing taped to your skin. The drain may be kept in place next to your skin with a stitch or a safety pin in the tube. When you first get the drain, the fluid will be bloody. It will change color from red to pink to a light yellow or clear as the wound heals and the fluid starts to go away. Your doctor may give you information on when you no longer need the drain and when it will be removed. Follow-up care is a key part of your treatment and safety. Be sure to make and go to all appointments, and call your doctor if you are having problems. It's also a good idea to know your test results and keep a list of the medicines you take. How can you care for yourself at home? How to empty the bulb of a Jelani-Clay drain  Follow any instructions your doctor gives you. How often you empty the bulb depends on how much fluid is draining. Empty the bulb when it is half full. 6. Wash your hands with soap and water. 7. Take the plug out of the bulb. 8. Empty the bulb.    If your doctor asks you to measure the fluid, empty the fluid into a measuring cup, and write down the color and how much you collected. Your doctor will want to know this information. 9. Clean the plug with alcohol. 10. Squeeze the bulb until it is flat. This removes all the air from the bulb. You may need to put the bulb on a table or a counter to flatten it. 11. Keep the bulb flat, and put the plug in. The bulb should stay flat after you put the plug back in. This creates the suction that pulls the fluid into the bulb. 12. Empty the fluid into the toilet. 13. Wash your hands. How to change the dressing around your surgical drain  You may have a dressing (bandage). The dressing is often made of gauze pads held on with tape. Your doctor will tell you how often to change it. 6. Wash your hands with soap and water. 7. Take off the dressing from around the drain. 8. Clean the drain site and the skin around it with soap and water. Use gauze or a cotton swab. 9. When the site is dry, put on a new dressing. The way your dressing is put on depends on what kind of drain you have. You will get instructions for your type of drain. 10. Wash your hands again with soap and water. Your doctor may ask you to keep track of your dressing changes. Write down the time of day and the amount and color of the fluid on the dressing. How to help prevent clogs in your surgical drain  Squeezing or \"milking\" the tube of your surgical drain can help prevent clogs so that it drains correctly. Your doctor will tell you when you need to do this. In general, you do this when:  · You see a clot in the tube that prevents fluid from draining. The clot may look like a dark, stringy lining. · You see fluid leaking around the tube where it goes into the skin. Follow these steps for milking the tube. 1. Use one hand to hold and pinch the tube where it leaves the skin.   2. With the thumb and first finger of your other hand, pinch the tube just below where you're holding it. 3. Slowly and firmly push your thumb and first finger down the tubing toward the end of the tube. 4. Repeat this as many times as needed to move the clot. If you have a Jelani-Clay (JESSA) drain, the clot should move down the tube and into the bulb. If you have a Penrose drain, the clot should move into the dressing. When should you call for help? Call your doctor now or seek immediate medical care if:    · You have signs of infection, such as:  ? Increased pain, swelling, warmth, or redness around the area. ? Red streaks leading from the area. ? Pus draining from the area. ? A fever.     · You see a sudden change in the color or smell of the drainage.     · The tube is coming loose where it leaves your skin.    Watch closely for changes in your health, and be sure to contact your doctor if:    · You see a lot of fluid around the drain.     · You cannot remove a clot from the tube by milking the tube. Where can you learn more? Go to http://luna-esa.info/. Enter K117 in the search box to learn more about \"Surgical Drain Care: Care Instructions. \"  Current as of: September 23, 2018  Content Version: 11.9  © 8543-8757 [employer]. Care instructions adapted under license by [employer] (which disclaims liability or warranty for this information). If you have questions about a medical condition or this instruction, always ask your healthcare professional. [place] [address] any warranty or liability for your use of this information.

## 2019-03-28 NOTE — PROGRESS NOTES
JESSA drain education complete. Instructed patient and  on how to empty drain, document output and return to follow up appointment. Verbalized understanding.

## 2019-03-28 NOTE — PROGRESS NOTES
END OF SHIFT NOTE: 
 
INTAKE/OUTPUT 
03/27 0701 - 03/28 0700 In: 630 [I.V.:852] Out: 1 [Urine:900; Drains:85] Voiding: YES Catheter: NO 
Drain:  
Jelani-Clay Drain 03/27/19 Right Other (comment) (Active) Site Assessment Clean, dry, & intact 3/28/2019  4:36 AM  
Dressing Status Clean, dry, & intact 3/28/2019  4:36 AM  
Status Patent; Charged;Draining 3/28/2019  4:36 AM  
Drainage Color Serosanguinous 3/28/2019  4:36 AM  
Output (ml) 15 ml 3/28/2019  4:36 AM  
 
 
 
 
 
 
Flatus: Patient does not have flatus present. Stool:  0 occurrences. Characteristics: 
  
Emesis: 0 occurrences. Characteristics: VITAL SIGNS Patient Vitals for the past 12 hrs: 
 Temp Pulse Resp BP SpO2  
03/28/19 0323 97.4 °F (36.3 °C) 80 16 137/73 97 % 03/27/19 2313 97.6 °F (36.4 °C) 86  133/63 99 % Pain Assessment Pain Intensity 1: 0 (03/28/19 0436) Pain Location 1: Arm, Breast 
Pain Intervention(s) 1: Medication (see MAR), Emotional support Patient Stated Pain Goal: 0 Ambulating Yes Shift report given to oncoming nurse at the bedside.  
 
Mike Hall RN

## 2019-03-28 NOTE — PROGRESS NOTES
PLAN: 
Pain control Diet as nancy No heavy lifting Home today ASSESSMENT:  Admit Date: 3/27/2019  
1 Day Post-Op  Procedure(s): RIGHT BREAST MASTECTOMY SIMPLE SENTINEL NODE BIOPSY WITH LYMPHATIC MAPPING  PREOP @ 7:30 / NUC MED @ 9:00 / SURGERY @ 11:43 Active Problems: 
  Breast cancer, right breast ([address] (3/27/2019) SUBJECTIVE: 
Awake in bed, pain controlled. AF, VSS. Wants to go home. Intake/Output Summary (Last 24 hours) at 3/28/2019 0932 Last data filed at 3/28/2019 6467 Gross per 24 hour Intake 1092 ml Output 1015 ml Net 77 ml OBJECTIVE: 
Constitutional: Alert oriented cooperative patient in no acute distress; appears stated age Visit Vitals /71 Pulse 76 Temp 97.3 °F (36.3 °C) Resp 14 Ht 5' 6\" (1.676 m) Wt 180 lb (81.6 kg) SpO2 95% BMI 29.05 kg/m² Eyes:Sclera are clear. ENMT: no external lesions gross hearing normal; no obvious neck masses, no ear or lip lesions CV: RRR. Normal perfusion Resp: No JVD. Breathing is  non-labored; no audible wheezing. GI: soft and non-distended Musculoskeletal: unremarkable with normal function. No embolic signs or cyanosis. Integument: right breast dressing c/d/i, drains serosanguinous Neuro:  Oriented; moves all 4; no focal deficits Psychiatric: normal affect and mood, no memory impairment Patient Vitals for the past 24 hrs: 
 BP Temp Pulse Resp SpO2  
03/28/19 0802 126/71 97.3 °F (36.3 °C) 76 14 95 % 03/28/19 0323 137/73 97.4 °F (36.3 °C) 80 16 97 % 03/27/19 2313 133/63 97.6 °F (36.4 °C) 86  99 % 03/27/19 1906 158/83 98 °F (36.7 °C) 85 17 98 % 03/27/19 1601 168/84 97.6 °F (36.4 °C) 77 17 97 % 03/27/19 1541   78 13 98 % 03/27/19 1532 166/73  78 13 98 % 03/27/19 1517 177/72  78 15 99 % 03/27/19 1510  97.7 °F (36.5 °C) 71 15 99 % 03/27/19 1503 159/70  75 18 98 % 03/27/19 1457 177/72  76 15 99 % 03/27/19 1452 164/73  79 15 99 % 03/27/19 1447 164/70  79 14 98 % 03/27/19 1443 165/72  81 23 98 % 03/27/19 1435 188/79 98.1 °F (36.7 °C) 85 15 99 % 03/27/19 1434 188/79  88  98 % 03/27/19 1019 127/70 97.4 °F (36.3 °C) 85 17 98 % Labs:  No results for input(s): WBC, HGB, PLT, NA, K, CL, CO2, BUN, CREA, GLU, PTP, INR, APTT, TBIL, TBILI, CBIL, SGOT, GPT, ALT, AP, AML, LPSE, LCAD, NH4, TROPT, TROIQ, HGBEXT, PLTEXT in the last 72 hours. No lab exists for component: INREXT Signed:  Rolanda Vu NP

## 2019-03-28 NOTE — PROGRESS NOTES
's initial visit attempted. Ms. Eliz San was not in the room. Loren Jackson MDiv Board Certified [employer]

## 2019-03-28 NOTE — PROGRESS NOTES
Patient and family refusing chloraseptic spray ordered. Dr. Omaira Chase on unit and verbal order given for viscous lidocaine.

## 2019-03-28 NOTE — PROGRESS NOTES
Discharge instructions reviewed with patient. Patient verbalized/ esigned agreement/ understanding. JESSA drain teaching reviewed with patient/ .

## 2019-04-01 NOTE — OP NOTES
[facility]  OPERATIVE REPORT    Name:  Nico Benton  MR#:  [id number]  :  1940  ACCOUNT #:  [de-identified]  DATE OF SERVICE:  2019    PREOPERATIVE DIAGNOSIS:  Right breast cancer. POSTOPERATIVE DIAGNOSIS:  Right breast cancer. PROCEDURE PERFORMED:  Right breast simple mastectomy and right axillary sentinel lymph node biopsy with lymphatic mapping. SURGEON:  Marlen Avalos MD    ASSISTANT:  None. ANESTHESIA:  General anesthetic. COMPLICATIONS:  none. .    IMPLANTS:  None. ESTIMATED BLOOD LOSS:  50 mL. CONDITION AT COMPLETION:  Stable. DRAINS:  One #19-Danish JESSA drain in the inframammary fold. INDICATIONS:  This patient is 70-year-old white female with a history of right-sided breast cancer. She was found to have a new lesion at the lateral aspect of the previous lumpectomy bed. Biopsy confirmed low-grade carcinoma. There was no sign of metastatic disease. Risks, benefits, and alternatives of the surgical options were clearly discussed with the patient prior to the surgery. She understood the risks and wished to proceed. Appropriate consent was given. PROCEDURE:  The patient was taken to the operating room. She underwent general endotracheal anesthetic with no difficulties. The right breast and axilla were prepped and draped in the routine sterile fashion. Timeout was performed identifying the patient and the procedure to be performed. IV antibiotics were given, inducted the anesthetic. An elliptical incision was made with a #10 blade through the skin circling the nipple areolar complex from medial to lateral extended toward the axilla. Skin flaps were then raised to the appropriate thickness with electrocautery Bovie to include maximum allowable amount of breast tissue and maintain viability of the skin flaps.   This was continued superiorly to the clavicle medial to the sternal border inferior to the inframammary fold and lateral to include the axillary tail of Abelardo. The breast was then taken off the chest wall using pectoral fascia as its posterior border which was taken along with it. Special attention was given to complete excision of the previous lumpectomy cavity. Once removed, the breast was marked for orientation with a long suture lateral and a short suture superior. This was sent to Pathology for review. The Neoprobe was then used to localize the sentinel lymph node within the right axilla, which had been mapped previously in Nuclear Medicine. Lymph node was identified and carefully dissected out and removed and sent to Pathology as a separate specimen. At the completion, irrigation was performed, hemostasis was assured. A single #19-Welsh JESSA drain was placed through an inferolateral stab incision. This was positioned to drain the inframammary fold and axilla. The drain was secured to the skin with 0 silk suture and placed to bulb suction. The skin flaps were closed using series of interrupted 2-0 Vicryl suture in 1 cm increments. The skin was then closed with subcuticular 4-0 Monocryl. Mastisol, Steri-Strips, and gauze dressings were applied. The patient tolerated the procedure well with no complications. She was awakened, extubated, and taken to recovery in good condition.       MD JOSY Blum/[email or web address]/  D:  04/01/2019 11:45  T:  04/01/2019 13:57  JOB #:  [id number]

## 2022-02-18 ENCOUNTER — PATIENT OUTREACH (OUTPATIENT)
Dept: CASE MANAGEMENT | Age: 82
End: 2022-02-18

## 2022-02-18 NOTE — PROGRESS NOTES
Spoke with patient first who stated that [facility] should call Catherine Molina her POA. POA documents are scanned in her chart. He states the patient lives alone. He states the patient is having more trouble with living alone a night because of dementia. He states that she has a CNA 3 days week for bathing, also provides cleaning and preparing meals. He also states that he checks on her everyday at her home. He has purchased her a life alert. EDWARD explained that Medicare does not cover sitters or CNA care for patient at night or any time. He states that he will follow-up with [employer] for additional help in the future. EDWARD provided contact number. He stated no other needs, expressed gratitude and states he will call [facility] if he needs any additional info. [facility] will suspend outreach at this time.     CC: Kartik Milian NP

## 2022-03-03 PROBLEM — E78.2 MIXED HYPERLIPIDEMIA: Status: ACTIVE | Noted: 2022-03-03

## 2022-03-03 PROBLEM — G30.9 ALZHEIMER'S DISEASE, UNSPECIFIED (CODE) (HCC): Status: ACTIVE | Noted: 2022-03-03

## 2022-03-03 PROBLEM — R79.89 LOW SERUM VITAMIN D: Status: ACTIVE | Noted: 2022-03-03

## 2022-03-18 PROBLEM — R79.89 LOW SERUM VITAMIN D: Status: ACTIVE | Noted: 2022-03-03

## 2022-03-19 PROBLEM — Z17.0 MALIGNANT NEOPLASM OF LOWER-OUTER QUADRANT OF RIGHT BREAST OF FEMALE, ESTROGEN RECEPTOR POSITIVE (HCC): Status: ACTIVE | Noted: 2019-02-28

## 2022-03-19 PROBLEM — C50.511 MALIGNANT NEOPLASM OF LOWER-OUTER QUADRANT OF RIGHT BREAST OF FEMALE, ESTROGEN RECEPTOR POSITIVE (HCC): Status: ACTIVE | Noted: 2019-02-28

## 2022-03-19 PROBLEM — N64.59 ABNORMAL BREAST EXAM: Status: ACTIVE | Noted: 2018-01-02

## 2022-03-19 PROBLEM — C50.911 BREAST CANCER, RIGHT BREAST (HCC): Status: ACTIVE | Noted: 2019-03-27

## 2022-03-20 PROBLEM — E78.2 MIXED HYPERLIPIDEMIA: Status: ACTIVE | Noted: 2022-03-03

## 2022-03-20 PROBLEM — G30.9 ALZHEIMER'S DISEASE, UNSPECIFIED (CODE) (HCC): Status: ACTIVE | Noted: 2022-03-03

## 2022-06-02 ENCOUNTER — OFFICE VISIT (OUTPATIENT)
Dept: INTERNAL MEDICINE CLINIC | Facility: CLINIC | Age: 82
End: 2022-06-02
Payer: COMMERCIAL

## 2022-06-02 ENCOUNTER — HOME HEALTH ADMISSION (OUTPATIENT)
Dept: HOME HEALTH SERVICES | Facility: HOME HEALTH | Age: 82
End: 2022-06-02
Payer: MEDICARE

## 2022-06-02 VITALS
OXYGEN SATURATION: 95 % | HEART RATE: 63 BPM | BODY MASS INDEX: 22.98 KG/M2 | HEIGHT: 66 IN | SYSTOLIC BLOOD PRESSURE: 118 MMHG | WEIGHT: 143 LBS | TEMPERATURE: 97.7 F | DIASTOLIC BLOOD PRESSURE: 78 MMHG

## 2022-06-02 DIAGNOSIS — M05.79 RHEUMATOID ARTHRITIS INVOLVING MULTIPLE SITES WITH POSITIVE RHEUMATOID FACTOR (HCC): ICD-10-CM

## 2022-06-02 DIAGNOSIS — C50.511 MALIGNANT NEOPLASM OF LOWER-OUTER QUADRANT OF RIGHT BREAST OF FEMALE, ESTROGEN RECEPTOR POSITIVE (HCC): ICD-10-CM

## 2022-06-02 DIAGNOSIS — G30.9 ALZHEIMER'S DISEASE, UNSPECIFIED (CODE) (HCC): Primary | ICD-10-CM

## 2022-06-02 DIAGNOSIS — Z17.0 MALIGNANT NEOPLASM OF LOWER-OUTER QUADRANT OF RIGHT BREAST OF FEMALE, ESTROGEN RECEPTOR POSITIVE (HCC): ICD-10-CM

## 2022-06-02 DIAGNOSIS — Z74.1 REQUIRES ASSISTANCE WITH ACTIVITIES OF DAILY LIVING (ADL): ICD-10-CM

## 2022-06-02 LAB
ALBUMIN SERPL-MCNC: 3.8 G/DL (ref 3.2–4.6)
ALBUMIN/GLOB SERPL: 1.1 {RATIO} (ref 1.2–3.5)
ALP SERPL-CCNC: 90 U/L (ref 50–136)
ALT SERPL-CCNC: 12 U/L (ref 12–65)
ANION GAP SERPL CALC-SCNC: 8 MMOL/L (ref 7–16)
AST SERPL-CCNC: 9 U/L (ref 15–37)
BASOPHILS # BLD: 0 K/UL (ref 0–0.2)
BASOPHILS NFR BLD: 0 % (ref 0–2)
BILIRUB SERPL-MCNC: 0.6 MG/DL (ref 0.2–1.1)
BUN SERPL-MCNC: 19 MG/DL (ref 8–23)
CALCIUM SERPL-MCNC: 9.7 MG/DL (ref 8.3–10.4)
CHLORIDE SERPL-SCNC: 104 MMOL/L (ref 98–107)
CO2 SERPL-SCNC: 27 MMOL/L (ref 21–32)
CREAT SERPL-MCNC: 0.7 MG/DL (ref 0.6–1)
DIFFERENTIAL METHOD BLD: NORMAL
EOSINOPHIL # BLD: 0.1 K/UL (ref 0–0.8)
EOSINOPHIL NFR BLD: 3 % (ref 0.5–7.8)
ERYTHROCYTE [DISTWIDTH] IN BLOOD BY AUTOMATED COUNT: 13.6 % (ref 11.9–14.6)
GLOBULIN SER CALC-MCNC: 3.6 G/DL (ref 2.3–3.5)
GLUCOSE SERPL-MCNC: 96 MG/DL (ref 65–100)
HCT VFR BLD AUTO: 45.6 % (ref 35.8–46.3)
HGB BLD-MCNC: 14.4 G/DL (ref 11.7–15.4)
IMM GRANULOCYTES # BLD AUTO: 0 K/UL (ref 0–0.5)
IMM GRANULOCYTES NFR BLD AUTO: 0 % (ref 0–5)
LYMPHOCYTES # BLD: 1.8 K/UL (ref 0.5–4.6)
LYMPHOCYTES NFR BLD: 31 % (ref 13–44)
MCH RBC QN AUTO: 29.9 PG (ref 26.1–32.9)
MCHC RBC AUTO-ENTMCNC: 31.6 G/DL (ref 31.4–35)
MCV RBC AUTO: 94.6 FL (ref 79.6–97.8)
MONOCYTES # BLD: 0.3 K/UL (ref 0.1–1.3)
MONOCYTES NFR BLD: 6 % (ref 4–12)
NEUTS SEG # BLD: 3.4 K/UL (ref 1.7–8.2)
NEUTS SEG NFR BLD: 60 % (ref 43–78)
NRBC # BLD: 0 K/UL (ref 0–0.2)
PLATELET # BLD AUTO: 286 K/UL (ref 150–450)
PMV BLD AUTO: 9.4 FL (ref 9.4–12.3)
POTASSIUM SERPL-SCNC: 4.8 MMOL/L (ref 3.5–5.1)
PROT SERPL-MCNC: 7.4 G/DL (ref 6.3–8.2)
RBC # BLD AUTO: 4.82 M/UL (ref 4.05–5.2)
SODIUM SERPL-SCNC: 139 MMOL/L (ref 136–145)
WBC # BLD AUTO: 5.6 K/UL (ref 4.3–11.1)

## 2022-06-02 PROCEDURE — 99214 OFFICE O/P EST MOD 30 MIN: CPT | Performed by: NURSE PRACTITIONER

## 2022-06-02 PROCEDURE — 1123F ACP DISCUSS/DSCN MKR DOCD: CPT | Performed by: NURSE PRACTITIONER

## 2022-06-02 ASSESSMENT — MINI MENTAL STATE EXAM
SAY: PUT THE PAPER DOWN ON THE FLOOR, SCORE IF PAPER IS PLACED BACK ON FLOOR: 1
WHAT IS THE NAME OF THIS BUILDING [IN FACILITY]?/WHAT IS THE STREET ADDRESS OF THIS HOUSE [IN HOME]?: 1
WHAT STATE [OR PROVINCE] ARE WE IN?: 1
WHICH SEASON IS THIS?: 1
SAY: READ THE WORDS ON THE PAGE AND THEN DO WHAT IT SAYS. THEN HAND THE PERSON
THE SHEET WITH CLOSE YOUR EYES ON IT. IF THE SUBJECT READS AND DOES NOT CLOSE THEIR EYES, REPEAT UP TO THREE TIMES. SCORE ONLY IF SUBJECT CLOSES EYES.: 0
SHOW: PENCIL [OBJECT] ASK: WHAT IS THIS CALLED?: 1
SHOW: WRISTWATCH [OBJECT] ASK: WHAT IS THIS CALLED?: 1
WHAT COUNTRY ARE WE IN?: 1
WHAT YEAR IS THIS?: 0
SAY: I AM GOING TO NAME THREE OBJECTS. WHEN I AM FINISHED, I WANT YOU TO REPEAT
THEM. REMEMBER WHAT THEY ARE BECAUSE I AM GOING TO ASK YOU TO NAME THEM AGAIN IN
A FEW MINUTES.  SAY THE FOLLOWING WORDS SLOWLY AT 1-SECOND INTERVALS - BALL/ CAR/ MAN [ITERATIONS FOR REPEAT ADMINISTRATION]: 3
WHAT CITY/TOWN ARE WE IN?: 1
HAND THE PERSON A PENCIL AND PAPER. SAY: WRITE ANY COMPLETE SENTENCE ON THAT
PIECE OF PAPER. (NOTE: THE SENTENCE MUST MAKE SENSE. IGNORE SPELLING ERRORS): 1
ASK THE PERSON IF HE IS RIGHT OR LEFT-HANDED. TAKE A PIECE OF PAPER AND HOLD IT UP IN
FRONT OF THE PERSON. SAY: TAKE THIS PAPER IN YOUR RIGHT/LEFT HAND (WHICHEVER IS NON-
DOMINANT), SCORE IF PAPER IS PICKED UP IN CORRECT HAND.: 1
WHAT DAY OF THE WEEK IS THIS?: 0
WHAT FLOOR ARE WE ON [IN FACILITY]?/ WHAT ROOM ARE WE IN [IN HOME]?: 1
SUM ALL MMSE QUESTIONS FOR TOTAL SCORE [OUT OF 30].: 17
WHAT IS TODAY'S DATE?: 0
PLACE DESIGN, ERASER AND PENCIL IN FRONT OF THE PERSON.  SAY:  COPY THIS DESIGN PLEASE.  SHOW: DESIGN. ALLOW: MULTIPLE TRIES. WAIT UNTIL PERSON IS FINISHED AND HANDS IT BACK. SCORE: ONLY FOR DIAGRAM WITH 4-SIDED FIGURE BETWEEN TWO 5-SIDED FIGURES: 1
SAY: I WOULD LIKE YOU TO COUNT BACKWARD FROM 100 BY SEVENS: 0
WHAT MONTH IS THIS?: 0
SAY: FOLD THE PAPER IN HALF ONCE WITH BOTH HANDS, SCORE IF PAPER IS CORRECTLY FOLDED IN HALF.: 1
SAY: I WOULD LIKE YOU TO REPEAT THIS PHRASE AFTER ME: NO IFS, ANDS, OR BUTS.: 1
NOW WHAT WERE THE THREE OBJECTS I ASKED YOU TO REMEMBER?: 0

## 2022-06-02 ASSESSMENT — PATIENT HEALTH QUESTIONNAIRE - PHQ9
SUM OF ALL RESPONSES TO PHQ QUESTIONS 1-9: 0
2. FEELING DOWN, DEPRESSED OR HOPELESS: 0
SUM OF ALL RESPONSES TO PHQ9 QUESTIONS 1 & 2: 0
1. LITTLE INTEREST OR PLEASURE IN DOING THINGS: 0
SUM OF ALL RESPONSES TO PHQ QUESTIONS 1-9: 0

## 2022-06-02 NOTE — PROGRESS NOTES
Erlinda Adkins (:  1940) is a 80 y.o. female,Established patient, here for evaluation of the following chief complaint(s):  No chief complaint on file. ASSESSMENT/PLAN:  1. Alzheimer's disease, unspecified (CODE) ([address]  -     CBC with Auto Differential  -     Comprehensive Metabolic Panel  -     [facility]/Social Work - [facility] Care Management  -     [facility]  2. Rheumatoid arthritis involving multiple sites with positive rheumatoid factor ([address]  -     [facility]/Social Work - [facility] [place] Management  -     [facility]  3. Malignant neoplasm of lower-outer quadrant of right breast of female, estrogen receptor positive ([address]  4. Requires assistance with activities of daily living (ADL)  -     [facility]/Social Work - [facility] 33     [facility]      Return in about 6 months (around 2022), or if symptoms worsen or fail to improve. Patient requests her friend to leave and talks about how she needs help and he isn't helping her at home  She is concerned he is trying to take her property and wants her money  She needs a new mattress that he won't let her buy  She converses well and discusses her finances and the obligations he has financially  MMSE 17  She states he is her biggest difficulty not her memory and dementia - she states he isn't upstanding as he appears? Refer to social work and home health for home safety   She still declines oncology and rheumatology  Continue current meds      Subjective   SUBJECTIVE/OBJECTIVE:  Wt Readings from Last 3 Encounters:  22 : 143 lb (64.9 kg)  22 : 144 lb (65.3 kg)  22 : 150 lb (68 kg)  Patient is here for follow up, brought in by friend YOANA Strange. She is doing better on namenda, still has off days, but they are more rare. She previously was having nighty hallucinations but that improved with namenda.  She occasionally sees building move around but for the most part is better. She is having back pain and hip pain at night. It doesn't seem to matter if she is in bed or chair. She got a new tylenol and naproxen a few nights and that seemed to help. Review of Systems       Objective   Physical Exam  Constitutional:       Appearance: Normal appearance. She is normal weight. HENT:      Head: Normocephalic. Right Ear: External ear normal.      Left Ear: External ear normal.      Nose: Nose normal.   Eyes:      Extraocular Movements: Extraocular movements intact. Pupils: Pupils are equal, round, and reactive to light. Cardiovascular:      Rate and Rhythm: Regular rhythm. Pulmonary:      Effort: Pulmonary effort is normal.      Breath sounds: Normal breath sounds. Musculoskeletal:      Cervical back: Neck supple. Skin:     General: Skin is dry. Neurological:      Mental Status: She is alert. Mental status is at baseline. Psychiatric:         Mood and Affect: Mood normal.                  An electronic signature was used to authenticate this note.     --Edinson Arenas, SIRENA - CNP

## 2022-06-04 ENCOUNTER — HOME CARE VISIT (OUTPATIENT)
Dept: SCHEDULING | Facility: HOME HEALTH | Age: 82
End: 2022-06-04
Payer: MEDICARE

## 2022-06-04 PROCEDURE — 1090000001 HH PPS REVENUE CREDIT

## 2022-06-04 PROCEDURE — G0151 HHCP-SERV OF PT,EA 15 MIN: HCPCS

## 2022-06-04 PROCEDURE — 1090000002 HH PPS REVENUE DEBIT

## 2022-06-04 PROCEDURE — 400013 HH SOC

## 2022-06-04 PROCEDURE — 0221000100 HH NO PAY CLAIM PROCEDURE

## 2022-06-05 VITALS
SYSTOLIC BLOOD PRESSURE: 138 MMHG | HEART RATE: 72 BPM | TEMPERATURE: 96.7 F | DIASTOLIC BLOOD PRESSURE: 70 MMHG | OXYGEN SATURATION: 92 %

## 2022-06-05 PROCEDURE — 1090000001 HH PPS REVENUE CREDIT

## 2022-06-05 PROCEDURE — 1090000002 HH PPS REVENUE DEBIT

## 2022-06-06 PROCEDURE — 1090000001 HH PPS REVENUE CREDIT

## 2022-06-06 PROCEDURE — 1090000002 HH PPS REVENUE DEBIT

## 2022-06-07 ENCOUNTER — HOME CARE VISIT (OUTPATIENT)
Dept: SCHEDULING | Facility: HOME HEALTH | Age: 82
End: 2022-06-07
Payer: MEDICARE

## 2022-06-07 VITALS
OXYGEN SATURATION: 99 % | TEMPERATURE: 97.7 F | DIASTOLIC BLOOD PRESSURE: 86 MMHG | RESPIRATION RATE: 17 BRPM | HEART RATE: 72 BPM | SYSTOLIC BLOOD PRESSURE: 122 MMHG

## 2022-06-07 PROCEDURE — G0157 HHC PT ASSISTANT EA 15: HCPCS

## 2022-06-07 PROCEDURE — 1090000001 HH PPS REVENUE CREDIT

## 2022-06-07 PROCEDURE — 1090000002 HH PPS REVENUE DEBIT

## 2022-06-07 ASSESSMENT — ENCOUNTER SYMPTOMS
DYSPNEA ACTIVITY LEVEL: AFTER AMBULATING MORE THAN 20 FT
PAIN LOCATION - PAIN QUALITY: ACHE TO SHARP

## 2022-06-08 ENCOUNTER — HOME CARE VISIT (OUTPATIENT)
Dept: SCHEDULING | Facility: HOME HEALTH | Age: 82
End: 2022-06-08
Payer: MEDICARE

## 2022-06-08 ENCOUNTER — TELEPHONE (OUTPATIENT)
Dept: INTERNAL MEDICINE CLINIC | Facility: CLINIC | Age: 82
End: 2022-06-08

## 2022-06-08 ENCOUNTER — HOME CARE VISIT (OUTPATIENT)
Dept: HOME HEALTH SERVICES | Facility: HOME HEALTH | Age: 82
End: 2022-06-08
Payer: MEDICARE

## 2022-06-08 PROCEDURE — G0152 HHCP-SERV OF OT,EA 15 MIN: HCPCS

## 2022-06-08 PROCEDURE — G0299 HHS/HOSPICE OF RN EA 15 MIN: HCPCS

## 2022-06-08 PROCEDURE — 1090000002 HH PPS REVENUE DEBIT

## 2022-06-08 PROCEDURE — 1090000001 HH PPS REVENUE CREDIT

## 2022-06-08 NOTE — TELEPHONE ENCOUNTER
Received call from [address] [place] with [id number] [address] needing verbal orders for nursing care for patient. Unable to get phone number for Andre  due to phone cutting out when she left her phone number. I heard [phone] then it cut out.

## 2022-06-09 ENCOUNTER — HOME CARE VISIT (OUTPATIENT)
Dept: SCHEDULING | Facility: HOME HEALTH | Age: 82
End: 2022-06-09
Payer: MEDICARE

## 2022-06-09 ENCOUNTER — TELEPHONE (OUTPATIENT)
Dept: INTERNAL MEDICINE CLINIC | Facility: CLINIC | Age: 82
End: 2022-06-09

## 2022-06-09 VITALS
DIASTOLIC BLOOD PRESSURE: 68 MMHG | TEMPERATURE: 97.8 F | HEART RATE: 68 BPM | SYSTOLIC BLOOD PRESSURE: 128 MMHG | OXYGEN SATURATION: 99 %

## 2022-06-09 VITALS
DIASTOLIC BLOOD PRESSURE: 70 MMHG | OXYGEN SATURATION: 95 % | HEART RATE: 75 BPM | SYSTOLIC BLOOD PRESSURE: 160 MMHG | RESPIRATION RATE: 14 BRPM | TEMPERATURE: 97.9 F

## 2022-06-09 VITALS
OXYGEN SATURATION: 97 % | TEMPERATURE: 98 F | DIASTOLIC BLOOD PRESSURE: 70 MMHG | RESPIRATION RATE: 17 BRPM | HEART RATE: 62 BPM | SYSTOLIC BLOOD PRESSURE: 128 MMHG

## 2022-06-09 PROCEDURE — G0157 HHC PT ASSISTANT EA 15: HCPCS

## 2022-06-09 PROCEDURE — 1090000001 HH PPS REVENUE CREDIT

## 2022-06-09 PROCEDURE — 1090000002 HH PPS REVENUE DEBIT

## 2022-06-09 PROCEDURE — G0155 HHCP-SVS OF CSW,EA 15 MIN: HCPCS

## 2022-06-09 ASSESSMENT — ENCOUNTER SYMPTOMS: PAIN LOCATION - PAIN QUALITY: ACHY

## 2022-06-09 NOTE — TELEPHONE ENCOUNTER
Received call from Jhonny Crowell needing verbal orders for patient. LVM for Jhonny Crowell giving verbal orders for patient.

## 2022-06-10 ENCOUNTER — HOME CARE VISIT (OUTPATIENT)
Dept: SCHEDULING | Facility: HOME HEALTH | Age: 82
End: 2022-06-10
Payer: MEDICARE

## 2022-06-10 VITALS
DIASTOLIC BLOOD PRESSURE: 68 MMHG | HEART RATE: 79 BPM | SYSTOLIC BLOOD PRESSURE: 132 MMHG | OXYGEN SATURATION: 98 % | RESPIRATION RATE: 18 BRPM | TEMPERATURE: 98 F

## 2022-06-10 PROCEDURE — 1090000002 HH PPS REVENUE DEBIT

## 2022-06-10 PROCEDURE — 1090000001 HH PPS REVENUE CREDIT

## 2022-06-10 PROCEDURE — G0299 HHS/HOSPICE OF RN EA 15 MIN: HCPCS

## 2022-06-11 PROCEDURE — 1090000001 HH PPS REVENUE CREDIT

## 2022-06-11 PROCEDURE — 1090000002 HH PPS REVENUE DEBIT

## 2022-06-12 PROCEDURE — 1090000002 HH PPS REVENUE DEBIT

## 2022-06-12 PROCEDURE — 1090000001 HH PPS REVENUE CREDIT

## 2022-06-13 ENCOUNTER — HOME CARE VISIT (OUTPATIENT)
Dept: SCHEDULING | Facility: HOME HEALTH | Age: 82
End: 2022-06-13
Payer: MEDICARE

## 2022-06-13 VITALS
HEART RATE: 71 BPM | DIASTOLIC BLOOD PRESSURE: 71 MMHG | TEMPERATURE: 97.5 F | RESPIRATION RATE: 16 BRPM | SYSTOLIC BLOOD PRESSURE: 132 MMHG | OXYGEN SATURATION: 98 %

## 2022-06-13 VITALS
RESPIRATION RATE: 17 BRPM | DIASTOLIC BLOOD PRESSURE: 66 MMHG | SYSTOLIC BLOOD PRESSURE: 118 MMHG | TEMPERATURE: 97.5 F | OXYGEN SATURATION: 96 % | HEART RATE: 66 BPM

## 2022-06-13 PROCEDURE — 1090000001 HH PPS REVENUE CREDIT

## 2022-06-13 PROCEDURE — 1090000002 HH PPS REVENUE DEBIT

## 2022-06-13 PROCEDURE — G0158 HHC OT ASSISTANT EA 15: HCPCS

## 2022-06-13 PROCEDURE — G0157 HHC PT ASSISTANT EA 15: HCPCS

## 2022-06-14 PROCEDURE — 1090000002 HH PPS REVENUE DEBIT

## 2022-06-14 PROCEDURE — 1090000001 HH PPS REVENUE CREDIT

## 2022-06-15 ENCOUNTER — HOME CARE VISIT (OUTPATIENT)
Dept: SCHEDULING | Facility: HOME HEALTH | Age: 82
End: 2022-06-15
Payer: MEDICARE

## 2022-06-15 VITALS
SYSTOLIC BLOOD PRESSURE: 138 MMHG | RESPIRATION RATE: 18 BRPM | DIASTOLIC BLOOD PRESSURE: 70 MMHG | TEMPERATURE: 98.5 F | OXYGEN SATURATION: 98 % | HEART RATE: 74 BPM

## 2022-06-15 VITALS
RESPIRATION RATE: 17 BRPM | TEMPERATURE: 97.8 F | HEART RATE: 75 BPM | OXYGEN SATURATION: 96 % | SYSTOLIC BLOOD PRESSURE: 124 MMHG | DIASTOLIC BLOOD PRESSURE: 70 MMHG

## 2022-06-15 PROCEDURE — G0299 HHS/HOSPICE OF RN EA 15 MIN: HCPCS

## 2022-06-15 PROCEDURE — 1090000002 HH PPS REVENUE DEBIT

## 2022-06-15 PROCEDURE — 1090000001 HH PPS REVENUE CREDIT

## 2022-06-15 PROCEDURE — G0157 HHC PT ASSISTANT EA 15: HCPCS

## 2022-06-15 ASSESSMENT — ENCOUNTER SYMPTOMS: PAIN LOCATION - PAIN QUALITY: SORE

## 2022-06-16 PROCEDURE — 1090000001 HH PPS REVENUE CREDIT

## 2022-06-16 PROCEDURE — 1090000002 HH PPS REVENUE DEBIT

## 2022-06-17 ENCOUNTER — HOME CARE VISIT (OUTPATIENT)
Dept: SCHEDULING | Facility: HOME HEALTH | Age: 82
End: 2022-06-17
Payer: MEDICARE

## 2022-06-17 PROCEDURE — 1090000001 HH PPS REVENUE CREDIT

## 2022-06-17 PROCEDURE — G0299 HHS/HOSPICE OF RN EA 15 MIN: HCPCS

## 2022-06-17 PROCEDURE — 1090000002 HH PPS REVENUE DEBIT

## 2022-06-18 VITALS
SYSTOLIC BLOOD PRESSURE: 126 MMHG | TEMPERATURE: 97.2 F | DIASTOLIC BLOOD PRESSURE: 66 MMHG | RESPIRATION RATE: 18 BRPM | OXYGEN SATURATION: 98 % | HEART RATE: 76 BPM

## 2022-06-18 PROCEDURE — 1090000002 HH PPS REVENUE DEBIT

## 2022-06-18 PROCEDURE — 1090000001 HH PPS REVENUE CREDIT

## 2022-06-19 PROCEDURE — 1090000001 HH PPS REVENUE CREDIT

## 2022-06-19 PROCEDURE — 1090000002 HH PPS REVENUE DEBIT

## 2022-06-20 ENCOUNTER — HOME CARE VISIT (OUTPATIENT)
Dept: SCHEDULING | Facility: HOME HEALTH | Age: 82
End: 2022-06-20
Payer: MEDICARE

## 2022-06-20 VITALS
SYSTOLIC BLOOD PRESSURE: 124 MMHG | OXYGEN SATURATION: 96 % | DIASTOLIC BLOOD PRESSURE: 64 MMHG | HEART RATE: 74 BPM | TEMPERATURE: 97.6 F | RESPIRATION RATE: 18 BRPM

## 2022-06-20 PROCEDURE — 1090000001 HH PPS REVENUE CREDIT

## 2022-06-20 PROCEDURE — 1090000002 HH PPS REVENUE DEBIT

## 2022-06-20 PROCEDURE — G0299 HHS/HOSPICE OF RN EA 15 MIN: HCPCS

## 2022-06-20 ASSESSMENT — ENCOUNTER SYMPTOMS
PAIN LOCATION - PAIN QUALITY: ACHY
STOOL DESCRIPTION: SOFT FORMED
HEMOPTYSIS: 0

## 2022-06-21 ENCOUNTER — HOME CARE VISIT (OUTPATIENT)
Dept: SCHEDULING | Facility: HOME HEALTH | Age: 82
End: 2022-06-21
Payer: MEDICARE

## 2022-06-21 VITALS
RESPIRATION RATE: 17 BRPM | OXYGEN SATURATION: 97 % | HEART RATE: 64 BPM | DIASTOLIC BLOOD PRESSURE: 68 MMHG | TEMPERATURE: 97.6 F | SYSTOLIC BLOOD PRESSURE: 122 MMHG

## 2022-06-21 PROCEDURE — G0157 HHC PT ASSISTANT EA 15: HCPCS

## 2022-06-21 PROCEDURE — 1090000001 HH PPS REVENUE CREDIT

## 2022-06-21 PROCEDURE — 1090000002 HH PPS REVENUE DEBIT

## 2022-06-22 ENCOUNTER — HOME CARE VISIT (OUTPATIENT)
Dept: SCHEDULING | Facility: HOME HEALTH | Age: 82
End: 2022-06-22
Payer: MEDICARE

## 2022-06-22 VITALS
TEMPERATURE: 97.7 F | HEART RATE: 60 BPM | DIASTOLIC BLOOD PRESSURE: 68 MMHG | SYSTOLIC BLOOD PRESSURE: 118 MMHG | RESPIRATION RATE: 18 BRPM | OXYGEN SATURATION: 98 %

## 2022-06-22 VITALS
OXYGEN SATURATION: 95 % | SYSTOLIC BLOOD PRESSURE: 122 MMHG | HEART RATE: 71 BPM | RESPIRATION RATE: 17 BRPM | TEMPERATURE: 98.2 F | DIASTOLIC BLOOD PRESSURE: 60 MMHG

## 2022-06-22 PROCEDURE — G0299 HHS/HOSPICE OF RN EA 15 MIN: HCPCS

## 2022-06-22 PROCEDURE — G0152 HHCP-SERV OF OT,EA 15 MIN: HCPCS

## 2022-06-22 PROCEDURE — 1090000002 HH PPS REVENUE DEBIT

## 2022-06-22 PROCEDURE — 1090000001 HH PPS REVENUE CREDIT

## 2022-06-22 ASSESSMENT — ENCOUNTER SYMPTOMS: PAIN LOCATION - PAIN QUALITY: ACHING

## 2022-06-23 ENCOUNTER — HOME CARE VISIT (OUTPATIENT)
Dept: SCHEDULING | Facility: HOME HEALTH | Age: 82
End: 2022-06-23
Payer: MEDICARE

## 2022-06-23 VITALS
OXYGEN SATURATION: 95 % | DIASTOLIC BLOOD PRESSURE: 64 MMHG | TEMPERATURE: 97.4 F | SYSTOLIC BLOOD PRESSURE: 122 MMHG | RESPIRATION RATE: 16 BRPM | HEART RATE: 70 BPM

## 2022-06-23 PROCEDURE — G0151 HHCP-SERV OF PT,EA 15 MIN: HCPCS

## 2022-06-23 PROCEDURE — 1090000002 HH PPS REVENUE DEBIT

## 2022-06-23 PROCEDURE — 1090000001 HH PPS REVENUE CREDIT

## 2022-06-23 ASSESSMENT — ENCOUNTER SYMPTOMS
DYSPNEA ACTIVITY LEVEL: AFTER AMBULATING MORE THAN 20 FT
PAIN LOCATION - PAIN QUALITY: ACHES SORE

## 2022-06-24 ENCOUNTER — HOME CARE VISIT (OUTPATIENT)
Dept: SCHEDULING | Facility: HOME HEALTH | Age: 82
End: 2022-06-24
Payer: MEDICARE

## 2022-06-24 PROCEDURE — 1090000001 HH PPS REVENUE CREDIT

## 2022-06-24 PROCEDURE — G0155 HHCP-SVS OF CSW,EA 15 MIN: HCPCS

## 2022-06-24 PROCEDURE — 1090000002 HH PPS REVENUE DEBIT

## 2022-06-25 PROCEDURE — 1090000002 HH PPS REVENUE DEBIT

## 2022-06-25 PROCEDURE — 1090000001 HH PPS REVENUE CREDIT

## 2022-06-26 PROCEDURE — 1090000001 HH PPS REVENUE CREDIT

## 2022-06-26 PROCEDURE — 1090000002 HH PPS REVENUE DEBIT

## 2022-06-27 PROCEDURE — 1090000002 HH PPS REVENUE DEBIT

## 2022-06-27 PROCEDURE — 1090000001 HH PPS REVENUE CREDIT

## 2022-06-28 PROCEDURE — 1090000002 HH PPS REVENUE DEBIT

## 2022-06-28 PROCEDURE — 1090000001 HH PPS REVENUE CREDIT

## 2022-06-29 PROCEDURE — 1090000002 HH PPS REVENUE DEBIT

## 2022-06-29 PROCEDURE — 1090000001 HH PPS REVENUE CREDIT

## 2022-06-30 ENCOUNTER — HOME CARE VISIT (OUTPATIENT)
Dept: SCHEDULING | Facility: HOME HEALTH | Age: 82
End: 2022-06-30
Payer: MEDICARE

## 2022-06-30 VITALS
HEART RATE: 62 BPM | SYSTOLIC BLOOD PRESSURE: 120 MMHG | OXYGEN SATURATION: 97 % | DIASTOLIC BLOOD PRESSURE: 70 MMHG | TEMPERATURE: 97.6 F | RESPIRATION RATE: 18 BRPM

## 2022-06-30 PROCEDURE — 1090000002 HH PPS REVENUE DEBIT

## 2022-06-30 PROCEDURE — G0299 HHS/HOSPICE OF RN EA 15 MIN: HCPCS

## 2022-06-30 PROCEDURE — 1090000001 HH PPS REVENUE CREDIT

## 2022-06-30 ASSESSMENT — ENCOUNTER SYMPTOMS
HEMOPTYSIS: 0
STOOL DESCRIPTION: SOFT FORMED

## 2022-07-01 PROCEDURE — 1090000001 HH PPS REVENUE CREDIT

## 2022-07-01 PROCEDURE — 1090000002 HH PPS REVENUE DEBIT

## 2022-07-02 PROCEDURE — 1090000002 HH PPS REVENUE DEBIT

## 2022-07-02 PROCEDURE — 1090000001 HH PPS REVENUE CREDIT

## 2022-07-03 PROCEDURE — 1090000002 HH PPS REVENUE DEBIT

## 2022-07-03 PROCEDURE — 1090000001 HH PPS REVENUE CREDIT

## 2022-07-03 PROCEDURE — 1090000003 HH PPS REV ADJ

## 2022-07-04 PROCEDURE — 1090000001 HH PPS REVENUE CREDIT

## 2022-07-04 PROCEDURE — 1090000002 HH PPS REVENUE DEBIT

## 2022-07-05 PROCEDURE — 1090000002 HH PPS REVENUE DEBIT

## 2022-07-05 PROCEDURE — 1090000001 HH PPS REVENUE CREDIT

## 2022-07-06 ENCOUNTER — HOME CARE VISIT (OUTPATIENT)
Dept: SCHEDULING | Facility: HOME HEALTH | Age: 82
End: 2022-07-06
Payer: MEDICARE

## 2022-07-06 PROCEDURE — 400013 HH SOC

## 2022-07-06 PROCEDURE — 1090000001 HH PPS REVENUE CREDIT

## 2022-07-06 PROCEDURE — G0299 HHS/HOSPICE OF RN EA 15 MIN: HCPCS

## 2022-07-06 PROCEDURE — 1090000002 HH PPS REVENUE DEBIT

## 2022-07-06 PROCEDURE — 1090000003 HH PPS REV ADJ

## 2022-07-06 PROCEDURE — G0155 HHCP-SVS OF CSW,EA 15 MIN: HCPCS

## 2022-07-07 VITALS
TEMPERATURE: 97.6 F | HEART RATE: 70 BPM | RESPIRATION RATE: 18 BRPM | DIASTOLIC BLOOD PRESSURE: 70 MMHG | OXYGEN SATURATION: 98 % | SYSTOLIC BLOOD PRESSURE: 122 MMHG

## 2022-07-07 ASSESSMENT — ENCOUNTER SYMPTOMS
HEMOPTYSIS: 0
STOOL DESCRIPTION: SOFT FORMED

## 2022-08-29 ENCOUNTER — TELEPHONE (OUTPATIENT)
Dept: INTERNAL MEDICINE CLINIC | Facility: CLINIC | Age: 82
End: 2022-08-29

## 2022-09-12 ENCOUNTER — OFFICE VISIT (OUTPATIENT)
Dept: INTERNAL MEDICINE CLINIC | Facility: CLINIC | Age: 82
End: 2022-09-12
Payer: COMMERCIAL

## 2022-09-12 ENCOUNTER — HOME HEALTH ADMISSION (OUTPATIENT)
Dept: HOME HEALTH SERVICES | Facility: HOME HEALTH | Age: 82
End: 2022-09-12

## 2022-09-12 VITALS
WEIGHT: 145 LBS | BODY MASS INDEX: 23.3 KG/M2 | RESPIRATION RATE: 16 BRPM | HEIGHT: 66 IN | HEART RATE: 76 BPM | DIASTOLIC BLOOD PRESSURE: 72 MMHG | OXYGEN SATURATION: 95 % | SYSTOLIC BLOOD PRESSURE: 140 MMHG

## 2022-09-12 DIAGNOSIS — Z74.1 REQUIRES ASSISTANCE WITH ACTIVITIES OF DAILY LIVING (ADL): ICD-10-CM

## 2022-09-12 DIAGNOSIS — E78.2 MIXED HYPERLIPIDEMIA: ICD-10-CM

## 2022-09-12 DIAGNOSIS — M05.79 RHEUMATOID ARTHRITIS INVOLVING MULTIPLE SITES WITH POSITIVE RHEUMATOID FACTOR (HCC): ICD-10-CM

## 2022-09-12 DIAGNOSIS — E55.9 VITAMIN D DEFICIENCY: ICD-10-CM

## 2022-09-12 DIAGNOSIS — G30.9 ALZHEIMER'S DISEASE, UNSPECIFIED (CODE) (HCC): Primary | ICD-10-CM

## 2022-09-12 PROCEDURE — 1123F ACP DISCUSS/DSCN MKR DOCD: CPT | Performed by: NURSE PRACTITIONER

## 2022-09-12 PROCEDURE — 99213 OFFICE O/P EST LOW 20 MIN: CPT | Performed by: NURSE PRACTITIONER

## 2022-09-12 RX ORDER — MULTIVIT-MIN/IRON/FOLIC ACID/K 18-600-40
1 CAPSULE ORAL DAILY
Qty: 30 CAPSULE | Refills: 5 | Status: SHIPPED | OUTPATIENT
Start: 2022-09-12

## 2022-09-12 ASSESSMENT — PATIENT HEALTH QUESTIONNAIRE - PHQ9
SUM OF ALL RESPONSES TO PHQ QUESTIONS 1-9: 0
SUM OF ALL RESPONSES TO PHQ QUESTIONS 1-9: 0
2. FEELING DOWN, DEPRESSED OR HOPELESS: 0
SUM OF ALL RESPONSES TO PHQ QUESTIONS 1-9: 0
1. LITTLE INTEREST OR PLEASURE IN DOING THINGS: 0
SUM OF ALL RESPONSES TO PHQ QUESTIONS 1-9: 0
SUM OF ALL RESPONSES TO PHQ9 QUESTIONS 1 & 2: 0

## 2022-09-12 NOTE — PROGRESS NOTES
Alem Motta (:  1940) is a 80 y.o. female,Established patient, here for evaluation of the following chief complaint(s):  Referral - General (Home health)         ASSESSMENT/PLAN:  1. Alzheimer's disease, unspecified (CODE) ([facility]  -     [address]  -     TSH; Future  -     Comprehensive Metabolic Panel; Future  -     CBC; Future  2. Rheumatoid arthritis involving multiple sites with positive rheumatoid factor ([facility]  -     [address]  -     TSH; Future  -     Comprehensive Metabolic Panel; Future  -     CBC; Future  3. Requires assistance with activities of daily living (ADL)  -     [facility]  4. Mixed hyperlipidemia  5. Vitamin D deficiency  -     Vitamin D 25 Hydroxy; Future    No follow-ups on file. Refer again to home health and   Discussed limitations of home health care, may need to seek an aid as she wants help with changing sheets and sweeping floors as well as bathing  Memory some worsening per her caregiver but she denies worsening memory and both agree less hallucinations and behavioral disturbances improved    Subjective   SUBJECTIVE/OBJECTIVE:  Patient is here for follow up with her caregiver. She would like a renewal on her home health referral.       Review of Systems       Objective   Physical Exam  Vitals reviewed. Constitutional:       Appearance: Normal appearance. HENT:      Head: Normocephalic. Eyes:      Extraocular Movements: Extraocular movements intact. Pupils: Pupils are equal, round, and reactive to light. Cardiovascular:      Rate and Rhythm: Normal rate and regular rhythm. Pulmonary:      Effort: Pulmonary effort is normal.      Breath sounds: Normal breath sounds. Musculoskeletal:      Cervical back: Neck supple. Neurological:      Mental Status: She is alert.    Psychiatric:         Mood and Affect: Mood normal.         Behavior: Behavior normal.                An electronic signature was used to authenticate this note.     --Herberth Claire, APRGHISLAINE - CNP

## 2022-11-23 ENCOUNTER — NURSE ONLY (OUTPATIENT)
Dept: INTERNAL MEDICINE CLINIC | Facility: CLINIC | Age: 82
End: 2022-11-23

## 2022-11-23 DIAGNOSIS — G30.9 ALZHEIMER'S DISEASE, UNSPECIFIED (CODE) (HCC): ICD-10-CM

## 2022-11-23 DIAGNOSIS — M05.79 RHEUMATOID ARTHRITIS INVOLVING MULTIPLE SITES WITH POSITIVE RHEUMATOID FACTOR (HCC): ICD-10-CM

## 2022-11-23 DIAGNOSIS — E55.9 VITAMIN D DEFICIENCY: ICD-10-CM

## 2022-11-23 LAB
ERYTHROCYTE [DISTWIDTH] IN BLOOD BY AUTOMATED COUNT: 12.3 % (ref 11.9–14.6)
HCT VFR BLD AUTO: 47.9 % (ref 35.8–46.3)
HGB BLD-MCNC: 15 G/DL (ref 11.7–15.4)
MCH RBC QN AUTO: 29.9 PG (ref 26.1–32.9)
MCHC RBC AUTO-ENTMCNC: 31.3 G/DL (ref 31.4–35)
MCV RBC AUTO: 95.6 FL (ref 82–102)
NRBC # BLD: 0 K/UL (ref 0–0.2)
PLATELET # BLD AUTO: 298 K/UL (ref 150–450)
PMV BLD AUTO: 10.5 FL (ref 9.4–12.3)
RBC # BLD AUTO: 5.01 M/UL (ref 4.05–5.2)
WBC # BLD AUTO: 8.2 K/UL (ref 4.3–11.1)

## 2022-11-24 LAB
25(OH)D3 SERPL-MCNC: 23.9 NG/ML (ref 30–100)
ALBUMIN SERPL-MCNC: 3.8 G/DL (ref 3.2–4.6)
ALBUMIN/GLOB SERPL: 1.2 {RATIO} (ref 0.4–1.6)
ALP SERPL-CCNC: 82 U/L (ref 50–136)
ALT SERPL-CCNC: 13 U/L (ref 12–65)
ANION GAP SERPL CALC-SCNC: 7 MMOL/L (ref 2–11)
AST SERPL-CCNC: 11 U/L (ref 15–37)
BILIRUB SERPL-MCNC: 0.5 MG/DL (ref 0.2–1.1)
BUN SERPL-MCNC: 16 MG/DL (ref 8–23)
CALCIUM SERPL-MCNC: 9.8 MG/DL (ref 8.3–10.4)
CHLORIDE SERPL-SCNC: 106 MMOL/L (ref 101–110)
CO2 SERPL-SCNC: 25 MMOL/L (ref 21–32)
CREAT SERPL-MCNC: 0.7 MG/DL (ref 0.6–1)
GLOBULIN SER CALC-MCNC: 3.2 G/DL (ref 2.8–4.5)
GLUCOSE SERPL-MCNC: 98 MG/DL (ref 65–100)
POTASSIUM SERPL-SCNC: 4.7 MMOL/L (ref 3.5–5.1)
PROT SERPL-MCNC: 7 G/DL (ref 6.3–8.2)
SODIUM SERPL-SCNC: 138 MMOL/L (ref 133–143)
TSH, 3RD GENERATION: 0.76 UIU/ML (ref 0.36–3.74)

## 2022-12-01 ENCOUNTER — OFFICE VISIT (OUTPATIENT)
Dept: INTERNAL MEDICINE CLINIC | Facility: CLINIC | Age: 82
End: 2022-12-01
Payer: MEDICARE

## 2022-12-01 VITALS
DIASTOLIC BLOOD PRESSURE: 72 MMHG | OXYGEN SATURATION: 96 % | SYSTOLIC BLOOD PRESSURE: 140 MMHG | BODY MASS INDEX: 23.14 KG/M2 | HEART RATE: 80 BPM | WEIGHT: 144 LBS | HEIGHT: 66 IN | RESPIRATION RATE: 17 BRPM

## 2022-12-01 DIAGNOSIS — E78.2 MIXED HYPERLIPIDEMIA: ICD-10-CM

## 2022-12-01 DIAGNOSIS — I95.1 ORTHOSTATIC HYPOTENSION: Primary | ICD-10-CM

## 2022-12-01 DIAGNOSIS — R79.89 LOW SERUM VITAMIN D: ICD-10-CM

## 2022-12-01 DIAGNOSIS — G30.9 ALZHEIMER'S DISEASE, UNSPECIFIED (CODE) (HCC): ICD-10-CM

## 2022-12-01 DIAGNOSIS — Z00.00 MEDICARE ANNUAL WELLNESS VISIT, SUBSEQUENT: ICD-10-CM

## 2022-12-01 DIAGNOSIS — Z91.81 AT HIGH RISK FOR FALLS: ICD-10-CM

## 2022-12-01 PROCEDURE — G8484 FLU IMMUNIZE NO ADMIN: HCPCS | Performed by: NURSE PRACTITIONER

## 2022-12-01 PROCEDURE — G0439 PPPS, SUBSEQ VISIT: HCPCS | Performed by: NURSE PRACTITIONER

## 2022-12-01 PROCEDURE — 1123F ACP DISCUSS/DSCN MKR DOCD: CPT | Performed by: NURSE PRACTITIONER

## 2022-12-01 RX ORDER — DONEPEZIL HYDROCHLORIDE 5 MG/1
5 TABLET, FILM COATED ORAL NIGHTLY
Qty: 30 TABLET | Refills: 5 | Status: SHIPPED | OUTPATIENT
Start: 2022-12-01

## 2022-12-01 RX ORDER — MULTIVIT-MIN/IRON/FOLIC ACID/K 18-600-40
1 CAPSULE ORAL DAILY
Qty: 30 CAPSULE | Refills: 5 | Status: SHIPPED | OUTPATIENT
Start: 2022-12-01

## 2022-12-01 RX ORDER — MEMANTINE HYDROCHLORIDE 28 MG/1
28 CAPSULE, EXTENDED RELEASE ORAL DAILY
Qty: 30 CAPSULE | Refills: 5 | Status: SHIPPED | OUTPATIENT
Start: 2022-12-01

## 2022-12-01 ASSESSMENT — LIFESTYLE VARIABLES
HOW MANY STANDARD DRINKS CONTAINING ALCOHOL DO YOU HAVE ON A TYPICAL DAY: PATIENT DOES NOT DRINK
HOW OFTEN DO YOU HAVE A DRINK CONTAINING ALCOHOL: NEVER

## 2022-12-01 ASSESSMENT — PATIENT HEALTH QUESTIONNAIRE - PHQ9
1. LITTLE INTEREST OR PLEASURE IN DOING THINGS: 0
SUM OF ALL RESPONSES TO PHQ QUESTIONS 1-9: 0
SUM OF ALL RESPONSES TO PHQ9 QUESTIONS 1 & 2: 0
SUM OF ALL RESPONSES TO PHQ QUESTIONS 1-9: 0
SUM OF ALL RESPONSES TO PHQ QUESTIONS 1-9: 0
2. FEELING DOWN, DEPRESSED OR HOPELESS: 0
SUM OF ALL RESPONSES TO PHQ QUESTIONS 1-9: 0

## 2022-12-01 NOTE — PROGRESS NOTES
Melanie Oquendo (:  1940) is a 80 y.o. female,Established patient, here for evaluation of the following chief complaint(s):  Memory Loss and Medicare AWV         ASSESSMENT/PLAN:  1. Orthostatic hypotension  2. Low serum vitamin D  3. Mixed hyperlipidemia  4. Alzheimer's disease, unspecified (CODE) ([facility]  5. Medicare annual wellness visit, subsequent  6. At high risk for falls  -     [address]    Return in 6 months (on 2023) for Medicare Annual Wellness Visit in 1 year. BP Readings from Last 3 Encounters:   22 (!) 140/72   22 (!) 140/72   22 122/70     BP elevated at first of visit, rechecked after sitting and improved, then checked and 10point drop with standing  Avoid adding BP med to avoid increasing dizziness upon standing  Continue hydration  Reviewed lab  Continue aricept and namenda  Refer to home health PT for high risk falls    Subjective   SUBJECTIVE/OBJECTIVE:  Patient is here for follow up of dementia. She is on aricept and namenda and tolerating well. Per patient her memory is better than it was a year ago, per her care-taker her short-term memory has worsened. The hallucinations are better and overall less behavioral concerns. She has an aid that bathes her and helps with housework  She has fallen 1x, when bending over in her closet. She has to hang on to her aid when walking or she fears falling. Memory Loss        Review of Systems   Psychiatric/Behavioral:  Positive for memory loss. Objective   Physical Exam  Constitutional:       Appearance: Normal appearance. HENT:      Head: Normocephalic. Right Ear: External ear normal.      Left Ear: External ear normal.   Eyes:      Extraocular Movements: Extraocular movements intact. Pupils: Pupils are equal, round, and reactive to light. Cardiovascular:      Rate and Rhythm: Normal rate.    Pulmonary:      Effort: Pulmonary effort is normal.   Musculoskeletal:      Cervical back: Neck supple. Neurological:      Mental Status: She is alert. Psychiatric:         Mood and Affect: Mood normal.         Behavior: Behavior normal.                An electronic signature was used to authenticate this note. --SIRENA Parmar - CNP   Medicare Annual Wellness Visit    Sue Aleman is here for Memory Loss and Medicare AWV    Assessment & Plan   Orthostatic hypotension  Low serum vitamin D  Mixed hyperlipidemia  Alzheimer's disease, unspecified (CODE) ([address]  Medicare annual wellness visit, subsequent  At high risk for falls  -     [address] [facility]    Recommendations for Preventive Services Due: see orders and patient instructions/AVS.  Recommended screening schedule for the next 5-10 years is provided to the patient in written form: see Patient Instructions/AVS.     Return in 6 months (on 6/1/2023) for Medicare Annual Wellness Visit in 1 year. Subjective       Patient's complete Health Risk Assessment and screening values have been reviewed and are found in Flowsheets. The following problems were reviewed today and where indicated follow up appointments were made and/or referrals ordered. Positive Risk Factor Screenings with Interventions:    Fall Risk:  Do you feel unsteady or are you worried about falling? : (!) yes  2 or more falls in past year?: no (1 fall this year when bending over)  Fall with injury in past year?: no   Fall Risk Interventions:    Home safety tips provided and PT referral    Cognitive:   Words recalled: 2 Words Recalled  Clock Drawing Test (CDT): (!) Abnormal  Total Score Interpretation: Abnormal Mini-Cog  Did the patient refuse to take the cognition test?: No  Cognitive Impairment Interventions:  Has dementia, on namenda            Health Habits/Nutrition:  Physical Activity: Insufficiently Active    Days of Exercise per Week: 7 days    Minutes of Exercise per Session: 10 min     Have you lost any weight without trying in the past 3 months?: No  Body mass index: 23.24  Have you seen the dentist within the past year?: (!) No  Health Habits/Nutrition Interventions:  Dental exam overdue:  patient encouraged to make appointment with his/her dentist      ADLs:  In the past 7 days, did you need help from others to perform any of the following everyday activities: Eating, dressing, grooming, bathing, toileting, or walking/balance?: (!) Yes  Select all that apply: (!) Bathing, Walking/Balance, Dressing  In the past 7 days, did you need help from others to take care of any of the following: Laundry, housekeeping, banking/finances, shopping, telephone use, food preparation, transportation, or taking medications?: (!) Yes  Select all that apply: [employer], Housekeeping, Shopping, Transportation  ADL Interventions:  Has a home health aid          Objective   Vitals:    12/01/22 1101 12/01/22 1117 12/01/22 1121 12/01/22 1122   BP: (!) 190/70 (!) 160/70 (!) 150/72 (!) 140/72   Site: Left Upper Arm      Position: Sitting  Sitting Standing   Pulse: 80      Resp: 17      SpO2: 96%      Weight: 144 lb (65.3 kg)      Height: 5' 6\" (1.676 m)         Body mass index is 23.24 kg/m². Allergies   Allergen Reactions    Ciprofloxacin Other (See Comments)     Elevated BP    Gluten Meal Other (See Comments)    Hydromorphone Other (See Comments)     hallucinations  hallucinations      Hydroxychloroquine Other (See Comments)     Pt could stand up or get up. Eyes got really big but pt could not see ? Pt states that it was 2-3 days before she got her senses back. Ketoconazole Other (See Comments)     Intolerance/caused burning sensation    Meperidine Other (See Comments)     hallucinations    Prednisone Other (See Comments)     Irritability    Yellow Dye Itching     Yellow  Dye in cheddar cheese- can eat white cheddar    Codeine Palpitations     Prior to Visit Medications    Medication Sig Taking?  Authorizing Provider   donepezil (ARICEPT) 5 MG tablet Take 1 tablet by mouth nightly Yes SIRENA Mckee CNP   memantine ER (NAMENDA XR) 28 MG CP24 extended release capsule Take 1 capsule by mouth daily Yes SIRENA Mckee CNP   Vitamin D, Cholecalciferol, 50 MCG (2000 UT) CAPS Take 1 capsule by mouth daily Yes SIRENA Mckee CNP   Menthol, Topical Analgesic, (BIOFREEZE ROLL-ON EX) Rub a thin film over affected areas not more than four times a day on shoulders and low back Yes Historical Provider, MD   diclofenac sodium (VOLTAREN) 1 % GEL Apply thin layer to affected area as needed for low back Yes Historical Provider, MD   acetaminophen (TYLENOL) 650 MG extended release tablet Take 1,300 mg by mouth nightly as needed for Pain.  Yes Historical Provider, MD   ergocalciferol (ERGOCALCIFEROL) 1.25 MG (05605 UT) capsule Take 50,000 Units by mouth every 7 days  Patient not taking: No sig reported  Ar Automatic Reconciliation       CareTeam (Including outside providers/suppliers regularly involved in providing care):   Patient Care Team:  SIRENA Mckee CNP as PCP - General (Nurse Practitioner)  SIRENA Mckee CNP as PCP - [facility] Empaneled Provider     Reviewed and updated this visit:  Tobacco  Allergies  Meds  Med Hx  Surg Hx  Soc Hx  Fam Hx

## 2022-12-01 NOTE — PATIENT INSTRUCTIONS
Personalized Preventive Plan for Valerio Barrett - 12/1/2022  Medicare offers a range of preventive health benefits. Some of the tests and screenings are paid in full while other may be subject to a deductible, co-insurance, and/or copay. Some of these benefits include a comprehensive review of your medical history including lifestyle, illnesses that may run in your family, and various assessments and screenings as appropriate. After reviewing your medical record and screening and assessments performed today your provider may have ordered immunizations, labs, imaging, and/or referrals for you. A list of these orders (if applicable) as well as your Preventive Care list are included within your After Visit Summary for your review. Other Preventive Recommendations:    A preventive eye exam performed by an eye specialist is recommended every 1-2 years to screen for glaucoma; cataracts, macular degeneration, and other eye disorders. A preventive dental visit is recommended every 6 months. Try to get at least 150 minutes of exercise per week or 10,000 steps per day on a pedometer . Order or download the FREE \"Exercise & Physical Activity: Your Everyday Guide\" from [employer]. Call 4-329.126.1443 or search [employer] online. You need 5094-4367 mg of calcium and 0705-9580 IU of vitamin D per day. It is possible to meet your calcium requirement with diet alone, but a vitamin D supplement is usually necessary to meet this goal.  When exposed to the sun, use a sunscreen that protects against both UVA and UVB radiation with an SPF of 30 or greater. Reapply every 2 to 3 hours or after sweating, drying off with a towel, or swimming. Always wear a seat belt when traveling in a car. Always wear a helmet when riding a bicycle or motorcycle.

## 2022-12-02 ENCOUNTER — HOME HEALTH ADMISSION (OUTPATIENT)
Dept: HOME HEALTH SERVICES | Facility: HOME HEALTH | Age: 82
End: 2022-12-02

## 2022-12-05 ENCOUNTER — HOME CARE VISIT (OUTPATIENT)
Dept: HOME HEALTH SERVICES | Facility: HOME HEALTH | Age: 82
End: 2022-12-05

## 2023-02-09 ENCOUNTER — NURSE TRIAGE (OUTPATIENT)
Dept: OTHER | Facility: CLINIC | Age: 83
End: 2023-02-09

## 2023-02-09 ENCOUNTER — APPOINTMENT (OUTPATIENT)
Dept: CT IMAGING | Age: 83
End: 2023-02-09
Payer: MEDICARE

## 2023-02-09 ENCOUNTER — HOSPITAL ENCOUNTER (EMERGENCY)
Age: 83
Discharge: HOME | End: 2023-02-10
Attending: EMERGENCY MEDICINE
Payer: MEDICARE

## 2023-02-09 DIAGNOSIS — K56.609 SBO (SMALL BOWEL OBSTRUCTION) (HCC): Primary | ICD-10-CM

## 2023-02-09 LAB
ABO + RH BLD: NORMAL
ALBUMIN SERPL-MCNC: 3.8 G/DL (ref 3.2–4.6)
ALBUMIN/GLOB SERPL: 0.9 (ref 0.4–1.6)
ALP SERPL-CCNC: 81 U/L (ref 50–136)
ALT SERPL-CCNC: 33 U/L (ref 12–65)
ANION GAP SERPL CALC-SCNC: 10 MMOL/L (ref 2–11)
AST SERPL-CCNC: 20 U/L (ref 15–37)
BASOPHILS # BLD: 0 K/UL (ref 0–0.2)
BASOPHILS NFR BLD: 0 % (ref 0–2)
BILIRUB SERPL-MCNC: 0.6 MG/DL (ref 0.2–1.1)
BLOOD GROUP ANTIBODIES SERPL: NORMAL
BUN BLD-MCNC: 13 MG/DL (ref 8–26)
BUN SERPL-MCNC: 15 MG/DL (ref 8–23)
CALCIUM SERPL-MCNC: 9.8 MG/DL (ref 8.3–10.4)
CHLORIDE BLD-SCNC: 98 MMOL/L (ref 98–107)
CHLORIDE SERPL-SCNC: 95 MMOL/L (ref 101–110)
CO2 BLD-SCNC: 23.2 MMOL/L (ref 21–32)
CO2 SERPL-SCNC: 27 MMOL/L (ref 21–32)
CREAT BLD-MCNC: 0.59 MG/DL (ref 0.8–1.5)
CREAT SERPL-MCNC: 0.9 MG/DL (ref 0.6–1)
DIFFERENTIAL METHOD BLD: ABNORMAL
EOSINOPHIL # BLD: 0.1 K/UL (ref 0–0.8)
EOSINOPHIL NFR BLD: 1 % (ref 0.5–7.8)
ERYTHROCYTE [DISTWIDTH] IN BLOOD BY AUTOMATED COUNT: 13.1 % (ref 11.9–14.6)
GLOBULIN SER CALC-MCNC: 4.1 G/DL (ref 2.8–4.5)
GLUCOSE BLD-MCNC: 115 MG/DL (ref 65–100)
GLUCOSE SERPL-MCNC: 145 MG/DL (ref 65–100)
HCT VFR BLD AUTO: 48.3 % (ref 35.8–46.3)
HGB BLD-MCNC: 16.2 G/DL (ref 11.7–15.4)
IMM GRANULOCYTES # BLD AUTO: 0 K/UL (ref 0–0.5)
IMM GRANULOCYTES NFR BLD AUTO: 0 % (ref 0–5)
LACTATE BLD-SCNC: 0.74 MMOL/L (ref 0.5–1.9)
LACTATE SERPL-SCNC: 0.8 MMOL/L (ref 0.4–2)
LACTATE SERPL-SCNC: 1.4 MMOL/L (ref 0.4–2)
LYMPHOCYTES # BLD: 1.8 K/UL (ref 0.5–4.6)
LYMPHOCYTES NFR BLD: 20 % (ref 13–44)
MCH RBC QN AUTO: 30.3 PG (ref 26.1–32.9)
MCHC RBC AUTO-ENTMCNC: 33.5 G/DL (ref 31.4–35)
MCV RBC AUTO: 90.3 FL (ref 82–102)
MONOCYTES # BLD: 0.5 K/UL (ref 0.1–1.3)
MONOCYTES NFR BLD: 5 % (ref 4–12)
NEUTS SEG # BLD: 6.8 K/UL (ref 1.7–8.2)
NEUTS SEG NFR BLD: 74 % (ref 43–78)
NRBC # BLD: 0 K/UL (ref 0–0.2)
PLATELET # BLD AUTO: 282 K/UL (ref 150–450)
PMV BLD AUTO: 9.4 FL (ref 9.4–12.3)
POTASSIUM BLD-SCNC: 3.6 MMOL/L (ref 3.5–5.1)
POTASSIUM SERPL-SCNC: 4.1 MMOL/L (ref 3.5–5.1)
PROT SERPL-MCNC: 7.9 G/DL (ref 6.3–8.2)
RBC # BLD AUTO: 5.35 M/UL (ref 4.05–5.2)
SERVICE CMNT-IMP: ABNORMAL
SODIUM BLD-SCNC: 135 MMOL/L (ref 136–145)
SODIUM SERPL-SCNC: 132 MMOL/L (ref 133–143)
SPECIMEN EXP DATE BLD: NORMAL
WBC # BLD AUTO: 9.2 K/UL (ref 4.3–11.1)

## 2023-02-09 PROCEDURE — 74178 CT ABD&PLV WO CNTR FLWD CNTR: CPT

## 2023-02-09 PROCEDURE — 86901 BLOOD TYPING SEROLOGIC RH(D): CPT

## 2023-02-09 PROCEDURE — 2580000003 HC RX 258: Performed by: EMERGENCY MEDICINE

## 2023-02-09 PROCEDURE — 83605 ASSAY OF LACTIC ACID: CPT

## 2023-02-09 PROCEDURE — 85025 COMPLETE CBC W/AUTO DIFF WBC: CPT

## 2023-02-09 PROCEDURE — 6360000004 HC RX CONTRAST MEDICATION: Performed by: EMERGENCY MEDICINE

## 2023-02-09 PROCEDURE — 96375 TX/PRO/DX INJ NEW DRUG ADDON: CPT

## 2023-02-09 PROCEDURE — C9113 INJ PANTOPRAZOLE SODIUM, VIA: HCPCS | Performed by: EMERGENCY MEDICINE

## 2023-02-09 PROCEDURE — 80053 COMPREHEN METABOLIC PANEL: CPT

## 2023-02-09 PROCEDURE — 6360000002 HC RX W HCPCS: Performed by: EMERGENCY MEDICINE

## 2023-02-09 PROCEDURE — 96376 TX/PRO/DX INJ SAME DRUG ADON: CPT

## 2023-02-09 PROCEDURE — A4216 STERILE WATER/SALINE, 10 ML: HCPCS | Performed by: EMERGENCY MEDICINE

## 2023-02-09 PROCEDURE — 99285 EMERGENCY DEPT VISIT HI MDM: CPT

## 2023-02-09 RX ORDER — 0.9 % SODIUM CHLORIDE 0.9 %
500 INTRAVENOUS SOLUTION INTRAVENOUS ONCE
Status: COMPLETED | OUTPATIENT
Start: 2023-02-09 | End: 2023-02-09

## 2023-02-09 RX ORDER — ONDANSETRON 2 MG/ML
4 INJECTION INTRAMUSCULAR; INTRAVENOUS
Status: COMPLETED | OUTPATIENT
Start: 2023-02-09 | End: 2023-02-09

## 2023-02-09 RX ADMIN — SODIUM CHLORIDE 80 MG: 9 INJECTION, SOLUTION INTRAMUSCULAR; INTRAVENOUS; SUBCUTANEOUS at 19:05

## 2023-02-09 RX ADMIN — ONDANSETRON 4 MG: 2 INJECTION INTRAMUSCULAR; INTRAVENOUS at 22:57

## 2023-02-09 RX ADMIN — IOHEXOL 100 ML: 350 INJECTION, SOLUTION INTRAVENOUS at 22:08

## 2023-02-09 RX ADMIN — SODIUM CHLORIDE 500 ML: 9 INJECTION, SOLUTION INTRAVENOUS at 19:07

## 2023-02-09 ASSESSMENT — ENCOUNTER SYMPTOMS
RESPIRATORY NEGATIVE: 1
BLOOD IN STOOL: 1
EYES NEGATIVE: 1
VOMITING: 1

## 2023-02-09 ASSESSMENT — PAIN - FUNCTIONAL ASSESSMENT: PAIN_FUNCTIONAL_ASSESSMENT: NONE - DENIES PAIN

## 2023-02-09 NOTE — TELEPHONE ENCOUNTER
Location of patient:     Received call from Brandon Edmonds at [facility] with [employer]. Subjective: Limited triage pt is not with caller. Caller states pt was having emesis that looked brown. Unsure of how many episodes. Did drink some fluid. Caller states she pt said she could not go to the restroom, Unsure if it urination or bowel movement. Pt able to stand but seemed lethargic. C/o dizziness. Caller states pt seems altered and confused with delayed speech. Current Symptoms: As above    Onset: Today     Associated Symptoms: na    Pain Severity: Unsure    Temperature: Denies     What has been tried: crackers    LMP: NA Pregnant: NA    Recommended disposition: Call  Now    Care advice provided, patient verbalizes understanding; denies any other questions or concerns; instructed to call back for any new or worsening symptoms. Patient/caller agrees to calling 911    Attention Provider: Thank you for allowing me to participate in the care of your patient. The patient was connected to triage in response to information provided to the ECC/PSC. Please do not respond through this encounter as the response is not directed to a shared pool.         Reason for Disposition   Difficult to awaken or acting confused (e.g., disoriented, slurred speech)    Protocols used: Vomiting-ADULT-OH

## 2023-02-09 NOTE — ED PROVIDER NOTES
Emergency Department Provider Note                   PCP:                SIRENA Pineda CNP               Age: 80 y.o. Sex: female       ICD-10-CM    1. SBO (small bowel obstruction) ([facility]  K56.609           DISPOSITION Decision To Admit 02/10/2023 12:29:13 AM        Medical Decision Making  Patient appears nontoxic. Vital signs are stable. Patient is having 2 days of what sounds to be coffee-ground emesis and dark tarry stools. She is not on blood thinners. CBC, CHEM, type and screen, CT abdomen pelvis GI bleed study, Protonix, Zofran have been ordered    Patient vitals remained stable. CT finally resulted showing a small bowel obstruction. Patient continues to have nontender abdomen. I discussed this case with the on-call surgery team.  They would like the patient admitted to the hospitalist with consult to Dr. Clifford Sanchez and/or Complexity of Data Reviewed  Labs: ordered. Radiology: ordered. Risk  Prescription drug management. Decision regarding hospitalization.                                 Orders Placed This Encounter   Procedures    CT ABDOMEN PELVIS GI BLEED Additional Contrast? Oral    CBC with Auto Differential    CMP    Lactate, Sepsis    Diet NPO    POCT CREATININE - BLOOD    POC CHEMISTRY (NA,K,ICA,GLU,CALC HCT/HGB,LACTATE,CREA,CL)    TYPE AND SCREEN        Medications   cefTRIAXone (ROCEPHIN) 1,000 mg in sodium chloride 0.9 % 50 mL IVPB (mini-bag) (1,000 mg IntraVENous New Bag 2/10/23 0044)   azithromycin (ZITHROMAX) 500 mg in sodium chloride 0.9 % 250 mL IVPB ([id number]) (has no administration in time range)   0.9 % sodium chloride bolus (0 mLs IntraVENous Stopped 2/9/23 2000)   pantoprazole (PROTONIX) 80 mg in sodium chloride (PF) 0.9 % 20 mL injection (80 mg IntraVENous Given 2/9/23 1905)   ondansetron (ZOFRAN) injection 4 mg (4 mg IntraVENous Given 2/9/23 2257)   iohexol (OMNIPAQUE 350) solution 100 mL (100 mLs IntraVENous Given 2/9/23 2208)       New Prescriptions    No medications on file        Susannah Thomas is a 80 y.o. female who presents to the Emergency Department with chief complaint of    Chief Complaint   Patient presents with    GI Bleeding      80-year-old female presenting with 2 days of dark hematemesis and dark tarry stools. She denies any pain, denies any specifically abdominal pain, fever. Denies any trauma. Patient not on blood thinners. She has no history of GI bleed. Review of Systems   HENT: Negative. Eyes: Negative. Respiratory: Negative. Cardiovascular: Negative. Gastrointestinal:  Positive for blood in stool and vomiting. Genitourinary: Negative. Musculoskeletal: Negative. Skin: Negative. Neurological: Negative. Psychiatric/Behavioral: Negative. Past Medical History:   Diagnosis Date    Anemia, unspecified     patient denies hx of anemia    Arthritis     osteoarthritis    Breast cancer ([facility] 2019    Cancer ([facility] 07/2013    right breast lumpectomy    Chronic pain     d/t rheumatoid arthritis    HTN (hypertension) 03/18/2012    patient states not a current problem, no medication. Hypercholesterolemia     no meds    Ill-defined condition     \"I can't take anything strong\"    Menopause     Nausea & vomiting     patient denies post-op N/V    Osteoarthritis of both shoulders     Osteoarthritis of right hip     Followed by Dr. Kelby Hernandez historian     Rheumatoid arthritis [facility])     Patient does not see Rheumatologist; no prescription medication. Total knee replacement status 3/15/2012    Unspecified adverse effect of anesthesia     headache after general anesthesia x 1    Vitamin D deficiency disease     pt unaware of issue,diagnosis doc prior to 3/9/12; managed with daily vitamin D supplement.          Past Surgical History:   Procedure Laterality Date    APPENDECTOMY      with hysterectomy    BREAST LUMPECTOMY  7/31/2013    BREAST NEEDLE LOCALIZATION WITH MASS EXCISION AND SENTINAL NODE BIOPSY performed by Ne De Paz MD at [facility] (CERVIX STATUS UNKNOWN)  1987    MASTECTOMY Right 3/27/2019    RIGHT BREAST MASTECTOMY SIMPLE performed by Daisy Madsen MD at [place] [address] Right 2016    TOTAL HIP ARTHROPLASTY Left 2018    TOTAL KNEE ARTHROPLASTY Left 8/2010    left    TOTAL KNEE ARTHROPLASTY Right 3/2012    right    US BREAST BIOPSY W LOC DEVICE 1ST LESION RIGHT Right 2/26/2019    US BREAST NEEDLE BIOPSY RIGHT 2/26/2019 [facility]        Family History   Problem Relation Age of Onset    Hypertension Mother     No Known Problems Father     Osteoarthritis Mother     Breast Cancer Neg Hx     Other Other         family hx of HTN        Social History     Socioeconomic History    Marital status:    Tobacco Use    Smoking status: Never    Smokeless tobacco: Never   Substance and Sexual Activity    Alcohol use: No    Drug use: No     Social Determinants of Health     Physical Activity: Insufficiently Active    Days of Exercise per Week: 7 days    Minutes of Exercise per Session: 10 min         Ciprofloxacin, Gluten meal, Hydromorphone, Hydroxychloroquine, Ketoconazole, Meperidine, Prednisone, Yellow dye, and Codeine     Previous Medications    ACETAMINOPHEN (TYLENOL) 650 MG EXTENDED RELEASE TABLET    Take 1,300 mg by mouth nightly as needed for Pain.     DICLOFENAC SODIUM (VOLTAREN) 1 % GEL    Apply thin layer to affected area as needed for low back    DONEPEZIL (ARICEPT) 5 MG TABLET    Take 1 tablet by mouth nightly    MEMANTINE ER (NAMENDA XR) 28 MG CP24 EXTENDED RELEASE CAPSULE    Take 1 capsule by mouth daily    MENTHOL, TOPICAL ANALGESIC, (BIOFREEZE ROLL-ON EX)    Rub a thin film over affected areas not more than four times a day on shoulders and low back    VITAMIN D, CHOLECALCIFEROL, 50 MCG (2000 UT) CAPS    Take 1 capsule by mouth daily        Vitals signs and nursing note reviewed. Patient Vitals for the past 4 hrs:   Temp BP SpO2   02/10/23 0100 -- (!) 147/53 94 %   02/10/23 0045 -- (!) 145/51 95 %   02/10/23 0008 -- -- 95 %   02/10/23 0007 98.2 °F (36.8 °C) (!) 159/51 --   02/09/23 2200 -- (!) 152/57 97 %          Physical Exam  Constitutional:       General: She is not in acute distress. Appearance: Normal appearance. She is not ill-appearing. HENT:      Head: Normocephalic and atraumatic. Nose: No rhinorrhea. Mouth/Throat:      Mouth: Mucous membranes are moist.   Eyes:      Extraocular Movements: Extraocular movements intact. Cardiovascular:      Rate and Rhythm: Normal rate and regular rhythm. Pulmonary:      Effort: Pulmonary effort is normal.      Breath sounds: Normal breath sounds. Abdominal:      General: Abdomen is flat. Bowel sounds are normal. There is no distension. Palpations: Abdomen is soft. Tenderness: There is no abdominal tenderness. Musculoskeletal:         General: Normal range of motion. Cervical back: Normal range of motion. Skin:     General: Skin is warm and dry. Neurological:      General: No focal deficit present. Mental Status: She is alert. Psychiatric:         Mood and Affect: Mood normal.        Procedures    Results for orders placed or performed during the hospital encounter of 02/09/23   CT ABDOMEN PELVIS GI BLEED Additional Contrast? Oral    Narrative    EXAMINATION:  CT ABDOMEN PELVIS without and with contrast GI BLEED    DATE: 2/9/2023 7:00 PM    INDICATION: Hematemesis, dark tarry stools ;    COMPARISON: None. PROCEDURE:  CT of the abdomen and pelvis was performed prior to and following   the intravenous administration of iodinated contrast material.  CT dose lowering   techniques were used, to include: automated exposure control, adjustment for   patient size, and or use of iterative reconstruction. Arterial and venous phase postcontrast imaging.     FINDINGS:    Visualized lower chest: Minor nodular groundglass opacities in the right middle   lobe. 4 mm subpleural nodule right lower lobe, series 2 image seven, likely a   fissural lymph node. Moderate coronary artery atherosclerotic calcifications. ABDOMEN:    Liver and Biliary system:  No suspicious liver lesion. No biliary ductal   dilation. Gallbladder:  Cholecystectomy. Spleen:  Normal.    Pancreas:  Normal.    Adrenal glands:  4 cm myelolipoma left adrenal gland, benign lesion. .    Kidneys and ureters:  Kidneys enhance symmetrically. No suspicious mass. No   hydronephrosis. Aorta/IVC:  Moderate atherosclerotic calcifications. No aortic aneurysm or   dissection. IVC normal.    Lymph nodes:  No lymphadenopathy. Gastrointestinal tract:  Stomach and proximal small bowel distended with fluid. The proximal small bowel is mildly dilated. There is transition zone in the   right lower quadrant with suggestion of two transition points/closed loop. Distal small bowel decompressed. Appendix not visualized; no pericecal   inflammation. . No colonic wall thickening or dilation. Diverticulosis sigmoid   colon. Peritoneum/Mesentery: No pneumoperitoneum. No ascites. Abdominal wall: Unremarkable. PELVIS:    Urinary bladder: Largely obscured by artifact. Reproductive organs: Uterus not visualized    Lymph Nodes: No pelvic lymphadenopathy. BONES:  Bilateral hip arthroplasties with associated artifact limiting   evaluation the lower pelvis. Degenerative changes in the spine. .          Impression       1. Small bowel obstruction with transition zone in the right lower quadrant. Configuration raises possibility of a closed loop obstruction. Recommend   surgical consultation. 2.  Minimal right middle lobe pulmonary infiltrate, possible aspiration and/or   early pneumonia. 3.  No evidence of active extravasation. 4.  Chronic findings as above.            Lo Weaver M.D.   2/9/2023 11:51:00 PM   CBC with Auto Differential   Result Value Ref Range    WBC 9.2 4.3 - 11.1 K/uL    RBC 5.35 (H) 4.05 - 5.2 M/uL    Hemoglobin 16.2 (H) 11.7 - 15.4 g/dL    Hematocrit 48.3 (H) 35.8 - 46.3 %    MCV 90.3 82 - 102 FL    MCH 30.3 26.1 - 32.9 PG    MCHC 33.5 31.4 - 35.0 g/dL    RDW 13.1 11.9 - 14.6 %    Platelets 880 751 - 781 K/uL    MPV 9.4 9.4 - 12.3 FL    nRBC 0.00 0.0 - 0.2 K/uL    Differential Type AUTOMATED      Seg Neutrophils 74 43 - 78 %    Lymphocytes 20 13 - 44 %    Monocytes 5 4.0 - 12.0 %    Eosinophils % 1 0.5 - 7.8 %    Basophils 0 0.0 - 2.0 %    Immature Granulocytes 0 0.0 - 5.0 %    Segs Absolute 6.8 1.7 - 8.2 K/UL    Absolute Lymph # 1.8 0.5 - 4.6 K/UL    Absolute Mono # 0.5 0.1 - 1.3 K/UL    Absolute Eos # 0.1 0.0 - 0.8 K/UL    Basophils Absolute 0.0 0.0 - 0.2 K/UL    Absolute Immature Granulocyte 0.0 0.0 - 0.5 K/UL   CMP   Result Value Ref Range    Sodium 132 (L) 133 - 143 mmol/L    Potassium 4.1 3.5 - 5.1 mmol/L    Chloride 95 (L) 101 - 110 mmol/L    CO2 27 21 - 32 mmol/L    Anion Gap 10 2 - 11 mmol/L    Glucose 145 (H) 65 - 100 mg/dL    BUN 15 8 - 23 MG/DL    Creatinine 0.90 0.6 - 1.0 MG/DL    Est, Glom Filt Rate >60 >60 ml/min/1.73m2    Calcium 9.8 8.3 - 10.4 MG/DL    Total Bilirubin 0.6 0.2 - 1.1 MG/DL    ALT 33 12 - 65 U/L    AST 20 15 - 37 U/L    Alk Phosphatase 81 50 - 136 U/L    Total Protein 7.9 6.3 - 8.2 g/dL    Albumin 3.8 3.2 - 4.6 g/dL    Globulin 4.1 2.8 - 4.5 g/dL    Albumin/Globulin Ratio 0.9 0.4 - 1.6     Lactate, Sepsis   Result Value Ref Range    Lactic Acid, Sepsis 1.4 0.4 - 2.0 MMOL/L   Lactate, Sepsis   Result Value Ref Range    Lactic Acid, Sepsis 0.8 0.4 - 2.0 MMOL/L   POC CHEMISTRY (NA,K,ICA,GLU,CALC HCT/HGB,LACTATE,CREA,CL)   Result Value Ref Range    POC Sodium 135 (L) 136 - 145 mmol/L    POC Potassium 3.6 3.5 - 5.1 mmol/L    POC Chloride 98 98 - 107 mmol/L    POC TCO2 23.2 21 - 32 mmol/L    POC Glucose 115 (H) 65 - 100 mg/dL    POC BUN 13 8 - 26 mg/dL    POC Creatinine 0.59 (L) 0.8 - 1.5 mg/dL    eGFR, POC >60 >60 ml/min/1.73m2    POC Lactic Acid 0.74 0.5 - 1.9 mmol/L    Performed by: Burton Butler    TYPE AND SCREEN   Result Value Ref Range    Crossmatch expiration date 02/12/2023,2359     ABO/Rh O POSITIVE     Antibody Screen NEG         CT ABDOMEN PELVIS GI BLEED Additional Contrast? Oral   Final Result       1. Small bowel obstruction with transition zone in the right lower quadrant. Configuration raises possibility of a closed loop obstruction. Recommend    surgical consultation. 2.  Minimal right middle lobe pulmonary infiltrate, possible aspiration and/or    early pneumonia. 3.  No evidence of active extravasation. 4.  Chronic findings as above. Grabiel Rosado M.D.    2/9/2023 11:51:00 PM                          Voice dictation software was used during the making of this note. This software is not perfect and grammatical and other typographical errors may be present. This note has not been completely proofread for errors.      Leyla Guan MD  02/10/23 1434

## 2023-02-09 NOTE — ED TRIAGE NOTES
Patient arrives from home via EMS with c/o black tarry stools/emesis for 2 days. No blood thinners. VSS in route. [id number]. No pertinent medical hx.

## 2023-02-10 ENCOUNTER — ANESTHESIA EVENT (OUTPATIENT)
Dept: SURGERY | Age: 83
End: 2023-02-10
Payer: MEDICARE

## 2023-02-10 ENCOUNTER — ANESTHESIA (OUTPATIENT)
Dept: SURGERY | Age: 83
End: 2023-02-10
Payer: MEDICARE

## 2023-02-10 ENCOUNTER — HOSPITAL ENCOUNTER (INPATIENT)
Age: 83
LOS: 9 days | Discharge: HOME HEALTH | DRG: 336 | End: 2023-02-19
Attending: FAMILY MEDICINE | Admitting: INTERNAL MEDICINE
Payer: MEDICARE

## 2023-02-10 ENCOUNTER — APPOINTMENT (OUTPATIENT)
Dept: GENERAL RADIOLOGY | Age: 83
DRG: 336 | End: 2023-02-10
Attending: FAMILY MEDICINE
Payer: MEDICARE

## 2023-02-10 VITALS
SYSTOLIC BLOOD PRESSURE: 160 MMHG | OXYGEN SATURATION: 93 % | DIASTOLIC BLOOD PRESSURE: 49 MMHG | BODY MASS INDEX: 22.5 KG/M2 | TEMPERATURE: 98 F | HEIGHT: 66 IN | WEIGHT: 140 LBS | HEART RATE: 92 BPM | RESPIRATION RATE: 16 BRPM

## 2023-02-10 PROBLEM — E78.2 MIXED HYPERLIPIDEMIA: Status: ACTIVE | Noted: 2022-03-03

## 2023-02-10 PROBLEM — C50.911 BREAST CANCER, RIGHT BREAST (HCC): Status: ACTIVE | Noted: 2019-03-27

## 2023-02-10 PROBLEM — K56.609 SBO (SMALL BOWEL OBSTRUCTION) (HCC): Status: ACTIVE | Noted: 2023-02-10

## 2023-02-10 LAB
ALBUMIN SERPL-MCNC: 3.3 G/DL (ref 3.2–4.6)
ALBUMIN/GLOB SERPL: 0.9 (ref 0.4–1.6)
ALP SERPL-CCNC: 72 U/L (ref 50–130)
ALT SERPL-CCNC: 41 U/L (ref 12–65)
ANION GAP SERPL CALC-SCNC: 5 MMOL/L (ref 2–11)
AST SERPL-CCNC: 37 U/L (ref 15–37)
BASOPHILS # BLD: 0 K/UL (ref 0–0.2)
BASOPHILS NFR BLD: 0 % (ref 0–2)
BILIRUB SERPL-MCNC: 0.6 MG/DL (ref 0.2–1.1)
BUN SERPL-MCNC: 9 MG/DL (ref 8–23)
CALCIUM SERPL-MCNC: 9 MG/DL (ref 8.3–10.4)
CHLORIDE SERPL-SCNC: 104 MMOL/L (ref 101–110)
CO2 SERPL-SCNC: 26 MMOL/L (ref 21–32)
CREAT SERPL-MCNC: 0.68 MG/DL (ref 0.6–1)
DIFFERENTIAL METHOD BLD: NORMAL
EOSINOPHIL # BLD: 0.1 K/UL (ref 0–0.8)
EOSINOPHIL NFR BLD: 1 % (ref 0.5–7.8)
ERYTHROCYTE [DISTWIDTH] IN BLOOD BY AUTOMATED COUNT: 13.1 % (ref 11.9–14.6)
GLOBULIN SER CALC-MCNC: 3.6 G/DL (ref 2.8–4.5)
GLUCOSE SERPL-MCNC: 97 MG/DL (ref 65–100)
HCT VFR BLD AUTO: 44.1 % (ref 35.8–46.3)
HGB BLD-MCNC: 14.6 G/DL (ref 11.7–15.4)
IMM GRANULOCYTES # BLD AUTO: 0 K/UL (ref 0–0.5)
IMM GRANULOCYTES NFR BLD AUTO: 0 % (ref 0–5)
LYMPHOCYTES # BLD: 1.9 K/UL (ref 0.5–4.6)
LYMPHOCYTES NFR BLD: 18 % (ref 13–44)
MCH RBC QN AUTO: 29.8 PG (ref 26.1–32.9)
MCHC RBC AUTO-ENTMCNC: 33.1 G/DL (ref 31.4–35)
MCV RBC AUTO: 90 FL (ref 82–102)
MONOCYTES # BLD: 0.6 K/UL (ref 0.1–1.3)
MONOCYTES NFR BLD: 6 % (ref 4–12)
NEUTS SEG # BLD: 7.9 K/UL (ref 1.7–8.2)
NEUTS SEG NFR BLD: 75 % (ref 43–78)
NRBC # BLD: 0 K/UL (ref 0–0.2)
PLATELET # BLD AUTO: 236 K/UL (ref 150–450)
PMV BLD AUTO: 9.5 FL (ref 9.4–12.3)
POTASSIUM SERPL-SCNC: 4 MMOL/L (ref 3.5–5.1)
PROT SERPL-MCNC: 6.9 G/DL (ref 6.3–8.2)
RBC # BLD AUTO: 4.9 M/UL (ref 4.05–5.2)
SODIUM SERPL-SCNC: 135 MMOL/L (ref 133–143)
WBC # BLD AUTO: 10.5 K/UL (ref 4.3–11.1)

## 2023-02-10 PROCEDURE — 0DN84ZZ RELEASE SMALL INTESTINE, PERCUTANEOUS ENDOSCOPIC APPROACH: ICD-10-PCS | Performed by: SURGERY

## 2023-02-10 PROCEDURE — 1100000000 HC RM PRIVATE

## 2023-02-10 PROCEDURE — 6370000000 HC RX 637 (ALT 250 FOR IP): Performed by: SURGERY

## 2023-02-10 PROCEDURE — 2500000003 HC RX 250 WO HCPCS: Performed by: INTERNAL MEDICINE

## 2023-02-10 PROCEDURE — 2580000003 HC RX 258: Performed by: NURSE ANESTHETIST, CERTIFIED REGISTERED

## 2023-02-10 PROCEDURE — 2580000003 HC RX 258: Performed by: SURGERY

## 2023-02-10 PROCEDURE — C9113 INJ PANTOPRAZOLE SODIUM, VIA: HCPCS | Performed by: NURSE PRACTITIONER

## 2023-02-10 PROCEDURE — 6360000002 HC RX W HCPCS: Performed by: NURSE ANESTHETIST, CERTIFIED REGISTERED

## 2023-02-10 PROCEDURE — 74018 RADEX ABDOMEN 1 VIEW: CPT

## 2023-02-10 PROCEDURE — 3700000001 HC ADD 15 MINUTES (ANESTHESIA): Performed by: SURGERY

## 2023-02-10 PROCEDURE — 6360000002 HC RX W HCPCS: Performed by: EMERGENCY MEDICINE

## 2023-02-10 PROCEDURE — 97161 PT EVAL LOW COMPLEX 20 MIN: CPT

## 2023-02-10 PROCEDURE — 6360000002 HC RX W HCPCS: Performed by: NURSE PRACTITIONER

## 2023-02-10 PROCEDURE — 96375 TX/PRO/DX INJ NEW DRUG ADDON: CPT

## 2023-02-10 PROCEDURE — 80053 COMPREHEN METABOLIC PANEL: CPT

## 2023-02-10 PROCEDURE — A4216 STERILE WATER/SALINE, 10 ML: HCPCS | Performed by: NURSE PRACTITIONER

## 2023-02-10 PROCEDURE — 3600000004 HC SURGERY LEVEL 4 BASE: Performed by: SURGERY

## 2023-02-10 PROCEDURE — 2580000003 HC RX 258: Performed by: EMERGENCY MEDICINE

## 2023-02-10 PROCEDURE — 96365 THER/PROPH/DIAG IV INF INIT: CPT

## 2023-02-10 PROCEDURE — 36415 COLL VENOUS BLD VENIPUNCTURE: CPT

## 2023-02-10 PROCEDURE — 97530 THERAPEUTIC ACTIVITIES: CPT

## 2023-02-10 PROCEDURE — 96367 TX/PROPH/DG ADDL SEQ IV INF: CPT

## 2023-02-10 PROCEDURE — 44180 LAP ENTEROLYSIS: CPT | Performed by: SURGERY

## 2023-02-10 PROCEDURE — 7100000000 HC PACU RECOVERY - FIRST 15 MIN: Performed by: SURGERY

## 2023-02-10 PROCEDURE — 6370000000 HC RX 637 (ALT 250 FOR IP): Performed by: FAMILY MEDICINE

## 2023-02-10 PROCEDURE — 3600000014 HC SURGERY LEVEL 4 ADDTL 15MIN: Performed by: SURGERY

## 2023-02-10 PROCEDURE — 7100000001 HC PACU RECOVERY - ADDTL 15 MIN: Performed by: SURGERY

## 2023-02-10 PROCEDURE — 2500000003 HC RX 250 WO HCPCS: Performed by: NURSE ANESTHETIST, CERTIFIED REGISTERED

## 2023-02-10 PROCEDURE — 2580000003 HC RX 258: Performed by: NURSE PRACTITIONER

## 2023-02-10 PROCEDURE — 97165 OT EVAL LOW COMPLEX 30 MIN: CPT

## 2023-02-10 PROCEDURE — 2720000010 HC SURG SUPPLY STERILE: Performed by: SURGERY

## 2023-02-10 PROCEDURE — 2580000003 HC RX 258: Performed by: FAMILY MEDICINE

## 2023-02-10 PROCEDURE — 2709999900 HC NON-CHARGEABLE SUPPLY: Performed by: SURGERY

## 2023-02-10 PROCEDURE — 3700000000 HC ANESTHESIA ATTENDED CARE: Performed by: SURGERY

## 2023-02-10 PROCEDURE — 85025 COMPLETE CBC W/AUTO DIFF WBC: CPT

## 2023-02-10 PROCEDURE — 2500000003 HC RX 250 WO HCPCS: Performed by: SURGERY

## 2023-02-10 RX ORDER — ONDANSETRON 2 MG/ML
4 INJECTION INTRAMUSCULAR; INTRAVENOUS
Status: COMPLETED | OUTPATIENT
Start: 2023-02-10 | End: 2023-02-10

## 2023-02-10 RX ORDER — LIDOCAINE HYDROCHLORIDE 20 MG/ML
INJECTION, SOLUTION EPIDURAL; INFILTRATION; INTRACAUDAL; PERINEURAL PRN
Status: DISCONTINUED | OUTPATIENT
Start: 2023-02-10 | End: 2023-02-10 | Stop reason: SDUPTHER

## 2023-02-10 RX ORDER — LABETALOL HYDROCHLORIDE 5 MG/ML
10 INJECTION, SOLUTION INTRAVENOUS
Status: DISCONTINUED | OUTPATIENT
Start: 2023-02-10 | End: 2023-02-10 | Stop reason: HOSPADM

## 2023-02-10 RX ORDER — IPRATROPIUM BROMIDE AND ALBUTEROL SULFATE 2.5; .5 MG/3ML; MG/3ML
1 SOLUTION RESPIRATORY (INHALATION)
Status: DISCONTINUED | OUTPATIENT
Start: 2023-02-10 | End: 2023-02-10 | Stop reason: HOSPADM

## 2023-02-10 RX ORDER — SODIUM CHLORIDE, SODIUM LACTATE, POTASSIUM CHLORIDE, CALCIUM CHLORIDE 600; 310; 30; 20 MG/100ML; MG/100ML; MG/100ML; MG/100ML
INJECTION, SOLUTION INTRAVENOUS CONTINUOUS PRN
Status: DISCONTINUED | OUTPATIENT
Start: 2023-02-10 | End: 2023-02-10 | Stop reason: SDUPTHER

## 2023-02-10 RX ORDER — SODIUM CHLORIDE 0.9 % (FLUSH) 0.9 %
5-40 SYRINGE (ML) INJECTION PRN
Status: DISCONTINUED | OUTPATIENT
Start: 2023-02-10 | End: 2023-02-19 | Stop reason: HOSPADM

## 2023-02-10 RX ORDER — DONEPEZIL HYDROCHLORIDE 5 MG/1
5 TABLET, FILM COATED ORAL NIGHTLY
Status: DISCONTINUED | OUTPATIENT
Start: 2023-02-10 | End: 2023-02-17

## 2023-02-10 RX ORDER — SUCCINYLCHOLINE CHLORIDE 20 MG/ML
INJECTION INTRAMUSCULAR; INTRAVENOUS PRN
Status: DISCONTINUED | OUTPATIENT
Start: 2023-02-10 | End: 2023-02-10 | Stop reason: SDUPTHER

## 2023-02-10 RX ORDER — POLYETHYLENE GLYCOL 3350 17 G/17G
17 POWDER, FOR SOLUTION ORAL DAILY PRN
Status: DISCONTINUED | OUTPATIENT
Start: 2023-02-10 | End: 2023-02-19 | Stop reason: HOSPADM

## 2023-02-10 RX ORDER — OXYCODONE HYDROCHLORIDE 5 MG/1
10 TABLET ORAL PRN
Status: DISCONTINUED | OUTPATIENT
Start: 2023-02-10 | End: 2023-02-10 | Stop reason: HOSPADM

## 2023-02-10 RX ORDER — FENTANYL CITRATE 50 UG/ML
INJECTION, SOLUTION INTRAMUSCULAR; INTRAVENOUS PRN
Status: DISCONTINUED | OUTPATIENT
Start: 2023-02-10 | End: 2023-02-10 | Stop reason: SDUPTHER

## 2023-02-10 RX ORDER — SODIUM CHLORIDE 9 MG/ML
INJECTION, SOLUTION INTRAVENOUS PRN
Status: DISCONTINUED | OUTPATIENT
Start: 2023-02-10 | End: 2023-02-19 | Stop reason: HOSPADM

## 2023-02-10 RX ORDER — PROCHLORPERAZINE EDISYLATE 5 MG/ML
5 INJECTION INTRAMUSCULAR; INTRAVENOUS
Status: DISCONTINUED | OUTPATIENT
Start: 2023-02-10 | End: 2023-02-10 | Stop reason: HOSPADM

## 2023-02-10 RX ORDER — NEOSTIGMINE METHYLSULFATE 1 MG/ML
INJECTION, SOLUTION INTRAVENOUS PRN
Status: DISCONTINUED | OUTPATIENT
Start: 2023-02-10 | End: 2023-02-10 | Stop reason: SDUPTHER

## 2023-02-10 RX ORDER — VITAMIN B COMPLEX
2000 TABLET ORAL DAILY
Status: DISCONTINUED | OUTPATIENT
Start: 2023-02-10 | End: 2023-02-19 | Stop reason: HOSPADM

## 2023-02-10 RX ORDER — SENNOSIDES 8.6 MG
1300 CAPSULE ORAL
Status: DISCONTINUED | OUTPATIENT
Start: 2023-02-10 | End: 2023-02-10 | Stop reason: CLARIF

## 2023-02-10 RX ORDER — SODIUM CHLORIDE 0.9 % (FLUSH) 0.9 %
5-40 SYRINGE (ML) INJECTION EVERY 12 HOURS SCHEDULED
Status: DISCONTINUED | OUTPATIENT
Start: 2023-02-10 | End: 2023-02-19 | Stop reason: HOSPADM

## 2023-02-10 RX ORDER — HYDRALAZINE HYDROCHLORIDE 20 MG/ML
10 INJECTION INTRAMUSCULAR; INTRAVENOUS
Status: DISCONTINUED | OUTPATIENT
Start: 2023-02-10 | End: 2023-02-10 | Stop reason: HOSPADM

## 2023-02-10 RX ORDER — MEMANTINE HYDROCHLORIDE 5 MG/1
10 TABLET ORAL 2 TIMES DAILY
Status: DISCONTINUED | OUTPATIENT
Start: 2023-02-10 | End: 2023-02-17

## 2023-02-10 RX ORDER — ENOXAPARIN SODIUM 100 MG/ML
40 INJECTION SUBCUTANEOUS DAILY
Status: DISCONTINUED | OUTPATIENT
Start: 2023-02-10 | End: 2023-02-19 | Stop reason: HOSPADM

## 2023-02-10 RX ORDER — ROCURONIUM BROMIDE 10 MG/ML
INJECTION, SOLUTION INTRAVENOUS PRN
Status: DISCONTINUED | OUTPATIENT
Start: 2023-02-10 | End: 2023-02-10 | Stop reason: SDUPTHER

## 2023-02-10 RX ORDER — CEFAZOLIN 1 G/1
INJECTION, POWDER, FOR SOLUTION INTRAVENOUS PRN
Status: DISCONTINUED | OUTPATIENT
Start: 2023-02-10 | End: 2023-02-10 | Stop reason: SDUPTHER

## 2023-02-10 RX ORDER — SODIUM CHLORIDE 9 MG/ML
INJECTION, SOLUTION INTRAVENOUS CONTINUOUS
Status: DISCONTINUED | OUTPATIENT
Start: 2023-02-10 | End: 2023-02-16

## 2023-02-10 RX ORDER — HALOPERIDOL 5 MG/ML
1 INJECTION INTRAMUSCULAR
Status: DISCONTINUED | OUTPATIENT
Start: 2023-02-10 | End: 2023-02-10 | Stop reason: HOSPADM

## 2023-02-10 RX ORDER — ONDANSETRON 4 MG/1
4 TABLET, ORALLY DISINTEGRATING ORAL EVERY 8 HOURS PRN
Status: DISCONTINUED | OUTPATIENT
Start: 2023-02-10 | End: 2023-02-19 | Stop reason: HOSPADM

## 2023-02-10 RX ORDER — ONDANSETRON 2 MG/ML
INJECTION INTRAMUSCULAR; INTRAVENOUS PRN
Status: DISCONTINUED | OUTPATIENT
Start: 2023-02-10 | End: 2023-02-10 | Stop reason: SDUPTHER

## 2023-02-10 RX ORDER — PROPOFOL 10 MG/ML
INJECTION, EMULSION INTRAVENOUS PRN
Status: DISCONTINUED | OUTPATIENT
Start: 2023-02-10 | End: 2023-02-10 | Stop reason: SDUPTHER

## 2023-02-10 RX ORDER — ACETAMINOPHEN 325 MG/1
650 TABLET ORAL EVERY 6 HOURS PRN
Status: DISCONTINUED | OUTPATIENT
Start: 2023-02-10 | End: 2023-02-19 | Stop reason: HOSPADM

## 2023-02-10 RX ORDER — ACETAMINOPHEN 650 MG/1
650 SUPPOSITORY RECTAL EVERY 6 HOURS PRN
Status: DISCONTINUED | OUTPATIENT
Start: 2023-02-10 | End: 2023-02-19 | Stop reason: HOSPADM

## 2023-02-10 RX ORDER — ONDANSETRON 2 MG/ML
4 INJECTION INTRAMUSCULAR; INTRAVENOUS EVERY 6 HOURS PRN
Status: DISCONTINUED | OUTPATIENT
Start: 2023-02-10 | End: 2023-02-19 | Stop reason: HOSPADM

## 2023-02-10 RX ORDER — MORPHINE SULFATE 2 MG/ML
2 INJECTION, SOLUTION INTRAMUSCULAR; INTRAVENOUS EVERY 4 HOURS PRN
Status: DISCONTINUED | OUTPATIENT
Start: 2023-02-10 | End: 2023-02-19 | Stop reason: HOSPADM

## 2023-02-10 RX ORDER — OXYCODONE HYDROCHLORIDE 5 MG/1
5 TABLET ORAL PRN
Status: DISCONTINUED | OUTPATIENT
Start: 2023-02-10 | End: 2023-02-10 | Stop reason: HOSPADM

## 2023-02-10 RX ORDER — DIPHENHYDRAMINE HYDROCHLORIDE 50 MG/ML
12.5 INJECTION INTRAMUSCULAR; INTRAVENOUS
Status: DISCONTINUED | OUTPATIENT
Start: 2023-02-10 | End: 2023-02-10 | Stop reason: HOSPADM

## 2023-02-10 RX ORDER — EPHEDRINE SULFATE/0.9% NACL/PF 50 MG/5 ML
SYRINGE (ML) INTRAVENOUS PRN
Status: DISCONTINUED | OUTPATIENT
Start: 2023-02-10 | End: 2023-02-10 | Stop reason: SDUPTHER

## 2023-02-10 RX ORDER — BUPIVACAINE HYDROCHLORIDE 5 MG/ML
INJECTION, SOLUTION EPIDURAL; INTRACAUDAL PRN
Status: DISCONTINUED | OUTPATIENT
Start: 2023-02-10 | End: 2023-02-10 | Stop reason: HOSPADM

## 2023-02-10 RX ORDER — GLYCOPYRROLATE 0.2 MG/ML
INJECTION INTRAMUSCULAR; INTRAVENOUS PRN
Status: DISCONTINUED | OUTPATIENT
Start: 2023-02-10 | End: 2023-02-10 | Stop reason: SDUPTHER

## 2023-02-10 RX ADMIN — SODIUM CHLORIDE, PRESERVATIVE FREE 40 MG: 5 INJECTION INTRAVENOUS at 10:56

## 2023-02-10 RX ADMIN — CEFTRIAXONE 1000 MG: 1 INJECTION, POWDER, FOR SOLUTION INTRAMUSCULAR; INTRAVENOUS at 00:44

## 2023-02-10 RX ADMIN — TUBERCULIN PURIFIED PROTEIN DERIVATIVE 5 UNITS: 5 INJECTION, SOLUTION INTRADERMAL at 10:57

## 2023-02-10 RX ADMIN — ROCURONIUM BROMIDE 10 MG: 50 INJECTION, SOLUTION INTRAVENOUS at 19:28

## 2023-02-10 RX ADMIN — GLYCOPYRROLATE 0.4 MG: 0.2 INJECTION INTRAMUSCULAR; INTRAVENOUS at 20:11

## 2023-02-10 RX ADMIN — FENTANYL CITRATE 25 MCG: 50 INJECTION, SOLUTION INTRAMUSCULAR; INTRAVENOUS at 20:05

## 2023-02-10 RX ADMIN — FENTANYL CITRATE 100 MCG: 50 INJECTION, SOLUTION INTRAMUSCULAR; INTRAVENOUS at 19:07

## 2023-02-10 RX ADMIN — ONDANSETRON 4 MG: 2 INJECTION INTRAMUSCULAR; INTRAVENOUS at 01:32

## 2023-02-10 RX ADMIN — Medication 100 MG: at 19:07

## 2023-02-10 RX ADMIN — MEMANTINE 10 MG: 5 TABLET ORAL at 11:22

## 2023-02-10 RX ADMIN — SODIUM CHLORIDE, PRESERVATIVE FREE 10 ML: 5 INJECTION INTRAVENOUS at 08:43

## 2023-02-10 RX ADMIN — CEFAZOLIN 2000 MG: 1 INJECTION, POWDER, FOR SOLUTION INTRAVENOUS at 19:22

## 2023-02-10 RX ADMIN — FENTANYL CITRATE 25 MCG: 50 INJECTION, SOLUTION INTRAMUSCULAR; INTRAVENOUS at 19:34

## 2023-02-10 RX ADMIN — DONEPEZIL HYDROCHLORIDE 5 MG: 5 TABLET, FILM COATED ORAL at 23:42

## 2023-02-10 RX ADMIN — Medication 3 MG: at 20:11

## 2023-02-10 RX ADMIN — SODIUM CHLORIDE, SODIUM LACTATE, POTASSIUM CHLORIDE, AND CALCIUM CHLORIDE: 600; 310; 30; 20 INJECTION, SOLUTION INTRAVENOUS at 18:53

## 2023-02-10 RX ADMIN — PROPOFOL 80 MG: 10 INJECTION, EMULSION INTRAVENOUS at 19:07

## 2023-02-10 RX ADMIN — Medication 10 MG: at 19:26

## 2023-02-10 RX ADMIN — ROCURONIUM BROMIDE 5 MG: 50 INJECTION, SOLUTION INTRAVENOUS at 19:07

## 2023-02-10 RX ADMIN — ONDANSETRON 4 MG: 2 INJECTION INTRAMUSCULAR; INTRAVENOUS at 19:41

## 2023-02-10 RX ADMIN — MEMANTINE 10 MG: 5 TABLET ORAL at 23:42

## 2023-02-10 RX ADMIN — AZITHROMYCIN MONOHYDRATE 500 MG: 500 INJECTION, POWDER, LYOPHILIZED, FOR SOLUTION INTRAVENOUS at 01:15

## 2023-02-10 RX ADMIN — SODIUM CHLORIDE: 9 INJECTION, SOLUTION INTRAVENOUS at 13:53

## 2023-02-10 RX ADMIN — SODIUM CHLORIDE, PRESERVATIVE FREE 10 ML: 5 INJECTION INTRAVENOUS at 22:25

## 2023-02-10 RX ADMIN — LIDOCAINE HYDROCHLORIDE 100 MG: 20 INJECTION, SOLUTION EPIDURAL; INFILTRATION; INTRACAUDAL; PERINEURAL at 19:07

## 2023-02-10 ASSESSMENT — PAIN DESCRIPTION - LOCATION: LOCATION: ABDOMEN

## 2023-02-10 ASSESSMENT — PAIN SCALES - GENERAL
PAINLEVEL_OUTOF10: 2
PAINLEVEL_OUTOF10: 0

## 2023-02-10 ASSESSMENT — PAIN - FUNCTIONAL ASSESSMENT: PAIN_FUNCTIONAL_ASSESSMENT: 0-10

## 2023-02-10 NOTE — CARE COORDINATION
Hospitalist History and Physical   Admit Date:     No admission date for patient encounter. Name:              Teodoro Obregon   Age:                 80 y.o. Sex:                 female  :               1940   MRN:               [id number]   Room:              362/01     Presenting Complaint: No chief complaint on file. Reason(s) for Admission: SBO (small bowel obstruction) ([facility] [K56.609]      History of Present Illness:   Teodoro Obregon is a 80 y.o. female with medical history of hypertension, hyperlipidemia and dementia who presented with abdominal pain and nausea and vomiting which started about 24 hours prior to presentation. Patient is accompanied by her daughter at the bedside and states that patient was weak, symptoms started abruptly last night. No history of abdominal surgeries. Patient is a poor historian daughter is able to contribute to the history. No fevers or chills, bright red blood per rectum or hematochezia. The emesis was greenish tinged but not black. By the time I examined her in the ER patient vomited about 20 minutes prior as well. Vital signs are stable no fever. Sodium is low at 132 and chloride is 95. CBC is unremarkable. CT scan shows SBO with possible right lower quadrant transition point. Case was discussed with surgeon Dr. Adriana Hubbard.  Patient is currently comfortable. Intermittent vomiting persistent as well. Patient is full code     Assessment & Plan:      Principal Problem:    SBO (small bowel obstruction) (HCC)    Hyponatremia  Plan: N.p.o., possible transition point of the right lower quadrant. Patient has no abdominal pain however and abdomen is nonsurgical or acute. However nausea and vomiting is persistent. We will place an NG tube if she will tolerate. Conservative management and consult surgery if not improving. Active Problems:    Breast cancer, right breast ([facility]  Plan: Stable    Mixed hyperlipidemia  Plan: Stable as well. Anticipated discharge needs:   DVT treatment     Diet: Diet NPO  VTE ppx: Routine  Code status: Full Code     Hospital Problems:  Principal Problem:    SBO (small bowel obstruction) (HCC)  Active Problems:    Breast cancer, right breast ([address]    Mixed hyperlipidemia     02/10/23 1001   Service Assessment   Patient Orientation Alert and Oriented   Cognition Alert   History Provided By Patient   Primary Caregiver Self   Accompanied By/Relationship dtr/Milka #[phone]   Support Systems Children;Friends/Neighbors   Patient's Healthcare Decision Maker is: Named in [address] [place] [address]   PCP Verified by CM Yes   Prior Functional Level Independent in ADLs/IADLs   Current Functional Level Independent in ADLs/IADLs   Can patient return to prior living arrangement Yes   Ability to make needs known: Good   Family able to assist with home care needs: Yes   Would you like for me to discuss the discharge plan with any other family members/significant others, and if so, who? 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 She is inde, but has a cleaning lady 3 days/week, also a neighbor that comes over when needed to help her. Suze Pinedo/friend & neighbor takes pt to doctor, she uses a delivery service for groceries. Currently on 2L NC, but not at home. [facility] [address] [facility]. She denies any needs & per MD pt may be medically ready for d/c on 2/14. PT/OT /PPD ordered. CM to follow.

## 2023-02-10 NOTE — ANESTHESIA PRE PROCEDURE
Department of Anesthesiology  Preprocedure Note       Name:  Ivelisse Wagner   Age:  80 y.o.  :  1940                                          MRN:  [id number]         Date:  2/10/2023      Surgeon: Shalini Lopez):  Varinder Hoffman MD    Procedure: Procedure(s):  LAPAROSCOPY DIAGNOSTIC/ POSS LAPAROTOMY    Medications prior to admission:   Prior to Admission medications    Medication Sig Start Date End Date Taking? Authorizing Provider   donepezil (ARICEPT) 5 MG tablet Take 1 tablet by mouth nightly 22   Karlynn Lanes, APRN - CNP   memantine ER (NAMENDA XR) 28 MG CP24 extended release capsule Take 1 capsule by mouth daily 22   Karlynn Lanes, APRN - CNP   Vitamin D, Cholecalciferol, 50 MCG (2000 UT) CAPS Take 1 capsule by mouth daily 22   Karlynn Lanes, APRN - CNP   Menthol, Topical Analgesic, (BIOFREEZE ROLL-ON EX) Rub a thin film over affected areas not more than four times a day on shoulders and low back  Patient not taking: Reported on 2/10/2023    Historical Provider, MD   diclofenac sodium (VOLTAREN) 1 % GEL Apply thin layer to affected area as needed for low back  Patient not taking: Reported on 2/10/2023    Historical Provider, MD   acetaminophen (TYLENOL) 650 MG extended release tablet Take 1,300 mg by mouth nightly as needed for Pain.     Historical Provider, MD   ergocalciferol (ERGOCALCIFEROL) 1.25 MG (74127 UT) capsule Take 50,000 Units by mouth every 7 days  Patient not taking: No sig reported 3/3/22 6/4/22  Ar Automatic Reconciliation       Current medications:    Current Facility-Administered Medications   Medication Dose Route Frequency Provider Last Rate Last Admin    donepezil (ARICEPT) tablet 5 mg  5 mg Oral Nightly Tere Comer MD        memantine [facility]) tablet 10 mg  10 mg Oral BID Tere Comer MD   10 mg at 02/10/23 1122    Vitamin D (CHOLECALCIFEROL) tablet 2,000 Units  2,000 Units Oral Daily Tere Comer MD        sodium chloride flush 0.9 % injection 5-40 mL  5-40 mL IntraVENous 2 times per day Pat Flores MD   10 mL at 02/10/23 0843    sodium chloride flush 0.9 % injection 5-40 mL  5-40 mL IntraVENous PRN Pat Flores MD        0.9 % sodium chloride infusion   IntraVENous PRN MD Rufino Jung Maris [Held by provider] enoxaparin (LOVENOX) injection 40 mg  40 mg SubCUTAneous Daily Pat Flores MD        ondansetron (ZOFRAN-ODT) disintegrating tablet 4 mg  4 mg Oral Q8H PRN Pat Flores MD        Or    ondansetron [facility]) injection 4 mg  4 mg IntraVENous Q6H PRN Pat Flores MD        polyethylene glycol (GLYCOLAX) packet 17 g  17 g Oral Daily PRN Pat Flores MD        acetaminophen (TYLENOL) tablet 650 mg  650 mg Oral Q6H PRN Pat Flores MD        Or    acetaminophen (TYLENOL) suppository 650 mg  650 mg Rectal Q6H PRN Pat Flores MD        0.9 % sodium chloride infusion   IntraVENous Continuous Pat Flores  mL/hr at 02/10/23 1353 New Bag at 02/10/23 1353    morphine injection 2 mg  2 mg IntraVENous Q4H PRN Pat Flores MD        pantoprazole (PROTONIX) 40 mg in sodium chloride (PF) 0.9 % 10 mL injection  40 mg IntraVENous Daily SIRENA Machado - NP   40 mg at 02/10/23 1056    tuberculin injection 5 Units  5 Units IntraDERmal Once [employer], DO   5 Units at 02/10/23 1057       Allergies: Allergies   Allergen Reactions    Ciprofloxacin Other (See Comments)     Elevated BP    Gluten Meal Other (See Comments)    Hydromorphone Other (See Comments)     hallucinations  hallucinations      Hydroxychloroquine Other (See Comments)     Pt could stand up or get up. Eyes got really big but pt could not see ? Pt states that it was 2-3 days before she got her senses back.     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    Arthritis of left hip M16.12    Low serum vitamin D R79.89    Breast cancer, right breast ([facility] C50.911    Osteoarthritis of both shoulders M19.011, M19.012    Malignant neoplasm of lower-outer quadrant of right breast of female, estrogen receptor positive (HCC) C50.511, Z17.0    Osteoarthritis of left knee M17.12    Rheumatoid arthritis involving multiple sites with positive rheumatoid factor (HCC) M05.79    Low back pain M54.50    Total knee replacement status Z96.659    Hormone receptor positive breast cancer, unspecified laterality ([facility]) C50.919    Abnormal breast exam N64.59    Alzheimer's disease, unspecified (CODE) ([facility] G30.9    Mixed hyperlipidemia E78.2    S/P total hip arthroplasty Z96.649    Anemia, unspecified D64.9    Osteoarthritis of right knee M17.11    S[facility] obstruction) ([facility] K56.609       Past Medical History:        Diagnosis Date    Anemia, unspecified     patient denies hx of anemia    Arthritis     osteoarthritis    Breast cancer ([facility] 2019    Cancer ([facility] 07[facility]2013    right breast lumpectomy    Chronic pain     d/t rheumatoid arthritis    HTN (hypertension) 03/18/2012    patient states not a current problem, no medication.  Hypercholesterolemia     no meds    Ill-defined condition     \"I can't take anything strong\"    Menopause     Nausea & vomiting     patient denies post-op N/V    Osteoarthritis of both shoulders     Osteoarthritis of right hip     Followed by Dr. Pressley December Rheumatoid arthritis [facility])     Patient does not see Rheumatologist; no prescription medication.      Total knee replacement status 3/15/2012    Unspecified adverse effect of anesthesia     headache after general anesthesia x 1    Vitamin D deficiency disease     pt unaware of issue,diagnosis doc prior to 3/9/12; managed with daily vitamin D supplement. Past Surgical History:        Procedure Laterality Date    APPENDECTOMY      with hysterectomy    BREAST LUMPECTOMY  7/31/2013    BREAST NEEDLE LOCALIZATION WITH MASS EXCISION AND SENTINAL NODE BIOPSY performed by Sade Peoples MD at [address] AND [facility]TERUS      HYSTERECTOMY (CERVIX STATUS UNKNOWN)  1987    MASTECTOMY Right 3/27/2019    RIGHT BREAST MASTECTOMY SIMPLE performed by Trice Rajput MD at [address] Right 2016    TOTAL HIP ARTHROPLASTY Left 2018    TOTAL KNEE ARTHROPLASTY Left 8/2010    left    TOTAL KNEE ARTHROPLASTY Right 3/2012    right    US BREAST BIOPSY W LOC DEVICE 1ST LESION RIGHT Right 2/26/2019    US BREAST NEEDLE BIOPSY RIGHT 2/26/2019 [facility] RADIOLOGY MAMMO       Social History:    Social History     Tobacco Use    Smoking status: Never    Smokeless tobacco: Never   Substance Use Topics    Alcohol use: No                                Counseling given: Not Answered      Vital Signs (Current):   Vitals:    02/10/23 0742 02/10/23 0830 02/10/23 1118 02/10/23 1517   BP: (!) 135/47  (!) 153/84 (!) 152/55   Pulse: 77  86 80   Resp: 18  18 18   Temp: 97.7 °F (36.5 °C)  98.2 °F (36.8 °C) 98.6 °F (37 °C)   TempSrc: Oral  Oral Oral   SpO2: 100%  97% 91%   Weight:  143 lb 1.6 oz (64.9 kg)     Height:  5' 6\" (1.676 m)                                                BP Readings from Last 3 Encounters:   02/10/23 (!) 152/55   02/10/23 (!) 160/49   12/01/22 (!) 140/72       NPO Status:                                                                                 BMI:   Wt Readings from Last 3 Encounters:   02/10/23 143 lb 1.6 oz (64.9 kg)   02/09/23 140 lb (63.5 kg)   12/01/22 144 lb (65.3 kg)     Body mass index is 23.1 kg/m².     CBC:   Lab Results   Component Value Date/Time    WBC 10.5 02/10/2023 10:51 AM    RBC 4.90 02/10/2023 10:51 AM    HGB 14.6 02/10/2023 10:51 AM    HCT 44.1 02/10/2023 10:51 AM    MCV 90.0 02/10/2023 10:51 AM    RDW 13.1 02/10/2023 10:51 AM     02/10/2023 10:51 AM       CMP:   Lab Results   Component Value Date/Time     02/10/2023 10:51 AM    K 4.0 02/10/2023 10:51 AM     02/10/2023 10:51 AM    CO2 26 02/10/2023 10:51 AM    BUN 9 02/10/2023 10:51 AM    CREATININE 0.68 02/10/2023 10:51 AM    GFRAA >60 06/02/2022 12:30 PM    AGRATIO 1.6 02/17/2022 11:03 AM    LABGLOM >60 02/10/2023 10:51 AM    GLUCOSE 97 02/10/2023 10:51 AM    PROT 6.9 02/10/2023 10:51 AM    CALCIUM 9.0 02/10/2023 10:51 AM    BILITOT 0.6 02/10/2023 10:51 AM    ALKPHOS 72 02/10/2023 10:51 AM    ALKPHOS 93 02/17/2022 11:03 AM    AST 37 02/10/2023 10:51 AM    ALT 41 02/10/2023 10:51 AM       POC Tests:   Recent Labs     02/09/23 2133   POCGLU 115*   POCNA 135*   POCK 3.6   POCCL 98   POCBUN 13       Coags: No results found for: PROTIME, INR, APTT    HCG (If Applicable): No results found for: PREGTESTUR, PREGSERUM, HCG, HCGQUANT     ABGs: No results found for: PHART, PO2ART, LSR6RSI, TMU4NXI, BEART, K9VDLMWC     Type & Screen (If Applicable):  No results found for: LABABO, LABRH    Drug/Infectious Status (If Applicable):  No results found for: HIV, HEPCAB    COVID-19 Screening (If Applicable): No results found for: COVID19        Anesthesia Evaluation  Patient summary reviewed and Nursing notes reviewed no history of anesthetic complications:   Airway: Mallampati: III  TM distance: >3 FB   Neck ROM: full  Mouth opening: < 3 FB   Dental:    (+) poor dentition  Comment: multiple missing teeth    Pulmonary:Negative Pulmonary ROS and normal exam                               Cardiovascular:  Exercise tolerance: good (>4 METS),   (+) hypertension:, hyperlipidemia                  Neuro/Psych:   (+) dementia            GI/Hepatic/Renal:            ROS comment: SBO.    Endo/Other:    (+) : arthritis:., malignancy/cancer. Abdominal:             Vascular: negative vascular ROS. Other Findings: ngt right nare    Oriented to person//surgery          Anesthesia Plan      general     ASA 3       Induction: intravenous. Anesthetic plan and risks discussed with patient (grandson).                         Zoya Liu MD   2/10/2023

## 2023-02-10 NOTE — PROGRESS NOTES
80 y.o. female with medical history of hypertension, hyperlipidemia and dementia admitted after midnight for SBO. Seen by surgery this am.  Waiting on NGT placement. I have seen the patient and reviewed the chart. I agree with current plan.

## 2023-02-10 NOTE — ED NOTES
TRANSFER - OUT REPORT:    Verbal report given to YUE Oates on Navarro Higdon  being transferred to [facility] for routine progression of patient care       Report consisted of patient's Situation, Background, Assessment and   Recommendations(SBAR). Information from the following report(s) Nurse Handoff Report, ED SBAR, Intake/Output, MAR, Recent Results and Event Log was reviewed with the receiving nurse. [place] Assessment: Presents to emergency department  because of falls (Syncope, seizure, or loss of consciousness): No, Age > 79: Yes, Altered Mental Status, Intoxication with alcohol or substance confusion (Disorientation, impaired judgment, poor safety awaremess, or inability to follow instructions): No, Impaired Mobility: Ambulates or transfers with assistive devices or assistance; Unable to ambulate or transer.: Yes, Nursing Judgement: No  Lines:   Peripheral IV Proximal;Right Forearm (Active)        Opportunity for questions and clarification was provided.       Patient transported with:  EMS transport         Baudilio De Paz RN  02/10/23 0300

## 2023-02-10 NOTE — CONSULTS
General Surgery Consult    Teodoro Obregon  MRN: [id number]  DHS:[birth year]  Age:82 y.o. Chief Complaint: Nausea with Vomiting    HPI: Teodoro Obregon is a 80 y.o. female who we are asked by Hospitalist MD  to see for Abdominal Pain. Patient has medical history of  hypertension, Rheumatoid arthritis, hyperlipidemia and dementia. Patient presented to ED  2/9/2023 at [facility] with complaints of abdominal pain with  nausea and vomiting, which started 1 day ago. Patient was initially seen at [facility] ER then transferred to [facility] for admission. Patient reports emesis (dark hematemesis) and she has had dark tarry stools the past few days. She reports abdominal pain has since resolved with medication given at ER but reports nausea continues. Patient denies any chest pain, dyspnea, fevers, chills,cough,  dysuria or hematuria. Patient currently has order for placement of NG tube as ordered per admitting team .        Patient  reports she has been having intervals of constipation x several weeks and  has not taken any medications for relief. Patient reports she has had poor appetite the past few days. Patient reports she is not on any blood thinners and has no prior history of GI Bleed/ulcer. Patient reports past surgical history of appendectomy, cholecystectomy, hysterectomy, right breast mastectomy , bilateral total hip arthroplasty and bilateral knee arthroplasty. The history was provided by patient and her daughter who is at bedside at this the time of exam.       ER workup includes:  CT Abdomen/Pelvis 2/9/2023  EXAMINATION:  CT ABDOMEN PELVIS without and with contrast GI BLEED     DATE: 2/9/2023 7:00 PM     INDICATION: Hematemesis, dark tarry stools ;     COMPARISON: None.      PROCEDURE:  CT of the abdomen and pelvis was performed prior to and following   the intravenous administration of iodinated contrast material.  CT dose lowering   techniques were used, to include: automated exposure control, adjustment for   patient size, and or use of iterative reconstruction. Arterial and venous phase postcontrast imaging. FINDINGS:     Visualized lower chest: Minor nodular groundglass opacities in the right middle   lobe. 4 mm subpleural nodule right lower lobe, series 2 image seven, likely a   fissural lymph node. Moderate coronary artery atherosclerotic calcifications. ABDOMEN:     Liver and Biliary system:  No suspicious liver lesion. No biliary ductal   dilation. Gallbladder:  Cholecystectomy. Spleen:  Normal.     Pancreas:  Normal.     Adrenal glands:  4 cm myelolipoma left adrenal gland, benign lesion. .     Kidneys and ureters:  Kidneys enhance symmetrically. No suspicious mass. No   hydronephrosis. Aorta/IVC:  Moderate atherosclerotic calcifications. No aortic aneurysm or   dissection. IVC normal.     Lymph nodes:  No lymphadenopathy. Gastrointestinal tract:  Stomach and proximal small bowel distended with fluid. The proximal small bowel is mildly dilated. There is transition zone in the   right lower quadrant with suggestion of two transition points/closed loop. Distal small bowel decompressed. Appendix not visualized; no pericecal   inflammation. . No colonic wall thickening or dilation. Diverticulosis sigmoid   colon. Peritoneum/Mesentery: No pneumoperitoneum. No ascites. Abdominal wall: Unremarkable. PELVIS:     Urinary bladder: Largely obscured by artifact. Reproductive organs: Uterus not visualized     Lymph Nodes: No pelvic lymphadenopathy. BONES:  Bilateral hip arthroplasties with associated artifact limiting   evaluation the lower pelvis. Degenerative changes in the spine. .               Impression        1. Small bowel obstruction with transition zone in the right lower quadrant. Configuration raises possibility of a closed loop obstruction. Recommend   surgical consultation.   2.  Minimal right middle lobe pulmonary infiltrate, possible aspiration and/or   early pneumonia. 3.  No evidence of active extravasation. 4.  Chronic findings as above. Labs done 2/9/2023 include WBC 9.2 Hgb 16.2  K+ 4.1  Cr 0.90  Lactic acid 1.4 then repeat 0.8 ALT 33 AST 20 Total bili 0.6       Past Medical History:   Diagnosis Date    Anemia, unspecified     patient denies hx of anemia    Arthritis     osteoarthritis    Breast cancer ([facility] 2019    Cancer ([facility] 07/2013    right breast lumpectomy    Chronic pain     d/t rheumatoid arthritis    HTN (hypertension) 03/18/2012    patient states not a current problem, no medication. Hypercholesterolemia     no meds    Ill-defined condition     \"I can't take anything strong\"    Menopause     Nausea & vomiting     patient denies post-op N/V    Osteoarthritis of both shoulders     Osteoarthritis of right hip     Followed by Dr. Norman Armstrong     Poor historian     Rheumatoid arthritis [facility])     Patient does not see Rheumatologist; no prescription medication. Total knee replacement status 3/15/2012    Unspecified adverse effect of anesthesia     headache after general anesthesia x 1    Vitamin D deficiency disease     pt unaware of issue,diagnosis doc prior to 3/9/12; managed with daily vitamin D supplement.       Past Surgical History:   Procedure Laterality Date    APPENDECTOMY      with hysterectomy    BREAST LUMPECTOMY  7/31/2013    BREAST NEEDLE LOCALIZATION WITH MASS EXCISION AND SENTINAL NODE BIOPSY performed by Apryl Roa MD at [address] [place] [address] (CERVIX STATUS UNKNOWN)  1987    MASTECTOMY Right 3/27/2019    RIGHT BREAST MASTECTOMY SIMPLE performed by Louie Vicente MD at [place] [address] Right 2016    TOTAL HIP ARTHROPLASTY Left 2018    TOTAL KNEE ARTHROPLASTY Left 8/2010    left    TOTAL KNEE ARTHROPLASTY Right 3/2012    right    US BREAST BIOPSY W LOC DEVICE 1ST LESION RIGHT Right 2/26/2019    US BREAST NEEDLE BIOPSY RIGHT 2/26/2019 E RADIOLOGY MAMMO     Current Facility-Administered Medications   Medication Dose Route Frequency    donepezil (ARICEPT) tablet 5 mg  5 mg Oral Nightly    memantine (NAMENDA) tablet 10 mg  10 mg Oral BID    Vitamin D (CHOLECALCIFEROL) tablet 2,000 Units  2,000 Units Oral Daily    sodium chloride flush 0.9 % injection 5-40 mL  5-40 mL IntraVENous 2 times per day    sodium chloride flush 0.9 % injection 5-40 mL  5-40 mL IntraVENous PRN    0.9 % sodium chloride infusion   IntraVENous PRN    enoxaparin (LOVENOX) injection 40 mg  40 mg SubCUTAneous Daily    ondansetron (ZOFRAN-ODT) disintegrating tablet 4 mg  4 mg Oral Q8H PRN    Or    ondansetron (ZOFRAN) injection 4 mg  4 mg IntraVENous Q6H PRN    polyethylene glycol (GLYCOLAX) packet 17 g  17 g Oral Daily PRN    acetaminophen (TYLENOL) tablet 650 mg  650 mg Oral Q6H PRN    Or    acetaminophen (TYLENOL) suppository 650 mg  650 mg Rectal Q6H PRN    0.9 % sodium chloride infusion   IntraVENous Continuous    morphine injection 2 mg  2 mg IntraVENous Q4H PRN       Allergies:   Ciprofloxacin, Gluten meal, Hydromorphone, Hydroxychloroquine, Ketoconazole, Meperidine, Prednisone, Yellow dye, and Codeine      Social History     Socioeconomic History    Marital status:    Tobacco Use    Smoking status: Never    Smokeless tobacco: Never   Substance and Sexual Activity    Alcohol use: No    Drug use: No     Social Determinants of Health     Physical Activity: Insufficiently Active    Days of Exercise per Week: 7 days    Minutes of Exercise per Session: 10 min         Family History   Problem Relation Age of Onset    Hypertension Mother     No Known Problems Father     Osteoarthritis Mother     Breast Cancer Neg Hx     Other Other         family hx of HTN     Review of Systems:  HENT: Negative. Eyes: Negative. Respiratory: Negative. Cardiovascular: Negative. Gastrointestinal:  Positive for blood in stool, nausea and vomiting. Genitourinary: Negative. Musculoskeletal: Negative. Skin: Negative. Neurological: Negative. Psychiatric/Behavioral: Negative. Comprehensive review of systems was otherwise unremarkable except as noted above. Objective:   Physical Exam:   BP (!) 135/47   Pulse 77   Temp 97.7 °F (36.5 °C) (Oral)   Resp 18   Ht 5' 6\" (1.676 m)   Wt 143 lb 1.6 oz (64.9 kg)   SpO2 100%   BMI 23.10 kg/m²         Recent vitals (if inpt):  Patient Vitals for the past 24 hrs:   BP Temp Temp src Pulse Resp SpO2 Height Weight   02/10/23 0830 -- -- -- -- -- -- 5' 6\" (1.676 m) 143 lb 1.6 oz (64.9 kg)   02/10/23 0742 (!) 135/47 97.7 °F (36.5 °C) Oral 77 18 100 % -- --   02/10/23 0632 (!) 132/48 98.1 °F (36.7 °C) Oral 85 17 99 % -- --     General:          Well nourished. Normal appearance, She is not in acute distress. Head:               Normocephalic, atraumatic  Eyes:               Sclerae appear normal.  Pupils equally round. ENT:                Nares appear normal.  Moist oral mucosa. Poor dentition noted. No rhinorrhea. Neck:               No restricted ROM. Trachea midline   CV:                  RRR. No m/r/g. No jugular venous distension. Lungs:             CTAB. No wheezing, rhonchi, or rales. Symmetric expansion. Abdomen:        Soft, non-tender,  no rebound or guarding., Hypoactive bowel sounds, No peritoneal signs. No organomegaly. Well healed surgical scars. No masses or hernias appreciated. Extremities:     No cyanosis or clubbing. No edema. Patient with hx rheumatoid arthritis and noted to upper extremity digits. Skin:                No rashes and normal coloration. Warm and dry. Neuro:             CN II-XII grossly intact. Sensation intact. Psych:             Normal mood and affect.  Mental status baseline     Labs:  Recent Labs     02/09/23  1904   WBC 9.2   HGB 16.2*      *   K 4.1   CL 95*   CO2 27   BUN 15   ALT 33         I reviewed recent labs and recent radiologic studies. Admission date (for inpatients): 2/10/2023   * No surgery date entered *  Procedure(s):  LAPAROSCOPY DIAGNOSTIC/ POSS LAPAROTOMY    ASSESSMENT:  [unfilled]  Principal Problem:    SBO (small bowel obstruction) (HCC)  Active Problems:    Breast cancer, right breast (HCC)    Mixed hyperlipidemia  Resolved Problems:    * No resolved hospital problems. *       PLAN:  -Further input per attending surgeon, Carlo Castro  -NPO  -IVF's  -Admitting team for meds/labs/etc  -NG tube placement to LIWS  -Trend labs  -Add Protonix to med regimen    Signed:  SIRENA Shay - NP      Addendum  She has a history of a remote total abdominal hysterectomy by her report. She has multiple medical problems including hypertension, arthritis, hyperlipidemia, and dementia. Her history is somewhat limited and her daughter is able to fill in additional.  She complains of nausea vomiting that started several days ago. She has had difficulty keeping things down. She has developed abdominal pain within the last 24 hours. They were concerned because she started throwing up blood by her daughter's report. She was taken to the emergency room and underwent CT scan evaluation where the CT scan shows a bowel obstruction with a possible closed loop obstruction. She denies any recent flatus. She is not sure of her last bowel movement. She has had decreased appetite over the last several days. She says she currently feels better since admission but still has some nausea. Vital signs stable  Afebrile  Regular rate and rhythm  Aerating well  Abdomen soft, nondistended, minimal tenderness  Assessment and plan  Multiple medical problems and history of prior abdominal surgery. CT scan concerning for bowel obstruction and cannot rule out a closed-loop obstruction. I reviewed with the patient and her daughter.   With the symptoms and the CT findings I recommended diagnostic laparoscopy, possible laparotomy, possible bowel resection, possible lysis of adhesions due to the concern for possible closed-loop obstruction. History and exam is a little difficult with the patient's dementia. I reviewed the surgery and risks including bleeding, infection, damage to nerves/vessels/organs, recurrence, need for additional procedures.

## 2023-02-10 NOTE — PROGRESS NOTES
Previous POA Ari Letterman whose POA was revoked in Nov 2022 called inquiring about the patients medication information and status. No information was given to [facility]. Admitting was called to mary pts chart as private/confidential encounter.

## 2023-02-10 NOTE — PROGRESS NOTES
ACUTE OCCUPATIONAL THERAPY GOALS:   (Developed with and agreed upon by patient and/or caregiver.)  1. Patient will perform grooming with SPV and adaptive equipment as needed. 2. Patient will perform upper body dressing with MIN A and adaptive equipment as needed. 3. Patient will perform lower body dressing with MIN A and adaptive equipment as needed. 4. Patient will perform bathing with MIN A and adaptive equipment as needed. 5. Patient will perform toileting and toilet transfer with CGA and adaptive equipment as needed. 6. Patient will perform ADL functional mobility and tranfers in room with SPV and adaptive equipment as needed. 7. Patient/family to demonstrate knowledge of home safety and DME recommendations. Timeframe: 7 visits     OCCUPATIONAL THERAPY Initial Assessment, Daily Note, and PM       OT Visit Days: 1  Acknowledge Orders  Time  OT Charge Capture  Rehab Caseload Tracker      Robi Harper is a 80 y.o. female   PRIMARY DIAGNOSIS: SBO (small bowel obstruction) (HCC)  SBO (small bowel obstruction) (HCC) [K56.609]  Procedure(s) (LRB):  LAPAROSCOPY DIAGNOSTIC/ POSS LAPAROTOMY (N/A)  Day of Surgery  Reason for Referral: Generalized Muscle Weakness (M62.81)  Other lack of cordination (R27.8)  Difficulty in walking, Not elsewhere classified (R26.2)  Inpatient: Payor: Yonathan Castanon / Plan: Sridhar Velazco / Product Type: *No Product type* /     ASSESSMENT:     REHAB RECOMMENDATIONS:   Recommendation to date pending progress:  Setting:  Home Health Therapy    Equipment:    To Be Determined     ASSESSMENT:  Ms. Herrera Rosado is admitted for the above diagnoses and presents with overall deficits in strength, functional mobility and ADL performance. Of note, patient also with diagnoses of dementia, breast cancer and arthritis (particularly in bilateral hands rendering them almost non-functional). At baseline, she lives alone in a 1 level home with 2 JACKY and rails.  Questionable historian, but she reports requiring assistance with dressing and bathing (from a hired caregiver and a friend/neighbor) and assist w meal prep and some grooming tasks. She is independent with mobility and with toileting. Today, she reports feeling bad and tired. She was able to perform bed mobility and household mobility around room but declined further ADLs. Presents with ongoing discomfort. She was left in supine with all needs met and in reach. Pt is functioning below their baseline and will benefit from skilled OT during hospital stay. Anticipate pt will benefit from home health services and resumption of hired care and friends upon discharge. [address] [facility] [address] [address] AM-PAC 6 Clicks Daily Activity Inpatient Short Form:    AM-PAC Daily Activity - Inpatient   How much help is needed for putting on and taking off regular lower body clothing?: A Lot  How much help is needed for bathing (which includes washing, rinsing, drying)?: A Lot  How much help is needed for toileting (which includes using toilet, bedpan, or urinal)?: A Little  How much help is needed for putting on and taking off regular upper body clothing?: A Little  How much help is needed for taking care of personal grooming?: A Little  How much help for eating meals?: A Little  AM-[facility] Inpatient Daily Activity Raw Score: 16  AM-PAC Inpatient ADL T-Scale Score : 35.96  ADL Inpatient CMS 0-100% Score: 53.32  ADL Inpatient CMS G-Code Modifier : CK           SUBJECTIVE:     Ms. NARAN[facility] states, \"I get help with dressing every morning\"     Social/Functional Lives With: Alone  Type of Home: House  Home Layout: One level  Home Access: Stairs to enter with rails  Entrance Stairs - Number of Steps: 2  Bathroom Shower/Tub: Walk-in shower, Shower chair with back  Bathroom Toilet: Standard  Bathroom Equipment: Shower chair  Receives Help From: Family, Friend(s), Neighbor, Personal care attendant  ADL Assistance: Needs assistance  Bath: Moderate assistance  Dressing:  Moderate assistance  Grooming: Minimal assistance  Feeding: Modified independent   Toileting: Independent  Homemaking Assistance: Needs assistance  Ambulation Assistance: Independent  Active : No  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    OBJECTIVE:     Lauren Camejodles / Yane Piper / AIRWAY: Nasogastric Tube    RESTRICTIONS/PRECAUTIONS:       PAIN: Virgle Soni / O2:   Pre Treatment:          Post Treatment: did not rate pain       Vitals          Oxygen            GROSS EVALUATION: INTACT IMPAIRED   (See Comments)   UE AROM [] []Almost no ROM in bilateral hands and digits   UE PROM [] []   Strength []  Generalized weakness     Posture / Balance []  Good sitting and standing balance   Sensation [x]     Coordination [x]       Tone [x]       Edema []    Activity Tolerance []  Decreased from baseline     Hand Dominance R [] L []      COGNITION/  PERCEPTION: INTACT IMPAIRED   (See Comments)   Orientation [x]     Vision [x]     Hearing [x]     Cognition  []  Hx of dementia   Perception []       MOBILITY: I Mod I S SBA CGA Min Mod Max Total  NT x2 Comments:   Bed Mobility    Rolling [] [] [] [] [] [] [] [] [] [] []    Supine to Sit [] [] [] [x] [] [] [] [] [] [] []    Scooting [] [] [] [x] [] [] [] [] [] [] []    Sit to Supine [] [] [] [x] [] [] [] [] [] [] []    Transfers    Sit to Stand [] [] [] [] [x] [] [] [] [] [] []    Bed to Chair [] [] [] [] [x] [] [] [] [] [] []    Stand to Sit [] [] [] [] [x] [] [] [] [] [] []    Tub/Shower [] [] [] [] [] [] [] [] [] [] []     Toilet [] [] [] [] [] [] [] [] [] [] []      [] [] [] [] [] [] [] [] [] [] []    I=Independent, Mod I=Modified Independent, S=Supervision/Setup, SBA=Standby Assistance, CGA=Contact Guard Assistance, Min=Minimal Assistance, Mod=Moderate Assistance, Max=Maximal Assistance, Total=Total Assistance, NT=Not Tested    ACTIVITIES OF DAILY LIVING: I Mod I S SBA CGA Min Mod Max Total NT Comments   BASIC ADLs:              Upper Body Bathing  [] [] [] [] [] [] [] [] [] []    Lower Body Bathing [] [] [] [] [] [] [] [] [] []    Toileting [] [] [] [] [] [] [] [] [] []    Upper Body Dressing [] [] [] [] [] [] [] [] [] []    Lower Body Dressing [] [] [] [] [] [] [] [] [] []    Feeding [] [] [] [] [] [] [] [] [] []    Grooming [] [] [] [] [] [] [] [] [] []    Personal Device Care [] [] [] [] [] [] [] [] [] []    Functional Mobility [] [] [] [] [x] [] [] [] [] []    I=Independent, Mod I=Modified Independent, S=Supervision/Setup, SBA=Standby Assistance, CGA=Contact Guard Assistance, Min=Minimal Assistance, Mod=Moderate Assistance, Max=Maximal Assistance, Total=Total Assistance, NT=Not Tested    PLAN:   FREQUENCY/DURATION   OT Plan of Care: 3 times/week for duration of hospital stay or until stated goals are met, whichever comes first.    PROBLEM LIST:   (Skilled intervention is medically necessary to address:)  Decreased ADL/Functional Activities  Decreased Activity Tolerance  Decreased Coordination  Decreased Gait Ability  Decreased Safety Awareness  Decreased Transfer Abilities  Increased Pain   INTERVENTIONS PLANNED:  (Benefits and precautions of occupational therapy have been discussed with the patient.)  Self Care Training  Therapeutic Activity  Therapeutic Exercise/HEP  Neuromuscular Re-education  Manual Therapy  Education         TREATMENT:     EVALUATION: LOW COMPLEXITY: (Untimed Charge)    TREATMENT:   Therapeutic Activity (10 Minutes): Patient participated in therapeutic activities including bed mobility training, functional transfer training, and functional mobility of household distances with minimal assistance and education in order to increase independence, increase safety awareness, increase activity tolerance, and prepare for discharge home.      TREATMENT GRID:  N/A    AFTER TREATMENT PRECAUTIONS: Alarm Activated, Bed, Bed/Chair Locked, Call light within reach, Needs within reach, RN notified, and Visitors at bedside    INTERDISCIPLINARY COLLABORATION:  RN/ PCT and PT/ PTA    EDUCATION:  Education Given To: Patient; Family  Education Provided: Role of Therapy;Plan of Care;Precautions; ADL Adaptive Strategies;Transfer Training;Energy Conservation; Fall Prevention Strategies  Education Method: Demonstration;Verbal  Barriers to Learning: None  Education Outcome: Verbalized understanding;Demonstrated understanding;Continued education needed    TOTAL TREATMENT DURATION AND TIME:  Time In: 1200  Time Out: [address]  Minutes: [address] [place], [place]

## 2023-02-10 NOTE — PROGRESS NOTES
ACUTE PHYSICAL THERAPY GOALS:   (Developed with and agreed upon by patient and/or caregiver.)    LTG:  (1.)Ms. Tatum will move from supine to sit and sit to supine  in bed with SUPERVISION within 7 treatment day(s).    (2.)Ms. Tatum will transfer from bed to chair and chair to bed with INDEPENDENT using the least restrictive device within 7 treatment day(s).    (3.)Ms. Tautm will ambulate with SUPERVISION for 300 feet with the least restrictive device within 7 treatment day(s).  ________________________________________________________________________________________________     PHYSICAL THERAPY Initial Assessment and PM  (Link to Caseload Tracking: PT Visit Days : 1  Acknowledge Orders  Time In/Out  PT Charge Capture  Rehab Caseload Tracker    Yi Tatum is a 82 y.o. female   PRIMARY DIAGNOSIS: SBO (small bowel obstruction) (HCC)  SBO (small bowel obstruction) (HCC) [K56.609]  Procedure(s) (LRB):  LAPAROSCOPY DIAGNOSTIC/ POSS LAPAROTOMY (N/A)  Day of Surgery  Reason for Referral: Generalized Muscle Weakness (M62.81)  Other abnormalities of gait and mobility (R26.89)  Inpatient: Payor: [place] MEDICARE / Plan: [employer] CHOICE-PPO MEDICARE / Product Type: *No Product type* /     ASSESSMENT:     REHAB RECOMMENDATIONS:   Recommendation to date pending progress:  Setting:  No further skilled therapy after discharge from hospital    Equipment:    None     ASSESSMENT:  Ms. Tatum presents this admission with SBO and is somewhat limited from her baseline with functional mobility. She will discharge home and has family/friend support but lives alone.  She will benefit from PT to increase her functional independence and she should be able to return home without further therapy.   She was supine on contact and needing to use the bathroom. Transferred supine to sit with CGA and then ambulated 20 ft to the bathroom with SBA/CGA. She toileted with SBA and practiced sit to stand while changing brief. Needed  only SBA/Supervision for toileting. She ambulated back to the bed and returned supine with SBA. Going for Laparoscopy this evening. [address] [facility] [address] [id number] AM[facility] 6 Clicks Basic Mobility Inpatient Short Form  AM-PAC Basic Mobility - Inpatient   How much help is needed turning from your back to your side while in a flat bed without using bedrails?: None  How much help is needed moving from lying on your back to sitting on the side of a flat bed without using bedrails?: A Little  How much help is needed moving to and from a bed to a chair?: A Little  How much help is needed standing up from a chair using your arms?: None  How much help is needed walking in hospital room?: None  How much help is needed climbing 3-5 steps with a railing?: A Little  AM-[facility] Inpatient Mobility Raw Score : 21  AM-PAC Inpatient T-Scale Score : 50.25  Mobility Inpatient CMS 0-100% Score: 28.97  Mobility Inpatient CMS G-Code Modifier : CJ    SUBJECTIVE:   Ms. [place] was agreeable to therapy and without complaints    Social/Functional Lives With: Alone  Type of Home: House  Home Layout: One level  Home Access: Stairs to enter with rails  Entrance Stairs - Number of Steps: 2  Bathroom Shower/Tub: Walk-in shower, Shower chair with back  Bathroom Toilet: Standard  Bathroom Equipment: Shower chair  Receives Help From: Family, Friend(s), Neighbor, Personal care attendant  ADL Assistance: Needs assistance  Bath: Moderate assistance  Dressing:  Moderate assistance  Grooming: Minimal assistance  Feeding: Modified independent   Toileting: Independent  Homemaking Assistance: Needs assistance  Ambulation Assistance: Independent  Active : No  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    OBJECTIVE:     PAIN: VITALS / O2: PRECAUTION / Lesleigh Jameson / DRAINS:   Pre Treatment:          Post Treatment: 0 Vitals        Oxygen      Nasogastric Tube    RESTRICTIONS/PRECAUTIONS:                    GROSS EVALUATION: Intact Impaired (Comments):   AROM []  [place] PROM []    Strength []  WFL   Balance []  WFL   Posture [] N/A   Sensation [x]     Coordination [x]      Tone [x]     Edema []    Activity Tolerance [x]      []      COGNITION/  PERCEPTION: Intact Impaired (Comments):   Orientation [x]     Vision [x]     Hearing [x]     Cognition  [x]       MOBILITY: I Mod I S SBA CGA Min Mod Max Total  NT x2 Comments:   Bed Mobility    Rolling [] [] [x] [] [] [] [] [] [] [] []    Supine to Sit [] [] [] [] [x] [] [] [] [] [] []    Scooting [] [] [x] [] [] [] [] [] [] [] []    Sit to Supine [] [] [x] [] [] [] [] [] [] [] []    Transfers    Sit to Stand [] [] [] [x] [] [] [] [] [] [] []    Bed to Chair [] [] [] [x] [] [] [] [] [] [] []    Stand to Sit [] [] [] [x] [] [] [] [] [] [] []     [] [] [] [] [] [] [] [] [] [] []    I=Independent, Mod I=Modified Independent, S=Supervision, SBA=Standby Assistance, CGA=Contact Guard Assistance,   Min=Minimal Assistance, Mod=Moderate Assistance, Max=Maximal Assistance, Total=Total Assistance, NT=Not Tested    GAIT: I Mod I S SBA CGA Min Mod Max Total  NT x2 Comments:   Level of Assistance [] [] [] [x] [x] [] [] [] [] [] []    Distance 20 (walked distance twice) feet    DME None    Gait Quality Decreased facundo , Decreased step clearance, Decreased step length, and Shuffling     Weightbearing Status      Stairs      I=Independent, Mod I=Modified Independent, S=Supervision, SBA=Standby Assistance, CGA=Contact Guard Assistance,   Min=Minimal Assistance, Mod=Moderate Assistance, Max=Maximal Assistance, Total=Total Assistance, NT=Not Tested    PLAN:   FREQUENCY AND DURATION: Daily for duration of hospital stay or until stated goals are met, whichever comes first.    THERAPY PROGNOSIS: Excellent    PROBLEM LIST:   (Skilled intervention is medically necessary to address:)  Decreased ADL/Functional Activities  Decreased Activity Tolerance  Decreased Balance  Decreased Gait Ability  Decreased Safety Awareness  Decreased Strength  Decreased Transfer Abilities INTERVENTIONS PLANNED:   (Benefits and precautions of physical therapy have been discussed with the patient.)  Therapeutic Activity  Therapeutic Exercise/HEP  Gait Training       TREATMENT:   EVALUATION: LOW COMPLEXITY: (Untimed Charge)    TREATMENT:   Therapeutic Activity (20 Minutes): Therapeutic activity included Rolling, Supine to Sit, Sit to Supine, Scooting, Transfer Training, Ambulation on level ground, Sitting balance , Standing balance, and toilet transfers to improve functional Activity tolerance, Balance, Coordination, Mobility, Strength, and ROM.     TREATMENT GRID:  N/A    AFTER TREATMENT PRECAUTIONS: Bed, Call light within reach, Needs within reach, Side rails x2, and Visitors at bedside    INTERDISCIPLINARY COLLABORATION:  RN/ PCT, PT/ PTA, and OT/ MEDINA    EDUCATION:      TIME IN/OUT:  Time In: 1630  Time Out: [address]  Minutes: Nakul Argueta, PT

## 2023-02-10 NOTE — H&P
Hospitalist History and Physical   Admit Date:  No admission date for patient encounter. Name:  Javier Lutz   Age:  80 y.o. Sex:  female  :  1940   MRN:  [id number]   Room:  362/01    Presenting Complaint: No chief complaint on file. Reason(s) for Admission: SBO (small bowel obstruction) ([facility] [K56.609]     History of Present Illness:   Javier Lutz is a 80 y.o. female with medical history of hypertension, hyperlipidemia and dementia who presented with abdominal pain and nausea and vomiting which started about 24 hours prior to presentation. Patient is accompanied by her daughter at the bedside and states that patient was weak, symptoms started abruptly last night. No history of abdominal surgeries. Patient is a poor historian daughter is able to contribute to the history. No fevers or chills, bright red blood per rectum or hematochezia. The emesis was greenish tinged but not black. By the time I examined her in the ER patient vomited about 20 minutes prior as well. Vital signs are stable no fever. Sodium is low at 132 and chloride is 95. CBC is unremarkable. CT scan shows SBO with possible right lower quadrant transition point. Case was discussed with surgeon Dr. Viki Castro.  Patient is currently comfortable. Intermittent vomiting persistent as well. Patient is full code    Assessment & Plan:     Principal Problem:    SBO (small bowel obstruction) (HCC)    Hyponatremia  Plan: N.p.o., possible transition point of the right lower quadrant. Patient has no abdominal pain however and abdomen is nonsurgical or acute. However nausea and vomiting is persistent. We will place an NG tube if she will tolerate. Conservative management and consult surgery if not improving. Active Problems:    Breast cancer, right breast ([facility]  Plan: Stable    Mixed hyperlipidemia  Plan: Stable as well.       Anticipated discharge needs:   DVT treatment    Diet: Diet NPO  VTE ppx: Routine  Code status: Full Code    Hospital Problems:  Principal Problem:    SBO (small bowel obstruction) (HCC)  Active Problems:    Breast cancer, right breast ([facility]    Mixed hyperlipidemia  Resolved Problems:    * No resolved hospital problems. *       Past History:     Past Medical History:   Diagnosis Date    Anemia, unspecified     patient denies hx of anemia    Arthritis     osteoarthritis    Breast cancer ([facility] 2019    Cancer ([facility] 07/2013    right breast lumpectomy    Chronic pain     d/t rheumatoid arthritis    HTN (hypertension) 03/18/2012    patient states not a current problem, no medication. Hypercholesterolemia     no meds    Ill-defined condition     \"I can't take anything strong\"    Menopause     Nausea & vomiting     patient denies post-op N/V    Osteoarthritis of both shoulders     Osteoarthritis of right hip     Followed by Dr. Kaylyn Hernandez historian     Rheumatoid arthritis [facility])     Patient does not see Rheumatologist; no prescription medication. Total knee replacement status 3/15/2012    Unspecified adverse effect of anesthesia     headache after general anesthesia x 1    Vitamin D deficiency disease     pt unaware of issue,diagnosis doc prior to 3/9/12; managed with daily vitamin D supplement.         Past Surgical History:   Procedure Laterality Date    APPENDECTOMY      with hysterectomy    BREAST LUMPECTOMY  7/31/2013    BREAST NEEDLE LOCALIZATION WITH MASS EXCISION AND SENTINAL NODE BIOPSY performed by Mily Walsh MD at [address] [place] [address] (CERVIX STATUS UNKNOWN)  1987    MASTECTOMY Right 3/27/2019    RIGHT BREAST MASTECTOMY SIMPLE performed by Maira Andrews MD at [place] [address] Right 2016    TOTAL HIP ARTHROPLASTY Left 2018    TOTAL KNEE ARTHROPLASTY Left 8/2010    left    TOTAL KNEE ARTHROPLASTY Right 3/2012    right    US BREAST BIOPSY W LOC DEVICE 1ST LESION RIGHT Right 2/26/2019 US BREAST NEEDLE BIOPSY RIGHT 2/26/2019 [facility]        Social History     Tobacco Use    Smoking status: Never    Smokeless tobacco: Never   Substance Use Topics    Alcohol use: No      Social History     Substance and Sexual Activity   Drug Use No       Family History   Problem Relation Age of Onset    Hypertension Mother     No Known Problems Father     Osteoarthritis Mother     Breast Cancer Neg Hx     Other Other         family hx of HTN        Immunization History   Administered Date(s) Administered    PPD Test 09/01/2016, 02/27/2018    Pneumococcal Conjugate 13-valent ([id number]) 09/01/2020     Allergies   Allergen Reactions    Ciprofloxacin Other (See Comments)     Elevated BP    Gluten Meal Other (See Comments)    Hydromorphone Other (See Comments)     hallucinations  hallucinations      Hydroxychloroquine Other (See Comments)     Pt could stand up or get up. Eyes got really big but pt could not see ? Pt states that it was 2-3 days before she got her senses back.     Ketoconazole Other (See Comments)     Intolerance/caused burning sensation    Meperidine Other (See Comments)     hallucinations    Prednisone Other (See Comments)     Irritability    Yellow Dye Itching     Yellow  Dye in cheddar cheese- can eat white cheddar    Codeine Palpitations     Prior to Admit Medications:  Current Outpatient Medications   Medication Instructions    acetaminophen (TYLENOL) 1,300 mg, Oral, BEDTIME PRN    diclofenac sodium (VOLTAREN) 1 % GEL Apply thin layer to affected area as needed for low back    donepezil (ARICEPT) 5 mg, Oral, NIGHTLY    memantine ER (NAMENDA XR) 28 mg, Oral, DAILY    Menthol, Topical Analgesic, (BIOFREEZE ROLL-ON EX) Rub a thin film over affected areas not more than four times a day on shoulders and low back    Vitamin D, Cholecalciferol, 50 MCG (2000 UT) CAPS 1 capsule, Oral, DAILY         Objective:   No data found.         Estimated body mass index is 22.6 kg/m² as calculated  from the following:    Height as of 2/9/23: 5' 6\" (1.676 m). Weight as of 2/9/23: 140 lb (63.5 kg). No intake or output data in the 24 hours ending 02/10/23 0338      Physical Exam:  13 point ROS done with pertinent positives in HPI  not currently breastfeeding. General:    Well nourished. Head:  Normocephalic, atraumatic  Eyes:  Sclerae appear normal.  Pupils equally round. ENT:  Nares appear normal.  Moist oral mucosa  Neck:  No restricted ROM. Trachea midline   CV:   RRR. No m/r/g. No jugular venous distension. Lungs:   CTAB. No wheezing, rhonchi, or rales. Symmetric expansion. Abdomen:   Soft, nontender diffusely, no rebound or guarding. , nondistended. Extremities: No cyanosis or clubbing. No edema  Skin:     No rashes and normal coloration. Warm and dry. Neuro:  CN II-XII grossly intact. Sensation intact. Psych:  Normal mood and affect.       I have personally reviewed labs and tests:  Recent Labs:  Recent Results (from the past 24 hour(s))   CBC with Auto Differential    Collection Time: 02/09/23  7:04 PM   Result Value Ref Range    WBC 9.2 4.3 - 11.1 K/uL    RBC 5.35 (H) 4.05 - 5.2 M/uL    Hemoglobin 16.2 (H) 11.7 - 15.4 g/dL    Hematocrit 48.3 (H) 35.8 - 46.3 %    MCV 90.3 82 - 102 FL    MCH 30.3 26.1 - 32.9 PG    MCHC 33.5 31.4 - 35.0 g/dL    RDW 13.1 11.9 - 14.6 %    Platelets 936 242 - 747 K/uL    MPV 9.4 9.4 - 12.3 FL    nRBC 0.00 0.0 - 0.2 K/uL    Differential Type AUTOMATED      Seg Neutrophils 74 43 - 78 %    Lymphocytes 20 13 - 44 %    Monocytes 5 4.0 - 12.0 %    Eosinophils % 1 0.5 - 7.8 %    Basophils 0 0.0 - 2.0 %    Immature Granulocytes 0 0.0 - 5.0 %    Segs Absolute 6.8 1.7 - 8.2 K/UL    Absolute Lymph # 1.8 0.5 - 4.6 K/UL    Absolute Mono # 0.5 0.1 - 1.3 K/UL    Absolute Eos # 0.1 0.0 - 0.8 K/UL    Basophils Absolute 0.0 0.0 - 0.2 K/UL    Absolute Immature Granulocyte 0.0 0.0 - 0.5 K/UL   CMP    Collection Time: 02/09/23  7:04 PM   Result Value Ref Range    Sodium 132 (L) 133 - 143 mmol/L    Potassium 4.1 3.5 - 5.1 mmol/L    Chloride 95 (L) 101 - 110 mmol/L    CO2 27 21 - 32 mmol/L    Anion Gap 10 2 - 11 mmol/L    Glucose 145 (H) 65 - 100 mg/dL    BUN 15 8 - 23 MG/DL    Creatinine 0.90 0.6 - 1.0 MG/DL    Est, Glom Filt Rate >60 >60 ml/min/1.73m2    Calcium 9.8 8.3 - 10.4 MG/DL    Total Bilirubin 0.6 0.2 - 1.1 MG/DL    ALT 33 12 - 65 U/L    AST 20 15 - 37 U/L    Alk Phosphatase 81 50 - 136 U/L    Total Protein 7.9 6.3 - 8.2 g/dL    Albumin 3.8 3.2 - 4.6 g/dL    Globulin 4.1 2.8 - 4.5 g/dL    Albumin/Globulin Ratio 0.9 0.4 - 1.6     TYPE AND SCREEN    Collection Time: 02/09/23  7:04 PM   Result Value Ref Range    Crossmatch expiration date 02/12/2023,2359     ABO/Rh O POSITIVE     Antibody Screen NEG    Lactate, Sepsis    Collection Time: 02/09/23  7:04 PM   Result Value Ref Range    Lactic Acid, Sepsis 1.4 0.4 - 2.0 MMOL/L   POC CHEMISTRY (NA,K,ICA,GLU,CALC HCT/HGB,LACTATE,CREA,CL)    Collection Time: 02/09/23  9:33 PM   Result Value Ref Range    POC Sodium 135 (L) 136 - 145 mmol/L    POC Potassium 3.6 3.5 - 5.1 mmol/L    POC Chloride 98 98 - 107 mmol/L    POC TCO2 23.2 21 - 32 mmol/L    POC Glucose 115 (H) 65 - 100 mg/dL    POC BUN 13 8 - 26 mg/dL    POC Creatinine 0.59 (L) 0.8 - 1.5 mg/dL    eGFR, POC >60 >60 ml/min/1.73m2    POC Lactic Acid 0.74 0.5 - 1.9 mmol/L    Performed by: Dom Bishop    Lactate, Sepsis    Collection Time: 02/09/23  9:35 PM   Result Value Ref Range    Lactic Acid, Sepsis 0.8 0.4 - 2.0 MMOL/L       I have personally reviewed imaging studies:  CT ABDOMEN PELVIS GI BLEED Additional Contrast? Oral    Result Date: 2/9/2023  EXAMINATION:  CT ABDOMEN PELVIS without and with contrast GI BLEED DATE: 2/9/2023 7:00 PM INDICATION: Hematemesis, dark tarry stools ; COMPARISON: None.  PROCEDURE:  CT of the abdomen and pelvis was performed prior to and following the intravenous administration of iodinated contrast material.  CT dose lowering  techniques were used, to include: automated exposure control, adjustment for patient size, and or use of iterative reconstruction. Arterial and venous phase postcontrast imaging. FINDINGS: Visualized lower chest: Minor nodular groundglass opacities in the right middle lobe. 4 mm subpleural nodule right lower lobe, series 2 image seven, likely a fissural lymph node. Moderate coronary artery atherosclerotic calcifications. ABDOMEN: Liver and Biliary system:  No suspicious liver lesion. No biliary ductal dilation. Gallbladder:  Cholecystectomy. Spleen:  Normal. Pancreas:  Normal. Adrenal glands:  4 cm myelolipoma left adrenal gland, benign lesion. . Kidneys and ureters:  Kidneys enhance symmetrically. No suspicious mass. No hydronephrosis. Aorta/IVC:  Moderate atherosclerotic calcifications. No aortic aneurysm or dissection. IVC normal. Lymph nodes:  No lymphadenopathy. Gastrointestinal tract:  Stomach and proximal small bowel distended with fluid. The proximal small bowel is mildly dilated. There is transition zone in the right lower quadrant with suggestion of two transition points/closed loop. Distal small bowel decompressed. Appendix not visualized; no pericecal inflammation. . No colonic wall thickening or dilation. Diverticulosis sigmoid colon. Peritoneum/Mesentery: No pneumoperitoneum. No ascites. Abdominal wall: Unremarkable. PELVIS: Urinary bladder: Largely obscured by artifact. Reproductive organs: Uterus not visualized Lymph Nodes: No pelvic lymphadenopathy. BONES:  Bilateral hip arthroplasties with associated artifact limiting evaluation the lower pelvis. Degenerative changes in the spine. .      1.  Small bowel obstruction with transition zone in the right lower quadrant. Configuration raises possibility of a closed loop obstruction. Recommend surgical consultation. 2.  Minimal right middle lobe pulmonary infiltrate, possible aspiration and/or early pneumonia. 3.  No evidence of active extravasation.  4.  Chronic findings as above. Dav Diego M.D. 2/9/2023 11:51:00 PM      Echocardiogram:  No results found for this or any previous visit. Orders Placed This Encounter   Medications    acetaminophen (TYLENOL) extended release tablet 1,300 mg    donepezil (ARICEPT) tablet 5 mg    memantine (NAMENDA) tablet 5 mg    Vitamin D (Cholecalciferol) CAPS 1 capsule    sodium chloride flush 0.9 % injection 5-40 mL    sodium chloride flush 0.9 % injection 5-40 mL    0.9 % sodium chloride infusion    enoxaparin (LOVENOX) injection 40 mg     Order Specific Question:   Indication of Use     Answer:   Prophylaxis-DVT/PE    OR Linked Order Group     ondansetron (ZOFRAN-ODT) disintegrating tablet 4 mg     ondansetron (ZOFRAN) injection 4 mg    polyethylene glycol (GLYCOLAX) packet 17 g    OR Linked Order Group     acetaminophen (TYLENOL) tablet 650 mg     acetaminophen (TYLENOL) suppository 650 mg    0.9 % sodium chloride infusion    morphine injection 2 mg     37 minutes    Signed:  Felicity Chen MD    Part of this note may have been written by using a voice dictation software. The note has been proof read but may still contain some grammatical/other typographical errors.

## 2023-02-11 LAB
ANION GAP SERPL CALC-SCNC: 8 MMOL/L (ref 2–11)
BASOPHILS # BLD: 0 K/UL (ref 0–0.2)
BASOPHILS NFR BLD: 0 % (ref 0–2)
BUN SERPL-MCNC: 6 MG/DL (ref 8–23)
CALCIUM SERPL-MCNC: 7.9 MG/DL (ref 8.3–10.4)
CHLORIDE SERPL-SCNC: 105 MMOL/L (ref 101–110)
CO2 SERPL-SCNC: 25 MMOL/L (ref 21–32)
CREAT SERPL-MCNC: 0.36 MG/DL (ref 0.6–1)
DIFFERENTIAL METHOD BLD: NORMAL
EOSINOPHIL # BLD: 0.1 K/UL (ref 0–0.8)
EOSINOPHIL NFR BLD: 1 % (ref 0.5–7.8)
ERYTHROCYTE [DISTWIDTH] IN BLOOD BY AUTOMATED COUNT: 13.2 % (ref 11.9–14.6)
GLUCOSE SERPL-MCNC: 96 MG/DL (ref 65–100)
HCT VFR BLD AUTO: 38.4 % (ref 35.8–46.3)
HGB BLD-MCNC: 12.4 G/DL (ref 11.7–15.4)
IMM GRANULOCYTES # BLD AUTO: 0 K/UL (ref 0–0.5)
IMM GRANULOCYTES NFR BLD AUTO: 0 % (ref 0–5)
LYMPHOCYTES # BLD: 1.4 K/UL (ref 0.5–4.6)
LYMPHOCYTES NFR BLD: 20 % (ref 13–44)
MCH RBC QN AUTO: 29.6 PG (ref 26.1–32.9)
MCHC RBC AUTO-ENTMCNC: 32.3 G/DL (ref 31.4–35)
MCV RBC AUTO: 91.6 FL (ref 82–102)
MM INDURATION, POC: 0 MM (ref 0–5)
MONOCYTES # BLD: 0.5 K/UL (ref 0.1–1.3)
MONOCYTES NFR BLD: 7 % (ref 4–12)
NEUTS SEG # BLD: 4.9 K/UL (ref 1.7–8.2)
NEUTS SEG NFR BLD: 72 % (ref 43–78)
NRBC # BLD: 0 K/UL (ref 0–0.2)
PLATELET # BLD AUTO: 187 K/UL (ref 150–450)
PMV BLD AUTO: 9.4 FL (ref 9.4–12.3)
POTASSIUM SERPL-SCNC: 3.7 MMOL/L (ref 3.5–5.1)
PPD, POC: NEGATIVE
RBC # BLD AUTO: 4.19 M/UL (ref 4.05–5.2)
SODIUM SERPL-SCNC: 138 MMOL/L (ref 133–143)
WBC # BLD AUTO: 6.8 K/UL (ref 4.3–11.1)

## 2023-02-11 PROCEDURE — 85025 COMPLETE CBC W/AUTO DIFF WBC: CPT

## 2023-02-11 PROCEDURE — 6360000002 HC RX W HCPCS: Performed by: SURGERY

## 2023-02-11 PROCEDURE — 36415 COLL VENOUS BLD VENIPUNCTURE: CPT

## 2023-02-11 PROCEDURE — 6370000000 HC RX 637 (ALT 250 FOR IP): Performed by: SURGERY

## 2023-02-11 PROCEDURE — 80048 BASIC METABOLIC PNL TOTAL CA: CPT

## 2023-02-11 PROCEDURE — 2580000003 HC RX 258: Performed by: SURGERY

## 2023-02-11 PROCEDURE — C9113 INJ PANTOPRAZOLE SODIUM, VIA: HCPCS | Performed by: SURGERY

## 2023-02-11 PROCEDURE — 1100000000 HC RM PRIVATE

## 2023-02-11 RX ADMIN — SODIUM CHLORIDE: 9 INJECTION, SOLUTION INTRAVENOUS at 17:07

## 2023-02-11 RX ADMIN — MEMANTINE 10 MG: 5 TABLET ORAL at 08:48

## 2023-02-11 RX ADMIN — DONEPEZIL HYDROCHLORIDE 5 MG: 5 TABLET, FILM COATED ORAL at 22:34

## 2023-02-11 RX ADMIN — MORPHINE SULFATE 2 MG: 2 INJECTION, SOLUTION INTRAMUSCULAR; INTRAVENOUS at 05:25

## 2023-02-11 RX ADMIN — VITAMIN D, TAB 1000IU (100/BT) 2000 UNITS: 25 TAB at 08:48

## 2023-02-11 RX ADMIN — MEMANTINE 10 MG: 5 TABLET ORAL at 22:34

## 2023-02-11 RX ADMIN — SODIUM CHLORIDE, PRESERVATIVE FREE 40 MG: 5 INJECTION INTRAVENOUS at 08:49

## 2023-02-11 ASSESSMENT — PAIN DESCRIPTION - LOCATION: LOCATION: BACK

## 2023-02-11 ASSESSMENT — PAIN DESCRIPTION - ORIENTATION: ORIENTATION: LOWER

## 2023-02-11 ASSESSMENT — PAIN DESCRIPTION - DESCRIPTORS: DESCRIPTORS: ACHING

## 2023-02-11 NOTE — ANESTHESIA POSTPROCEDURE EVALUATION
Department of Anesthesiology  Postprocedure Note    Patient: Clare Corral  MRN: [id number]  YOB: 1940  Date of evaluation: 2/10/2023      Procedure Summary     Date: 02/10/23 Room / Location: [facility] [place] MAIN OR 01 / [facility] [place] MAIN OR    Anesthesia Start: 1853 Anesthesia Stop: 2027    Procedure: EXPLORATORY LAPAROSCOPY LYSIS OF ADHESIONS (Abdomen) Diagnosis:       Small bowel obstruction ([address]      (Small bowel obstruction)    Surgeons: Marcelina Colon MD Responsible Provider: Chrissie Ray MD    Anesthesia Type: general ASA Status: 3          Anesthesia Type: No value filed.     Theo Phase I: Theo Score: 10    Theo Phase II:        Anesthesia Post Evaluation    Patient location during evaluation: bedside  Patient participation: complete - patient participated  Level of consciousness: awake and alert  Pain score: 1  Airway patency: patent  Nausea & Vomiting: no vomiting  Complications: no  Cardiovascular status: hemodynamically stable  Respiratory status: acceptable  Hydration status: euvolemic

## 2023-02-11 NOTE — PROGRESS NOTES
H&P/Consult Note/Progress Note/Office Note:   Melanie Oquendo  MRN: [id number]  BLZ:[birth year]  Age:82 y.o.    HPI: Melanie Oquendo is a 80 y.o. female who underwent Ex laparotomy with SANDHYA for SBO    S? Feeling well this am, no major complaints           Past Medical History:   Diagnosis Date    Anemia, unspecified     patient denies hx of anemia    Arthritis     osteoarthritis    Breast cancer ([facility] 2019    Cancer ([facility] 07/2013    right breast lumpectomy    Chronic pain     d/t rheumatoid arthritis    HTN (hypertension) 03/18/2012    patient states not a current problem, no medication. Hypercholesterolemia     no meds    Ill-defined condition     \"I can't take anything strong\"    Menopause     Nausea & vomiting     patient denies post-op N/V    Osteoarthritis of both shoulders     Osteoarthritis of right hip     Followed by Dr. Mj Hernandez historian     Rheumatoid arthritis [facility])     Patient does not see Rheumatologist; no prescription medication. Total knee replacement status 3/15/2012    Unspecified adverse effect of anesthesia     headache after general anesthesia x 1    Vitamin D deficiency disease     pt unaware of issue,diagnosis doc prior to 3/9/12; managed with daily vitamin D supplement.       Past Surgical History:   Procedure Laterality Date    APPENDECTOMY      with hysterectomy    BREAST LUMPECTOMY  7/31/2013    BREAST NEEDLE LOCALIZATION WITH MASS EXCISION AND SENTINAL NODE BIOPSY performed by Nadia Chapman MD at [address] [place] [address] (CERVIX STATUS UNKNOWN)  1987    MASTECTOMY Right 3/27/2019    RIGHT BREAST MASTECTOMY SIMPLE performed by Margarita Gomes MD at [place] [address] Right 2016    TOTAL HIP ARTHROPLASTY Left 2018    TOTAL KNEE ARTHROPLASTY Left 8/2010    left    TOTAL KNEE ARTHROPLASTY Right 3/2012    right    US BREAST BIOPSY W LOC DEVICE 1ST LESION RIGHT Right 2/26/2019 US BREAST NEEDLE BIOPSY RIGHT 2/26/2019 [facility]     Current Facility-Administered Medications   Medication Dose Route Frequency    donepezil (ARICEPT) tablet 5 mg  5 mg Oral Nightly    memantine (NAMENDA) tablet 10 mg  10 mg Oral BID    Vitamin D (CHOLECALCIFEROL) tablet 2,000 Units  2,000 Units Oral Daily    sodium chloride flush 0.9 % injection 5-40 mL  5-40 mL IntraVENous 2 times per day    sodium chloride flush 0.9 % injection 5-40 mL  5-40 mL IntraVENous PRN    0.9 % sodium chloride infusion   IntraVENous PRN    [Held by provider] enoxaparin (LOVENOX) injection 40 mg  40 mg SubCUTAneous Daily    ondansetron (ZOFRAN-ODT) disintegrating tablet 4 mg  4 mg Oral Q8H PRN    Or    ondansetron (ZOFRAN) injection 4 mg  4 mg IntraVENous Q6H PRN    polyethylene glycol (GLYCOLAX) packet 17 g  17 g Oral Daily PRN    acetaminophen (TYLENOL) tablet 650 mg  650 mg Oral Q6H PRN    Or    acetaminophen (TYLENOL) suppository 650 mg  650 mg Rectal Q6H PRN    0.9 % sodium chloride infusion   IntraVENous Continuous    morphine injection 2 mg  2 mg IntraVENous Q4H PRN    pantoprazole (PROTONIX) 40 mg in sodium chloride (PF) 0.9 % 10 mL injection  40 mg IntraVENous Daily     ALLERGIES:  Ciprofloxacin, Gluten meal, Hydromorphone, Hydroxychloroquine, Ketoconazole, Meperidine, Prednisone, Yellow dye, and Codeine    Social History     Socioeconomic History    Marital status:       Spouse name: None    Number of children: None    Years of education: None    Highest education level: None   Tobacco Use    Smoking status: Never    Smokeless tobacco: Never   Substance and Sexual Activity    Alcohol use: No    Drug use: No     Social Determinants of Health     Physical Activity: Insufficiently Active    Days of Exercise per Week: 7 days    Minutes of Exercise per Session: 10 min     Social History     Tobacco Use   Smoking Status Never   Smokeless Tobacco Never     Family History   Problem Relation Age of Onset    Hypertension Mother     No Known Problems Father     Osteoarthritis Mother     Breast Cancer Neg Hx     Other Other         family hx of HTN     ROS: The patient has no difficulty with chest pain or shortness of breath. No fever or chills. Comprehensive review of systems was otherwise unremarkable except as noted above. Physical Exam:   BP (!) 151/67   Pulse 86   Temp 98.2 °F (36.8 °C) (Oral)   Resp 16   Ht 5' 6\" (1.676 m)   Wt 143 lb 1.6 oz (64.9 kg)   SpO2 95%   BMI 23.10 kg/m²   Vitals:    02/10/23 2327 02/11/23 0310 02/11/23 0722 02/11/23 1135   BP: (!) 146/58 (!) 151/59 (!) 143/72 (!) 151/67   Pulse: 97 89 88 86   Resp: 16 16 18 16   Temp: 97.9 °F (36.6 °C) 97.3 °F (36.3 °C) 98.2 °F (36.8 °C) 98.2 °F (36.8 °C)   TempSrc: Axillary Oral Oral Oral   SpO2: 93% 95% 94% 95%   Weight:       Height:         02/11 0701 - 02/11 1900  In: -   Out: 175   02/09 1901 - 02/11 0700  In: 1520 [P.O.:30; I.V.:1490]  Out: 670 [Urine:250]    Constitutional: Alert, oriented, cooperative patient in no acute distress; appears stated age    Eyes:Sclera are clear. EOMs intact  ENMT: no external lesions gross hearing normal; no obvious neck masses, no ear or lip lesions, nares normal  CV: RRR. Normal perfusion  Resp: No JVD. Breathing is  non-labored; no audible wheezing. GI: soft , incision OK, no tenderness   Musculoskeletal: unremarkable with normal function. No embolic signs or cyanosis.    Neuro:  Oriented; moves all 4; no focal deficits  Psychiatric: normal affect and mood, no memory impairment    Recent vitals (if inpt):  Patient Vitals for the past 24 hrs:   BP Temp Temp src Pulse Resp SpO2   02/11/23 1135 (!) 151/67 98.2 °F (36.8 °C) Oral 86 16 95 %   02/11/23 0722 (!) 143/72 98.2 °F (36.8 °C) Oral 88 18 94 %   02/11/23 0310 (!) 151/59 97.3 °F (36.3 °C) Oral 89 16 95 %   02/10/23 2327 (!) 146/58 97.9 °F (36.6 °C) Axillary 97 16 93 %   02/10/23 2209 -- -- -- 80 -- --   02/10/23 2139 (!) 150/62 98.1 °F (36.7 °C) Axillary 94 16 94 %   02/10/23 2100 (!) 142/66 -- -- 80 16 92 %   02/10/23 2055 (!) 149/61 -- -- 90 18 92 %   02/10/23 2050 (!) 147/65 -- -- 80 16 91 %   02/10/23 2045 (!) 153/58 -- -- 85 16 92 %   02/10/23 2040 (!) 149/64 -- -- 85 18 93 %   02/10/23 2035 (!) 178/73 -- -- 96 18 94 %   02/10/23 2030 (!) 178/73 -- -- 86 18 100 %   02/10/23 2026 (!) 183/75 98.8 °F (37.1 °C) -- 88 20 100 %   02/10/23 1517 (!) 152/55 98.6 °F (37 °C) Oral 80 18 91 %       Amount and/or Complexity of Data Reviewed and Analyzed:  I reviewed and analyzed all of the unique labs and radiologic studies that are shown below as well as any that are in the HPI, and any that are in the expanded problem list below  *Each unique test, order, or document contributes to the combination of 2 or combination of 3 in Category 1 below. For this visit I also reviewed old records and prior notes.       Recent Labs     02/10/23  1051 02/11/23  0619   WBC 10.5 6.8   HGB 14.6 12.4    187    138   K 4.0 3.7    105   CO2 26 25   BUN 9 6*   ALT 41  --      Review of most recent CBC  Lab Results   Component Value Date    WBC 6.8 02/11/2023    HGB 12.4 02/11/2023    HCT 38.4 02/11/2023    MCV 91.6 02/11/2023     02/11/2023       Review of most recent BMP  Lab Results   Component Value Date/Time     02/11/2023 06:19 AM    K 3.7 02/11/2023 06:19 AM     02/11/2023 06:19 AM    CO2 25 02/11/2023 06:19 AM    BUN 6 02/11/2023 06:19 AM    CREATININE 0.36 02/11/2023 06:19 AM    GLUCOSE 96 02/11/2023 06:19 AM    CALCIUM 7.9 02/11/2023 06:19 AM        Review of most recent LFTs (and lipase if done)  Lab Results   Component Value Date    ALT 41 02/10/2023    AST 37 02/10/2023    ALKPHOS 72 02/10/2023    BILITOT 0.6 02/10/2023     No results found for: LIPASE    No results found for: INR, APTT, LCAD    Review of most recent HgbA1c  Hemoglobin A1C   Date Value Ref Range Status   02/17/2022 5.2 4.8 - 5.6 % Final     Comment:              Prediabetes: 5.7 - 6.4           Diabetes: >6.4           Glycemic control for adults with diabetes: <7.0         Nutritional assessment screen for wound healing issues:  Lab Results   Component Value Date    LABALBU 3.3 02/10/2023       @lastcovr@    Xray Result (most recent):  XR ABDOMEN (KUB) (SINGLE AP VIEW) 02/10/2023    Narrative  Portable chest x-ray    CLINICAL INDICATION: Evaluate NG tube    FINDINGS: Single AP view the chest submitted without comparison shows an NG tube  in satisfactory position in the left upper abdominal quadrant. Lungs are  expanded and clear. The cardiac silhouette and mediastinum are unremarkable. Impression  NG tube in good position. CT Result (most recent):  CT ABDOMEN PELVIS GI BLEED 02/09/2023    Narrative  EXAMINATION:  CT ABDOMEN PELVIS without and with contrast GI BLEED    DATE: 2/9/2023 7:00 PM    INDICATION: Hematemesis, dark tarry stools ;    COMPARISON: None. PROCEDURE:  CT of the abdomen and pelvis was performed prior to and following  the intravenous administration of iodinated contrast material.  CT dose lowering  techniques were used, to include: automated exposure control, adjustment for  patient size, and or use of iterative reconstruction. Arterial and venous phase postcontrast imaging. FINDINGS:    Visualized lower chest: Minor nodular groundglass opacities in the right middle  lobe. 4 mm subpleural nodule right lower lobe, series 2 image seven, likely a  fissural lymph node. Moderate coronary artery atherosclerotic calcifications. ABDOMEN:    Liver and Biliary system:  No suspicious liver lesion. No biliary ductal  dilation. Gallbladder:  Cholecystectomy. Spleen:  Normal.    Pancreas:  Normal.    Adrenal glands:  4 cm myelolipoma left adrenal gland, benign lesion. .    Kidneys and ureters:  Kidneys enhance symmetrically. No suspicious mass. No  hydronephrosis. Aorta/IVC:  Moderate atherosclerotic calcifications. No aortic aneurysm or  dissection.   IVC normal.    Lymph nodes:  No lymphadenopathy. Gastrointestinal tract:  Stomach and proximal small bowel distended with fluid. The proximal small bowel is mildly dilated. There is transition zone in the  right lower quadrant with suggestion of two transition points/closed loop. Distal small bowel decompressed. Appendix not visualized; no pericecal  inflammation. . No colonic wall thickening or dilation. Diverticulosis sigmoid  colon. Peritoneum/Mesentery: No pneumoperitoneum. No ascites. Abdominal wall: Unremarkable. PELVIS:    Urinary bladder: Largely obscured by artifact. Reproductive organs: Uterus not visualized    Lymph Nodes: No pelvic lymphadenopathy. BONES:  Bilateral hip arthroplasties with associated artifact limiting  evaluation the lower pelvis. Degenerative changes in the spine. .    Impression  1. Small bowel obstruction with transition zone in the right lower quadrant. Configuration raises possibility of a closed loop obstruction. Recommend  surgical consultation. 2.  Minimal right middle lobe pulmonary infiltrate, possible aspiration and/or  early pneumonia. 3.  No evidence of active extravasation. 4.  Chronic findings as above. Kimi Martinez M.D.  2/9/2023 11:51:00 PM    US Result (most recent):  [address] RIGHT 02/26/2019    Addendum 3/1/2019  8:05 AM  Addendum: Yumi Sinha, accession number: [id number]  Date: 2/28/2019 Pathology was noted as Right breast, 11:00 position, 4 cm from  nipple, core biopsy: Infiltrating duct carcinoma, low grade (well  differentiated). Definite in situ component and lymphovascular invasion are not  identified. Results concordant with imaging. Pathology report was faxed to Dr. Kelly Almeida office on 1/07/9475 by RT Nataly(R)(M). Patient was referred to  Dr. Grupo Subramanian and has an appointment scheduled with him on 2/28/2019. Narrative  This is a summary report.  The complete report is available in the patient's medical record. If you cannot access the medical record, please contact the sending organization for a detailed fax or copy. ULTRASOUND-GUIDED CORE BREAST BIOPSY  DIGITAL DIAGNOSTIC MAMMOGRAM    CLINICAL INDICATION: Enlarging palpable right breast mass adjacent to a  lumpectomy scar. COMPARISON: 2/7/2019, 11/10/2017    PROCEDURE: After discussion of the risks and benefits, including but not limited  to bleeding and infection, both written and verbal informed consent were  obtained. The overlying skin was prepped in sterile fashion. Preprocedural sonography of the right axilla showed no evidence of  lymphadenopathy. Total of 6 mL of 1% Lidocaine was used for local anesthesia. Under ultrasound  guidance 3 core samples were obtained with a 14 gauge Achieve biopsy needle. A  marker clip was placed within the lesion. The needle was withdrawn and  hemostasis was obtained. The patient tolerated the procedure well without  complications. Postprocedural right breast mammogram was obtained showing appropriate location  of the clip. Impression  IMPRESSION: Successful ultrasound-guided core biopsy of 11:00 right breast mass  . Pathology results are pending. Admission date (for inpatients): 2/10/2023   1 Day Post-Op  Procedure(s):  EXPLORATORY LAPAROSCOPY LYSIS OF ADHESIONS        ASSESSMENT/PLAN:  [unfilled]  Principal Problem:    SBO (small bowel obstruction) (HCC)  Active Problems:    Breast cancer, right breast (HCC)    Mixed hyperlipidemia  Resolved Problems:    * No resolved hospital problems.  *     Patient Active Problem List    Diagnosis Date Noted    [facility]BO (small bowel obstruction) ([facility] 02/10/2023     Priority: Medium    Low serum vitamin D 03/03/2022    Alzheimer's disease, unspecified (CODE) ([facility] 03/03/2022    Mixed hyperlipidemia 03/03/2022    Breast cancer, right breast (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    Low back pain 09/20/2013    Hormone receptor positive breast cancer, unspecified laterality ([facility] 09/20/2013    Osteoarthritis of both shoulders     Anemia, unspecified     Osteoarthritis of right knee 03/15/2012    Osteoarthritis of left knee 08/31/2010    Total knee replacement status 08/31/2010          Number and Complexity of Problems addressed and   Risks of complications and/or morbidity of management                  Level of MDM (2/3 elements below)  Number and Complexity of Problems Addressed Amount and/or Complexity of Data to be Reviewed and Analyzed  *Each unique test, order, or document contributes to the combination of 2 or combination of 3 in Category 1 below. Risk of Complications and/or Morbidity or Mortality of pt Management     50707  87610 SF Minimal  ?1self-limited or minor problem Minimal or none Minimal risk of morbidity from additional diagnostic testing or Rx   29055  65500 Low Low  ? 2or more self-limited or minor problems;    or  ? 1stable chronic illness;    or  ?1acute, uncomplicated illness or injury   Limited  (Must meet the requirements of at least 1 of the 2 categories)  Category 1: Tests and documents   ? Any combination of 2 from the following:  ?Review of prior external note(s) from each unique source*;  ?review of the result(s) of each unique test*;   ?ordering of each unique test*    or   Category 2: Assessment requiring an independent historian(s)  (For the categories of independent interpretation of tests and discussion of management or test interpretation, see moderate or high) Low risk of morbidity from additional diagnostic testing or treatment     28844  09507 Mod Moderate  ? 1or more chronic illnesses with exacerbation, progression, or side effects of treatment;    or  ?2or more stable chronic illnesses;    or  ?1undiagnosed new problem with uncertain prognosis;    or  ?1acute illness with systemic symptoms;    or  ?1acute complicated injury   Moderate  (Must meet the requirements of at least 1 out of 3 categories)  Category 1: Tests, documents, or independent historian(s)  ? Any combination of 3 from the following:   ?Review of prior external note(s) from each unique source*;  ?Review of the result(s) of each unique test*;  ?Ordering of each unique test*;  ?Assessment requiring an independent historian(s)    or  Category 2: Independent interpretation of tests   ? Independent interpretation of a test performed by another physician/other qualified health care professional (not separately reported);     or  Category 3: Discussion of management or test interpretation  ? Discussion of management or test interpretation with external physician/other qualified health care professional/appropriate source (not separately reported)   Moderate risk of morbidity from additional diagnostic testing or treatment  Examples only:  ?Prescription drug management   ? Decision regarding minor surgery with identified patient or procedure risk factors  ? Decision regarding elective major surgery without identified patient or procedure risk factors   ? Diagnosis or treatment significantly limited by social determinants of health       80524  86494 High High  ? 1or more chronic illnesses with severe exacerbation, progression, or side effects of treatment;    or  ?1 acute or chronic illness or injury that poses a threat to life or bodily function   Extensive  (Must meet the requirements of at least 2 out of 3 categories)  Category 1: Tests, documents, or independent historian(s)  ? Any combination of 3 from the following:   ?Review of prior external note(s) from each unique source*;  ?Review of the result(s) of each unique test*;   ?Ordering of each unique test*;   ?Assessment requiring an independent historian(s)    or   Category 2: Independent interpretation of tests   ? Independent interpretation of a test performed by another physician/other qualified health care professional (not separately reported);     or  Category 3: Discussion of management or test interpretation  ? Discussion of management or test interpretation with external physician/other qualified health care professional/appropriate source (not separately reported)   High risk of morbidity from additional diagnostic testing or treatment  Examples only:  ?Drug therapy requiring intensive monitoring for toxicity  ? Decision regarding elective major surgery with identified patient or procedure risk factors  ? Decision regarding emergency major surgery  ? Decision regarding hospitalization  ? Decision not to resuscitate or to de-escalate care because of poor prognosis      Parenteral controlled substances             I have personally performed a face-to-face diagnostic evaluation and management  service on this patient. I have independently seen the patient. I have independently obtained the above history from the patient/family. I have independently examined the patient with above findings. I have independently reviewed data/labs for this patient and developed the above plan of care (MDM). A/P  POD # 1 s/p ex lap with lysis of adhesions. She is doing well, good UO  WBC normal,  No flatus yet.  Will place NGT back     Signed: Prabha Friend MD  2/11/2023    11:59 AM

## 2023-02-11 NOTE — BRIEF OP NOTE
Brief Postoperative Note      Patient: Eileen Cifuentes  YOB: 1940  MRN: [id number]    Date of Procedure: 2/10/2023    Pre-Op Diagnosis: Small bowel obstruction    Post-Op Diagnosis: Same       Procedure(s):  EXPLORATORY LAPAROSCOPY LYSIS OF ADHESIONS    Surgeon(s):  Ramez Short MD    Assistant:  * No surgical staff found *    Anesthesia: General    Estimated Blood Loss (mL): Minimal    Complications: None    Specimens:   * No specimens in log *    Implants:  * No implants in log *      Drains:   NG/OG/NJ/NE Tube Nasogastric 16 fr Right nostril (Active)   Surrounding Skin Clean, dry & intact 02/10/23 1118   Securement device Tape 02/10/23 1118   Status Suction-low intermittent 02/10/23 1118   Placement Verified X-Ray (Initial) 02/10/23 1118   NG/OG/NJ/NE External Measurement (cm) 60 cm 02/10/23 1118   Drainage Appearance Green 02/10/23 1849   Output (mL) 50 ml 02/10/23 1849       [REMOVED] Urinary Catheter 02/10/23 [place] (Removed)       Findings: Adhesions in the abdomen, there was a loop of small bowel adhesed to the anterior abdominal wall and several places which appear to cause a almost closed-loop obstruction.     Electronically signed by Ramez Camargo MD on 2/10/2023 at 8:19 PM

## 2023-02-11 NOTE — PROGRESS NOTES
TRANSFER - IN REPORT:    Verbal report received from [facility], RN on Javier Lutz  being received from PACU for routine post-op      Report consisted of patient's Situation, Background, Assessment and   Recommendations(SBAR). Information from the following report(s) Nurse Handoff Report, Surgery Report, Procedure Verification, and Event Log was reviewed with the receiving nurse. Opportunity for questions and clarification was provided. Assessment to e completed upon patient's arrival to unit and care to be assumed.

## 2023-02-11 NOTE — PERIOP NOTE
TRANSFER - OUT REPORT:    Verbal report given to [address] [place] [address] Street on Wilber Lyons  being transferred to [facility] for routine progression of patient care       Report consisted of patient's Situation, Background, Assessment and   Recommendations(SBAR). Information from the following report(s) Nurse Handoff Report, Adult Overview, Surgery Report, MAR, and Cardiac Rhythm SR  was reviewed with the receiving nurse. [place] Assessment: Presents to emergency department  because of falls (Syncope, seizure, or loss of consciousness): No, Age > 79: Yes, Altered Mental Status, Intoxication with alcohol or substance confusion (Disorientation, impaired judgment, poor safety awaremess, or inability to follow instructions): No, Impaired Mobility: Ambulates or transfers with assistive devices or assistance; Unable to ambulate or transer.: Yes, Nursing Judgement: No  Lines:   Peripheral IV 02/10/23 Distal;Right; Anterior Forearm (Active)   Site Assessment Clean, dry & intact 02/10/23 2026   Line Status Infusing 02/10/23 2026   Line Care Connections checked and tightened 02/10/23 2026   Phlebitis Assessment No symptoms 02/10/23 2026   Infiltration Assessment 0 02/10/23 2026   Alcohol Cap Used No 02/10/23 2026   Dressing Status Clean, dry & intact 02/10/23 2026   Dressing Type Transparent 02/10/23 2026        Opportunity for questions and clarification was provided.       Patient transported with:  O2 @ 0lpm

## 2023-02-11 NOTE — OP NOTE
Operative Note      Patient: Benitez Wood  YOB: 1940  MRN: [id number]    Date of Procedure: 2/10/2023    Pre-Op Diagnosis: Small bowel obstruction    Post-Op Diagnosis: Same       Procedure(s):  EXPLORATORY LAPAROSCOPY LYSIS OF ADHESIONS    Surgeon(s):  Lebron Gomez MD    Assistant:   * No surgical staff found *    Anesthesia: General    Estimated Blood Loss (mL): Minimal    Complications: None    Specimens:   * No specimens in log *    Implants:  * No implants in log *      Drains:   NG/OG/NJ/NE Tube Nasogastric 16 fr Right nostril (Active)   Surrounding Skin Clean, dry & intact 02/10/23 1118   Securement device Tape 02/10/23 1118   Status Suction-low intermittent 02/10/23 1118   Placement Verified X-Ray (Initial) 02/10/23 1118   NG/OG/NJ/NE External Measurement (cm) 60 cm 02/10/23 1118   Drainage Appearance Green 02/10/23 1849   Output (mL) 50 ml 02/10/23 1849       [REMOVED] Urinary Catheter 02/10/23 Capellan (Removed)       Findings: Adhesions in the abdomen, there was a loop of small bowel adhesed to the anterior abdominal wall and several places which appear to cause a almost closed-loop obstruction. Detailed Description of Procedure:   Patient underwent informed consent with the review of the associated risks. She was taken to the operating room and underwent general anesthesia without complications. A Capellan catheter was placed to decompress her bladder. Her abdomen was prepped and draped in a sterile fashion. The appropriate timeout was performed. Ancef was given as prophylaxis prior to incision. A site for incision was marked beneath the umbilicus and the area was infiltrated with local anesthetic. A skin incision was made vertically along the mary with a scalpel and dissection carried into the deeper tissues using cautery. The umbilical stalk was grasped and elevated and the Veress needle was carefully passed to the midline into the abdomen.   Its intra-abdominal location was verified with saline drop test and pneumoperitoneum was achieved to a pressure of 15.  5 mm trocars then passed into the abdomen. The scope was introduced. There was no evidence of injury from entry. She had significant adhesions in the mid abdomen to right lower quadrant and some in the upper abdomen. Under direct visualization 5 mm trocars were placed in the midline lower abdomen away from adhesions, and the left side of the abdomen upper and lower. There was a loop of bowel that was adhesed to the anterior abdominal wall in the right lower quadrant. The filmy adhesions were taken down using sharp dissection with EndoShears. It appeared that the bowel was kinked almost creating a closed-loop obstruction. Once the bowel was freed from the anterior abdominal wall there were omental adhesions causing the bowel to be kinked in another place and this was taken down using the harmonic scalpel freeing the bowel and allowing it to straighten out completely. It appeared that the bowel was dilated proximally and then decompressed distally. I then took down adhesions in the upper abdomen where the omentum was adhesed to the anterior abdominal wall and it was pulling the transverse colon up. I then ran the small bowel from proximal to distal and did not notice any other areas of obstruction. She had some fatty adhesions in the pelvis. No immediate other abnormalities were noted. Hemostasis was verified throughout. All trocars were removed and the abdomen was desufflated. The umbilical site fascia was closed with a figure-of-eight 0 Vicryl suture. The remainder of the local anesthetic was infiltrated into the sites for a total of 30 cc of half percent Marcaine for the case. Each incision was then closed with 3-0 Vicryl sutures in the dermis followed by Dermabond. She tolerated the procedure well without complications. Lap and instrument count at the completion of the procedure was correct x2. Estimated blood loss was 5 mils or less. Patient condition at the completion of the procedure was stable and she was awoken from anesthesia then transported to recovery in stable condition.     Electronically signed by Delmi Anderson MD on 2/10/2023 at 8:21 PM

## 2023-02-11 NOTE — PROGRESS NOTES
Hospitalist Progress Note   Admit Date:  2/10/2023  6:59 AM   Name:  Perla Weiss   Age:  80 y.o. Sex:  female  :  1940   MRN:  [id number]   Room:  [facility]/    Presenting Complaint: No chief complaint on file. Reason(s) for Admission: SBO (small bowel obstruction) ([address] [K56.609]     Hospital Course:   80 y.o. female with medical history of hypertension, hyperlipidemia and dementia admitted 2/10 for SBO. Underwent lap lysis of adhesions 2/10. Subjective & 24hr Events (23): Alert and oriented x2, pleasant pulled NGT out, surgery aware. Remains NPO. Assessment & Plan:     Principal Problem:    SBO (small bowel obstruction) (HCC)  Plan: Underwent lap lysis of adhesions 2/10. Surgery following     Active Problems:    Breast cancer, right breast ([address]  Plan: stable      Mixed hyperlipidemia  Plan: diet controlled. Alzheimer's disease:  Namenda and aricept ongoing      Anticipated discharge needs:      HH    Diet:  Diet NPO Exceptions are: Sips of Water with Meds  DVT PPx: Lovenox held  Code status: Full Code    Hospital Problems:  Principal Problem:    SBO (small bowel obstruction) ([address]  Active Problems:    Breast cancer, right breast ([address]    Mixed hyperlipidemia  Resolved Problems:    * No resolved hospital problems.  *      Objective:   Patient Vitals for the past 24 hrs:   Temp Pulse Resp BP SpO2   23 0722 98.2 °F (36.8 °C) 88 18 (!) 143/72 94 %   23 0310 97.3 °F (36.3 °C) 89 16 (!) 151/59 95 %   02/10/23 2327 97.9 °F (36.6 °C) 97 16 (!) 146/58 93 %   02/10/23 2209 -- 80 -- -- --   02/10/23 2139 98.1 °F (36.7 °C) 94 16 (!) 150/62 94 %   02/10/23 2100 -- 80 16 (!) 142/66 92 %   02/10/23 2055 -- 90 18 (!) 149/61 92 %   02/10/23 2050 -- 80 16 (!) 147/65 91 %   02/10/23 2045 -- 85 16 (!) 153/58 92 %   02/10/23 2040 -- 85 18 (!) 149/64 93 %   02/10/23 2035 -- 96 18 (!) 178/73 94 %   02/10/23 2030 -- 86 18 (!) 178/73 100 %   02/10/23 2026 98.8 °F (37.1 °C) 88 20 (!) 183/75 100 %   02/10/23 1517 98.6 °F (37 °C) 80 18 (!) 152/55 91 %       Oxygen Therapy  SpO2: 94 %  Pulse via Oximetry: 78 beats per minute  O2 Device: None (Room air)  O2 Flow Rate (L/min): 6 L/min    Estimated body mass index is 23.1 kg/m² as calculated from the following:    Height as of this encounter: 5' 6\" (1.676 m). Weight as of this encounter: 143 lb 1.6 oz (64.9 kg). Intake/Output Summary (Last 24 hours) at 2/11/2023 1121  Last data filed at 2/11/2023 0800  Gross per 24 hour   Intake 1485 ml   Output 845 ml   Net 640 ml         Physical Exam:   Blood pressure (!) 143/72, pulse 88, temperature 98.2 °F (36.8 °C), temperature source Oral, resp. rate 18, height 5' 6\" (1.676 m), weight 143 lb 1.6 oz (64.9 kg), SpO2 94 %, not currently breastfeeding. General:    Well nourished. Head:  Normocephalic, atraumatic  CV:   RRR. No m/r/g. No jugular venous distension. Lungs:   CTAB. No wheezing, rhonchi, or rales. Symmetric expansion. Abdomen:   Soft, nontender, lap sites no drainage, no erythema. Extremities: No cyanosis or clubbing. No edema  Skin:     No rashes and normal coloration. Warm and dry. Neuro:  CN II-XII grossly intact. Psych:  Normal mood and affect.       I have personally reviewed labs and tests:  Recent Labs:  Recent Results (from the past 48 hour(s))   CBC with Auto Differential    Collection Time: 02/09/23  7:04 PM   Result Value Ref Range    WBC 9.2 4.3 - 11.1 K/uL    RBC 5.35 (H) 4.05 - 5.2 M/uL    Hemoglobin 16.2 (H) 11.7 - 15.4 g/dL    Hematocrit 48.3 (H) 35.8 - 46.3 %    MCV 90.3 82 - 102 FL    MCH 30.3 26.1 - 32.9 PG    MCHC 33.5 31.4 - 35.0 g/dL    RDW 13.1 11.9 - 14.6 %    Platelets 890 929 - 339 K/uL    MPV 9.4 9.4 - 12.3 FL    nRBC 0.00 0.0 - 0.2 K/uL    Differential Type AUTOMATED      Seg Neutrophils 74 43 - 78 %    Lymphocytes 20 13 - 44 %    Monocytes 5 4.0 - 12.0 %    Eosinophils % 1 0.5 - 7.8 %    Basophils 0 0.0 - 2.0 %    Immature Granulocytes 0 0.0 - 5.0 %    Segs Absolute 6.8 1.7 - 8.2 K/UL    Absolute Lymph # 1.8 0.5 - 4.6 K/UL    Absolute Mono # 0.5 0.1 - 1.3 K/UL    Absolute Eos # 0.1 0.0 - 0.8 K/UL    Basophils Absolute 0.0 0.0 - 0.2 K/UL    Absolute Immature Granulocyte 0.0 0.0 - 0.5 K/UL   CMP    Collection Time: 02/09/23  7:04 PM   Result Value Ref Range    Sodium 132 (L) 133 - 143 mmol/L    Potassium 4.1 3.5 - 5.1 mmol/L    Chloride 95 (L) 101 - 110 mmol/L    CO2 27 21 - 32 mmol/L    Anion Gap 10 2 - 11 mmol/L    Glucose 145 (H) 65 - 100 mg/dL    BUN 15 8 - 23 MG/DL    Creatinine 0.90 0.6 - 1.0 MG/DL    Est, Glom Filt Rate >60 >60 ml/min/1.73m2    Calcium 9.8 8.3 - 10.4 MG/DL    Total Bilirubin 0.6 0.2 - 1.1 MG/DL    ALT 33 12 - 65 U/L    AST 20 15 - 37 U/L    Alk Phosphatase 81 50 - 136 U/L    Total Protein 7.9 6.3 - 8.2 g/dL    Albumin 3.8 3.2 - 4.6 g/dL    Globulin 4.1 2.8 - 4.5 g/dL    Albumin/Globulin Ratio 0.9 0.4 - 1.6     TYPE AND SCREEN    Collection Time: 02/09/23  7:04 PM   Result Value Ref Range    Crossmatch expiration date 02/12/2023,2359     ABO/Rh O POSITIVE     Antibody Screen NEG    Lactate, Sepsis    Collection Time: 02/09/23  7:04 PM   Result Value Ref Range    Lactic Acid, Sepsis 1.4 0.4 - 2.0 MMOL/L   POC CHEMISTRY (NA,K,ICA,GLU,CALC HCT/HGB,LACTATE,CREA,CL)    Collection Time: 02/09/23  9:33 PM   Result Value Ref Range    POC Sodium 135 (L) 136 - 145 mmol/L    POC Potassium 3.6 3.5 - 5.1 mmol/L    POC Chloride 98 98 - 107 mmol/L    POC TCO2 23.2 21 - 32 mmol/L    POC Glucose 115 (H) 65 - 100 mg/dL    POC BUN 13 8 - 26 mg/dL    POC Creatinine 0.59 (L) 0.8 - 1.5 mg/dL    eGFR, POC >60 >60 ml/min/1.73m2    POC Lactic Acid 0.74 0.5 - 1.9 mmol/L    Performed by: Damian Mitchell    Lactate, Sepsis    Collection Time: 02/09/23  9:35 PM   Result Value Ref Range    Lactic Acid, Sepsis 0.8 0.4 - 2.0 MMOL/L   CBC with Auto Differential    Collection Time: 02/10/23 10:51 AM   Result Value Ref Range    WBC 10.5 4.3 - 11.1 K/uL    RBC 4.90 4.05 - 5.2 M/uL    Hemoglobin 14.6 11.7 - 15.4 g/dL    Hematocrit 44.1 35.8 - 46.3 %    MCV 90.0 82.0 - 102.0 FL    MCH 29.8 26.1 - 32.9 PG    MCHC 33.1 31.4 - 35.0 g/dL    RDW 13.1 11.9 - 14.6 %    Platelets 027 547 - 953 K/uL    MPV 9.5 9.4 - 12.3 FL    nRBC 0.00 0.0 - 0.2 K/uL    Differential Type AUTOMATED      Seg Neutrophils 75 43 - 78 %    Lymphocytes 18 13 - 44 %    Monocytes 6 4.0 - 12.0 %    Eosinophils % 1 0.5 - 7.8 %    Basophils 0 0.0 - 2.0 %    Immature Granulocytes 0 0.0 - 5.0 %    Segs Absolute 7.9 1.7 - 8.2 K/UL    Absolute Lymph # 1.9 0.5 - 4.6 K/UL    Absolute Mono # 0.6 0.1 - 1.3 K/UL    Absolute Eos # 0.1 0.0 - 0.8 K/UL    Basophils Absolute 0.0 0.0 - 0.2 K/UL    Absolute Immature Granulocyte 0.0 0.0 - 0.5 K/UL   Comprehensive Metabolic Panel w/ Reflex to MG    Collection Time: 02/10/23 10:51 AM   Result Value Ref Range    Sodium 135 133 - 143 mmol/L    Potassium 4.0 3.5 - 5.1 mmol/L    Chloride 104 101 - 110 mmol/L    CO2 26 21 - 32 mmol/L    Anion Gap 5 2 - 11 mmol/L    Glucose 97 65 - 100 mg/dL    BUN 9 8 - 23 MG/DL    Creatinine 0.68 0.6 - 1.0 MG/DL    Est, Glom Filt Rate >60 >60 ml/min/1.73m2    Calcium 9.0 8.3 - 10.4 MG/DL    Total Bilirubin 0.6 0.2 - 1.1 MG/DL    ALT 41 12 - 65 U/L    AST 37 15 - 37 U/L    Alk Phosphatase 72 50 - 130 U/L    Total Protein 6.9 6.3 - 8.2 g/dL    Albumin 3.3 3.2 - 4.6 g/dL    Globulin 3.6 2.8 - 4.5 g/dL    Albumin/Globulin Ratio 0.9 0.4 - 1.6     Basic Metabolic Panel w/ Reflex to MG    Collection Time: 02/11/23  6:19 AM   Result Value Ref Range    Sodium 138 133 - 143 mmol/L    Potassium 3.7 3.5 - 5.1 mmol/L    Chloride 105 101 - 110 mmol/L    CO2 25 21 - 32 mmol/L    Anion Gap 8 2 - 11 mmol/L    Glucose 96 65 - 100 mg/dL    BUN 6 (L) 8 - 23 MG/DL    Creatinine 0.36 (L) 0.6 - 1.0 MG/DL    Est, Glom Filt Rate >60 >60 ml/min/1.73m2    Calcium 7.9 (L) 8.3 - 10.4 MG/DL   CBC with Auto Differential    Collection Time: 02/11/23  6:19 AM   Result Value Ref Range WBC 6.8 4.3 - 11.1 K/uL    RBC 4.19 4.05 - 5.2 M/uL    Hemoglobin 12.4 11.7 - 15.4 g/dL    Hematocrit 38.4 35.8 - 46.3 %    MCV 91.6 82.0 - 102.0 FL    MCH 29.6 26.1 - 32.9 PG    MCHC 32.3 31.4 - 35.0 g/dL    RDW 13.2 11.9 - 14.6 %    Platelets 911 257 - 684 K/uL    MPV 9.4 9.4 - 12.3 FL    nRBC 0.00 0.0 - 0.2 K/uL    Differential Type AUTOMATED      Seg Neutrophils 72 43 - 78 %    Lymphocytes 20 13 - 44 %    Monocytes 7 4.0 - 12.0 %    Eosinophils % 1 0.5 - 7.8 %    Basophils 0 0.0 - 2.0 %    Immature Granulocytes 0 0.0 - 5.0 %    Segs Absolute 4.9 1.7 - 8.2 K/UL    Absolute Lymph # 1.4 0.5 - 4.6 K/UL    Absolute Mono # 0.5 0.1 - 1.3 K/UL    Absolute Eos # 0.1 0.0 - 0.8 K/UL    Basophils Absolute 0.0 0.0 - 0.2 K/UL    Absolute Immature Granulocyte 0.0 0.0 - 0.5 K/UL       I have personally reviewed imaging studies:  Other Studies:  XR ABDOMEN (KUB) (SINGLE AP VIEW)   Final Result   NG tube in good position.           Current Meds:  Current Facility-Administered Medications   Medication Dose Route Frequency    donepezil (ARICEPT) tablet 5 mg  5 mg Oral Nightly    memantine (NAMENDA) tablet 10 mg  10 mg Oral BID    Vitamin D (CHOLECALCIFEROL) tablet 2,000 Units  2,000 Units Oral Daily    sodium chloride flush 0.9 % injection 5-40 mL  5-40 mL IntraVENous 2 times per day    sodium chloride flush 0.9 % injection 5-40 mL  5-40 mL IntraVENous PRN    0.9 % sodium chloride infusion   IntraVENous PRN    [Held by provider] enoxaparin (LOVENOX) injection 40 mg  40 mg SubCUTAneous Daily    ondansetron (ZOFRAN-ODT) disintegrating tablet 4 mg  4 mg Oral Q8H PRN    Or    ondansetron (ZOFRAN) injection 4 mg  4 mg IntraVENous Q6H PRN    polyethylene glycol (GLYCOLAX) packet 17 g  17 g Oral Daily PRN    acetaminophen (TYLENOL) tablet 650 mg  650 mg Oral Q6H PRN    Or    acetaminophen (TYLENOL) suppository 650 mg  650 mg Rectal Q6H PRN    0.9 % sodium chloride infusion   IntraVENous Continuous    morphine injection 2 mg  2 mg IntraVENous Q4H PRN    pantoprazole (PROTONIX) 40 mg in sodium chloride (PF) 0.9 % 10 mL injection  40 mg IntraVENous Daily       Signed:  Cornelius Vicente, APRN - CNP

## 2023-02-11 NOTE — PROGRESS NOTES
Patient pulled NGT out.  Patient said, \"I'm tired of all these things.  I did not know they're going to cut me.\"  Kim Frommel NP notified.

## 2023-02-11 NOTE — PERIOP NOTE
MD Lao  at bedside with patient. Pt VSS stable. Pain and Nausea controlled at this time. Verbal sign out per MD when pacu cares completed. Plan of care continues. 24H events:    Patient is a 78y old Female who presents with a chief complaint of Chest pain (11 Dec 2018 19:11)    Primary diagnosis of Chest pain     Today is hospital day 1d. No acute events over night. Awaiting cath.     PAST MEDICAL & SURGICAL HISTORY  Hypertension  No significant past surgical history    SOCIAL HISTORY:  Negative for smoking/alcohol/drug use.     ALLERGIES:  No Known Allergies    MEDICATIONS:  STANDING MEDICATIONS  aspirin  chewable 81 milliGRAM(s) Oral daily  atorvastatin 80 milliGRAM(s) Oral at bedtime  chlorhexidine 4% Liquid 1 Application(s) Topical <User Schedule>  clopidogrel Tablet 75 milliGRAM(s) Oral daily  heparin  Infusion 800 Unit(s)/Hr IV Continuous <Continuous>  metoprolol tartrate 25 milliGRAM(s) Oral every 12 hours  ondansetron Injectable 4 milliGRAM(s) IV Push once  pantoprazole    Tablet 40 milliGRAM(s) Oral before breakfast  sodium chloride 0.9%. 1000 milliLiter(s) IV Continuous <Continuous>    PRN MEDICATIONS  heparin  Injectable 4100 Unit(s) IV Push every 6 hours PRN    VITALS:   T(F): 97.2  HR: 54  BP: 104/51  RR: 33  SpO2: 98%    LABS:                        11.2   6.85  )-----------( 231      ( 12 Dec 2018 04:15 )             34.3     12-    146  |  108  |  14  ----------------------------<  98  4.5   |  24  |  1.0    Ca    9.1      12 Dec 2018 04:15  Mg     2.1     12-    TPro  7.0  /  Alb  4.2  /  TBili  0.4  /  DBili  x   /  AST  19  /  ALT  14  /  AlkPhos  94  12-11    PTT - ( 12 Dec 2018 07:41 )  PTT:48.8 sec  Urinalysis Basic - ( 11 Dec 2018 21:28 )    Color: Yellow / Appearance: Cloudy / S.010 / pH: x  Gluc: x / Ketone: Negative  / Bili: Negative / Urobili: 0.2 mg/dL   Blood: x / Protein: Negative mg/dL / Nitrite: Positive   Leuk Esterase: Small / RBC: 6-10 /HPF / WBC 6-10 /HPF   Sq Epi: x / Non Sq Epi: x / Bacteria: Many /HPF        Troponin T, Serum: <0.01 ng/mL (18 @ 04:15)  Creatine Kinase, Serum: 109 U/L (18 @ 04:15)  Troponin T, Serum: <0.01 ng/mL (18 @ 00:14)  Creatine Kinase, Serum: 110 U/L (18 @ 00:14)  Troponin T, Serum: <0.01 ng/mL (18 @ 17:13)      CARDIAC MARKERS ( 12 Dec 2018 04:15 )  x     / <0.01 ng/mL / 109 U/L / x     / 2.1 ng/mL  CARDIAC MARKERS ( 12 Dec 2018 00:14 )  x     / <0.01 ng/mL / 110 U/L / x     / 2.1 ng/mL  CARDIAC MARKERS ( 11 Dec 2018 17:13 )  x     / <0.01 ng/mL / x     / x     / x          RADIOLOGY:    PHYSICAL EXAM:  GEN: No acute distress  LUNGS: Clear to auscultation bilaterally   HEART: S1/S2 present. RRR.   ABD: Soft, non-tender, non-distended. Bowel sounds present  EXT: No edema  NEURO: AAOX3      A/P    77 yo F with PMH of HTN present with substernal chest pressure relieved with rest, for the past 2 and half weeks a/w exertional dyspnea, found to have T wave bipahsic inversions anteroseptal leads. Patient was evaluated by cardio team in ED and recommends to start on heparin drip and CCU monitoring.       Typical Chest pain 2/2 Unstable angina   - Patient is currently asymptomatic and hemodynamically stable.   - 1st CE: negative.   - BNP 8394  - EKG: T wave bipahsic inversions anteroseptal leads. Wellens' pattern on ECG -->Possible Proximal LAD stenosis.  - s/p ASA 162mg, Plavix 300mg x 1 in ED.  - start on heparin drip ACS protocol (monitor PTT with target of 55-75)  - c/w ASA, Plavix and statin  - c/w lopressor 25mg BID   - f/u 2nd CE  - f/u Lipid panel and HbA1c  - check Echo  - If Chest pain recurs --> get STAT EKG, CE and notify Cardio team.    - Awaiting cath  - Cardio recs appreciated.   - will monitor in CCU.    FELIZ or CKD?  - will start on IVF NS @ 75cc/hr  - monitor BMP    HTN  - c/w home meds      DVT ppx: on heparin drip  GI ppx: PPI   Activity: Bed rest for now  Diet: DASH diet

## 2023-02-12 LAB
ANION GAP SERPL CALC-SCNC: 7 MMOL/L (ref 2–11)
BASOPHILS # BLD: 0 K/UL (ref 0–0.2)
BASOPHILS NFR BLD: 0 % (ref 0–2)
BUN SERPL-MCNC: 4 MG/DL (ref 8–23)
CALCIUM SERPL-MCNC: 8.1 MG/DL (ref 8.3–10.4)
CHLORIDE SERPL-SCNC: 107 MMOL/L (ref 101–110)
CO2 SERPL-SCNC: 25 MMOL/L (ref 21–32)
CREAT SERPL-MCNC: 0.34 MG/DL (ref 0.6–1)
DIFFERENTIAL METHOD BLD: ABNORMAL
EOSINOPHIL # BLD: 0.2 K/UL (ref 0–0.8)
EOSINOPHIL NFR BLD: 2 % (ref 0.5–7.8)
ERYTHROCYTE [DISTWIDTH] IN BLOOD BY AUTOMATED COUNT: 12.9 % (ref 11.9–14.6)
GLUCOSE SERPL-MCNC: 66 MG/DL (ref 65–100)
HCT VFR BLD AUTO: 38.7 % (ref 35.8–46.3)
HGB BLD-MCNC: 12.6 G/DL (ref 11.7–15.4)
IMM GRANULOCYTES # BLD AUTO: 0 K/UL (ref 0–0.5)
IMM GRANULOCYTES NFR BLD AUTO: 0 % (ref 0–5)
LYMPHOCYTES # BLD: 1.3 K/UL (ref 0.5–4.6)
LYMPHOCYTES NFR BLD: 20 % (ref 13–44)
MAGNESIUM SERPL-MCNC: 1.7 MG/DL (ref 1.8–2.4)
MCH RBC QN AUTO: 29.6 PG (ref 26.1–32.9)
MCHC RBC AUTO-ENTMCNC: 32.6 G/DL (ref 31.4–35)
MCV RBC AUTO: 90.8 FL (ref 82–102)
MM INDURATION, POC: 0 MM (ref 0–5)
MONOCYTES # BLD: 0.4 K/UL (ref 0.1–1.3)
MONOCYTES NFR BLD: 7 % (ref 4–12)
NEUTS SEG # BLD: 4.5 K/UL (ref 1.7–8.2)
NEUTS SEG NFR BLD: 70 % (ref 43–78)
NRBC # BLD: 0 K/UL (ref 0–0.2)
PLATELET # BLD AUTO: 191 K/UL (ref 150–450)
PMV BLD AUTO: 9.3 FL (ref 9.4–12.3)
POTASSIUM SERPL-SCNC: 3.4 MMOL/L (ref 3.5–5.1)
PPD, POC: NEGATIVE
RBC # BLD AUTO: 4.26 M/UL (ref 4.05–5.2)
SODIUM SERPL-SCNC: 139 MMOL/L (ref 133–143)
WBC # BLD AUTO: 6.5 K/UL (ref 4.3–11.1)

## 2023-02-12 PROCEDURE — 6370000000 HC RX 637 (ALT 250 FOR IP): Performed by: SURGERY

## 2023-02-12 PROCEDURE — C9113 INJ PANTOPRAZOLE SODIUM, VIA: HCPCS | Performed by: SURGERY

## 2023-02-12 PROCEDURE — 6360000002 HC RX W HCPCS: Performed by: NURSE PRACTITIONER

## 2023-02-12 PROCEDURE — 2580000003 HC RX 258: Performed by: SURGERY

## 2023-02-12 PROCEDURE — 6360000002 HC RX W HCPCS: Performed by: SURGERY

## 2023-02-12 PROCEDURE — 83735 ASSAY OF MAGNESIUM: CPT

## 2023-02-12 PROCEDURE — 36415 COLL VENOUS BLD VENIPUNCTURE: CPT

## 2023-02-12 PROCEDURE — 1100000000 HC RM PRIVATE

## 2023-02-12 PROCEDURE — 85025 COMPLETE CBC W/AUTO DIFF WBC: CPT

## 2023-02-12 PROCEDURE — 80048 BASIC METABOLIC PNL TOTAL CA: CPT

## 2023-02-12 PROCEDURE — 6360000002 HC RX W HCPCS: Performed by: HOSPITALIST

## 2023-02-12 PROCEDURE — 97530 THERAPEUTIC ACTIVITIES: CPT

## 2023-02-12 PROCEDURE — 6370000000 HC RX 637 (ALT 250 FOR IP): Performed by: NURSE PRACTITIONER

## 2023-02-12 RX ORDER — MAGNESIUM SULFATE IN WATER 40 MG/ML
2000 INJECTION, SOLUTION INTRAVENOUS ONCE
Status: COMPLETED | OUTPATIENT
Start: 2023-02-12 | End: 2023-02-12

## 2023-02-12 RX ORDER — DIPHENHYDRAMINE HYDROCHLORIDE 50 MG/ML
25 INJECTION INTRAMUSCULAR; INTRAVENOUS ONCE
Status: COMPLETED | OUTPATIENT
Start: 2023-02-12 | End: 2023-02-12

## 2023-02-12 RX ORDER — DIPHENHYDRAMINE HYDROCHLORIDE 50 MG/ML
25 INJECTION INTRAMUSCULAR; INTRAVENOUS NIGHTLY PRN
Status: DISCONTINUED | OUTPATIENT
Start: 2023-02-13 | End: 2023-02-17

## 2023-02-12 RX ADMIN — DIPHENHYDRAMINE HYDROCHLORIDE 25 MG: 50 INJECTION, SOLUTION INTRAMUSCULAR; INTRAVENOUS at 02:57

## 2023-02-12 RX ADMIN — SODIUM CHLORIDE: 9 INJECTION, SOLUTION INTRAVENOUS at 03:11

## 2023-02-12 RX ADMIN — SODIUM CHLORIDE, PRESERVATIVE FREE 40 MG: 5 INJECTION INTRAVENOUS at 08:38

## 2023-02-12 RX ADMIN — MAGNESIUM SULFATE HEPTAHYDRATE 2000 MG: 40 INJECTION, SOLUTION INTRAVENOUS at 08:01

## 2023-02-12 RX ADMIN — VITAMIN D, TAB 1000IU (100/BT) 2000 UNITS: 25 TAB at 08:37

## 2023-02-12 RX ADMIN — DONEPEZIL HYDROCHLORIDE 5 MG: 5 TABLET, FILM COATED ORAL at 22:32

## 2023-02-12 RX ADMIN — MEMANTINE 10 MG: 5 TABLET ORAL at 22:32

## 2023-02-12 RX ADMIN — MEMANTINE 10 MG: 5 TABLET ORAL at 08:37

## 2023-02-12 RX ADMIN — SODIUM CHLORIDE, PRESERVATIVE FREE 10 ML: 5 INJECTION INTRAVENOUS at 09:02

## 2023-02-12 RX ADMIN — POTASSIUM BICARBONATE 40 MEQ: 782 TABLET, EFFERVESCENT ORAL at 08:37

## 2023-02-12 RX ADMIN — SODIUM CHLORIDE: 9 INJECTION, SOLUTION INTRAVENOUS at 15:01

## 2023-02-12 NOTE — PROGRESS NOTES
H&P/Consult Note/Progress Note/Office Note:   Alice Tineo  MRN: [id number]  ALZ:[birth year]  Age:82 y.o.    HPI: Alice Tineo is a 80 y.o. female who underwent Ex laparotomy with SANDHYA for SBO     S/ Feeling well this am, no major complaints   No flatus or BM      Past Medical History:   Diagnosis Date    Anemia, unspecified     patient denies hx of anemia    Arthritis     osteoarthritis    Breast cancer ([facility] [address] 2019    Cancer ([facility] [address] 07/2013    right breast lumpectomy    Chronic pain     d/t rheumatoid arthritis    HTN (hypertension) 03/18/2012    patient states not a current problem, no medication. Hypercholesterolemia     no meds    Ill-defined condition     \"I can't take anything strong\"    Menopause     Nausea & vomiting     patient denies post-op N/V    Osteoarthritis of both shoulders     Osteoarthritis of right hip     Followed by Dr. Leonidas Holland     Poor historian     Rheumatoid arthritis [facility])     Patient does not see Rheumatologist; no prescription medication. Total knee replacement status 3/15/2012    Unspecified adverse effect of anesthesia     headache after general anesthesia x 1    Vitamin D deficiency disease     pt unaware of issue,diagnosis doc prior to 3/9/12; managed with daily vitamin D supplement.       Past Surgical History:   Procedure Laterality Date    APPENDECTOMY      with hysterectomy    BREAST LUMPECTOMY  7/31/2013    BREAST NEEDLE LOCALIZATION WITH MASS EXCISION AND SENTINAL NODE BIOPSY performed by Brianne Jaffe MD at [address] [place] [address] (CERVIX STATUS UNKNOWN)  1987    MASTECTOMY Right 3/27/2019    RIGHT BREAST MASTECTOMY SIMPLE performed by Shantal Ojeda MD at [place] [address] Right 2016    TOTAL HIP ARTHROPLASTY Left 2018    TOTAL KNEE ARTHROPLASTY Left 8/2010    left    TOTAL KNEE ARTHROPLASTY Right 3/2012    right    US BREAST BIOPSY W LOC DEVICE 1ST LESION RIGHT Right 2/26/2019    US BREAST NEEDLE BIOPSY RIGHT 2/26/2019 [facility]     Current Facility-Administered Medications   Medication Dose Route Frequency    magnesium sulfate 2000 mg in 50 mL IVPB premix  2,000 mg IntraVENous Once    donepezil (ARICEPT) tablet 5 mg  5 mg Oral Nightly    memantine (NAMENDA) tablet 10 mg  10 mg Oral BID    Vitamin D (CHOLECALCIFEROL) tablet 2,000 Units  2,000 Units Oral Daily    sodium chloride flush 0.9 % injection 5-40 mL  5-40 mL IntraVENous 2 times per day    sodium chloride flush 0.9 % injection 5-40 mL  5-40 mL IntraVENous PRN    0.9 % sodium chloride infusion   IntraVENous PRN    [Held by provider] enoxaparin (LOVENOX) injection 40 mg  40 mg SubCUTAneous Daily    ondansetron (ZOFRAN-ODT) disintegrating tablet 4 mg  4 mg Oral Q8H PRN    Or    ondansetron (ZOFRAN) injection 4 mg  4 mg IntraVENous Q6H PRN    polyethylene glycol (GLYCOLAX) packet 17 g  17 g Oral Daily PRN    acetaminophen (TYLENOL) tablet 650 mg  650 mg Oral Q6H PRN    Or    acetaminophen (TYLENOL) suppository 650 mg  650 mg Rectal Q6H PRN    0.9 % sodium chloride infusion   IntraVENous Continuous    morphine injection 2 mg  2 mg IntraVENous Q4H PRN    pantoprazole (PROTONIX) 40 mg in sodium chloride (PF) 0.9 % 10 mL injection  40 mg IntraVENous Daily     ALLERGIES:  Ciprofloxacin, Gluten meal, Hydromorphone, Hydroxychloroquine, Ketoconazole, Meperidine, Prednisone, Yellow dye, and Codeine    Social History     Socioeconomic History    Marital status:       Spouse name: None    Number of children: None    Years of education: None    Highest education level: None   Tobacco Use    Smoking status: Never    Smokeless tobacco: Never   Substance and Sexual Activity    Alcohol use: No    Drug use: No     Social Determinants of Health     Physical Activity: Insufficiently Active    Days of Exercise per Week: 7 days    Minutes of Exercise per Session: 10 min     Social History     Tobacco Use   Smoking Status Never Smokeless Tobacco Never     Family History   Problem Relation Age of Onset    Hypertension Mother     No Known Problems Father     Osteoarthritis Mother     Breast Cancer Neg Hx     Other Other         family hx of HTN     ROS: The patient has no difficulty with chest pain or shortness of breath. No fever or chills. Comprehensive review of systems was otherwise unremarkable except as noted above. Physical Exam:   BP (!) 164/65   Pulse 82   Temp 98.2 °F (36.8 °C) (Oral)   Resp 16   Ht 5' 6\" (1.676 m)   Wt 143 lb 1.6 oz (64.9 kg)   SpO2 95%   BMI 23.10 kg/m²   Vitals:    02/11/23 2001 02/11/23 2337 02/12/23 0429 02/12/23 0748   BP: (!) 149/54 (!) 154/60 (!) 142/64 (!) 164/65   Pulse: 85 92 84 82   Resp: 16 16 18 16   Temp: 98.2 °F (36.8 °C) 98.2 °F (36.8 °C) 98.7 °F (37.1 °C) 98.2 °F (36.8 °C)   TempSrc: Axillary Axillary Axillary Oral   SpO2: 93% 93%  95%   Weight:       Height:         02/12 0701 - 02/12 1900  In: 0   Out: 300 [Urine:300]  02/10 1901 - 02/12 0700  In: 0977 [P.O.:50; I.V.:4124]  Out: 5898 [Urine:950]    Constitutional: Alert, oriented, cooperative patient in no acute distress; appears stated age    Eyes:Sclera are clear. EOMs intact  ENMT: no external lesions gross hearing normal; no obvious neck masses, no ear or lip lesions, nares normal  CV: RRR. Normal perfusion  Resp: No JVD. Breathing is  non-labored; no audible wheezing. GI: soft and non-distended . Incision OK   Musculoskeletal: unremarkable with normal function. No embolic signs or cyanosis.    Neuro:  Oriented; moves all 4; no focal deficits  Psychiatric: normal affect and mood, no memory impairment    Recent vitals (if inpt):  Patient Vitals for the past 24 hrs:   BP Temp Temp src Pulse Resp SpO2   02/12/23 0748 (!) 164/65 98.2 °F (36.8 °C) Oral 82 16 95 %   02/12/23 0429 (!) 142/64 98.7 °F (37.1 °C) Axillary 84 18 --   02/11/23 2337 (!) 154/60 98.2 °F (36.8 °C) Axillary 92 16 93 %   02/11/23 2001 (!) 149/54 98.2 °F (36.8 °C) Axillary 85 16 93 %   [birth year]1940 -- -- -- 98 -- --   02/11/23 1534 (!) 147/60 97.9 °F (36.6 °C) Oral 81 17 95 %   02/11/23 1135 (!) 151/67 98.2 °F (36.8 °C) Oral 86 16 95 %       Amount and/or Complexity of Data Reviewed and Analyzed:  I reviewed and analyzed all of the unique labs and radiologic studies that are shown below as well as any that are in the HPI, and any that are in the expanded problem list below  *Each unique test, order, or document contributes to the combination of 2 or combination of 3 in Category 1 below. For this visit I also reviewed old records and prior notes. Recent Labs     02/10/23  1051 02/11/23  0619 02/12/23  0501   WBC 10.5   < > 6.5   HGB 14.6   < > 12.6      < > 191      < > 139   K 4.0   < > 3.4*      < > 107   CO2 26   < > 25   BUN 9   < > 4*   ALT 41  --   --     < > = values in this interval not displayed.      Review of most recent CBC  Lab Results   Component Value Date    WBC 6.5 02/12/2023    HGB 12.6 02/12/2023    HCT 38.7 02/12/2023    MCV 90.8 02/12/2023     02/12/2023       Review of most recent BMP  Lab Results   Component Value Date/Time     02/12/2023 05:01 AM    K 3.4 02/12/2023 05:01 AM     02/12/2023 05:01 AM    CO2 25 02/12/2023 05:01 AM    BUN 4 02/12/2023 05:01 AM    CREATININE 0.34 02/12/2023 05:01 AM    GLUCOSE 66 02/12/2023 05:01 AM    CALCIUM 8.1 02/12/2023 05:01 AM        Review of most recent LFTs (and lipase if done)  Lab Results   Component Value Date    ALT 41 02/10/2023    AST 37 02/10/2023    ALKPHOS 72 02/10/2023    BILITOT 0.6 02/10/2023     No results found for: LIPASE    No results found for: INR, APTT, LCAD    Review of most recent HgbA1c  Hemoglobin A1C   Date Value Ref Range Status   02/17/2022 5.2 4.8 - 5.6 % Final     Comment:              Prediabetes: 5.7 - 6.4           Diabetes: >6.4           Glycemic control for adults with diabetes: <7.0         Nutritional assessment screen for wound healing issues:  Lab Results   Component Value Date    LABALBU 3.3 02/10/2023       @lastcovr@    Xray Result (most recent):  XR ABDOMEN (KUB) (SINGLE AP VIEW) 02/10/2023    Narrative  Portable chest x-ray    CLINICAL INDICATION: Evaluate NG tube    FINDINGS: Single AP view the chest submitted without comparison shows an NG tube  in satisfactory position in the left upper abdominal quadrant. Lungs are  expanded and clear. The cardiac silhouette and mediastinum are unremarkable. Impression  NG tube in good position. CT Result (most recent):  CT ABDOMEN PELVIS GI BLEED 02/09/2023    Narrative  EXAMINATION:  CT ABDOMEN PELVIS without and with contrast GI BLEED    DATE: 2/9/2023 7:00 PM    INDICATION: Hematemesis, dark tarry stools ;    COMPARISON: None. PROCEDURE:  CT of the abdomen and pelvis was performed prior to and following  the intravenous administration of iodinated contrast material.  CT dose lowering  techniques were used, to include: automated exposure control, adjustment for  patient size, and or use of iterative reconstruction. Arterial and venous phase postcontrast imaging. FINDINGS:    Visualized lower chest: Minor nodular groundglass opacities in the right middle  lobe. 4 mm subpleural nodule right lower lobe, series 2 image seven, likely a  fissural lymph node. Moderate coronary artery atherosclerotic calcifications. ABDOMEN:    Liver and Biliary system:  No suspicious liver lesion. No biliary ductal  dilation. Gallbladder:  Cholecystectomy. Spleen:  Normal.    Pancreas:  Normal.    Adrenal glands:  4 cm myelolipoma left adrenal gland, benign lesion. .    Kidneys and ureters:  Kidneys enhance symmetrically. No suspicious mass. No  hydronephrosis. Aorta/IVC:  Moderate atherosclerotic calcifications. No aortic aneurysm or  dissection. IVC normal.    Lymph nodes:  No lymphadenopathy. Gastrointestinal tract:  Stomach and proximal small bowel distended with fluid.   The proximal small bowel is mildly dilated. There is transition zone in the  right lower quadrant with suggestion of two transition points/closed loop. Distal small bowel decompressed. Appendix not visualized; no pericecal  inflammation. . No colonic wall thickening or dilation. Diverticulosis sigmoid  colon. Peritoneum/Mesentery: No pneumoperitoneum. No ascites. Abdominal wall: Unremarkable. PELVIS:    Urinary bladder: Largely obscured by artifact. Reproductive organs: Uterus not visualized    Lymph Nodes: No pelvic lymphadenopathy. BONES:  Bilateral hip arthroplasties with associated artifact limiting  evaluation the lower pelvis. Degenerative changes in the spine. .    Impression  1. Small bowel obstruction with transition zone in the right lower quadrant. Configuration raises possibility of a closed loop obstruction. Recommend  surgical consultation. 2.  Minimal right middle lobe pulmonary infiltrate, possible aspiration and/or  early pneumonia. 3.  No evidence of active extravasation. 4.  Chronic findings as above. [place] M.D.  2/9/2023 11:51:00 PM    US Result (most recent):  [address] RIGHT 02/26/2019    Addendum 3/1/2019  8:05 AM  Addendum: Niko Mcdaniel, accession number: [id number]  Date: 2/28/2019 Pathology was noted as Right breast, 11:00 position, 4 cm from  nipple, core biopsy: Infiltrating duct carcinoma, low grade (well  differentiated). Definite in situ component and lymphovascular invasion are not  identified. Results concordant with imaging. Pathology report was faxed to Dr. Sidney Linton office on 1/27/5405 by Mendel Faden, RT(R)([facility]). Patient was referred to  Dr. Shawna Mcneil and has an appointment scheduled with him on 2/28/2019. Narrative  This is a summary report. The complete report is available in the patient's medical record.  If you cannot access the medical record, please contact the sending organization for a detailed fax or copy. ULTRASOUND-GUIDED CORE BREAST BIOPSY  DIGITAL DIAGNOSTIC MAMMOGRAM    CLINICAL INDICATION: Enlarging palpable right breast mass adjacent to a  lumpectomy scar. COMPARISON: 2/7/2019, 11/10/2017    PROCEDURE: After discussion of the risks and benefits, including but not limited  to bleeding and infection, both written and verbal informed consent were  obtained. The overlying skin was prepped in sterile fashion. Preprocedural sonography of the right axilla showed no evidence of  lymphadenopathy. Total of 6 mL of 1% Lidocaine was used for local anesthesia. Under ultrasound  guidance 3 core samples were obtained with a 14 gauge Achieve biopsy needle. A  marker clip was placed within the lesion. The needle was withdrawn and  hemostasis was obtained. The patient tolerated the procedure well without  complications. Postprocedural right breast mammogram was obtained showing appropriate location  of the clip. Impression  IMPRESSION: Successful ultrasound-guided core biopsy of 11:00 right breast mass  . Pathology results are pending. Admission date (for inpatients): 2/10/2023   2 Days Post-Op  Procedure(s):  EXPLORATORY LAPAROSCOPY LYSIS OF ADHESIONS        ASSESSMENT/PLAN:  [unfilled]  Principal Problem:    SBO (small bowel obstruction) (HCC)  Active Problems:    Breast cancer, right breast (HCC)    Mixed hyperlipidemia  Resolved Problems:    * No resolved hospital problems.  *     Patient Active Problem List    Diagnosis Date Noted    SBO (small bowel obstruction) ([facility] [address] 02/10/2023     Priority: Medium    Low serum vitamin D 03/03/2022    Alzheimer's disease, unspecified (CODE) ([facility] [address] 03/03/2022    Mixed hyperlipidemia 03/03/2022    Breast cancer, right breast ([facility] [address] 03/27/2019    Malignant neoplasm of lower-outer quadrant of right breast of female, estrogen receptor positive ([facility] [address] 02/28/2019    Abnormal breast exam 01/02/2018    Rheumatoid arthritis involving multiple sites with positive rheumatoid factor (HCC)     Arthritis of left hip 09/01/2016    S/P total hip arthroplasty 09/01/2016    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    Low back pain 09/20/2013    Hormone receptor positive breast cancer, unspecified laterality ([facility] [address] 09/20/2013    Osteoarthritis of both shoulders     Anemia, unspecified     Osteoarthritis of right knee 03/15/2012    Osteoarthritis of left knee 08/31/2010    Total knee replacement status 08/31/2010          Number and Complexity of Problems addressed and   Risks of complications and/or morbidity of management                  Level of MDM (2/3 elements below)  Number and Complexity of Problems Addressed Amount and/or Complexity of Data to be Reviewed and Analyzed  *Each unique test, order, or document contributes to the combination of 2 or combination of 3 in Category 1 below. Risk of Complications and/or Morbidity or Mortality of pt Management     97894  [id number] SF Minimal  ?1self-limited or minor problem Minimal or none Minimal risk of morbidity from additional diagnostic testing or Rx   66823  37060 Low Low  ? 2or more self-limited or minor problems;    or  ? 1stable chronic illness;    or  ?1acute, uncomplicated illness or injury   Limited  (Must meet the requirements of at least 1 of the 2 categories)  Category 1: Tests and documents   ? Any combination of 2 from the following:  ?Review of prior external note(s) from each unique source*;  ?review of the result(s) of each unique test*;   ?ordering of each unique test*    or   Category 2: Assessment requiring an independent historian(s)  (For the categories of independent interpretation of tests and discussion of management or test interpretation, see moderate or high) Low risk of morbidity from additional diagnostic testing or treatment     55300  57671 Mod Moderate  ? 1or more chronic illnesses with exacerbation, progression, or side effects of treatment;    or  ?2or more stable chronic illnesses;    or  ?1undiagnosed new problem with uncertain prognosis;    or  ?1acute illness with systemic symptoms;    or  ?1acute complicated injury   Moderate  (Must meet the requirements of at least 1 out of 3 categories)  Category 1: Tests, documents, or independent historian(s)  ? Any combination of 3 from the following:   ?Review of prior external note(s) from each unique source*;  ?Review of the result(s) of each unique test*;  ?Ordering of each unique test*;  ?Assessment requiring an independent historian(s)    or  Category 2: Independent interpretation of tests   ? Independent interpretation of a test performed by another physician/other qualified health care professional (not separately reported);     or  Category 3: Discussion of management or test interpretation  ? Discussion of management or test interpretation with external physician/other qualified health care professional/appropriate source (not separately reported)   Moderate risk of morbidity from additional diagnostic testing or treatment  Examples only:  ?Prescription drug management   ? Decision regarding minor surgery with identified patient or procedure risk factors  ? Decision regarding elective major surgery without identified patient or procedure risk factors   ? Diagnosis or treatment significantly limited by social determinants of health       62710  72339 High High  ? 1or more chronic illnesses with severe exacerbation, progression, or side effects of treatment;    or  ?1 acute or chronic illness or injury that poses a threat to life or bodily function   Extensive  (Must meet the requirements of at least 2 out of 3 categories)  Category 1: Tests, documents, or independent historian(s)  ? Any combination of 3 from the following:   ?Review of prior external note(s) from each unique source*;  ?Review of the result(s) of each unique test*;   ?Ordering of each unique test*;   ?Assessment requiring an independent historian(s)    or   Category 2: Independent interpretation of tests ?Independent interpretation of a test performed by another physician/other qualified health care professional (not separately reported);     or  Category 3: Discussion of management or test interpretation  ? Discussion of management or test interpretation with external physician/other qualified health care professional/appropriate source (not separately reported)   High risk of morbidity from additional diagnostic testing or treatment  Examples only:  ?Drug therapy requiring intensive monitoring for toxicity  ? Decision regarding elective major surgery with identified patient or procedure risk factors  ? Decision regarding emergency major surgery  ? Decision regarding hospitalization  ? Decision not to resuscitate or to de-escalate care because of poor prognosis      Parenteral controlled substances             I have personally performed a face-to-face diagnostic evaluation and management  service on this patient. I have independently seen the patient. I have independently obtained the above history from the patient/family. I have independently examined the patient with above findings. I have independently reviewed data/labs for this patient and developed the above plan of care (MDM). A/P  POD # 2  s/p ex lap with lysis of adhesions. She is doing well, good UO, confused this am  WBC normal,  No flatus yet.      -NGT  -IVF  -Ice chips     Signed: Javier Clifton MD  2/12/2023    11:16 AM

## 2023-02-12 NOTE — PROGRESS NOTES
ACUTE PHYSICAL THERAPY GOALS:   (Developed with and agreed upon by patient and/or caregiver. )    LTG:  (1.)Ms. Herrera Rosado will move from supine to sit and sit to supine  in bed with SUPERVISION within 7 treatment day(s). (2.)Ms. Herrera Rosado will transfer from bed to chair and chair to bed with INDEPENDENT using the least restrictive device within 7 treatment day(s). (3.)Ms. Herrera Rosado will ambulate with SUPERVISION for 300 feet with the least restrictive device within 7 treatment day(s). ________________________________________________________________________________________________     PHYSICAL THERAPY Daily Note and AM  (Link to Caseload Tracking: PT Visit Days : 2  Acknowledge Orders  Time In/Out  PT Charge Capture  Rehab Caseload Tracker    Robi Harper is a 80 y.o. female   PRIMARY DIAGNOSIS: SBO (small bowel obstruction) (HCC)  SBO (small bowel obstruction) (HCC) [K56.609]  Procedure(s) (LRB):  EXPLORATORY LAPAROSCOPY LYSIS OF ADHESIONS (N/A)  2 Days Post-Op  Reason for Referral: Generalized Muscle Weakness (M62.81)  Other abnormalities of gait and mobility (R26.89)  Inpatient: Payor: Yonathan Chimera / Plan: Sridhar Velazco / Product Type: *No Product type* /     ASSESSMENT:     REHAB RECOMMENDATIONS:   Recommendation to date pending progress:  Setting:  Home Health Therapy    Equipment:    None     ASSESSMENT:  Ms. Herrera Rosado presents this admission with SBO and is somewhat limited from her baseline with functional mobility. She will discharge home and has family/friend support but lives alone. She will benefit from PT to increase her functional independence and she should be able to return home without further therapy. She was supine on contact and needing to use the bathroom. Transferred supine to sit with CGA and then ambulated 20 ft to the bathroom with SBA/CGA. She toileted with SBA and practiced sit to stand while changing brief. Needed only SBA/Supervision for toileting.  She ambulated back to the bed and returned supine with SBA. Going for Laparoscopy this evening. 2/12- pt up in chair on contact, RN clamped NG tube and disconnected IV. We worked on walking in recio, pt walked 200 feet with CGA. Decreased step length and shuffling gait. Back in room in recliner pt did 20 ankle pumps, 20 hip ABD/ADD and 10 straight leg raises. Pt feeling winded from the activity. She stayed up in chair with needs in reach. Hopeful since her surgery that she can still return home with HHPT for follow up. She has bilateral finger contractures, RN trying to keep rolled wash cloths in her hands, got her set up with these in chair. [facility] [address] [id number] AM-PAC 6 Clicks Basic Mobility Inpatient Short Form  AM-PAC Basic Mobility - Inpatient   How much help is needed turning from your back to your side while in a flat bed without using bedrails?: None  How much help is needed moving from lying on your back to sitting on the side of a flat bed without using bedrails?: A Little  How much help is needed moving to and from a bed to a chair?: A Little  How much help is needed standing up from a chair using your arms?: None  How much help is needed walking in hospital room?: None  How much help is needed climbing 3-5 steps with a railing?: A Little  [facility] Inpatient Mobility Raw Score : 21  AM-PAC Inpatient T-Scale Score : 50.25  Mobility Inpatient CMS 0-100% Score: 28.97  Mobility Inpatient CMS G-Code Modifier : CJ    SUBJECTIVE:   Ms. Chang Mitchell was agreeable to walk. Social/Functional Lives With: Alone  Type of Home: House  Home Layout: One level  Home Access: Stairs to enter with rails  Entrance Stairs - Number of Steps: 2  Bathroom Shower/Tub: Walk-in shower, Shower chair with back  Bathroom Toilet: Standard  Bathroom Equipment: Shower chair  Receives Help From: Family, Friend(s), Neighbor, Personal care attendant  ADL Assistance: Needs assistance  Bath: Moderate assistance  Dressing:  Moderate assistance  Grooming: Minimal assistance  Feeding: Modified independent   Toileting: Independent  Homemaking Assistance: Needs assistance  Ambulation Assistance: Independent  Active : No  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    OBJECTIVE:     PAIN: VITALS / O2: PRECAUTION / Izell Blazing / DRAINS:   Pre Treatment: 0/10         Post Treatment: 0 Vitals        Oxygen      IV and Nasogastric Tube    RESTRICTIONS/PRECAUTIONS:                    GROSS EVALUATION: Intact Impaired (Comments):   AROM []  WFL except bilateral fingers/wrists   PROM []    Strength []  WFL minus some now generalized post op weakness   Balance [] Posture: Fair  Sitting - Static: Good  Sitting - Dynamic: Good  Standing - Static: Good  Standing - Dynamic: Fair   Posture [] N/A   Sensation [x]     Coordination [x]      Tone [x]     Edema []    Activity Tolerance []  Some post op decline    []      COGNITION/  PERCEPTION: Intact Impaired (Comments):   Orientation [x]     Vision [x]     Hearing [x]     Cognition  [x]       MOBILITY: I Mod I S SBA CGA Min Mod Max Total  NT x2 Comments:   Bed Mobility    Rolling [] [] [] [] [] [] [] [] [] [] []    Supine to Sit [] [] [] [] [] [] [] [] [] [] []    Scooting [] [] [] [] [] [] [] [] [] [] []    Sit to Supine [] [] [] [] [] [] [] [] [] [] []    Transfers    Sit to Stand [] [] [] [x] [] [] [] [] [] [] []    Bed to Chair [] [] [] [x] [] [] [] [] [] [] []    Stand to Sit [] [] [] [x] [] [] [] [] [] [] []     [] [] [] [] [] [] [] [] [] [] []    I=Independent, Mod I=Modified Independent, S=Supervision, SBA=Standby Assistance, CGA=Contact Guard Assistance,   Min=Minimal Assistance, Mod=Moderate Assistance, Max=Maximal Assistance, Total=Total Assistance, NT=Not Tested    GAIT: I Mod I S SBA CGA Min Mod Max Total  NT x2 Comments:   Level of Assistance [] [] [] [] [x] [] [] [] [] [] []    Distance 200 feet    DME Hand held assist    Gait Quality Decreased facundo , Decreased step clearance, Decreased step length, and Shuffling     Weightbearing Status      Stairs      I=Independent, Mod I=Modified Independent, S=Supervision, SBA=Standby Assistance, CGA=Contact Guard Assistance,   Min=Minimal Assistance, Mod=Moderate Assistance, Max=Maximal Assistance, Total=Total Assistance, NT=Not Tested    PLAN:   FREQUENCY AND DURATION: Daily for duration of hospital stay or until stated goals are met, whichever comes first.    THERAPY PROGNOSIS: Good    PROBLEM LIST:   (Skilled intervention is medically necessary to address:)  Decreased ADL/Functional Activities  Decreased Activity Tolerance  Decreased Balance  Decreased Gait Ability  Decreased Safety Awareness  Decreased Strength  Decreased Transfer Abilities INTERVENTIONS PLANNED:   (Benefits and precautions of physical therapy have been discussed with the patient.)  Therapeutic Activity  Therapeutic Exercise/HEP  Gait Training       TREATMENT:     TREATMENT:   Therapeutic Activity (18 Minutes): Therapeutic activity included Transfer Training, Ambulation on level ground, Sitting balance , and Standing balance to improve functional Activity tolerance, Balance, Coordination, Mobility, Strength, and ROM.     TREATMENT GRID:  N/A    AFTER TREATMENT PRECAUTIONS: Bed, Call light within reach, Needs within reach, Side rails x2, and Visitors at bedside    INTERDISCIPLINARY COLLABORATION:  RN/ PCT    EDUCATION: Education Outcome: Continued education needed    TIME IN/OUT:  Time In: 1530  Time Out: C/ [address]  Minutes: [address], PT

## 2023-02-12 NOTE — PROGRESS NOTES
Hospitalist Progress Note   Admit Date:  2/10/2023  6:59 AM   Name:  Robi Harper   Age:  80 y.o. Sex:  female  :  1940   MRN:  [id number]   Room:  [facility]    Presenting Complaint: No chief complaint on file. Reason(s) for Admission: SBO (small bowel obstruction) ([facility] [K56.609]     Hospital Course:   80 y.o. female with medical history of hypertension, hyperlipidemia and dementia admitted 2/10 for SBO. Underwent lap lysis of adhesions 2/10. Subjective & 24hr Events (23): Alert and oriented x2, pleasant. NGT intact, moderate amount green, liquid output. Lap sites without signs of infection. Assessment & Plan:     Principal Problem:    SBO (small bowel obstruction) (HCC)  Plan: Underwent lap lysis of adhesions 2/10. Surgery following   Lap sites healing  Remains with NGT, states some flatus today  Abd soft, non distended. Remains NPO except for sips with meds    Active Problems:  Hypokalemia:  3.4  Oral replacement  Trend    Hypomagnesemia:  1.7  IV replacement  trend        Breast cancer, right breast ([facility]  Plan: stable      Mixed hyperlipidemia  Plan: diet controlled. Alzheimer's disease:  Namenda and aricept ongoing      Anticipated discharge needs:      HH    Diet:  Diet NPO Exceptions are: Sips of Water with Meds  DVT PPx: Lovenox held  Code status: Full Code    Hospital Problems:  Principal Problem:    SBO (small bowel obstruction) ([facility]  Active Problems:    Breast cancer, right breast ([facility]    Mixed hyperlipidemia  Resolved Problems:    * No resolved hospital problems.  *      Objective:   Patient Vitals for the past 24 hrs:   Temp Pulse Resp BP SpO2   23 0748 98.2 °F (36.8 °C) 82 16 (!) 164/65 95 %   23 0429 98.7 °F (37.1 °C) 84 18 (!) 142/64 --   23 2337 98.2 °F (36.8 °C) 92 16 (!) 154/60 93 %   23 98.2 °F (36.8 °C) 85 16 (!) 149/54 93 %   23 -- 98 -- -- --   23 1534 97.9 °F (36.6 °C) 81 17 (!) 147/60 95 % 02/11/23 1135 98.2 °F (36.8 °C) 86 16 (!) 151/67 95 %       Oxygen Therapy  SpO2: 95 %  Pulse via Oximetry: 78 beats per minute  O2 Device: None (Room air)  O2 Flow Rate (L/min): 6 L/min    Estimated body mass index is 23.1 kg/m² as calculated from the following:    Height as of this encounter: 5' 6\" (1.676 m). Weight as of this encounter: 143 lb 1.6 oz (64.9 kg). Intake/Output Summary (Last 24 hours) at 2/12/2023 0950  Last data filed at 2/12/2023 0819  Gross per 24 hour   Intake 2639 ml   Output 1220 ml   Net 1419 ml         Physical Exam:   Blood pressure (!) 164/65, pulse 82, temperature 98.2 °F (36.8 °C), temperature source Oral, resp. rate 16, height 5' 6\" (1.676 m), weight 143 lb 1.6 oz (64.9 kg), SpO2 95 %, not currently breastfeeding. General:    Well nourished. Head:  Normocephalic, atraumatic  CV:   RRR. No m/r/g. No jugular venous distension. Lungs:   CTAB. No wheezing, rhonchi, or rales. Symmetric expansion. Abdomen:   Soft, nontender, lap sites no drainage, no erythema. NGT intact. Extremities: No cyanosis or clubbing. No edema  Skin:     No rashes and normal coloration. Warm and dry. Neuro:  CN II-XII grossly intact. Psych:  Normal mood and affect.       I have personally reviewed labs and tests:  Recent Labs:  Recent Results (from the past 48 hour(s))   CBC with Auto Differential    Collection Time: 02/10/23 10:51 AM   Result Value Ref Range    WBC 10.5 4.3 - 11.1 K/uL    RBC 4.90 4.05 - 5.2 M/uL    Hemoglobin 14.6 11.7 - 15.4 g/dL    Hematocrit 44.1 35.8 - 46.3 %    MCV 90.0 82.0 - 102.0 FL    MCH 29.8 26.1 - 32.9 PG    MCHC 33.1 31.4 - 35.0 g/dL    RDW 13.1 11.9 - 14.6 %    Platelets 337 720 - 830 K/uL    MPV 9.5 9.4 - 12.3 FL    nRBC 0.00 0.0 - 0.2 K/uL    Differential Type AUTOMATED      Seg Neutrophils 75 43 - 78 %    Lymphocytes 18 13 - 44 %    Monocytes 6 4.0 - 12.0 %    Eosinophils % 1 0.5 - 7.8 %    Basophils 0 0.0 - 2.0 %    Immature Granulocytes 0 0.0 - 5.0 % Segs Absolute 7.9 1.7 - 8.2 K/UL    Absolute Lymph # 1.9 0.5 - 4.6 K/UL    Absolute Mono # 0.6 0.1 - 1.3 K/UL    Absolute Eos # 0.1 0.0 - 0.8 K/UL    Basophils Absolute 0.0 0.0 - 0.2 K/UL    Absolute Immature Granulocyte 0.0 0.0 - 0.5 K/UL   Comprehensive Metabolic Panel w/ Reflex to MG    Collection Time: 02/10/23 10:51 AM   Result Value Ref Range    Sodium 135 133 - 143 mmol/L    Potassium 4.0 3.5 - 5.1 mmol/L    Chloride 104 101 - 110 mmol/L    CO2 26 21 - 32 mmol/L    Anion Gap 5 2 - 11 mmol/L    Glucose 97 65 - 100 mg/dL    BUN 9 8 - 23 MG/DL    Creatinine 0.68 0.6 - 1.0 MG/DL    Est, Glom Filt Rate >60 >60 ml/min/1.73m2    Calcium 9.0 8.3 - 10.4 MG/DL    Total Bilirubin 0.6 0.2 - 1.1 MG/DL    ALT 41 12 - 65 U/L    AST 37 15 - 37 U/L    Alk Phosphatase 72 50 - 130 U/L    Total Protein 6.9 6.3 - 8.2 g/dL    Albumin 3.3 3.2 - 4.6 g/dL    Globulin 3.6 2.8 - 4.5 g/dL    Albumin/Globulin Ratio 0.9 0.4 - 1.6     PLEASE READ & DOCUMENT PPD TEST IN 24 HRS    Collection Time: 02/11/23 12:00 AM   Result Value Ref Range    PPD, (POC) Negative Negative    mm Induration 0 0 - 5 mm   Basic Metabolic Panel w/ Reflex to MG    Collection Time: 02/11/23  6:19 AM   Result Value Ref Range    Sodium 138 133 - 143 mmol/L    Potassium 3.7 3.5 - 5.1 mmol/L    Chloride 105 101 - 110 mmol/L    CO2 25 21 - 32 mmol/L    Anion Gap 8 2 - 11 mmol/L    Glucose 96 65 - 100 mg/dL    BUN 6 (L) 8 - 23 MG/DL    Creatinine 0.36 (L) 0.6 - 1.0 MG/DL    Est, Glom Filt Rate >60 >60 ml/min/1.73m2    Calcium 7.9 (L) 8.3 - 10.4 MG/DL   CBC with Auto Differential    Collection Time: 02/11/23  6:19 AM   Result Value Ref Range    WBC 6.8 4.3 - 11.1 K/uL    RBC 4.19 4.05 - 5.2 M/uL    Hemoglobin 12.4 11.7 - 15.4 g/dL    Hematocrit 38.4 35.8 - 46.3 %    MCV 91.6 82.0 - 102.0 FL    MCH 29.6 26.1 - 32.9 PG    MCHC 32.3 31.4 - 35.0 g/dL    RDW 13.2 11.9 - 14.6 %    Platelets 710 988 - 404 K/uL    MPV 9.4 9.4 - 12.3 FL    nRBC 0.00 0.0 - 0.2 K/uL    Differential Type AUTOMATED      Seg Neutrophils 72 43 - 78 %    Lymphocytes 20 13 - 44 %    Monocytes 7 4.0 - 12.0 %    Eosinophils % 1 0.5 - 7.8 %    Basophils 0 0.0 - 2.0 %    Immature Granulocytes 0 0.0 - 5.0 %    Segs Absolute 4.9 1.7 - 8.2 K/UL    Absolute Lymph # 1.4 0.5 - 4.6 K/UL    Absolute Mono # 0.5 0.1 - 1.3 K/UL    Absolute Eos # 0.1 0.0 - 0.8 K/UL    Basophils Absolute 0.0 0.0 - 0.2 K/UL    Absolute Immature Granulocyte 0.0 0.0 - 0.5 K/UL   CBC with Auto Differential    Collection Time: 02/12/23  5:01 AM   Result Value Ref Range    WBC 6.5 4.3 - 11.1 K/uL    RBC 4.26 4.05 - 5.2 M/uL    Hemoglobin 12.6 11.7 - 15.4 g/dL    Hematocrit 38.7 35.8 - 46.3 %    MCV 90.8 82.0 - 102.0 FL    MCH 29.6 26.1 - 32.9 PG    MCHC 32.6 31.4 - 35.0 g/dL    RDW 12.9 11.9 - 14.6 %    Platelets 165 869 - 255 K/uL    MPV 9.3 (L) 9.4 - 12.3 FL    nRBC 0.00 0.0 - 0.2 K/uL    Differential Type AUTOMATED      Seg Neutrophils 70 43 - 78 %    Lymphocytes 20 13 - 44 %    Monocytes 7 4.0 - 12.0 %    Eosinophils % 2 0.5 - 7.8 %    Basophils 0 0.0 - 2.0 %    Immature Granulocytes 0 0.0 - 5.0 %    Segs Absolute 4.5 1.7 - 8.2 K/UL    Absolute Lymph # 1.3 0.5 - 4.6 K/UL    Absolute Mono # 0.4 0.1 - 1.3 K/UL    Absolute Eos # 0.2 0.0 - 0.8 K/UL    Basophils Absolute 0.0 0.0 - 0.2 K/UL    Absolute Immature Granulocyte 0.0 0.0 - 0.5 K/UL   Basic Metabolic Panel w/ Reflex to MG    Collection Time: 02/12/23  5:01 AM   Result Value Ref Range    Sodium 139 133 - 143 mmol/L    Potassium 3.4 (L) 3.5 - 5.1 mmol/L    Chloride 107 101 - 110 mmol/L    CO2 25 21 - 32 mmol/L    Anion Gap 7 2 - 11 mmol/L    Glucose 66 65 - 100 mg/dL    BUN 4 (L) 8 - 23 MG/DL    Creatinine 0.34 (L) 0.6 - 1.0 MG/DL    Est, Glom Filt Rate >60 >60 ml/min/1.73m2    Calcium 8.1 (L) 8.3 - 10.4 MG/DL   Magnesium    Collection Time: 02/12/23  5:01 AM   Result Value Ref Range    Magnesium 1.7 (L) 1.8 - 2.4 mg/dL       I have personally reviewed imaging studies:  Other Studies:  XR ABDOMEN (KUB) (SINGLE AP VIEW)   Final Result   NG tube in good position.           Current Meds:  Current Facility-Administered Medications   Medication Dose Route Frequency    magnesium sulfate 2000 mg in 50 mL IVPB premix  2,000 mg IntraVENous Once    donepezil (ARICEPT) tablet 5 mg  5 mg Oral Nightly    memantine (NAMENDA) tablet 10 mg  10 mg Oral BID    Vitamin D (CHOLECALCIFEROL) tablet 2,000 Units  2,000 Units Oral Daily    sodium chloride flush 0.9 % injection 5-40 mL  5-40 mL IntraVENous 2 times per day    sodium chloride flush 0.9 % injection 5-40 mL  5-40 mL IntraVENous PRN    0.9 % sodium chloride infusion   IntraVENous PRN    [Held by provider] enoxaparin (LOVENOX) injection 40 mg  40 mg SubCUTAneous Daily    ondansetron (ZOFRAN-ODT) disintegrating tablet 4 mg  4 mg Oral Q8H PRN    Or    ondansetron (ZOFRAN) injection 4 mg  4 mg IntraVENous Q6H PRN    polyethylene glycol (GLYCOLAX) packet 17 g  17 g Oral Daily PRN    acetaminophen (TYLENOL) tablet 650 mg  650 mg Oral Q6H PRN    Or    acetaminophen (TYLENOL) suppository 650 mg  650 mg Rectal Q6H PRN    0.9 % sodium chloride infusion   IntraVENous Continuous    morphine injection 2 mg  2 mg IntraVENous Q4H PRN    pantoprazole (PROTONIX) 40 mg in sodium chloride (PF) 0.9 % 10 mL injection  40 mg IntraVENous Daily       Signed:  Soy Green, APRN - CNP [email or web address]

## 2023-02-13 ENCOUNTER — APPOINTMENT (OUTPATIENT)
Dept: GENERAL RADIOLOGY | Age: 83
DRG: 336 | End: 2023-02-13
Attending: FAMILY MEDICINE
Payer: MEDICARE

## 2023-02-13 LAB
ANION GAP SERPL CALC-SCNC: 11 MMOL/L (ref 2–11)
BASOPHILS # BLD: 0 K/UL (ref 0–0.2)
BASOPHILS NFR BLD: 0 % (ref 0–2)
BUN SERPL-MCNC: 5 MG/DL (ref 8–23)
CALCIUM SERPL-MCNC: 8.4 MG/DL (ref 8.3–10.4)
CHLORIDE SERPL-SCNC: 106 MMOL/L (ref 101–110)
CO2 SERPL-SCNC: 21 MMOL/L (ref 21–32)
CREAT SERPL-MCNC: 0.35 MG/DL (ref 0.6–1)
DIFFERENTIAL METHOD BLD: NORMAL
EOSINOPHIL # BLD: 0.3 K/UL (ref 0–0.8)
EOSINOPHIL NFR BLD: 3 % (ref 0.5–7.8)
ERYTHROCYTE [DISTWIDTH] IN BLOOD BY AUTOMATED COUNT: 13 % (ref 11.9–14.6)
GLUCOSE SERPL-MCNC: 69 MG/DL (ref 65–100)
HCT VFR BLD AUTO: 38.4 % (ref 35.8–46.3)
HGB BLD-MCNC: 12.8 G/DL (ref 11.7–15.4)
IMM GRANULOCYTES # BLD AUTO: 0 K/UL (ref 0–0.5)
IMM GRANULOCYTES NFR BLD AUTO: 0 % (ref 0–5)
LYMPHOCYTES # BLD: 1.4 K/UL (ref 0.5–4.6)
LYMPHOCYTES NFR BLD: 18 % (ref 13–44)
MAGNESIUM SERPL-MCNC: 1.9 MG/DL (ref 1.8–2.4)
MCH RBC QN AUTO: 30.3 PG (ref 26.1–32.9)
MCHC RBC AUTO-ENTMCNC: 33.3 G/DL (ref 31.4–35)
MCV RBC AUTO: 90.8 FL (ref 82–102)
MM INDURATION, POC: 0 MM (ref 0–5)
MONOCYTES # BLD: 0.6 K/UL (ref 0.1–1.3)
MONOCYTES NFR BLD: 7 % (ref 4–12)
NEUTS SEG # BLD: 5.5 K/UL (ref 1.7–8.2)
NEUTS SEG NFR BLD: 71 % (ref 43–78)
NRBC # BLD: 0 K/UL (ref 0–0.2)
PLATELET # BLD AUTO: 230 K/UL (ref 150–450)
PMV BLD AUTO: 9.4 FL (ref 9.4–12.3)
POTASSIUM SERPL-SCNC: 3.1 MMOL/L (ref 3.5–5.1)
PPD, POC: NEGATIVE
RBC # BLD AUTO: 4.23 M/UL (ref 4.05–5.2)
SODIUM SERPL-SCNC: 138 MMOL/L (ref 133–143)
WBC # BLD AUTO: 7.8 K/UL (ref 4.3–11.1)

## 2023-02-13 PROCEDURE — 2580000003 HC RX 258: Performed by: SURGERY

## 2023-02-13 PROCEDURE — 6360000002 HC RX W HCPCS: Performed by: NURSE PRACTITIONER

## 2023-02-13 PROCEDURE — 97530 THERAPEUTIC ACTIVITIES: CPT

## 2023-02-13 PROCEDURE — C9113 INJ PANTOPRAZOLE SODIUM, VIA: HCPCS | Performed by: SURGERY

## 2023-02-13 PROCEDURE — 6360000002 HC RX W HCPCS: Performed by: SURGERY

## 2023-02-13 PROCEDURE — 85025 COMPLETE CBC W/AUTO DIFF WBC: CPT

## 2023-02-13 PROCEDURE — 80048 BASIC METABOLIC PNL TOTAL CA: CPT

## 2023-02-13 PROCEDURE — 97535 SELF CARE MNGMENT TRAINING: CPT

## 2023-02-13 PROCEDURE — 74018 RADEX ABDOMEN 1 VIEW: CPT

## 2023-02-13 PROCEDURE — 6370000000 HC RX 637 (ALT 250 FOR IP): Performed by: SURGERY

## 2023-02-13 PROCEDURE — 83735 ASSAY OF MAGNESIUM: CPT

## 2023-02-13 PROCEDURE — 1100000000 HC RM PRIVATE

## 2023-02-13 PROCEDURE — A4216 STERILE WATER/SALINE, 10 ML: HCPCS | Performed by: SURGERY

## 2023-02-13 PROCEDURE — 36415 COLL VENOUS BLD VENIPUNCTURE: CPT

## 2023-02-13 RX ORDER — POTASSIUM CHLORIDE 7.45 MG/ML
10 INJECTION INTRAVENOUS
Status: COMPLETED | OUTPATIENT
Start: 2023-02-13 | End: 2023-02-13

## 2023-02-13 RX ORDER — HYDRALAZINE HYDROCHLORIDE 20 MG/ML
10 INJECTION INTRAMUSCULAR; INTRAVENOUS EVERY 6 HOURS PRN
Status: DISCONTINUED | OUTPATIENT
Start: 2023-02-13 | End: 2023-02-14

## 2023-02-13 RX ADMIN — DONEPEZIL HYDROCHLORIDE 5 MG: 5 TABLET, FILM COATED ORAL at 21:43

## 2023-02-13 RX ADMIN — SODIUM CHLORIDE, PRESERVATIVE FREE 10 ML: 5 INJECTION INTRAVENOUS at 08:27

## 2023-02-13 RX ADMIN — VITAMIN D, TAB 1000IU (100/BT) 2000 UNITS: 25 TAB at 08:24

## 2023-02-13 RX ADMIN — MEMANTINE 10 MG: 5 TABLET ORAL at 21:43

## 2023-02-13 RX ADMIN — POTASSIUM CHLORIDE 10 MEQ: 7.46 INJECTION, SOLUTION INTRAVENOUS at 11:59

## 2023-02-13 RX ADMIN — MEMANTINE 10 MG: 5 TABLET ORAL at 08:24

## 2023-02-13 RX ADMIN — POTASSIUM CHLORIDE 10 MEQ: 7.46 INJECTION, SOLUTION INTRAVENOUS at 14:51

## 2023-02-13 RX ADMIN — SODIUM CHLORIDE, PRESERVATIVE FREE 40 MG: 5 INJECTION INTRAVENOUS at 08:27

## 2023-02-13 RX ADMIN — SODIUM CHLORIDE: 9 INJECTION, SOLUTION INTRAVENOUS at 23:54

## 2023-02-13 RX ADMIN — HYDRALAZINE HYDROCHLORIDE 10 MG: 20 INJECTION INTRAMUSCULAR; INTRAVENOUS at 16:46

## 2023-02-13 RX ADMIN — POTASSIUM CHLORIDE 10 MEQ: 7.46 INJECTION, SOLUTION INTRAVENOUS at 10:22

## 2023-02-13 RX ADMIN — SODIUM CHLORIDE, PRESERVATIVE FREE 10 ML: 5 INJECTION INTRAVENOUS at 21:44

## 2023-02-13 RX ADMIN — SODIUM CHLORIDE: 9 INJECTION, SOLUTION INTRAVENOUS at 02:01

## 2023-02-13 RX ADMIN — SODIUM CHLORIDE: 9 INJECTION, SOLUTION INTRAVENOUS at 11:58

## 2023-02-13 RX ADMIN — POTASSIUM CHLORIDE 10 MEQ: 7.46 INJECTION, SOLUTION INTRAVENOUS at 17:11

## 2023-02-13 ASSESSMENT — PAIN SCALES - GENERAL: PAINLEVEL_OUTOF10: 0

## 2023-02-13 NOTE — PLAN OF CARE
1257- /58 hr 90 map 95 at 1134. NP Meli Cast notified. 1614- bp was 175/61 HR 91. NP Meli Cast notified. PRN hydralazine 10 [place] [address] [address]- pt just pulled her NG tube out during shift change. She is oriented x1 now due to her dementia. Pt has gotten 250 ml of green output during the day from NG tube. she has also had one small BM today. Yara Grajeda Md ordered for it to be replaced. 1930- Ng tube place in R nare at 60 cm. Pt tolerated well. KUB ordered by night shift nurse. Problem: Discharge Planning  Goal: Discharge to home or other facility with appropriate resources  Outcome: Progressing     Problem: Safety - Adult  Goal: Free from fall injury  Outcome: Progressing     Problem: ABCDS Injury Assessment  Goal: Absence of physical injury  Outcome: Progressing     Problem: Skin/Tissue Integrity  Goal: Absence of new skin breakdown  Description: 1. Monitor for areas of redness and/or skin breakdown  2. Assess vascular access sites hourly  3. Every 4-6 hours minimum:  Change oxygen saturation probe site  4. Every 4-6 hours:  If on nasal continuous positive airway pressure, respiratory therapy assess nares and determine need for appliance change or resting period.   Outcome: Progressing     Problem: Pain  Goal: Verbalizes/displays adequate comfort level or baseline comfort level  Outcome: Progressing

## 2023-02-13 NOTE — CARE COORDINATION
Discharge planning was discussed with the Interdisciplinary Team. The patient is not yet medically ready for discharge. Home health PT/OT/RN has been recommended.  The home health order was confirmed with read back with the attending NP.

## 2023-02-13 NOTE — PROGRESS NOTES
Patient seen and examined. She has no current complaints. She denies abdominal pain. No flatus yet. BP (!) 158/57   Pulse 88   Temp 98.6 °F (37 °C) (Oral)   Resp 18   Ht 5' 6\" (1.676 m)   Wt 143 lb 1.6 oz (64.9 kg)   SpO2 95%   BMI 23.10 kg/m²   HEENT: negative to gross visual examination  Heart: regular rate and rhythm  Lungs: Aerating well  Abdomen: soft, a little distended, nontender, nasogastric tube in place with minimal output  Neuro: alert and oriented.  No gross deficits  CBC:   Lab Results   Component Value Date/Time    WBC 7.8 02/13/2023 05:55 AM    RBC 4.23 02/13/2023 05:55 AM    HGB 12.8 02/13/2023 05:55 AM    HCT 38.4 02/13/2023 05:55 AM    MCV 90.8 02/13/2023 05:55 AM    MCH 30.3 02/13/2023 05:55 AM    MCHC 33.3 02/13/2023 05:55 AM    RDW 13.0 02/13/2023 05:55 AM     02/13/2023 05:55 AM    MPV 9.4 02/13/2023 05:55 AM     BMP:    Lab Results   Component Value Date/Time     02/13/2023 05:55 AM    K 3.1 02/13/2023 05:55 AM     02/13/2023 05:55 AM    CO2 21 02/13/2023 05:55 AM    BUN 5 02/13/2023 05:55 AM    LABALBU 3.3 02/10/2023 10:51 AM    CREATININE 0.35 02/13/2023 05:55 AM    CALCIUM 8.4 02/13/2023 05:55 AM    GFRAA >60 06/02/2022 12:30 PM    LABGLOM >60 02/13/2023 05:55 AM    GLUCOSE 69 02/13/2023 05:55 AM     Assessment:  Patient Active Problem List   Diagnosis    Sclerodactyly    Arthritis of left hip    Low serum vitamin D    Breast cancer, right breast (HCC)    Osteoarthritis of both shoulders    Malignant neoplasm of lower-outer quadrant of right breast of female, estrogen receptor positive (HCC)    Osteoarthritis of left knee    Rheumatoid arthritis involving multiple sites with positive rheumatoid factor (HCC)    Low back pain    Total knee replacement status    Hormone receptor positive breast cancer, unspecified laterality ([facility]    Abnormal breast exam    Al[facility]'s disease, unspecified (CODE) [facility]    Mixed hyperlipidemia    S/P total hip arthroplasty Anemia, unspecified    Osteoarthritis of right knee    SBO (small bowel obstruction) (HCC)     Plan:  Postop day 3 from laparoscopic lysis of adhesions for small bowel obstruction. Await return of bowel function. She needs to start mobilizing. Recommend PT OT evaluation. Keep NG tube for now. Okay to take home meds with sips.   Governor MD Shirlene  7:53 AM  2/13/2023

## 2023-02-13 NOTE — PROGRESS NOTES
Hospitalist Progress Note   Admit Date:  2/10/2023  6:59 AM   Name:  Ivette Duncan   Age:  80 y.o. Sex:  female  :  1940   MRN:  [id number]   Room:  [facility] / [facility]/    Presenting Complaint: No chief complaint on file. Reason(s) for Admission: SBO (small bowel obstruction) ([facility] [address] [K56.609]     Hospital Course:     80 y.o. female with medical history of hypertension, hyperlipidemia and dementia admitted 2/10 for SBO. Underwent lap lysis of adhesions 2/10. Subjective & 24hr Events (23): Wanting NGT out. Denies CP, SOB, n/v/d, abd pain. Sitting up in chair. Assessment & Plan:     SBO (small bowel obstruction) (HCC)  Plan: Underwent lap lysis of adhesions 2/10. Surgery following   Remains with NGT  Remains NPO except for sips with meds     Active Problems:  Hypokalemia:  Replace IV  BMP in AM     Hypomagnesemia:  Resolved after replacement           Breast cancer, right breast ([facility] [address]  Plan: stable       Mixed hyperlipidemia  Plan: diet controlled. Alzheimer's disease:  Namenda and aricept ongoing       Anticipated discharge needs:      HH     Diet:  Diet NPO Exceptions are: Sips of Water with Meds  DVT PPx: Lovenox held  Code status: Full Code       Hospital Problems:  Principal Problem:    SBO (small bowel obstruction) ([facility] [address]  Active Problems:    Breast cancer, right breast ([facility] [address]    Mixed hyperlipidemia  Resolved Problems:    * No resolved hospital problems.  *      Objective:   Patient Vitals for the past 24 hrs:   Temp Pulse Resp BP SpO2   23 1134 98.2 °F (36.8 °C) 90 17 (!) 169/58 94 %   23 0733 98.6 °F (37 °C) 88 18 (!) 158/57 95 %   23 0308 97.5 °F (36.4 °C) 90 18 (!) 151/61 93 %   23 2305 98.4 °F (36.9 °C) 92 18 128/86 95 %   23 -- 92 -- -- --   23 98.4 °F (36.9 °C) 88 18 (!) 180/72 95 %   23 1559 -- -- -- (!) 153/65 --   23 1557 98.1 °F (36.7 °C) 98 16 (!) 167/63 94 %       Oxygen Therapy  SpO2: 94 %  Pulse via Oximetry: 78 beats per minute  O2 Device: None (Room air)  O2 Flow Rate (L/min): 6 L/min    Estimated body mass index is 23.1 kg/m² as calculated from the following:    Height as of this encounter: 5' 6\" (1.676 m). Weight as of this encounter: 143 lb 1.6 oz (64.9 kg). Intake/Output Summary (Last 24 hours) at 2/13/2023 1235  Last data filed at 2/13/2023 2026  Gross per 24 hour   Intake 2298 ml   Output 2100 ml   Net 198 ml         Physical Exam:     Blood pressure (!) 169/58, pulse 90, temperature 98.2 °F (36.8 °C), temperature source Oral, resp. rate 17, height 5' 6\" (1.676 m), weight 143 lb 1.6 oz (64.9 kg), SpO2 94 %, not currently breastfeeding. General:          Well nourished. Head:               Normocephalic, atraumatic  CV:                  RRR. No m/r/g. No jugular venous distension. Lungs:             CTAB. No wheezing, rhonchi, or rales. Symmetric expansion. Abdomen:        Soft, nontender, lap sites no drainage, no erythema. NGT intact. Extremities:     No cyanosis or clubbing. No edema  Skin:                No rashes and normal coloration. Warm and dry. Neuro:             CN II-XII grossly intact. Psych:             Normal mood and affect.       I have personally reviewed labs and tests:  Recent Labs:  Recent Results (from the past 48 hour(s))   PLEASE READ & DOCUMENT PPD TEST IN 48 HRS    Collection Time: 02/12/23 12:00 AM   Result Value Ref Range    PPD, (POC) Negative Negative    mm Induration 0 0 - 5 mm   CBC with Auto Differential    Collection Time: 02/12/23  5:01 AM   Result Value Ref Range    WBC 6.5 4.3 - 11.1 K/uL    RBC 4.26 4.05 - 5.2 M/uL    Hemoglobin 12.6 11.7 - 15.4 g/dL    Hematocrit 38.7 35.8 - 46.3 %    MCV 90.8 82.0 - 102.0 FL    MCH 29.6 26.1 - 32.9 PG    MCHC 32.6 31.4 - 35.0 g/dL    RDW 12.9 11.9 - 14.6 %    Platelets 457 443 - 777 K/uL    MPV 9.3 (L) 9.4 - 12.3 FL    nRBC 0.00 0.0 - 0.2 K/uL    Differential Type AUTOMATED      Seg Neutrophils 70 43 - 78 %    Lymphocytes 20 13 - 44 %    Monocytes 7 4.0 - 12.0 %    Eosinophils % 2 0.5 - 7.8 %    Basophils 0 0.0 - 2.0 %    Immature Granulocytes 0 0.0 - 5.0 %    Segs Absolute 4.5 1.7 - 8.2 K/UL    Absolute Lymph # 1.3 0.5 - 4.6 K/UL    Absolute Mono # 0.4 0.1 - 1.3 K/UL    Absolute Eos # 0.2 0.0 - 0.8 K/UL    Basophils Absolute 0.0 0.0 - 0.2 K/UL    Absolute Immature Granulocyte 0.0 0.0 - 0.5 K/UL   Basic Metabolic Panel w/ Reflex to MG    Collection Time: 02/12/23  5:01 AM   Result Value Ref Range    Sodium 139 133 - 143 mmol/L    Potassium 3.4 (L) 3.5 - 5.1 mmol/L    Chloride 107 101 - 110 mmol/L    CO2 25 21 - 32 mmol/L    Anion Gap 7 2 - 11 mmol/L    Glucose 66 65 - 100 mg/dL    BUN 4 (L) 8 - 23 MG/DL    Creatinine 0.34 (L) 0.6 - 1.0 MG/DL    Est, Glom Filt Rate >60 >60 ml/min/1.73m2    Calcium 8.1 (L) 8.3 - 10.4 MG/DL   Magnesium    Collection Time: 02/12/23  5:01 AM   Result Value Ref Range    Magnesium 1.7 (L) 1.8 - 2.4 mg/dL   CBC with Auto Differential    Collection Time: 02/13/23  5:55 AM   Result Value Ref Range    WBC 7.8 4.3 - 11.1 K/uL    RBC 4.23 4.05 - 5.2 M/uL    Hemoglobin 12.8 11.7 - 15.4 g/dL    Hematocrit 38.4 35.8 - 46.3 %    MCV 90.8 82.0 - 102.0 FL    MCH 30.3 26.1 - 32.9 PG    MCHC 33.3 31.4 - 35.0 g/dL    RDW 13.0 11.9 - 14.6 %    Platelets 348 633 - 692 K/uL    MPV 9.4 9.4 - 12.3 FL    nRBC 0.00 0.0 - 0.2 K/uL    Differential Type AUTOMATED      Seg Neutrophils 71 43 - 78 %    Lymphocytes 18 13 - 44 %    Monocytes 7 4.0 - 12.0 %    Eosinophils % 3 0.5 - 7.8 %    Basophils 0 0.0 - 2.0 %    Immature Granulocytes 0 0.0 - 5.0 %    Segs Absolute 5.5 1.7 - 8.2 K/UL    Absolute Lymph # 1.4 0.5 - 4.6 K/UL    Absolute Mono # 0.6 0.1 - 1.3 K/UL    Absolute Eos # 0.3 0.0 - 0.8 K/UL    Basophils Absolute 0.0 0.0 - 0.2 K/UL    Absolute Immature Granulocyte 0.0 0.0 - 0.5 K/UL   Basic Metabolic Panel w/ Reflex to MG    Collection Time: 02/13/23  5:55 AM   Result Value Ref Range    Sodium 138 133 - 143 mmol/L    Potassium 3.1 (L) 3.5 - 5.1 mmol/L    Chloride 106 101 - 110 mmol/L    CO2 21 21 - 32 mmol/L    Anion Gap 11 2 - 11 mmol/L    Glucose 69 65 - 100 mg/dL    BUN 5 (L) 8 - 23 MG/DL    Creatinine 0.35 (L) 0.6 - 1.0 MG/DL    Est, Glom Filt Rate >60 >60 ml/min/1.73m2    Calcium 8.4 8.3 - 10.4 MG/DL   Magnesium    Collection Time: 02/13/23  5:55 AM   Result Value Ref Range    Magnesium 1.9 1.8 - 2.4 mg/dL       I have personally reviewed imaging studies:  Other Studies:  XR ABDOMEN (KUB) (SINGLE AP VIEW)   Final Result   NG tube in good position.       XR ABDOMEN (KUB) (SINGLE AP VIEW)    (Results Pending)       Current Meds:  Current Facility-Administered Medications   Medication Dose Route Frequency    potassium chloride 10 mEq/100 mL IVPB (Peripheral Line)  10 mEq IntraVENous Q2H    diphenhydrAMINE (BENADRYL) injection 25 mg  25 mg IntraVENous Nightly PRN    donepezil (ARICEPT) tablet 5 mg  5 mg Oral Nightly    memantine (NAMENDA) tablet 10 mg  10 mg Oral BID    Vitamin D (CHOLECALCIFEROL) tablet 2,000 Units  2,000 Units Oral Daily    sodium chloride flush 0.9 % injection 5-40 mL  5-40 mL IntraVENous 2 times per day    sodium chloride flush 0.9 % injection 5-40 mL  5-40 mL IntraVENous PRN    0.9 % sodium chloride infusion   IntraVENous PRN    [Held by provider] enoxaparin (LOVENOX) injection 40 mg  40 mg SubCUTAneous Daily    ondansetron (ZOFRAN-ODT) disintegrating tablet 4 mg  4 mg Oral Q8H PRN    Or    ondansetron (ZOFRAN) injection 4 mg  4 mg IntraVENous Q6H PRN    polyethylene glycol (GLYCOLAX) packet 17 g  17 g Oral Daily PRN    acetaminophen (TYLENOL) tablet 650 mg  650 mg Oral Q6H PRN    Or    acetaminophen (TYLENOL) suppository 650 mg  650 mg Rectal Q6H PRN    0.9 % sodium chloride infusion   IntraVENous Continuous    morphine injection 2 mg  2 mg IntraVENous Q4H PRN    pantoprazole (PROTONIX) 40 mg in sodium chloride (PF) 0.9 % 10 mL injection  40 mg IntraVENous Daily       Signed:  Natalie Acosta, APRN - CNP    Notes, labs, VS, diagnostic testing reviewed  Case discussed with pt, care team, Dr. Nunez Quiet

## 2023-02-13 NOTE — CARE COORDINATION
RN CM met with the patient at the bedside and discussed discharge planning. The patient was provided with a list of home health agencies and their quality metrics. She stated she wants to take some time to review it with her friend. RN CM encouraged her to contact case management once she's made a decision.

## 2023-02-13 NOTE — PROGRESS NOTES
ACUTE PHYSICAL THERAPY GOALS:   (Developed with and agreed upon by patient and/or caregiver. )    LTG:  (1.)Ms. Amber Hinton will move from supine to sit and sit to supine  in bed with SUPERVISION within 7 treatment day(s). (2.)Ms. Amber Hinton will transfer from bed to chair and chair to bed with INDEPENDENT using the least restrictive device within 7 treatment day(s). (3.)Ms. Amber Hinton will ambulate with SUPERVISION for 300 feet with the least restrictive device within 7 treatment day(s). ________________________________________________________________________________________________     PHYSICAL THERAPY Daily Note and PM  (Link to Caseload Tracking: PT Visit Days : 3  Acknowledge Orders  Time In/Out  PT Charge Capture  Rehab Caseload Tracker    Poornima Greenberg is a 80 y.o. female   PRIMARY DIAGNOSIS: SBO (small bowel obstruction) (HCC)  SBO (small bowel obstruction) (HCC) [K56.609]  Procedure(s) (LRB):  EXPLORATORY LAPAROSCOPY LYSIS OF ADHESIONS (N/A)  3 Days Post-Op  Reason for Referral: Generalized Muscle Weakness (M62.81)  Other abnormalities of gait and mobility (R26.89)  Inpatient: Payor: Una Abrams / Plan: Del Ports / Product Type: *No Product type* /     ASSESSMENT:     REHAB RECOMMENDATIONS:   Recommendation to date pending progress:  Setting:  Home Health Therapy    Equipment:    None     ASSESSMENT:  Ms. Amber Hinton presents this admission with SBO and is somewhat limited from her baseline with functional mobility. She will discharge home and has family/friend support but lives alone. She will benefit from PT to increase her functional independence and she should be able to return home without further therapy. She was supine on contact and needing to use the bathroom. Transferred supine to sit with CGA and then ambulated 20 ft to the bathroom with SBA/CGA. She toileted with SBA and practiced sit to stand while changing brief. Needed only SBA/Supervision for toileting.  She ambulated back to the bed and returned supine with SBA. Going for Laparoscopy this evening. 2/13 sitting in the chair upon arrival, and a sleep. Therapist woke pt up and agreeable to exercises. Performs B LE exercises  (see below) with guidance. Remain in the chair with needs in reach and instructed to call for assists, before getting up. [employer] [facility] 6 Clicks Basic Mobility Inpatient Short Form  AM-PAC Basic Mobility - Inpatient   How much help is needed turning from your back to your side while in a flat bed without using bedrails?: None  How much help is needed moving from lying on your back to sitting on the side of a flat bed without using bedrails?: A Little  How much help is needed moving to and from a bed to a chair?: A Little  How much help is needed standing up from a chair using your arms?: None  How much help is needed walking in hospital room?: None  How much help is needed climbing 3-5 steps with a railing?: A Little  [facility] Inpatient Mobility Raw Score : 21  AM-PAC Inpatient T-Scale Score : 50.25  Mobility Inpatient CMS 0-100% Score: 28.97  Mobility Inpatient CMS G-Code Modifier : CJ    SUBJECTIVE:   Ms. Epi Hernandez said I am sleepy, but agree for exercises. Social/Functional Lives With: Alone  Type of Home: House  Home Layout: One level  Home Access: Stairs to enter with rails  Entrance Stairs - Number of Steps: 2  Bathroom Shower/Tub: Walk-in shower, Shower chair with back  Bathroom Toilet: Standard  Bathroom Equipment: Shower chair  Receives Help From: Family, Friend(s), Neighbor, Personal care attendant  ADL Assistance: Needs assistance  Bath: Moderate assistance  Dressing:  Moderate assistance  Grooming: Minimal assistance  Feeding: Modified independent   Toileting: Independent  Homemaking Assistance: Needs assistance  Ambulation Assistance: Independent  Active : No  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    OBJECTIVE:     PAIN: [place] / O2: [place] / [employer] / DRAINS:   Pre Treatment: 0/10         Post Treatment: 0/10 Vitals        Oxygen      IV and Nasogastric Tube    RESTRICTIONS/PRECAUTIONS:                    GROSS EVALUATION: Intact Impaired (Comments):   AROM []  WFL except bilateral fingers/wrists   PROM []    Strength []  WFL minus some now generalized post op weakness   Balance []     Posture [] N/A   Sensation [x]     Coordination [x]      Tone [x]     Edema []    Activity Tolerance []  Some post op decline    []      COGNITION/  PERCEPTION: Intact Impaired (Comments):   Orientation [x]     Vision [x]     Hearing [x]     Cognition  [x]       MOBILITY: I Mod I S SBA CGA Min Mod Max Total  NT x2 Comments:   Bed Mobility    Rolling [] [] [] [] [] [] [] [] [] [x] []    Supine to Sit [] [] [] [] [] [] [] [] [] [x] []    Scooting [] [] [] [] [] [] [] [] [] [x] []    Sit to Supine [] [] [] [] [] [] [] [] [] [x] []    Transfers    Sit to Stand [] [] [] [] [] [] [] [] [] [x] []    Bed to Chair [] [] [] [] [] [] [] [] [] [x] []    Stand to Sit [] [] [] [] [] [] [] [] [] [x] []     [] [] [] [] [] [] [] [] [] [x] []    I=Independent, Mod I=Modified Independent, S=Supervision, SBA=Standby Assistance, CGA=Contact Guard Assistance,   Min=Minimal Assistance, Mod=Moderate Assistance, Max=Maximal Assistance, Total=Total Assistance, NT=Not Tested    GAIT: I Mod I S SBA CGA Min Mod Max Total  NT x2 Comments:   Level of Assistance [] [] [] [] [] [] [] [] [] [x] []    Distance   feet    DME Hand held assist    Gait Quality Decreased facundo , Decreased step clearance, Decreased step length, and Shuffling     Weightbearing Status      Stairs      I=Independent, Mod I=Modified Independent, S=Supervision, SBA=Standby Assistance, CGA=Contact Guard Assistance,   Min=Minimal Assistance, Mod=Moderate Assistance, Max=Maximal Assistance, Total=Total Assistance, NT=Not Tested    PLAN:   FREQUENCY AND DURATION: Daily for duration of hospital stay or until stated goals are met, whichever comes first.    THERAPY PROGNOSIS: Good    PROBLEM LIST:   (Skilled intervention is medically necessary to address:)  Decreased ADL/Functional Activities  Decreased Activity Tolerance  Decreased Balance  Decreased Gait Ability  Decreased Safety Awareness  Decreased Strength  Decreased Transfer Abilities INTERVENTIONS PLANNED:   (Benefits and precautions of physical therapy have been discussed with the patient.)  Therapeutic Activity  Therapeutic Exercise/HEP  Gait Training       TREATMENT:     TREATMENT:   Therapeutic Activity (15 Minutes): Therapeutic activity included exercises only to improve functional Activity tolerance, Balance, Coordination, Mobility, Strength, and ROM.     TREATMENT GRID:  B LE  Date:  2/13 Date:   Date:     Activity/Exercise Parameters Parameters Parameters   Ankle pumps 12     Quad sets 12     Heel slides 12     Hip abd/ad 12     SAQ 12                         AFTER TREATMENT PRECAUTIONS: Bed/Chair Locked, Call light within reach, Chair, Needs within reach, and Visitors at bedside    INTERDISCIPLINARY COLLABORATION:  RN/ PCT    EDUCATION: Education Given To: Patient  Education Provided: Role of Therapy  Education Method: Demonstration;Verbal    TIME IN/OUT:  Time In: 1345  Time Out: 1400  Minutes: 521 Aroldo Gregg PTA

## 2023-02-13 NOTE — PROGRESS NOTES
General Surgery Progress Note    2/13/2023    Admit Date: 2/10/2023    Chief Complaint: Nausea with Vomiting    Post OP Day # 3:  EXPLORATORY LAPAROSCOPY LYSIS OF ADHESIONS  Findings: : Adhesions in the abdomen, there was a loop of small bowel adhesed to the anterior abdominal wall and several places which appear to cause a almost closed-loop obstruction. HPI: Diana Wade is a 80 y.o. female who we are asked by Hospitalist MD  to see for Abdominal Pain. Patient has medical history of  hypertension, Rheumatoid arthritis, hyperlipidemia and dementia. Patient presented to ED  2/9/2023 at [facility] with complaints of abdominal pain with  nausea and vomiting, which started 1 day ago. Patient was initially seen at [facility] ER then transferred to [facility] for admission. Patient reports emesis (dark hematemesis) and she has had dark tarry stools the past few days. She reports abdominal pain has since resolved with medication given at ER but reports nausea continues. Patient denies any chest pain, dyspnea, fevers, chills,cough,  dysuria or hematuria. Patient currently has order for placement of NG tube as ordered per admitting team .          Patient  reports she has been having intervals of constipation x several weeks and  has not taken any medications for relief. Patient reports she has had poor appetite the past few days. Patient reports she is not on any blood thinners and has no prior history of GI Bleed/ulcer. Patient reports past surgical history of appendectomy, cholecystectomy, hysterectomy, right breast mastectomy , bilateral total hip arthroplasty and bilateral knee arthroplasty.       The history was provided by patient and her daughter who is at bedside at this the time of exam.         ER workup includes:  CT Abdomen/Pelvis 2/9/2023       EXAMINATION:  CT ABDOMEN PELVIS without and with contrast GI BLEED     DATE: 2/9/2023 7:00 PM     INDICATION: Hematemesis, dark tarry stools ;     COMPARISON: None.     PROCEDURE:  CT of the abdomen and pelvis was performed prior to and following   the intravenous administration of iodinated contrast material.  CT dose lowering   techniques were used, to include: automated exposure control, adjustment for   patient size, and or use of iterative reconstruction. Arterial and venous phase postcontrast imaging. FINDINGS:     Visualized lower chest: Minor nodular groundglass opacities in the right middle   lobe. 4 mm subpleural nodule right lower lobe, series 2 image seven, likely a   fissural lymph node. Moderate coronary artery atherosclerotic calcifications. ABDOMEN:     Liver and Biliary system:  No suspicious liver lesion. No biliary ductal   dilation. Gallbladder:  Cholecystectomy. Spleen:  Normal.     Pancreas:  Normal.     Adrenal glands:  4 cm myelolipoma left adrenal gland, benign lesion. .     Kidneys and ureters:  Kidneys enhance symmetrically. No suspicious mass. No   hydronephrosis. Aorta/IVC:  Moderate atherosclerotic calcifications. No aortic aneurysm or   dissection. IVC normal.     Lymph nodes:  No lymphadenopathy. Gastrointestinal tract:  Stomach and proximal small bowel distended with fluid. The proximal small bowel is mildly dilated. There is transition zone in the   right lower quadrant with suggestion of two transition points/closed loop. Distal small bowel decompressed. Appendix not visualized; no pericecal   inflammation. . No colonic wall thickening or dilation. Diverticulosis sigmoid   colon. Peritoneum/Mesentery: No pneumoperitoneum. No ascites. Abdominal wall: Unremarkable. PELVIS:     Urinary bladder: Largely obscured by artifact. Reproductive organs: Uterus not visualized     Lymph Nodes: No pelvic lymphadenopathy. BONES:  Bilateral hip arthroplasties with associated artifact limiting   evaluation the lower pelvis. Degenerative changes in the spine. .                Impression        1.   Small bowel obstruction with transition zone in the right lower quadrant. Configuration raises possibility of a closed loop obstruction. Recommend   surgical consultation. 2.  Minimal right middle lobe pulmonary infiltrate, possible aspiration and/or   early pneumonia. 3.  No evidence of active extravasation. 4.  Chronic findings as above. Subjective:     Patient resting in bed . Patient reports pain controlled. Patient denies any nausea or vomiting. Patient denies any flatus or bowel movement. NG tube remains to LIS  with 400 cc output(dark green)  past 24 hours   Patient remains NPO for now except sips of water with meds. Patient voiding 1600 cc past 24 hours. Temp Max past 24 hours 99.5 (oral)   Labs today: WBC 7.8 Hgb 12.8 K+ 3.1  Cr 0.35         Objective:     BP (!) 158/57   Pulse 88   Temp 98.6 °F (37 °C) (Oral)   Resp 18   Ht 5' 6\" (1.676 m)   Wt 143 lb 1.6 oz (64.9 kg)   SpO2 95%   BMI 23.10 kg/m²       Intake/Output Summary (Last 24 hours) at 2/13/2023 0751  Last data filed at 2/13/2023 2333  Gross per 24 hour   Intake 2293 ml   Output 2000 ml   Net 293 ml      Physical Exam :  General:          Well nourished. Normal appearance, She is not in acute distress. Head:               Normocephalic, atraumatic  Eyes:               Sclerae appear normal.  Pupils equally round. ENT:                Nares appear normal.  Moist oral mucosa. Poor dentition noted. No rhinorrhea. NG tube intact to LIS   Neck:               No restricted ROM. Trachea midline   CV:                  RRR. No m/r/g. No jugular venous distension. Lungs:             CTAB. No wheezing, rhonchi, or rales. Symmetric expansion. Abdomen:        Soft, mild distention noted, Hypoactive bowel wounds. Lap sites clean/dry/approximated with no drainage. No masses or hernias appreciated. Appropriate incisional tenderness noted to trochar/lap sites. Extremities:     No cyanosis or clubbing. No edema. Patient with hx rheumatoid arthritis and noted to upper extremity digits. Skin:                No rashes and normal coloration. Warm and dry. Neuro:             CN II-XII grossly intact. Sensation intact. Psych:             Normal mood and affect.  Mental status baseline            Data Review    Recent Results (from the past 24 hour(s))   CBC with Auto Differential    Collection Time: 02/13/23  5:55 AM   Result Value Ref Range    WBC 7.8 4.3 - 11.1 K/uL    RBC 4.23 4.05 - 5.2 M/uL    Hemoglobin 12.8 11.7 - 15.4 g/dL    Hematocrit 38.4 35.8 - 46.3 %    MCV 90.8 82.0 - 102.0 FL    MCH 30.3 26.1 - 32.9 PG    MCHC 33.3 31.4 - 35.0 g/dL    RDW 13.0 11.9 - 14.6 %    Platelets 618 485 - 008 K/uL    MPV 9.4 9.4 - 12.3 FL    nRBC 0.00 0.0 - 0.2 K/uL    Differential Type AUTOMATED      Seg Neutrophils 71 43 - 78 %    Lymphocytes 18 13 - 44 %    Monocytes 7 4.0 - 12.0 %    Eosinophils % 3 0.5 - 7.8 %    Basophils 0 0.0 - 2.0 %    Immature Granulocytes 0 0.0 - 5.0 %    Segs Absolute 5.5 1.7 - 8.2 K/UL    Absolute Lymph # 1.4 0.5 - 4.6 K/UL    Absolute Mono # 0.6 0.1 - 1.3 K/UL    Absolute Eos # 0.3 0.0 - 0.8 K/UL    Basophils Absolute 0.0 0.0 - 0.2 K/UL    Absolute Immature Granulocyte 0.0 0.0 - 0.5 K/UL   Basic Metabolic Panel w/ Reflex to MG    Collection Time: 02/13/23  5:55 AM   Result Value Ref Range    Sodium 138 133 - 143 mmol/L    Potassium 3.1 (L) 3.5 - 5.1 mmol/L    Chloride 106 101 - 110 mmol/L    CO2 21 21 - 32 mmol/L    Anion Gap 11 2 - 11 mmol/L    Glucose 69 65 - 100 mg/dL    BUN 5 (L) 8 - 23 MG/DL    Creatinine 0.35 (L) 0.6 - 1.0 MG/DL    Est, Glom Filt Rate >60 >60 ml/min/1.73m2    Calcium 8.4 8.3 - 10.4 MG/DL   Magnesium    Collection Time: 02/13/23  5:55 AM   Result Value Ref Range    Magnesium 1.9 1.8 - 2.4 mg/dL        Assessment:  Post OP Day # 3:  EXPLORATORY LAPAROSCOPY LYSIS OF ADHESIONS  Principal Problem:    SBO (small bowel obstruction) (HCC)  Active Problems:    Breast cancer, right breast [facility])    Mixed hyperlipidemia  Resolved Problems:    * No resolved hospital problems.  *        Plan:  -Further plan of care per Dr. Lila Antunez  -Continue NG tube for now  -Await bowel function for now  -Physical therapy and Occupational therapy evaluation  -Case management consult   -Encourage ambulation   -Encourage use of incentive spirometer  -Admitting team for electrolyte replacements/labs/etc    Mesha Torres FNP-BC

## 2023-02-13 NOTE — PROGRESS NOTES
ACUTE OCCUPATIONAL THERAPY GOALS:   (Developed with and agreed upon by patient and/or caregiver.)  1. Patient will perform grooming with SPV and adaptive equipment as needed. 2. Patient will perform upper body dressing with MIN A and adaptive equipment as needed. 3. Patient will perform lower body dressing with MIN A and adaptive equipment as needed. 4. Patient will perform bathing with MIN A and adaptive equipment as needed. 5. Patient will perform toileting and toilet transfer with CGA and adaptive equipment as needed. 6. Patient will perform ADL functional mobility and tranfers in room with SPV and adaptive equipment as needed. 7. Patient/family to demonstrate knowledge of home safety and DME recommendations. Timeframe: 7 visits     OCCUPATIONAL THERAPY Daily Note and AM       OT Visit Days: 2  Acknowledge Orders  Time  OT Charge Capture  Rehab Caseload Tracker      Dada Quinteros is a 80 y.o. female   PRIMARY DIAGNOSIS: SBO (small bowel obstruction) (HCC)  SBO (small bowel obstruction) (HCC) [K56.609]  Procedure(s) (LRB):  EXPLORATORY LAPAROSCOPY LYSIS OF ADHESIONS (N/A)  3 Days Post-Op  Reason for Referral: Generalized Muscle Weakness (M62.81)  Other lack of cordination (R27.8)  Difficulty in walking, Not elsewhere classified (R26.2)  Inpatient: Payor: Aviva Moses / Plan: Trung Reis / Product Type: *No Product type* /     ASSESSMENT:     REHAB RECOMMENDATIONS:   Recommendation to date pending progress:  Setting:  Home Health Therapy    Equipment:    To Be Determined     ASSESSMENT:  Ms. Silvio Ellis is admitted for the above diagnoses and presents with overall deficits in strength, functional mobility and ADL performance. Of note, patient also with diagnoses of dementia, breast cancer and arthritis (particularly in bilateral hands rendering them almost non-functional). At baseline, she lives alone in a 1 level home with 2 JACKY and rails.  Questionable historian, but she reports requiring assistance with dressing and bathing (from a hired caregiver and a friend/neighbor) and assist w meal prep and some grooming tasks. She is independent with mobility and with toileting. Today, she reports feeling bad and tired. She was able to perform bed mobility and household mobility around room but declined further ADLs. Presents with ongoing discomfort. She was left in supine with all needs met and in reach. Pt is functioning below their baseline and will benefit from skilled OT during hospital stay. Anticipate pt will benefit from home health services and resumption of hired care and friends upon discharge. 2/13/23: Patient now reporting that she dons a gown at home and stays in that all day, so does not need assist w dressing. She hires a person to come in 2-3 days to assist with housework and bathing. Today, pt performs sit<>stands at chair and household mobility with slight LOB 1 or 2 times. She declines any further ADL training/tasks this AM. She is concerned about her NG tube. Continue per OT POC.       [address] [facility] [address] [address] [facility] 6 Clicks Daily Activity Inpatient Short Form:    AM-PAC Daily Activity - Inpatient   How much help is needed for putting on and taking off regular lower body clothing?: A Lot  How much help is needed for bathing (which includes washing, rinsing, drying)?: A Lot  How much help is needed for toileting (which includes using toilet, bedpan, or urinal)?: A Little  How much help is needed for putting on and taking off regular upper body clothing?: A Little  How much help is needed for taking care of personal grooming?: A Little  How much help for eating meals?: A Little  AM-[facility] Inpatient Daily Activity Raw Score: 16  AM-PAC Inpatient ADL T-Scale Score : 35.96  ADL Inpatient CMS 0-100% Score: 53.32  ADL Inpatient CMS G-Code Modifier : CK           SUBJECTIVE:     Ms. Key states, \"I think it would be better if this thing came out of my nose\"     Social/Functional Lives With: Alone  Type of Home: House  Home Layout: One level  Home Access: Stairs to enter with rails  Entrance Stairs - Number of Steps: 2  Bathroom Shower/Tub: Walk-in shower, Shower chair with back  Bathroom Toilet: Standard  Bathroom Equipment: Shower chair  Receives Help From: Family, Friend(s), Neighbor, Personal care attendant  ADL Assistance: Needs assistance  Bath: Moderate assistance  Dressing:  Moderate assistance  Grooming: Minimal assistance  Feeding: Modified independent   Toileting: Independent  Homemaking Assistance: Needs assistance  Ambulation Assistance: Independent  Active : No  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    OBJECTIVE:     Tellis Merlin / Sri Stella / AIRWAY: Nasogastric Tube    RESTRICTIONS/PRECAUTIONS:       PAIN: VITALS / O2:   Pre Treatment:          Post Treatment: did not rate pain       Vitals          Oxygen            GROSS EVALUATION: INTACT IMPAIRED   (See Comments)   UE AROM [] []Almost no ROM in bilateral hands and digits   UE PROM [] []   Strength []  Generalized weakness     Posture / Balance []  Good sitting and standing balance   Sensation [x]     Coordination [x]       Tone [x]       Edema []    Activity Tolerance []  Decreased from baseline     Hand Dominance R [] L []      COGNITION/  PERCEPTION: INTACT IMPAIRED   (See Comments)   Orientation [x]     Vision [x]     Hearing [x]     Cognition  []  Hx of dementia, questionable historian   Perception []       MOBILITY: I Mod I S SBA CGA Min Mod Max Total  NT x2 Comments:   Bed Mobility    Rolling [] [] [] [] [] [] [] [] [] [] []    Supine to Sit [] [] [] [] [] [] [] [] [] [] []    Scooting [] [] [] [] [] [] [] [] [] [] []    Sit to Supine [] [] [] [] [] [] [] [] [] [] []    Transfers    Sit to Stand [] [] [] [x] [] [] [] [] [] [] []    Bed to Chair [] [] [] [x] [] [] [] [] [] [] []    Stand to Sit [] [] [] [x] [] [] [] [] [] [] []    Tub/Shower [] [] [] [] [] [] [] [] [] [] []     Toilet [] [] [] [] [] [] [] [] [] [] [] [] [] [] [] [] [] [] [] [] [] []    I=Independent, Mod I=Modified Independent, S=Supervision/Setup, SBA=Standby Assistance, CGA=Contact Guard Assistance, Min=Minimal Assistance, Mod=Moderate Assistance, Max=Maximal Assistance, Total=Total Assistance, NT=Not Tested    ACTIVITIES OF DAILY LIVING: I Mod I S SBA CGA Min Mod Max Total NT Comments   BASIC ADLs:              Upper Body Bathing  [] [] [] [] [] [] [] [] [] []    Lower Body Bathing [] [] [] [] [] [] [] [] [] []    Toileting [] [] [] [] [] [] [] [] [] []    Upper Body Dressing [] [] [] [] [] [] [] [] [] []    Lower Body Dressing [] [] [] [] [] [] [] [] [] []    Feeding [] [] [] [] [] [] [] [] [] []    Grooming [] [] [] [] [] [x] [] [] [] [] To comb hair   Personal Device Care [] [] [] [] [] [] [] [] [] []    Functional Mobility [] [] [] [x] [x] [] [] [] [] []    I=Independent, Mod I=Modified Independent, S=Supervision/Setup, SBA=Standby Assistance, CGA=Contact Guard Assistance, Min=Minimal Assistance, Mod=Moderate Assistance, Max=Maximal Assistance, Total=Total Assistance, NT=Not Tested    PLAN:   FREQUENCY/DURATION   OT Plan of Care: 3 times/week for duration of hospital stay or until stated goals are met, whichever comes first.    PROBLEM LIST:   (Skilled intervention is medically necessary to address:)  Decreased ADL/Functional Activities  Decreased Activity Tolerance  Decreased Coordination  Decreased Gait Ability  Decreased Safety Awareness  Decreased Transfer Abilities  Increased Pain   INTERVENTIONS PLANNED:  (Benefits and precautions of occupational therapy have been discussed with the patient.)  Self Care Training  Therapeutic Activity  Therapeutic Exercise/HEP  Neuromuscular Re-education  Manual Therapy  Education         TREATMENT:     TREATMENT:   Therapeutic Activity (20 Minutes): Patient participated in therapeutic activities including functional transfer training, adaptive equipment training, functional mobility of household distances, sitting tolerance activity, and standing tolerance activity with minimal assistance and education in order to increase independence, increase safety awareness, increase activity tolerance, and prepare for discharge home. Self Care (10 minutes): Patient participated in personal device care and grooming ADLs in unsupported sitting and standing with minimal verbal, manual, and tactile cueing to increase independence, increase activity tolerance, and increase safety awareness. Patient also participated in functional mobility, functional transfer, and energy conservation training to increase independence, increase activity tolerance, and increase safety awareness. TREATMENT GRID:  N/A    AFTER TREATMENT PRECAUTIONS: Bed/Chair Locked, Call light within reach, Chair, Needs within reach, and RN notified    INTERDISCIPLINARY COLLABORATION:  RN/ PCT and PT/ PTA    EDUCATION:  Education Given To: Patient  Education Provided: Role of Therapy;Plan of Care;Precautions; ADL Adaptive Strategies; Energy Conservation;Transfer Training;Equipment; Fall Prevention Strategies  Education Method: Demonstration;Verbal  Barriers to Learning: None  Education Outcome: Verbalized understanding;Demonstrated understanding;Continued education needed    TOTAL TREATMENT DURATION AND TIME:  Time In: 0915  Time Out: 0945  Minutes: [place], [place]

## 2023-02-14 ENCOUNTER — APPOINTMENT (OUTPATIENT)
Dept: GENERAL RADIOLOGY | Age: 83
DRG: 336 | End: 2023-02-14
Attending: FAMILY MEDICINE
Payer: MEDICARE

## 2023-02-14 LAB
ALBUMIN SERPL-MCNC: 2.6 G/DL (ref 3.2–4.6)
ALBUMIN/GLOB SERPL: 0.7 (ref 0.4–1.6)
ALP SERPL-CCNC: 90 U/L (ref 50–136)
ALT SERPL-CCNC: 57 U/L (ref 12–65)
ANION GAP SERPL CALC-SCNC: 11 MMOL/L (ref 2–11)
AST SERPL-CCNC: 40 U/L (ref 15–37)
BASOPHILS # BLD: 0 K/UL (ref 0–0.2)
BASOPHILS NFR BLD: 0 % (ref 0–2)
BILIRUB SERPL-MCNC: 0.9 MG/DL (ref 0.2–1.1)
BUN SERPL-MCNC: 3 MG/DL (ref 8–23)
CALCIUM SERPL-MCNC: 8.6 MG/DL (ref 8.3–10.4)
CHLORIDE SERPL-SCNC: 107 MMOL/L (ref 101–110)
CO2 SERPL-SCNC: 18 MMOL/L (ref 21–32)
CREAT SERPL-MCNC: 0.47 MG/DL (ref 0.6–1)
DIFFERENTIAL METHOD BLD: ABNORMAL
EOSINOPHIL # BLD: 0.3 K/UL (ref 0–0.8)
EOSINOPHIL NFR BLD: 3 % (ref 0.5–7.8)
ERYTHROCYTE [DISTWIDTH] IN BLOOD BY AUTOMATED COUNT: 13 % (ref 11.9–14.6)
GLOBULIN SER CALC-MCNC: 3.8 G/DL (ref 2.8–4.5)
GLUCOSE SERPL-MCNC: 84 MG/DL (ref 65–100)
HCT VFR BLD AUTO: 42.2 % (ref 35.8–46.3)
HGB BLD-MCNC: 14.1 G/DL (ref 11.7–15.4)
IMM GRANULOCYTES # BLD AUTO: 0 K/UL (ref 0–0.5)
IMM GRANULOCYTES NFR BLD AUTO: 0 % (ref 0–5)
LYMPHOCYTES # BLD: 1.6 K/UL (ref 0.5–4.6)
LYMPHOCYTES NFR BLD: 20 % (ref 13–44)
MCH RBC QN AUTO: 29.8 PG (ref 26.1–32.9)
MCHC RBC AUTO-ENTMCNC: 33.4 G/DL (ref 31.4–35)
MCV RBC AUTO: 89.2 FL (ref 82–102)
MONOCYTES # BLD: 0.6 K/UL (ref 0.1–1.3)
MONOCYTES NFR BLD: 7 % (ref 4–12)
NEUTS SEG # BLD: 5.7 K/UL (ref 1.7–8.2)
NEUTS SEG NFR BLD: 69 % (ref 43–78)
NRBC # BLD: 0 K/UL (ref 0–0.2)
PLATELET # BLD AUTO: 275 K/UL (ref 150–450)
PMV BLD AUTO: 8.9 FL (ref 9.4–12.3)
POTASSIUM SERPL-SCNC: 3.6 MMOL/L (ref 3.5–5.1)
PROT SERPL-MCNC: 6.4 G/DL (ref 6.3–8.2)
RBC # BLD AUTO: 4.73 M/UL (ref 4.05–5.2)
SODIUM SERPL-SCNC: 136 MMOL/L (ref 133–143)
WBC # BLD AUTO: 8.3 K/UL (ref 4.3–11.1)

## 2023-02-14 PROCEDURE — 2500000003 HC RX 250 WO HCPCS: Performed by: NURSE PRACTITIONER

## 2023-02-14 PROCEDURE — 2580000003 HC RX 258: Performed by: SURGERY

## 2023-02-14 PROCEDURE — 74018 RADEX ABDOMEN 1 VIEW: CPT

## 2023-02-14 PROCEDURE — 85025 COMPLETE CBC W/AUTO DIFF WBC: CPT

## 2023-02-14 PROCEDURE — C9113 INJ PANTOPRAZOLE SODIUM, VIA: HCPCS | Performed by: SURGERY

## 2023-02-14 PROCEDURE — 6360000002 HC RX W HCPCS: Performed by: SURGERY

## 2023-02-14 PROCEDURE — 1100000000 HC RM PRIVATE

## 2023-02-14 PROCEDURE — 6370000000 HC RX 637 (ALT 250 FOR IP): Performed by: SURGERY

## 2023-02-14 PROCEDURE — 97530 THERAPEUTIC ACTIVITIES: CPT

## 2023-02-14 PROCEDURE — 36415 COLL VENOUS BLD VENIPUNCTURE: CPT

## 2023-02-14 PROCEDURE — 80053 COMPREHEN METABOLIC PANEL: CPT

## 2023-02-14 RX ORDER — METOPROLOL TARTRATE 5 MG/5ML
5 INJECTION INTRAVENOUS EVERY 8 HOURS
Status: DISCONTINUED | OUTPATIENT
Start: 2023-02-14 | End: 2023-02-17

## 2023-02-14 RX ADMIN — SODIUM CHLORIDE, PRESERVATIVE FREE 10 ML: 5 INJECTION INTRAVENOUS at 10:03

## 2023-02-14 RX ADMIN — METOPROLOL TARTRATE 5 MG: 5 INJECTION INTRAVENOUS at 17:17

## 2023-02-14 RX ADMIN — MEMANTINE 10 MG: 5 TABLET ORAL at 07:59

## 2023-02-14 RX ADMIN — SODIUM CHLORIDE, PRESERVATIVE FREE 40 MG: 5 INJECTION INTRAVENOUS at 07:59

## 2023-02-14 RX ADMIN — METOPROLOL TARTRATE 5 MG: 5 INJECTION INTRAVENOUS at 10:02

## 2023-02-14 RX ADMIN — DONEPEZIL HYDROCHLORIDE 5 MG: 5 TABLET, FILM COATED ORAL at 21:35

## 2023-02-14 RX ADMIN — SODIUM CHLORIDE, PRESERVATIVE FREE 10 ML: 5 INJECTION INTRAVENOUS at 21:37

## 2023-02-14 RX ADMIN — VITAMIN D, TAB 1000IU (100/BT) 2000 UNITS: 25 TAB at 07:59

## 2023-02-14 RX ADMIN — SODIUM CHLORIDE: 9 INJECTION, SOLUTION INTRAVENOUS at 21:33

## 2023-02-14 RX ADMIN — MEMANTINE 10 MG: 5 TABLET ORAL at 21:35

## 2023-02-14 RX ADMIN — SODIUM CHLORIDE: 9 INJECTION, SOLUTION INTRAVENOUS at 10:13

## 2023-02-14 NOTE — PROGRESS NOTES
General Surgery Progress Note    2/14/2023    Admit Date: 2/10/2023    Chief Complaint: Nausea with Vomiting     Post OP Day # 4:  EXPLORATORY LAPAROSCOPY LYSIS OF ADHESIONS  Findings: : Adhesions in the abdomen, there was a loop of small bowel adhesed to the anterior abdominal wall and several places which appear to cause a almost closed-loop obstruction. HPI: Farida Monk is a 80 y.o. female who we are asked by Hospitalist MD  to see for Abdominal Pain. Patient has medical history of  hypertension, Rheumatoid arthritis, hyperlipidemia and dementia. Patient presented to ED  2/9/2023 at [facility] with complaints of abdominal pain with  nausea and vomiting, which started 1 day ago. Patient was initially seen at [facility] ER then transferred to [facility] for admission. Patient reports emesis (dark hematemesis) and she has had dark tarry stools the past few days. She reports abdominal pain has since resolved with medication given at ER but reports nausea continues. Patient denies any chest pain, dyspnea, fevers, chills,cough,  dysuria or hematuria. Patient currently has order for placement of NG tube as ordered per admitting team .          Patient  reports she has been having intervals of constipation x several weeks and  has not taken any medications for relief. Patient reports she has had poor appetite the past few days. Patient reports she is not on any blood thinners and has no prior history of GI Bleed/ulcer. Patient reports past surgical history of appendectomy, cholecystectomy, hysterectomy, right breast mastectomy , bilateral total hip arthroplasty and bilateral knee arthroplasty. The history was provided by patient and her daughter who is at bedside at this the time of exam.        Subjective:     Patient sitting up in chair. Patient reports her pain is controled. Patient denies any nausea or vomiting.  Patient denies any flatus but nurse states patient had one small bowel movement last night.     NG tube remains to  LIWS with 250 cc (Bilious) output past 24 hours. KUB done 2/14/2023 at 05:56 am  History: Follow-up small bowel obstruction       EXAM: Supine views abdomen       COMPARISON: CT abdomen and pelvis dated 2/9/2023       FINDINGS: There is an NG tube present within the body the stomach. Multiple   dilated loops of small bowel again seen. There is left basilar atelectasis. Impression   Persistently dilated loops of small bowel. Cannot exclude underlying   obstruction. Patient remains NPO except water with oral medications. Patient voiding without difficulty. Patient denies any chest pain, dyspnea or dysuria. Temp Max past 24 hours 98.6 (oral) pulse ranges 88 to 103     Labs today: K+ 3.6 Cr 0.47 ALT 57 AST 40 Alk phos 90       Objective:     BP (!) 165/57   Pulse 89   Temp 97.7 °F (36.5 °C) (Oral)   Resp 18   Ht 5' 6\" (1.676 m)   Wt 143 lb 1.6 oz (64.9 kg)   SpO2 95%   BMI 23.10 kg/m²       Intake/Output Summary (Last 24 hours) at 2/14/2023 0757  Last data filed at 2/14/2023 0319  Gross per 24 hour   Intake 5 ml   Output 750 ml   Net -745 ml      Physical Exam :  General:          Well nourished. Normal appearance, She is not in acute distress. Head:               Normocephalic, atraumatic  Eyes:               Sclerae appear normal.  Pupils equally round. ENT:                Nares appear normal.  Moist oral mucosa. Poor dentition noted. No rhinorrhea. NG tube intact to LIS   Neck:               No restricted ROM. Trachea midline   CV:                  RRR. No m/r/g. No jugular venous distension. Lungs:             CTAB. No wheezing, rhonchi, or rales. Symmetric expansion. Abdomen:        Soft, minimal  distention noted, Hypoactive bowel sounds. Lap sites clean/dry/approximated with no drainage. No masses or hernias appreciated. Appropriate incisional tenderness noted to trochar/lap sites. Extremities:     No cyanosis or clubbing.   No edema. Patient with hx rheumatoid arthritis and noted to upper extremity digits. Skin:                No rashes and normal coloration. Warm and dry. Neuro:             CN II-XII grossly intact. Sensation intact. Psych:             Normal mood and affect. Mental status baseline        Data Review    Recent Results (from the past 24 hour(s))   Comprehensive Metabolic Panel w/ Reflex to MG    Collection Time: 02/14/23  6:06 AM   Result Value Ref Range    Sodium 136 133 - 143 mmol/L    Potassium 3.6 3.5 - 5.1 mmol/L    Chloride 107 101 - 110 mmol/L    CO2 18 (L) 21 - 32 mmol/L    Anion Gap 11 2 - 11 mmol/L    Glucose 84 65 - 100 mg/dL    BUN 3 (L) 8 - 23 MG/DL    Creatinine 0.47 (L) 0.6 - 1.0 MG/DL    Est, Glom Filt Rate >60 >60 ml/min/1.73m2    Calcium 8.6 8.3 - 10.4 MG/DL    Total Bilirubin 0.9 0.2 - 1.1 MG/DL    ALT 57 12 - 65 U/L    AST 40 (H) 15 - 37 U/L    Alk Phosphatase 90 50 - 136 U/L    Total Protein 6.4 6.3 - 8.2 g/dL    Albumin 2.6 (L) 3.2 - 4.6 g/dL    Globulin 3.8 2.8 - 4.5 g/dL    Albumin/Globulin Ratio 0.7 0.4 - 1.6        Assessment:  Post OP Day # 4:  EXPLORATORY LAPAROSCOPY LYSIS OF ADHESIONS  Principal Problem:    SBO (small bowel obstruction) (HCC)  Active Problems:    Breast cancer, right breast (HCC)    Mixed hyperlipidemia  Resolved Problems:    * No resolved hospital problems. *    Plan:  -Further plan of care per Dr. Rojas   -Continue NG tube for now(Possible clamp later today)  -Check KUB  -Await bowel function for now  -Physical therapy and Occupational therapy evaluation  -Case management consult  for discharge planning(May need home health physical therapy, occupational therapy  and RN assessments)  -Encourage ambulation   -Encourage use of incentive spirometer  -Admitting team for electrolyte replacements/labs/etc  -Pain control     Deana Mast FNP-BC    Addendum  She was seen this morning. She had no complaints of abdominal pain or nausea. Nasogastric tube was in place.   Her KUB showed some persistently dilated loops of small bowel. She has not been out of bed much. Vital signs stable  Afebrile  Regular rate and rhythm  Aerating well  Abdomen soft, nondistended, nontender, nasogastric tube in place with minimal output  Assessment and plan  Status post laparoscopic lysis of adhesions for bowel obstruction. Await return of bowel function. N.p.o. for now. Continue NG tube decompression. We will try to get her out of bed with physical therapy. Continue to follow electrolytes and replete as needed.

## 2023-02-14 NOTE — PROGRESS NOTES
Hospitalist Progress Note   Admit Date:  2/10/2023  6:59 AM   Name:  Robi Harper   Age:  80 y.o. Sex:  female  :  1940   MRN:  [id number]   Room:  [facility]/    Presenting Complaint: No chief complaint on file. Reason(s) for Admission: SBO (small bowel obstruction) ([facility] [K56.609]     Hospital Course:     80 y.o. female with medical history of hypertension, hyperlipidemia and dementia admitted 2/10 for SBO. Underwent lap lysis of adhesions 2/10. Subjective & 24hr Events (23):   KUB with persistent distended loops of bowel. NGT uncomfortable per pt. Still with green output. Denies CP, SOB, n/v/d. Assessment & Plan:   SBO (small bowel obstruction) (HCC)  Plan: Underwent lap lysis of adhesions 2/10. Surgery following   Remains with NGT  Remains NPO except for sips with meds  KUB today with persistent dilated bowel loops     Active Problems:  Hypokalemia:  Replaced IV  BMP in AM  resolved     Hypomagnesemia:  Resolved after replacement           Breast cancer, right breast ([facility]  Plan: stable       Mixed hyperlipidemia  Plan: diet controlled. Alzheimer's disease:  Namenda and aricept ongoing       Anticipated discharge needs:      HH     Diet:  Diet NPO Exceptions are: Sips of Water with Meds  DVT PPx: Lovenox held  Code status: Full Code      Hospital Problems:  Principal Problem:    SBO (small bowel obstruction) ([facility]  Active Problems:    Breast cancer, right breast ([facility]    Mixed hyperlipidemia  Resolved Problems:    * No resolved hospital problems.  *      Objective:   Patient Vitals for the past 24 hrs:   Temp Pulse Resp BP SpO2   23 1117 98.1 °F (36.7 °C) 80 20 (!) 141/49 94 %   23 0754 97.7 °F (36.5 °C) 89 18 (!) 165/57 95 %   23 0319 97.2 °F (36.2 °C) 92 18 (!) 167/64 94 %   23 2253 97.9 °F (36.6 °C) (!) 103 18 (!) 174/59 96 %   23 1930 -- 98 -- -- --   23 191 97.7 °F (36.5 °C) 98 18 (!) 163/57 96 %   23 1553 97.9 °F (36.6 °C) 91 17 (!) 175/61 96 %       Oxygen Therapy  SpO2: 94 %  Pulse via Oximetry: 78 beats per minute  O2 Device: None (Room air)  O2 Flow Rate (L/min): 6 L/min    Estimated body mass index is 23.1 kg/m² as calculated from the following:    Height as of this encounter: 5' 6\" (1.676 m). Weight as of this encounter: 143 lb 1.6 oz (64.9 kg). Intake/Output Summary (Last 24 hours) at 2/14/2023 1155  Last data filed at 2/14/2023 1117  Gross per 24 hour   Intake 50 ml   Output 1150 ml   Net -1100 ml         Physical Exam:     Blood pressure (!) 141/49, pulse 80, temperature 98.1 °F (36.7 °C), temperature source Oral, resp. rate 20, height 5' 6\" (1.676 m), weight 143 lb 1.6 oz (64.9 kg), SpO2 94 %, not currently breastfeeding. I have  General:          Well nourished. Head:               Normocephalic, atraumatic  CV:                  RRR. No m/r/g. No jugular venous distension. Lungs:             CTAB. No wheezing, rhonchi, or rales. Symmetric expansion. Abdomen:        Soft, nontender, lap sites no drainage, no erythema. NGT intact. Extremities:     No cyanosis or clubbing. No edema  Skin:                No rashes and normal coloration. Warm and dry. Neuro:             CN II-XII grossly intact. Psych:             Normal mood and affect.      personally reviewed labs and tests:  Recent Labs:  Recent Results (from the past 48 hour(s))   PLEASE READ & DOCUMENT PPD TEST IN 72 HRS    Collection Time: 02/13/23 12:00 AM   Result Value Ref Range    PPD, (POC) Negative Negative    mm Induration 0 0 - 5 mm   CBC with Auto Differential    Collection Time: 02/13/23  5:55 AM   Result Value Ref Range    WBC 7.8 4.3 - 11.1 K/uL    RBC 4.23 4.05 - 5.2 M/uL    Hemoglobin 12.8 11.7 - 15.4 g/dL    Hematocrit 38.4 35.8 - 46.3 %    MCV 90.8 82.0 - 102.0 FL    MCH 30.3 26.1 - 32.9 PG    MCHC 33.3 31.4 - 35.0 g/dL    RDW 13.0 11.9 - 14.6 %    Platelets 106 398 - 893 K/uL    MPV 9.4 9.4 - 12.3 FL    nRBC 0.00 0.0 - 0.2 K/uL    Differential Type AUTOMATED      Seg Neutrophils 71 43 - 78 %    Lymphocytes 18 13 - 44 %    Monocytes 7 4.0 - 12.0 %    Eosinophils % 3 0.5 - 7.8 %    Basophils 0 0.0 - 2.0 %    Immature Granulocytes 0 0.0 - 5.0 %    Segs Absolute 5.5 1.7 - 8.2 K/UL    Absolute Lymph # 1.4 0.5 - 4.6 K/UL    Absolute Mono # 0.6 0.1 - 1.3 K/UL    Absolute Eos # 0.3 0.0 - 0.8 K/UL    Basophils Absolute 0.0 0.0 - 0.2 K/UL    Absolute Immature Granulocyte 0.0 0.0 - 0.5 K/UL   Basic Metabolic Panel w/ Reflex to MG    Collection Time: 02/13/23  5:55 AM   Result Value Ref Range    Sodium 138 133 - 143 mmol/L    Potassium 3.1 (L) 3.5 - 5.1 mmol/L    Chloride 106 101 - 110 mmol/L    CO2 21 21 - 32 mmol/L    Anion Gap 11 2 - 11 mmol/L    Glucose 69 65 - 100 mg/dL    BUN 5 (L) 8 - 23 MG/DL    Creatinine 0.35 (L) 0.6 - 1.0 MG/DL    Est, Glom Filt Rate >60 >60 ml/min/1.73m2    Calcium 8.4 8.3 - 10.4 MG/DL   Magnesium    Collection Time: 02/13/23  5:55 AM   Result Value Ref Range    Magnesium 1.9 1.8 - 2.4 mg/dL   Comprehensive Metabolic Panel w/ Reflex to MG    Collection Time: 02/14/23  6:06 AM   Result Value Ref Range    Sodium 136 133 - 143 mmol/L    Potassium 3.6 3.5 - 5.1 mmol/L    Chloride 107 101 - 110 mmol/L    CO2 18 (L) 21 - 32 mmol/L    Anion Gap 11 2 - 11 mmol/L    Glucose 84 65 - 100 mg/dL    BUN 3 (L) 8 - 23 MG/DL    Creatinine 0.47 (L) 0.6 - 1.0 MG/DL    Est, Glom Filt Rate >60 >60 ml/min/1.73m2    Calcium 8.6 8.3 - 10.4 MG/DL    Total Bilirubin 0.9 0.2 - 1.1 MG/DL    ALT 57 12 - 65 U/L    AST 40 (H) 15 - 37 U/L    Alk Phosphatase 90 50 - 136 U/L    Total Protein 6.4 6.3 - 8.2 g/dL    Albumin 2.6 (L) 3.2 - 4.6 g/dL    Globulin 3.8 2.8 - 4.5 g/dL    Albumin/Globulin Ratio 0.7 0.4 - 1.6     CBC with Auto Differential    Collection Time: 02/14/23  8:11 AM   Result Value Ref Range    WBC 8.3 4.3 - 11.1 K/uL    RBC 4.73 4.05 - 5.2 M/uL    Hemoglobin 14.1 11.7 - 15.4 g/dL    Hematocrit 42.2 35.8 - 46.3 %    MCV 89.2 82.0 - 102.0 FL    MCH 29.8 26.1 - 32.9 PG    MCHC 33.4 31.4 - 35.0 g/dL    RDW 13.0 11.9 - 14.6 %    Platelets 051 815 - 157 K/uL    MPV 8.9 (L) 9.4 - 12.3 FL    nRBC 0.00 0.0 - 0.2 K/uL    Differential Type AUTOMATED      Seg Neutrophils 69 43 - 78 %    Lymphocytes 20 13 - 44 %    Monocytes 7 4.0 - 12.0 %    Eosinophils % 3 0.5 - 7.8 %    Basophils 0 0.0 - 2.0 %    Immature Granulocytes 0 0.0 - 5.0 %    Segs Absolute 5.7 1.7 - 8.2 K/UL    Absolute Lymph # 1.6 0.5 - 4.6 K/UL    Absolute Mono # 0.6 0.1 - 1.3 K/UL    Absolute Eos # 0.3 0.0 - 0.8 K/UL    Basophils Absolute 0.0 0.0 - 0.2 K/UL    Absolute Immature Granulocyte 0.0 0.0 - 0.5 K/UL       I have personally reviewed imaging studies:  Other Studies:  XR ABDOMEN (KUB) (SINGLE AP VIEW)   Final Result   Persistently dilated loops of small bowel. Cannot exclude underlying   obstruction. XR ABDOMEN (KUB) (SINGLE AP VIEW)   Final Result   Impression:      Tube placement as above. Band of atelectasis or scarring in the left retrocardiac space. David Juares M.D.    2/13/2023 8:51:00 PM      XR ABDOMEN (KUB) (SINGLE AP VIEW)   Final Result   NG tube in good position.           Current Meds:  Current Facility-Administered Medications   Medication Dose Route Frequency    metoprolol (LOPRESSOR) injection 5 mg  5 mg IntraVENous Q8H    diphenhydrAMINE (BENADRYL) injection 25 mg  25 mg IntraVENous Nightly PRN    donepezil (ARICEPT) tablet 5 mg  5 mg Oral Nightly    memantine (NAMENDA) tablet 10 mg  10 mg Oral BID    Vitamin D (CHOLECALCIFEROL) tablet 2,000 Units  2,000 Units Oral Daily    sodium chloride flush 0.9 % injection 5-40 mL  5-40 mL IntraVENous 2 times per day    sodium chloride flush 0.9 % injection 5-40 mL  5-40 mL IntraVENous PRN    0.9 % sodium chloride infusion   IntraVENous PRN    [Held by provider] enoxaparin (LOVENOX) injection 40 mg  40 mg SubCUTAneous Daily    ondansetron (ZOFRAN-ODT) disintegrating tablet 4 mg  4 mg Oral Q8H PRN    Or ondansetron (ZOFRAN) injection 4 mg  4 mg IntraVENous Q6H PRN    polyethylene glycol (GLYCOLAX) packet 17 g  17 g Oral Daily PRN    acetaminophen (TYLENOL) tablet 650 mg  650 mg Oral Q6H PRN    Or    acetaminophen (TYLENOL) suppository 650 mg  650 mg Rectal Q6H PRN    0.9 % sodium chloride infusion   IntraVENous Continuous    morphine injection 2 mg  2 mg IntraVENous Q4H PRN    pantoprazole (PROTONIX) 40 mg in sodium chloride (PF) 0.9 % 10 mL injection  40 mg IntraVENous Daily       Signed:  Noemi Messina, APRN - CNP    Notes, labs, Vs, diagnostic testing reviewed  Case discussed with pt, care team ,Dr. Rhona Mendieta

## 2023-02-14 NOTE — PROGRESS NOTES
ACUTE PHYSICAL THERAPY GOALS:   (Developed with and agreed upon by patient and/or caregiver. )    LTG:  (1.)Ms. Eric Adan will move from supine to sit and sit to supine  in bed with SUPERVISION within 7 treatment day(s). (2.)Ms. Eric Adan will transfer from bed to chair and chair to bed with INDEPENDENT using the least restrictive device within 7 treatment day(s). (3.)Ms. Eric Adan will ambulate with SUPERVISION for 300 feet with the least restrictive device within 7 treatment day(s). ________________________________________________________________________________________________     PHYSICAL THERAPY Daily Note and AM  (Link to Caseload Tracking: PT Visit Days : 4  Acknowledge Orders  Time In/Out  PT Charge Capture  Rehab Caseload Tracker    Wilber Lyons is a 80 y.o. female   PRIMARY DIAGNOSIS: SBO (small bowel obstruction) (HCC)  SBO (small bowel obstruction) (HCC) [K56.609]  Procedure(s) (LRB):  EXPLORATORY LAPAROSCOPY LYSIS OF ADHESIONS (N/A)  4 Days Post-Op  Reason for Referral: Generalized Muscle Weakness (M62.81)  Other abnormalities of gait and mobility (R26.89)  Inpatient: Payor: Luz Maria Perez / Plan: Taylor Villar / Product Type: *No Product type* /     ASSESSMENT:     REHAB RECOMMENDATIONS:   Recommendation to date pending progress:  Setting:  Home Health Therapy    Equipment:    None     ASSESSMENT:  Ms. Eric Adan presents this admission with SBO and is somewhat limited from her baseline with functional mobility. She will discharge home and has family/friend support but lives alone. She will benefit from PT to increase her functional independence and she should be able to return home without further therapy. She was supine on contact and needing to use the bathroom. Transferred supine to sit with CGA and then ambulated 20 ft to the bathroom with SBA/CGA. She toileted with SBA and practiced sit to stand while changing brief. Needed only SBA/Supervision for toileting.  She ambulated back to the bed and returned supine with SBA. Going for Laparoscopy this evening. 2/14 sitting in the chair upon arrival.  Participated well. Performs B LE exercises (see below) with chair reclined with some rest breaks. Work on sit>stand with Min A, felt dizzy when first stood up. Ambulated 40 ft to the door with Min A and verbal cues. Stood few min. Ambulated another 40 ft back to the chair. Return to chair and rested few min. Then performs some more B LE in the chair. Remain in the chair with needs in reach and instructed to call for assists. Venita Larry AM-PAC 6 Clicks Basic Mobility Inpatient Short Form  AM-PAC Basic Mobility - Inpatient   How much help is needed turning from your back to your side while in a flat bed without using bedrails?: None  How much help is needed moving from lying on your back to sitting on the side of a flat bed without using bedrails?: A Little  How much help is needed moving to and from a bed to a chair?: A Little  How much help is needed standing up from a chair using your arms?: None  How much help is needed walking in hospital room?: None  How much help is needed climbing 3-5 steps with a railing?: A Little  [facility] Inpatient Mobility Raw Score : 21  AM-PAC Inpatient T-Scale Score : 50.25  Mobility Inpatient CMS 0-100% Score: 28.97  Mobility Inpatient CMS G-Code Modifier : CJ    SUBJECTIVE:   Ms. Lázaro Aguirre agreeable     Social/Functional Lives With: Alone  Type of Home: House  Home Layout: One level  Home Access: Stairs to enter with rails  Entrance Stairs - Number of Steps: 2  Bathroom Shower/Tub: Walk-in shower, Shower chair with back  Bathroom Toilet: Standard  Bathroom Equipment: Shower chair  Receives Help From: Family, Friend(s), Neighbor, Personal care attendant  ADL Assistance: Needs assistance  Bath: Moderate assistance  Dressing:  Moderate assistance  Grooming: Minimal assistance  Feeding: Modified independent   Toileting: Independent  Homemaking Assistance: Needs assistance  Ambulation Assistance: Independent  Active : No  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    OBJECTIVE:     PAIN: VITALS / O2: PRECAUTION / Petey Amis / DRAINS:   Pre Treatment: 0/10         Post Treatment: 0/10 Vitals        Oxygen      IV and Nasogastric Tube    RESTRICTIONS/PRECAUTIONS:                    GROSS EVALUATION: Intact Impaired (Comments):   AROM []  WFL except bilateral fingers/wrists   PROM []    Strength []  WFL minus some now generalized post op weakness   Balance []     Posture [] N/A   Sensation [x]     Coordination [x]      Tone [x]     Edema []    Activity Tolerance []  Some post op decline    []      COGNITION/  PERCEPTION: Intact Impaired (Comments):   Orientation [x]     Vision [x]     Hearing [x]     Cognition  [x]       MOBILITY: I Mod I S SBA CGA Min Mod Max Total  NT x2 Comments:   Bed Mobility    Rolling [] [] [] [] [] [] [] [] [] [x] []    Supine to Sit [] [] [] [] [] [] [] [] [] [x] []    Scooting [] [] [] [] [] [] [] [] [] [x] []    Sit to Supine [] [] [] [] [] [] [] [] [] [x] []    Transfers    Sit to Stand [] [] [] [] [] [x] [] [] [] [] []    Bed to Chair [] [] [] [] [] [] [] [] [] [x] []    Stand to Sit [] [] [] [] [] [x] [] [] [] [] []     [] [] [] [] [] [] [] [] [] [] []    I=Independent, Mod I=Modified Independent, S=Supervision, SBA=Standby Assistance, CGA=Contact Guard Assistance,   Min=Minimal Assistance, Mod=Moderate Assistance, Max=Maximal Assistance, Total=Total Assistance, NT=Not Tested    GAIT: I Mod I S SBA CGA Min Mod Max Total  NT x2 Comments:   Level of Assistance [] [] [] [] [] [] [] [] [] [x] []    Distance   feet    DME Hand held assist    Gait Quality Decreased facundo , Decreased step clearance, Decreased step length, and Shuffling     Weightbearing Status      Stairs      I=Independent, Mod I=Modified Independent, S=Supervision, SBA=Standby Assistance, CGA=Contact Guard Assistance,   Min=Minimal Assistance, Mod=Moderate Assistance, Max=Maximal Assistance, Total=Total Assistance, NT=Not Tested    PLAN:   FREQUENCY AND DURATION: Daily for duration of hospital stay or until stated goals are met, whichever comes first.    THERAPY PROGNOSIS: Good    PROBLEM LIST:   (Skilled intervention is medically necessary to address:)  Decreased ADL/Functional Activities  Decreased Activity Tolerance  Decreased Balance  Decreased Gait Ability  Decreased Safety Awareness  Decreased Strength  Decreased Transfer Abilities INTERVENTIONS PLANNED:   (Benefits and precautions of physical therapy have been discussed with the patient.)  Therapeutic Activity  Therapeutic Exercise/HEP  Gait Training       TREATMENT:     TREATMENT:   Therapeutic Activity (38 Minutes): Therapeutic activity included Ambulation on level ground, Sitting balance , and Standing balance to improve functional Activity tolerance, Balance, Coordination, Mobility, Strength, and ROM.     TREATMENT GRID:  B LE  Date:  2/13 Date:  2/14   Date:     Activity/Exercise Parameters Parameters Parameters   Ankle pumps 12 15    Quad sets 12 15    Heel slides 12 15    Hip abd/ad 12 15    SAQ 12 15    Marching in place in chair  15    LAQ in the chair  15            AFTER TREATMENT PRECAUTIONS: Bed/Chair Locked, Call light within reach, Chair, Needs within reach, RN notified, and Visitors at bedside    INTERDISCIPLINARY COLLABORATION:  RN/ PCT    EDUCATION:      TIME IN/OUT:  Time In: 0730  Time Out: [address]  Minutes: 13 [facility]

## 2023-02-15 LAB
ALBUMIN SERPL-MCNC: 2.3 G/DL (ref 3.2–4.6)
ALBUMIN/GLOB SERPL: 0.6 (ref 0.4–1.6)
ALP SERPL-CCNC: 75 U/L (ref 50–136)
ALT SERPL-CCNC: 38 U/L (ref 12–65)
ANION GAP SERPL CALC-SCNC: 10 MMOL/L (ref 2–11)
APPEARANCE UR: CLEAR
AST SERPL-CCNC: 33 U/L (ref 15–37)
BACTERIA URNS QL MICRO: NEGATIVE /HPF
BASOPHILS # BLD: 0 K/UL (ref 0–0.2)
BASOPHILS NFR BLD: 1 % (ref 0–2)
BILIRUB SERPL-MCNC: 0.7 MG/DL (ref 0.2–1.1)
BILIRUB UR QL: NEGATIVE
BUN SERPL-MCNC: 4 MG/DL (ref 8–23)
CALCIUM SERPL-MCNC: 8.3 MG/DL (ref 8.3–10.4)
CASTS URNS QL MICRO: ABNORMAL /LPF
CHLORIDE SERPL-SCNC: 109 MMOL/L (ref 101–110)
CO2 SERPL-SCNC: 20 MMOL/L (ref 21–32)
COLOR UR: ABNORMAL
CREAT SERPL-MCNC: 0.37 MG/DL (ref 0.6–1)
DIFFERENTIAL METHOD BLD: ABNORMAL
EOSINOPHIL # BLD: 0.3 K/UL (ref 0–0.8)
EOSINOPHIL NFR BLD: 4 % (ref 0.5–7.8)
EPI CELLS #/AREA URNS HPF: ABNORMAL /HPF
ERYTHROCYTE [DISTWIDTH] IN BLOOD BY AUTOMATED COUNT: 13.2 % (ref 11.9–14.6)
GLOBULIN SER CALC-MCNC: 3.7 G/DL (ref 2.8–4.5)
GLUCOSE SERPL-MCNC: 68 MG/DL (ref 65–100)
GLUCOSE UR STRIP.AUTO-MCNC: NEGATIVE MG/DL
HCT VFR BLD AUTO: 38.1 % (ref 35.8–46.3)
HGB BLD-MCNC: 12.9 G/DL (ref 11.7–15.4)
HGB UR QL STRIP: NEGATIVE
IMM GRANULOCYTES # BLD AUTO: 0 K/UL (ref 0–0.5)
IMM GRANULOCYTES NFR BLD AUTO: 0 % (ref 0–5)
KETONES UR QL STRIP.AUTO: >80 MG/DL
LEUKOCYTE ESTERASE UR QL STRIP.AUTO: NEGATIVE
LYMPHOCYTES # BLD: 1.3 K/UL (ref 0.5–4.6)
LYMPHOCYTES NFR BLD: 16 % (ref 13–44)
MCH RBC QN AUTO: 29.9 PG (ref 26.1–32.9)
MCHC RBC AUTO-ENTMCNC: 33.9 G/DL (ref 31.4–35)
MCV RBC AUTO: 88.2 FL (ref 82–102)
MONOCYTES # BLD: 0.6 K/UL (ref 0.1–1.3)
MONOCYTES NFR BLD: 7 % (ref 4–12)
NEUTS SEG # BLD: 6.3 K/UL (ref 1.7–8.2)
NEUTS SEG NFR BLD: 73 % (ref 43–78)
NITRITE UR QL STRIP.AUTO: NEGATIVE
NRBC # BLD: 0 K/UL (ref 0–0.2)
PH UR STRIP: 6 (ref 5–9)
PLATELET # BLD AUTO: 290 K/UL (ref 150–450)
PMV BLD AUTO: 9 FL (ref 9.4–12.3)
POTASSIUM SERPL-SCNC: 3.7 MMOL/L (ref 3.5–5.1)
PROT SERPL-MCNC: 6 G/DL (ref 6.3–8.2)
PROT UR STRIP-MCNC: 30 MG/DL
RBC # BLD AUTO: 4.32 M/UL (ref 4.05–5.2)
RBC #/AREA URNS HPF: ABNORMAL /HPF
SODIUM SERPL-SCNC: 139 MMOL/L (ref 133–143)
SP GR UR REFRACTOMETRY: 1.02 (ref 1–1.02)
UROBILINOGEN UR QL STRIP.AUTO: 1 EU/DL (ref 0.2–1)
WBC # BLD AUTO: 8.6 K/UL (ref 4.3–11.1)
WBC URNS QL MICRO: ABNORMAL /HPF

## 2023-02-15 PROCEDURE — 81001 URINALYSIS AUTO W/SCOPE: CPT

## 2023-02-15 PROCEDURE — 2580000003 HC RX 258: Performed by: SURGERY

## 2023-02-15 PROCEDURE — 6370000000 HC RX 637 (ALT 250 FOR IP): Performed by: SURGERY

## 2023-02-15 PROCEDURE — 36415 COLL VENOUS BLD VENIPUNCTURE: CPT

## 2023-02-15 PROCEDURE — 97535 SELF CARE MNGMENT TRAINING: CPT

## 2023-02-15 PROCEDURE — 2500000003 HC RX 250 WO HCPCS: Performed by: NURSE PRACTITIONER

## 2023-02-15 PROCEDURE — 85025 COMPLETE CBC W/AUTO DIFF WBC: CPT

## 2023-02-15 PROCEDURE — 1100000000 HC RM PRIVATE

## 2023-02-15 PROCEDURE — 6360000002 HC RX W HCPCS: Performed by: SURGERY

## 2023-02-15 PROCEDURE — C9113 INJ PANTOPRAZOLE SODIUM, VIA: HCPCS | Performed by: SURGERY

## 2023-02-15 PROCEDURE — 97530 THERAPEUTIC ACTIVITIES: CPT

## 2023-02-15 PROCEDURE — 80053 COMPREHEN METABOLIC PANEL: CPT

## 2023-02-15 RX ADMIN — SODIUM CHLORIDE: 9 INJECTION, SOLUTION INTRAVENOUS at 09:44

## 2023-02-15 RX ADMIN — SODIUM CHLORIDE, PRESERVATIVE FREE 40 MG: 5 INJECTION INTRAVENOUS at 08:47

## 2023-02-15 RX ADMIN — METOPROLOL TARTRATE 5 MG: 5 INJECTION INTRAVENOUS at 17:10

## 2023-02-15 RX ADMIN — MEMANTINE 10 MG: 5 TABLET ORAL at 08:58

## 2023-02-15 RX ADMIN — DONEPEZIL HYDROCHLORIDE 5 MG: 5 TABLET, FILM COATED ORAL at 21:58

## 2023-02-15 RX ADMIN — SODIUM CHLORIDE, PRESERVATIVE FREE 10 ML: 5 INJECTION INTRAVENOUS at 21:59

## 2023-02-15 RX ADMIN — MEMANTINE 10 MG: 5 TABLET ORAL at 21:58

## 2023-02-15 RX ADMIN — VITAMIN D, TAB 1000IU (100/BT) 2000 UNITS: 25 TAB at 08:59

## 2023-02-15 RX ADMIN — METOPROLOL TARTRATE 5 MG: 5 INJECTION INTRAVENOUS at 09:00

## 2023-02-15 RX ADMIN — SODIUM CHLORIDE, PRESERVATIVE FREE 10 ML: 5 INJECTION INTRAVENOUS at 08:47

## 2023-02-15 RX ADMIN — METOPROLOL TARTRATE 5 MG: 5 INJECTION INTRAVENOUS at 02:29

## 2023-02-15 ASSESSMENT — PAIN SCALES - GENERAL: PAINLEVEL_OUTOF10: 0

## 2023-02-15 NOTE — PLAN OF CARE
Ng tube removed per order at 0900. Pt on clear liquids and drank her vegetable broth for breakfast. IV leaking, new 22g placed on the left wrist. Pt educated on the used of the TrustAlert, was able to get up to 750 ml. Problem: Discharge Planning  Goal: Discharge to home or other facility with appropriate resources  2/15/2023 1018 by Sea Savage RN  Outcome: Progressing  2/15/2023 0042 by Kayden Gonzalez RN  Outcome: Progressing     Problem: Safety - Adult  Goal: Free from fall injury  2/15/2023 1018 by Sea Savage RN  Outcome: Progressing  2/15/2023 0042 by Kayden Gonzalez RN  Outcome: Progressing     Problem: ABCDS Injury Assessment  Goal: Absence of physical injury  2/15/2023 1018 by Sea Savaeg RN  Outcome: Progressing  2/15/2023 0042 by Kayden Gonzalez RN  Outcome: Progressing     Problem: Skin/Tissue Integrity  Goal: Absence of new skin breakdown  Description: 1. Monitor for areas of redness and/or skin breakdown  2. Assess vascular access sites hourly  3. Every 4-6 hours minimum:  Change oxygen saturation probe site  4. Every 4-6 hours:  If on nasal continuous positive airway pressure, respiratory therapy assess nares and determine need for appliance change or resting period.   2/15/2023 1018 by Sea Savage RN  Outcome: Progressing  2/15/2023 0042 by Kayden Gonzalez RN  Outcome: Progressing     Problem: Pain  Goal: Verbalizes/displays adequate comfort level or baseline comfort level  2/15/2023 1018 by Sea Savage RN  Outcome: Progressing  2/15/2023 0042 by Kayden Gonzalez RN  Outcome: Progressing

## 2023-02-15 NOTE — PROGRESS NOTES
Hospitalist Progress Note   Admit Date:  2/10/2023  6:59 AM   Name:  Ivtete Duncan   Age:  80 y.o. Sex:  female  :  1940   MRN:  [id number]   Room:  [facility]/    Presenting Complaint: No chief complaint on file. Reason(s) for Admission: SBO (small bowel obstruction) ([address] [K56.609]     Hospital Course:     80 y.o. female with medical history of hypertension, hyperlipidemia and dementia admitted 2/10 for SBO. Underwent lap lysis of adhesions 2/10. KUB on  with persistent distended loops of bowel. NGT remains in . Subjective & 24hr Events (02/15/23): Sitting up in chair. afebrile. no abd pain or N/V. Looks tired this am.      Assessment & Plan:   SBO (small bowel obstruction) ([address]  Plan: Underwent lap lysis of adhesions 2/10. Surgery following   Remains with NGT  Remains NPO except for sips with meds  KUB  with persistent dilated bowel loops     Active Problems:  Hypokalemia:  resolved     Hypomagnesemia:  Resolved after replacement           Breast cancer, right breast ([address]  Plan: stable       Mixed hyperlipidemia  Plan: diet controlled. Alzheimer's disease:  Namenda and aricept ongoing       Anticipated discharge needs:      HH     Diet:  Diet NPO Exceptions are: Sips of Water with Meds  DVT PPx: Lovenox held  Code status: Full Code      Hospital Problems:  Principal Problem:    SBO (small bowel obstruction) ([address]  Active Problems:    Breast cancer, right breast ([address]    Mixed hyperlipidemia  Resolved Problems:    * No resolved hospital problems.  *      Objective:   Patient Vitals for the past 24 hrs:   Temp Pulse Resp BP SpO2   02/15/23 0801 97.7 °F (36.5 °C) 85 18 (!) 160/50 96 %   02/15/23 0345 98.4 °F (36.9 °C) 80 14 (!) 151/61 95 %   02/15/23 0231 -- 87 -- (!) 155/60 --   02/15/23 0033 -- 85 16 (!) 163/61 95 %   23 97.7 °F (36.5 °C) 85 16 (!) 151/60 96 %   23 1543 98.4 °F (36.9 °C) 87 18 (!) 147/54 94 %   23 1117 98.1 °F (36.7 °C) 80 20 (!) 141/49 94 %       Oxygen Therapy  SpO2: 96 %  Pulse via Oximetry: 78 beats per minute  O2 Device: None (Room air)  O2 Flow Rate (L/min): 6 L/min    Estimated body mass index is 23.1 kg/m² as calculated from the following:    Height as of this encounter: 5' 6\" (1.676 m). Weight as of this encounter: 143 lb 1.6 oz (64.9 kg). Intake/Output Summary (Last 24 hours) at 2/15/2023 0915  Last data filed at 2/15/2023 0847  Gross per 24 hour   Intake 5 ml   Output 601 ml   Net -596 ml         Physical Exam:     Blood pressure (!) 160/50, pulse 85, temperature 97.7 °F (36.5 °C), temperature source Oral, resp. rate 18, height 5' 6\" (1.676 m), weight 143 lb 1.6 oz (64.9 kg), SpO2 96 %, not currently breastfeeding. I have  General:          Well nourished. Head:               Normocephalic, atraumatic  CV:                  RRR. No m/r/g. No jugular venous distension. Lungs:             CTAB. No wheezing, rhonchi, or rales. Symmetric expansion. Abdomen:        Soft, nontender, lap sites no drainage, no erythema. NGT intact. Extremities:     No cyanosis or clubbing. No edema  Skin:                No rashes and normal coloration. Warm and dry. Neuro:             CN II-XII grossly intact. Psych:             Normal mood and affect.      personally reviewed labs and tests:  Recent Labs:  Recent Results (from the past 48 hour(s))   Comprehensive Metabolic Panel w/ Reflex to MG    Collection Time: 02/14/23  6:06 AM   Result Value Ref Range    Sodium 136 133 - 143 mmol/L    Potassium 3.6 3.5 - 5.1 mmol/L    Chloride 107 101 - 110 mmol/L    CO2 18 (L) 21 - 32 mmol/L    Anion Gap 11 2 - 11 mmol/L    Glucose 84 65 - 100 mg/dL    BUN 3 (L) 8 - 23 MG/DL    Creatinine 0.47 (L) 0.6 - 1.0 MG/DL    Est, Glom Filt Rate >60 >60 ml/min/1.73m2    Calcium 8.6 8.3 - 10.4 MG/DL    Total Bilirubin 0.9 0.2 - 1.1 MG/DL    ALT 57 12 - 65 U/L    AST 40 (H) 15 - 37 U/L    Alk Phosphatase 90 50 - 136 U/L    Total Protein 6.4 6.3 - 8.2 g/dL    Albumin 2.6 (L) 3.2 - 4.6 g/dL    Globulin 3.8 2.8 - 4.5 g/dL    Albumin/Globulin Ratio 0.7 0.4 - 1.6     CBC with Auto Differential    Collection Time: 02/14/23  8:11 AM   Result Value Ref Range    WBC 8.3 4.3 - 11.1 K/uL    RBC 4.73 4.05 - 5.2 M/uL    Hemoglobin 14.1 11.7 - 15.4 g/dL    Hematocrit 42.2 35.8 - 46.3 %    MCV 89.2 82.0 - 102.0 FL    MCH 29.8 26.1 - 32.9 PG    MCHC 33.4 31.4 - 35.0 g/dL    RDW 13.0 11.9 - 14.6 %    Platelets 275 150 - 450 K/uL    MPV 8.9 (L) 9.4 - 12.3 FL    nRBC 0.00 0.0 - 0.2 K/uL    Differential Type AUTOMATED      Seg Neutrophils 69 43 - 78 %    Lymphocytes 20 13 - 44 %    Monocytes 7 4.0 - 12.0 %    Eosinophils % 3 0.5 - 7.8 %    Basophils 0 0.0 - 2.0 %    Immature Granulocytes 0 0.0 - 5.0 %    Segs Absolute 5.7 1.7 - 8.2 K/UL    Absolute Lymph # 1.6 0.5 - 4.6 K/UL    Absolute Mono # 0.6 0.1 - 1.3 K/UL    Absolute Eos # 0.3 0.0 - 0.8 K/UL    Basophils Absolute 0.0 0.0 - 0.2 K/UL    Absolute Immature Granulocyte 0.0 0.0 - 0.5 K/UL   Comprehensive Metabolic Panel w/ Reflex to MG    Collection Time: 02/15/23  5:40 AM   Result Value Ref Range    Sodium 139 133 - 143 mmol/L    Potassium 3.7 3.5 - 5.1 mmol/L    Chloride 109 101 - 110 mmol/L    CO2 20 (L) 21 - 32 mmol/L    Anion Gap 10 2 - 11 mmol/L    Glucose 68 65 - 100 mg/dL    BUN 4 (L) 8 - 23 MG/DL    Creatinine 0.37 (L) 0.6 - 1.0 MG/DL    Est, Glom Filt Rate >60 >60 ml/min/1.73m2    Calcium 8.3 8.3 - 10.4 MG/DL    Total Bilirubin 0.7 0.2 - 1.1 MG/DL    ALT 38 12 - 65 U/L    AST 33 15 - 37 U/L    Alk Phosphatase 75 50 - 136 U/L    Total Protein 6.0 (L) 6.3 - 8.2 g/dL    Albumin 2.3 (L) 3.2 - 4.6 g/dL    Globulin 3.7 2.8 - 4.5 g/dL    Albumin/Globulin Ratio 0.6 0.4 - 1.6         I have personally reviewed imaging studies:  Other Studies:  XR ABDOMEN (KUB) (SINGLE AP VIEW)   Final Result   Persistently dilated loops of small bowel. Cannot exclude underlying   obstruction.      XR ABDOMEN (KUB) (SINGLE AP VIEW)   Final Result  Impression:      Tube placement as above. Band of atelectasis or scarring in the left retrocardiac space. Karen Drew M.D.    2/13/2023 8:51:00 PM      XR ABDOMEN (KUB) (SINGLE AP VIEW)   Final Result   NG tube in good position.           Current Meds:  Current Facility-Administered Medications   Medication Dose Route Frequency    metoprolol (LOPRESSOR) injection 5 mg  5 mg IntraVENous Q8H    diphenhydrAMINE (BENADRYL) injection 25 mg  25 mg IntraVENous Nightly PRN    donepezil (ARICEPT) tablet 5 mg  5 mg Oral Nightly    memantine (NAMENDA) tablet 10 mg  10 mg Oral BID    Vitamin D (CHOLECALCIFEROL) tablet 2,000 Units  2,000 Units Oral Daily    sodium chloride flush 0.9 % injection 5-40 mL  5-40 mL IntraVENous 2 times per day    sodium chloride flush 0.9 % injection 5-40 mL  5-40 mL IntraVENous PRN    0.9 % sodium chloride infusion   IntraVENous PRN    [Held by provider] enoxaparin (LOVENOX) injection 40 mg  40 mg SubCUTAneous Daily    ondansetron (ZOFRAN-ODT) disintegrating tablet 4 mg  4 mg Oral Q8H PRN    Or    ondansetron (ZOFRAN) injection 4 mg  4 mg IntraVENous Q6H PRN    polyethylene glycol (GLYCOLAX) packet 17 g  17 g Oral Daily PRN    acetaminophen (TYLENOL) tablet 650 mg  650 mg Oral Q6H PRN    Or    acetaminophen (TYLENOL) suppository 650 mg  650 mg Rectal Q6H PRN    0.9 % sodium chloride infusion   IntraVENous Continuous    morphine injection 2 mg  2 mg IntraVENous Q4H PRN    pantoprazole (PROTONIX) 40 mg in sodium chloride (PF) 0.9 % 10 mL injection  40 mg IntraVENous Daily       Signed:  Soy Green, APRN - CNP

## 2023-02-15 NOTE — PROGRESS NOTES
ACUTE PHYSICAL THERAPY GOALS:   (Developed with and agreed upon by patient and/or caregiver. )    LTG:  (1.)Ms. Roxanna Chaves will move from supine to sit and sit to supine  in bed with SUPERVISION within 7 treatment day(s). (2.)Ms. Roxanna Chaves will transfer from bed to chair and chair to bed with INDEPENDENT using the least restrictive device within 7 treatment day(s). (3.)Ms. Roxnana Chaves will ambulate with SUPERVISION for 300 feet with the least restrictive device within 7 treatment day(s). ________________________________________________________________________________________________     PHYSICAL THERAPY Daily Note and AM  (Link to Caseload Tracking: PT Visit Days : 4  Acknowledge Orders  Time In/Out  PT Charge Capture  Rehab Caseload Tracker    Perla Weiss is a 80 y.o. female   PRIMARY DIAGNOSIS: SBO (small bowel obstruction) ([facility])  SBO (small bowel obstruction) ([facility]) [K56.609]  Procedure(s) (LRB):  EXPLORATORY LAPAROSCOPY LYSIS OF ADHESIONS (N/A)  5 Days Post-Op  Reason for Referral: Generalized Muscle Weakness (M62.81)  Other abnormalities of gait and mobility (R26.89)  Inpatient: Payor: Herlinda Renyaor / Plan: Arn Leak / Product Type: *No Product type* /     ASSESSMENT:     REHAB RECOMMENDATIONS:   Recommendation to date pending progress:  Setting:  Home Health Therapy    Equipment:    None     ASSESSMENT:  Ms. Roxanna Chaves presents this admission with SBO and is somewhat limited from her baseline with functional mobility. She will discharge home and has family/friend support but lives alone. She will benefit from PT to increase her functional independence and she should be able to return home without further therapy. She was supine on contact and needing to use the bathroom. Transferred supine to sit with CGA and then ambulated 20 ft to the bathroom with SBA/CGA. She toileted with SBA and practiced sit to stand while changing brief. Needed only SBA/Supervision for toileting.  She ambulated back to the bed and returned supine with SBA. Going for Laparoscopy this evening. 2/14 sitting in the chair upon arrival.  Participated well. Performs B LE exercises (see below) with chair reclined with some rest breaks. Work on sit>stand with Min A, felt dizzy when first stood up. Ambulated 40 ft to the door with Min A and verbal cues. Stood few min. Ambulated another 40 ft back to the chair. Return to chair and rested few min. Then performs some more B LE in the chair. Remain in the chair with needs in reach and instructed to call for assists. 2/15 sitting in the chair upon arrival.  Sit>stand with HHA. Ambulated 40 ft to the restroom with HHA and walk into the shower. Stood for therapist to clean her bottom without LOB. Donned her gown and ambulated 4 ft to the sink, to brush teeth and comb hair while working on maintaining her balance. Ambulated another 40 ft back to the bed with HHA. While in the chair performs B LE exercises (see below) with guidance. Remain in the chair with needs in reach and instructed to call for assists before getting up.        [address] [facility] [address] [id number] AM-PAC 6 Clicks Basic Mobility Inpatient Short Form  AM-PAC Basic Mobility - Inpatient   How much help is needed turning from your back to your side while in a flat bed without using bedrails?: None  How much help is needed moving from lying on your back to sitting on the side of a flat bed without using bedrails?: A Little  How much help is needed moving to and from a bed to a chair?: A Little  How much help is needed standing up from a chair using your arms?: None  How much help is needed walking in hospital room?: None  How much help is needed climbing 3-5 steps with a railing?: A Little  AM-[facility] Inpatient Mobility Raw Score : 21  AM-PAC Inpatient T-Scale Score : 50.25  Mobility Inpatient CMS 0-100% Score: 28.97  Mobility Inpatient CMS G-Code Modifier : CJ    SUBJECTIVE:   [facility] Social/Functional Lives With: Alone  Type of Home: House  Home Layout: One level  Home Access: Stairs to enter with rails  Entrance Stairs - Number of Steps: 2  Bathroom Shower/Tub: Walk-in shower, Shower chair with back  Bathroom Toilet: Standard  Bathroom Equipment: Shower chair  Receives Help From: Family, Friend(s), Neighbor, Personal care attendant  ADL Assistance: Needs assistance  Bath: Moderate assistance  Dressing:  Moderate assistance  Grooming: Minimal assistance  Feeding: Modified independent   Toileting: Independent  Homemaking Assistance: Needs assistance  Ambulation Assistance: Independent  Active : No  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    OBJECTIVE:     PAIN: VITALS / O2: PRECAUTION / Primus [place]burn / DRAINS:   Pre Treatment: 0/10         Post Treatment: 0/10 Vitals        Oxygen      IV and Nasogastric Tube    RESTRICTIONS/PRECAUTIONS:                    GROSS EVALUATION: Intact Impaired (Comments):   AROM []  WFL except bilateral fingers/wrists   PROM []    Strength []  WFL minus some now generalized post op weakness   Balance []     Posture [] N/A   Sensation [x]     Coordination [x]      Tone [x]     Edema []    Activity Tolerance []  Some post op decline    []      COGNITION/  PERCEPTION: Intact Impaired (Comments):   Orientation [x]     Vision [x]     Hearing [x]     Cognition  [x]       MOBILITY: I Mod I S SBA CGA Min Mod Max Total  NT x2 Comments:   Bed Mobility    Rolling [] [] [] [] [] [] [] [] [] [x] []    Supine to Sit [] [] [] [] [] [] [] [] [] [x] []    Scooting [] [] [] [] [] [] [] [] [] [x] []    Sit to Supine [] [] [] [] [] [] [] [] [] [x] []    Transfers    Sit to Stand [] [] [] [] [x] [] [] [] [] [] [] HHA   Bed to Chair [] [] [] [] [x] [] [] [] [] [] []    Stand to Sit [] [] [] [] [x] [] [] [] [] [] []     [] [] [] [] [] [] [] [] [] [] []    I=Independent, Mod I=Modified Independent, S=Supervision, SBA=Standby Assistance, CGA=Contact Guard Assistance,   Min=Minimal Assistance, Mod=Moderate Assistance, Max=Maximal Assistance, Total=Total Assistance, NT=Not Tested    GAIT: I Mod I S SBA CGA Min Mod Max Total  NT x2 Comments:   Level of Assistance [] [] [] [] [] [] [] [] [] [x] []    Distance 40 (+4, + 40) feet    DME Hand held assist    Gait Quality Decreased facundo , Decreased step clearance, Decreased step length, and Shuffling     Weightbearing Status      Stairs      I=Independent, Mod I=Modified Independent, S=Supervision, SBA=Standby Assistance, CGA=Contact Guard Assistance,   Min=Minimal Assistance, Mod=Moderate Assistance, Max=Maximal Assistance, Total=Total Assistance, NT=Not Tested    PLAN:   FREQUENCY AND DURATION: Daily for duration of hospital stay or until stated goals are met, whichever comes first.    THERAPY PROGNOSIS: Good    PROBLEM LIST:   (Skilled intervention is medically necessary to address:)  Decreased ADL/Functional Activities  Decreased Activity Tolerance  Decreased Balance  Decreased Gait Ability  Decreased Safety Awareness  Decreased Strength  Decreased Transfer Abilities INTERVENTIONS PLANNED:   (Benefits and precautions of physical therapy have been discussed with the patient.)  Therapeutic Activity  Therapeutic Exercise/HEP  Gait Training       TREATMENT:     TREATMENT:   Therapeutic Activity (38 Minutes): Therapeutic activity included Ambulation on level ground, Sitting balance , and Standing balance to improve functional Activity tolerance, Balance, Coordination, Mobility, Strength, and ROM.     TREATMENT GRID:  B LE  Date:  2/13 Date:  2/14   Date:  2/15     Activity/Exercise Parameters Parameters Parameters   Ankle pumps 12 15 15   Quad sets 12 15 15   Heel slides 12 15 15   Hip abd/ad 12 15 15   SAQ 12 15 15   Marching in place in chair  15    LAQ in the chair  15                AFTER TREATMENT PRECAUTIONS: Bed/Chair Locked, Call light within reach, Chair, Needs within reach, RN notified, and Visitors at bedside    INTERDISCIPLINARY COLLABORATION:  RN/ PCT    EDUCATION: Education Given To: Patient  Education Provided: Role of Therapy  Education Method: Demonstration;Verbal    TIME IN/OUT:  Time In: 1030  Time Out: 1108  Minutes: 13 [facility], [facility]

## 2023-02-15 NOTE — PROGRESS NOTES
[address] [place] Jenifer Cooper NP notified of the order placed by SIRENA Gill - NP for primary RN to check with admitting team to resume lovenox. Lovenox unheld on chart. 1545- Case management Deuceelena Montero case management notified that primary rn spoke to pts health care decision maker Clive Marrero who stated she spoke to interim health care and they need a referral from the MD to assess pt. Bam Jc is wondering if pt can get referral.    1843- Primary RN spoke to pts health care decision maker Robbin Burgos and she expressed concern of pts orientation (pt is alert to self/ but not time/location/situation). Bam Jc states even with pts dementia history she is usually able to state the situation/location and is wanting to see if we can get a UA to see if pt has a UTI. Jenifer Cooper NP notified. UA obtained and sent to lab.

## 2023-02-15 NOTE — PROGRESS NOTES
Comprehensive Nutrition Assessment    Type and Reason for Visit: Initial, NPO/Clear Liquid, RD Nutrition Re-Screen/LOS  Day 5 NPO/CLD and Length of Stay    Nutrition Recommendations/Plan:   Food and/or Nutrient Delivery: Continue Current Diet, Start Oral Nutrition Supplement  Medical food supplement therapy:  Initiate Ensure Clear three times per day (this provides 240 kcal and 8 grams protein per bottle) . Then Ensure Enlive at all meals once diet progressed to at least full liquids. Prefers vanilla       Coordination of Nutrition Care: Continue to monitor while inpatient, Interdisciplinary Rounds       Malnutrition Assessment:  Malnutrition Status: At risk for malnutrition (Comment) (NPO/CLD since admission, general thinness)  Nutrition Assessment:  Nutrition History: 2/15: Unable to provide any quantifiable or reliable nutrition history. Pt has missing teeth and says she just can't anything that os \"thick\". Reports variable intake PTA of 3 meals per day but depending on how she is feeling that day. Reports she weighed 143# PTA but then says she lost down to 123# because she couldn't eat. Nutrition Background:     H/O: HTN, RA, HLD, breast cancer, dementia. P/W c/o  abdominal pain,  black tarry stools/emesis for 2 days,  decreased appetite over the last several days. CT resulted showing a small bowel obstruction. Nutrition Interval:  2/10: S/P SANDHYA, NGT to LIS  2/11: Pt self removed NGT, NGT reinserted  2/15: NGT removed, CLD started  Pt seen sitting in chair with lunch tray at bedside with 0% consumed. Pt unable to provide information about why she did not consume lunch. Pt able to provide flavor preference for ONS. Current Nutrition Therapies:  ADULT DIET;  Clear Liquid    Current Intake: Average meal intake: NPO or clear liquid diet  Average supplement intake : none ordered           Anthropometric Measures:  Height: 5' 6\" (167.6 cm),  , Weight: 143 lb 1.6 oz (64.9 kg), source-not specified Body mass index is 23.1 kg/m². BMI class of normal weight     Weight History Weight Weight Weight Method   2/10/2023 143 lbs 2 oz 64.9 kg -   2/9/2023 140 lbs 63.5 kg Stated     Ideal Body Weight (Kg) (Calculated): 59 kg (130 lbs),    Usual Body Wt:  ,   12/1/2022 144 lbs 65.3 kg -   9/12/2022 145 lbs 65.8 kg -   6/2/2022 143 lbs 64.9 kg -   3/3/2022 144 lbs 65.3 kg -   2/17/2022 150 lbs 68 kg -   Percent weight change:  Unable to accurately assess without a current actual weight       Edema: No data recorded      Estimated Daily Nutrient Needs:  Energy (kcal/day): 0810-6343 (20-25 kcal/kg) (Kcal/kg Weight used: 63.5 kg Admission (stated)  Protein (g/day): 76-95 (1.2-1.5 g/kg) Weight Used: (Admission) 63.5 kg  Fluid (ml/day):   (1 ml/kcal)    Nutrition Diagnosis:   Inadequate oral intake related to altered GI function, altered GI structure as evidenced by NPO or clear liquid status due to medical condition  Nutrition Interventions:   Food and/or Nutrient Delivery: Continue Current Diet, Start Oral Nutrition Supplement     Coordination of Nutrition Care: Continue to monitor while inpatient, Interdisciplinary Rounds       Goals:   Current goal: Diet advancement beyond clear liquids within 2-3 days  Progress on current goal:not applicable, goal start day is today              Nutrition Monitoring and Evaluation:      Food/Nutrient Intake Outcomes: Diet Advancement/Tolerance, Food and Nutrient Intake, Supplement Intake  Physical Signs/Symptoms Outcomes: GI Status    Discharge Planning:     Too soon to determine    Francisca Pantoja RD,LD, [address] [facility]

## 2023-02-15 NOTE — PROGRESS NOTES
General Surgery Progress Note    2/15/2023    Admit Date: 2/10/2023    Chief Complaint: Nausea with Vomiting     Post OP Day # 5:  EXPLORATORY LAPAROSCOPY LYSIS OF ADHESIONS  Findings: : Adhesions in the abdomen, there was a loop of small bowel adhesed to the anterior abdominal wall and several places which appear to cause a almost closed-loop obstruction. HPI: Radha Sotelo is a 80 y.o. female who we are asked by Hospitalist MD  to see for Abdominal Pain. Patient has medical history of  hypertension, Rheumatoid arthritis, hyperlipidemia and dementia. Patient presented to ED  2/9/2023 at [facility] with complaints of abdominal pain with  nausea and vomiting, which started 1 day ago. Patient was initially seen at [facility] ER then transferred to [facility] for admission. Patient reports emesis (dark hematemesis) and she has had dark tarry stools the past few days. She reports abdominal pain has since resolved with medication given at ER but reports nausea continues. Patient denies any chest pain, dyspnea, fevers, chills,cough,  dysuria or hematuria. Patient currently has order for placement of NG tube as ordered per admitting team .          Patient  reports she has been having intervals of constipation x several weeks and  has not taken any medications for relief. Patient reports she has had poor appetite the past few days. Patient reports she is not on any blood thinners and has no prior history of GI Bleed/ulcer. Patient reports past surgical history of appendectomy, cholecystectomy, hysterectomy, right breast mastectomy , bilateral total hip arthroplasty and bilateral knee arthroplasty. The history was provided by patient and her daughter who is at bedside at this the time of exam.        Subjective:     Patient sitting up in chair. Patient reports her pain is controled. Patient denies any nausea or vomiting. Patient reports + flatus and she had  bowel movement last night.       NG tube remains to [facility] but will discontinue today due to minimal output noted. Will advance to clear liquids today. Labs today : K+ 3.7  Cr 0.37     Temp max past 24 hours 98.4 (oral) pulse ranges 80 to 87         Objective:     BP (!) 160/50   Pulse 85   Temp 97.7 °F (36.5 °C) (Oral)   Resp 18   Ht 5' 6\" (1.676 m)   Wt 143 lb 1.6 oz (64.9 kg)   SpO2 96%   BMI 23.10 kg/m²       Intake/Output Summary (Last 24 hours) at 2/15/2023 0809  Last data filed at 2/14/2023 1645  Gross per 24 hour   Intake --   Output 603 ml   Net -603 ml           Physical Exam :  General:          Well nourished. Normal appearance, She is not in acute distress. Head:               Normocephalic, atraumatic  Eyes:               Sclerae appear normal.  Pupils equally round. ENT:                Nares appear normal.  Moist oral mucosa. Poor dentition noted. No rhinorrhea. NG tube intact to LIS   Neck:               No restricted ROM. Trachea midline   CV:                  RRR. No m/r/g. No jugular venous distension. Lungs:             CTAB. No wheezing, rhonchi, or rales. Symmetric expansion. Abdomen:        Soft, minimal  distention noted, Hypoactive bowel sounds. Lap sites clean/dry/approximated with no drainage. No masses or hernias appreciated. Appropriate incisional tenderness noted to trochar/lap sites. Extremities:     No cyanosis or clubbing. No edema. Patient with hx rheumatoid arthritis and noted to upper extremity digits. Skin:                No rashes and normal coloration. Warm and dry. Neuro:             CN II-XII grossly intact. Sensation intact. Psych:             Normal mood and affect.  Mental status baseline           Data Review    Recent Results (from the past 24 hour(s))   CBC with Auto Differential    Collection Time: 02/14/23  8:11 AM   Result Value Ref Range    WBC 8.3 4.3 - 11.1 K/uL    RBC 4.73 4.05 - 5.2 M/uL    Hemoglobin 14.1 11.7 - 15.4 g/dL    Hematocrit 42.2 35.8 - 46.3 %    MCV 89.2 82.0 - 102.0 FL    MCH 29.8 26.1 - 32.9 PG    MCHC 33.4 31.4 - 35.0 g/dL    RDW 13.0 11.9 - 14.6 %    Platelets 131 485 - 973 K/uL    MPV 8.9 (L) 9.4 - 12.3 FL    nRBC 0.00 0.0 - 0.2 K/uL    Differential Type AUTOMATED      Seg Neutrophils 69 43 - 78 %    Lymphocytes 20 13 - 44 %    Monocytes 7 4.0 - 12.0 %    Eosinophils % 3 0.5 - 7.8 %    Basophils 0 0.0 - 2.0 %    Immature Granulocytes 0 0.0 - 5.0 %    Segs Absolute 5.7 1.7 - 8.2 K/UL    Absolute Lymph # 1.6 0.5 - 4.6 K/UL    Absolute Mono # 0.6 0.1 - 1.3 K/UL    Absolute Eos # 0.3 0.0 - 0.8 K/UL    Basophils Absolute 0.0 0.0 - 0.2 K/UL    Absolute Immature Granulocyte 0.0 0.0 - 0.5 K/UL   Comprehensive Metabolic Panel w/ Reflex to MG    Collection Time: 02/15/23  5:40 AM   Result Value Ref Range    Sodium 139 133 - 143 mmol/L    Potassium 3.7 3.5 - 5.1 mmol/L    Chloride 109 101 - 110 mmol/L    CO2 20 (L) 21 - 32 mmol/L    Anion Gap 10 2 - 11 mmol/L    Glucose 68 65 - 100 mg/dL    BUN 4 (L) 8 - 23 MG/DL    Creatinine 0.37 (L) 0.6 - 1.0 MG/DL    Est, Glom Filt Rate >60 >60 ml/min/1.73m2    Calcium 8.3 8.3 - 10.4 MG/DL    Total Bilirubin 0.7 0.2 - 1.1 MG/DL    ALT 38 12 - 65 U/L    AST 33 15 - 37 U/L    Alk Phosphatase 75 50 - 136 U/L    Total Protein 6.0 (L) 6.3 - 8.2 g/dL    Albumin 2.3 (L) 3.2 - 4.6 g/dL    Globulin 3.7 2.8 - 4.5 g/dL    Albumin/Globulin Ratio 0.6 0.4 - 1.6        Assessment:  Post OP Day # 5:  EXPLORATORY LAPAROSCOPY LYSIS OF ADHESIONS  Principal Problem:    SBO (small bowel obstruction) (HCC)  Active Problems:    Breast cancer, right breast (HCC)    Mixed hyperlipidemia  Resolved Problems:    * No resolved hospital problems.  *        Plan:  -Further plan of care per Dr. Jd Owens  -Remove NG tube   -Advance diet to clears  -Physical therapy and Occupational therapy evaluation  -Case management consult  for discharge planning(May need home health physical therapy, occupational therapy  and RN assessments)  -Encourage ambulation   -Encourage use of incentive spirometer  -Admitting team for electrolyte replacements/labs/etc  -Pain control    Arnulfo Brown FNP-BC    Addendum  She is feeling better. She reports flatus. Vital signs stable  Afebrile  Regular rate and rhythm  Aerating well  Abdomen soft, nondistended, nontender  Assessment and plan  Resolving bowel obstruction and status post lysis of adhesions. DC NG tube and trial clear liquids. Out of bed.

## 2023-02-15 NOTE — PROGRESS NOTES
ACUTE OCCUPATIONAL THERAPY GOALS:   (Developed with and agreed upon by patient and/or caregiver.)  1. Patient will perform grooming with SPV and adaptive equipment as needed. 2. Patient will perform upper body dressing with MIN A and adaptive equipment as needed. 3. Patient will perform lower body dressing with MIN A and adaptive equipment as needed. 4. Patient will perform bathing with MIN A and adaptive equipment as needed. 5. Patient will perform toileting and toilet transfer with CGA and adaptive equipment as needed. 6. Patient will perform ADL functional mobility and tranfers in room with SPV and adaptive equipment as needed. 7. Patient/family to demonstrate knowledge of home safety and DME recommendations. Timeframe: 7 visits     OCCUPATIONAL THERAPY Daily Note and AM       OT Visit Days: 2  Acknowledge Orders  Time  OT Charge Capture  Rehab Caseload Tracker      Andrea Dooley is a 80 y.o. female   PRIMARY DIAGNOSIS: SBO (small bowel obstruction) (HCC)  SBO (small bowel obstruction) (HCC) [K56.609]  Procedure(s) (LRB):  EXPLORATORY LAPAROSCOPY LYSIS OF ADHESIONS (N/A)  5 Days Post-Op  Reason for Referral: Generalized Muscle Weakness (M62.81)  Other lack of cordination (R27.8)  Difficulty in walking, Not elsewhere classified (R26.2)  Inpatient: Payor: Hal Lopez / Plan: Choco Kent / Product Type: *No Product type* /     ASSESSMENT:     REHAB RECOMMENDATIONS:   Recommendation to date pending progress:  Setting:  Home Health Therapy    Equipment:    To Be Determined     ASSESSMENT:  [facility] and presents with overall deficits in strength, functional mobility and ADL performance. Of note, patient also with diagnoses of dementia, breast cancer and arthritis (particularly in bilateral hands rendering them almost non-functional). At baseline, she lives alone in a 1 level home with 2 JACKY and rails.  Questionable historian, but she reports requiring assistance with dressing and bathing (from a hired caregiver and a friend/neighbor) and assist w meal prep and some grooming tasks. She is independent with mobility and with toileting. Today, she reports feeling bad and tired. She was able to perform bed mobility and household mobility around room but declined further ADLs. Presents with ongoing discomfort. She was left in supine with all needs met and in reach. Pt is functioning below their baseline and will benefit from skilled OT during hospital stay. Anticipate pt will benefit from home health services and resumption of hired care and friends upon discharge. 2/13/23: Patient now reporting that she dons a gown at home and stays in that all day, so does not need assist w dressing. She hires a person to come in 2-3 days to assist with housework and bathing. Today, pt performs sit<>stands at chair and household mobility with slight LOB 1 or 2 times. She declines any further ADL training/tasks this AM. She is concerned about her NG tube. Continue per OT POC.     2/15/2023   Ms. Adrian Roll completed shower and dressing as charted below in ADL grid and is ambulating with hand held assist.  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 Pt says she has a caregiver that come 2 days per week. This pt is likely close to baseline but will need some extra support home health support. Pt reports having a daughter but unsure is she can assist. OT reviewed safety precautions throughout session. Patient instructed to call for assistance when needing to get up from recliner and all needs in reach. Patient verbalized understanding of call light.           [employer] AM-PAC 6 Clicks Daily Activity Inpatient Short Form:    AM-PAC Daily Activity - Inpatient   How much help is needed for putting on and taking off regular lower body clothing?: A Lot  How much help is needed for bathing (which includes washing, rinsing, drying)?: A Lot  How much help is needed for toileting (which includes using toilet, bedpan, or urinal)?: A Little  How much help is needed for putting on and taking off regular upper body clothing?: A Little  How much help is needed for taking care of personal grooming?: A Little  How much help for eating meals?: A Little  AM-PAC Inpatient Daily Activity Raw Score: 16  AM-PAC Inpatient ADL T-Scale Score : 35.96  ADL Inpatient CMS 0-100% Score: 53.32  ADL Inpatient CMS G-Code Modifier : CK           SUBJECTIVE:     Ms. [facility] states, \"I was at my daughters house last night\"     Social/Functional Lives With: Alone  Type of Home: House  Home Layout: One level  Home Access: Stairs to enter with rails  Entrance Stairs - Number of Steps: 2  Bathroom Shower/Tub: Walk-in shower, Shower chair with back  Bathroom Toilet: Standard  Bathroom Equipment: Shower chair  Receives Help From: Family, Friend(s), Neighbor, Personal care attendant  ADL Assistance: Needs assistance  Bath: Moderate assistance  Dressing:  Moderate assistance  Grooming: Minimal assistance  Feeding: Modified independent   Toileting: Independent  Homemaking Assistance: Needs assistance  Ambulation Assistance: Independent  Active : No  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    OBJECTIVE:     Jaci Gutierrez / Cookie Keiry / AIRWAY: Nasogastric Tube    RESTRICTIONS/PRECAUTIONS:       PAIN: VITALS / O2:   Pre Treatment:          Post Treatment: did not rate pain       Vitals          Oxygen            GROSS EVALUATION: INTACT IMPAIRED   (See Comments)   UE AROM [] []Almost no ROM in bilateral hands and digits   UE PROM [] []   Strength []  Generalized weakness     Posture / Balance []  Good sitting and standing balance   Sensation [x]     Coordination [x]       Tone [x]       Edema []    Activity Tolerance []  Decreased from baseline     Hand Dominance R [] L [] COGNITION/  PERCEPTION: INTACT IMPAIRED   (See Comments)   Orientation [x]     Vision [x]     Hearing [x]     Cognition  []  Hx of dementia, questionable historian   Perception []       MOBILITY: I Mod I S SBA CGA Min Mod Max Total  NT x2 Comments:   Bed Mobility    Rolling [] [] [] [] [] [] [] [] [] [] []    Supine to Sit [] [] [] [] [] [] [] [] [] [] []    Scooting [] [] [] [] [] [] [] [] [] [] []    Sit to Supine [] [] [] [] [] [] [] [] [] [] []    Transfers    Sit to Stand [] [] [] [] [x] [] [] [] [] [] []    Bed to Chair [] [] [] [] [x] [] [] [] [] [] []    Stand to Sit [] [] [] [] [x] [] [] [] [] [] []    Tub/Shower [] [] [] [] [x] [] [] [] [] [] []     Toilet [] [] [] [] [x] [] [] [] [] [] []      [] [] [] [] [] [] [] [] [] [] []    I=Independent, Mod I=Modified Independent, S=Supervision/Setup, SBA=Standby Assistance, CGA=Contact Guard Assistance, Min=Minimal Assistance, Mod=Moderate Assistance, Max=Maximal Assistance, Total=Total Assistance, NT=Not Tested    ACTIVITIES OF DAILY LIVING: I Mod I S SBA CGA Min Mod Max Total NT Comments   BASIC ADLs:              Upper Body Bathing  [] [] [] [] [] [] [] [x] [] []    Lower Body Bathing [] [] [] [] [] [] [] [x] [] []    Toileting [] [] [] [] [] [] [] [x] [] []    Upper Body Dressing [] [] [] [] [] [] [x] [] [] []    Lower Body Dressing [] [] [] [] [] [] [] [x] [] []    Feeding [] [] [] [] [] [] [] [] [] [] Set at the least    Grooming [] [] [] [] [] [] [] [x] [] []    Personal Device Care [] [] [] [] [] [] [] [] [] []    Functional Mobility [] [] [] [] [x] [] [] [] [] [] Hand held    I=Independent, Mod I=Modified Independent, S=Supervision/Setup, SBA=Standby Assistance, CGA=Contact Guard Assistance, Min=Minimal Assistance, Mod=Moderate Assistance, Max=Maximal Assistance, Total=Total Assistance, NT=Not Tested    PLAN:   FREQUENCY/DURATION     for duration of hospital stay or until stated goals are met, whichever comes first.    PROBLEM LIST:   (Skilled intervention is medically necessary to address:)  Decreased ADL/Functional Activities  Decreased Activity Tolerance  Decreased Coordination  Decreased Gait Ability  Decreased Safety Awareness  Decreased Transfer Abilities  Increased Pain   INTERVENTIONS PLANNED:  (Benefits and precautions of occupational therapy have been discussed with the patient.)  Self Care Training  Therapeutic Activity  Therapeutic Exercise/HEP  Neuromuscular Re-education  Manual Therapy  Education         TREATMENT:     TREATMENT:   Self Care (60 minutes): Patient participated in upper body bathing, lower body bathing, toileting, upper body dressing, lower body dressing, grooming, and functional mobiltiy ADLs in unsupported sitting and standing with maximal verbal, manual, and tactile cueing to decrease assistance required. Patient also participated in functional mobility, functional transfer, and energy conservation training to decrease assistance required. TREATMENT GRID:  N/A    AFTER TREATMENT PRECAUTIONS: Bed/Chair Locked, Call light within reach, Chair, Needs within reach, and RN notified    INTERDISCIPLINARY COLLABORATION:  RN/ PCT and PT/ PTA    EDUCATION:  Education Given To: Patient  Education Provided: Role of Therapy;Plan of Care;Precautions; ADL Adaptive Strategies; Energy Conservation;Transfer Training;Equipment; Fall Prevention Strategies  Education Method: Demonstration;Verbal  Barriers to Learning: None  Education Outcome: Verbalized understanding;Demonstrated understanding;Continued education needed    TOTAL TREATMENT DURATION AND TIME:  Time In: 1030  Time Out: [address]  Minutes: [id number] [facility],

## 2023-02-15 NOTE — PLAN OF CARE
Problem: Discharge Planning  Goal: Discharge to home or other facility with appropriate resources  2/15/2023 0042 by Og Jacobs RN  Outcome: Progressing  2/14/2023 1122 by Katerine Clifton RN  Outcome: Progressing     Problem: Safety - Adult  Goal: Free from fall injury  2/15/2023 0042 by Og Jacobs RN  Outcome: Progressing  2/14/2023 1122 by Katerine Clifton RN  Outcome: Progressing     Problem: ABCDS Injury Assessment  Goal: Absence of physical injury  2/15/2023 0042 by Og Jacobs RN  Outcome: Progressing  2/14/2023 1122 by Katerine Clifton RN  Outcome: Progressing     Problem: Skin/Tissue Integrity  Goal: Absence of new skin breakdown  Description: 1. Monitor for areas of redness and/or skin breakdown  2. Assess vascular access sites hourly  3. Every 4-6 hours minimum:  Change oxygen saturation probe site  4. Every 4-6 hours:  If on nasal continuous positive airway pressure, respiratory therapy assess nares and determine need for appliance change or resting period.   2/15/2023 0042 by Og Jacobs RN  Outcome: Progressing  2/14/2023 1122 by Katerine Clifton RN  Outcome: Progressing     Problem: Pain  Goal: Verbalizes/displays adequate comfort level or baseline comfort level  2/15/2023 0042 by Og Jacobs RN  Outcome: Progressing  2/14/2023 1122 by Katerine Clifton RN  Outcome: Progressing

## 2023-02-16 LAB
ALBUMIN SERPL-MCNC: 2.7 G/DL (ref 3.2–4.6)
ALBUMIN/GLOB SERPL: 0.8 (ref 0.4–1.6)
ALP SERPL-CCNC: 84 U/L (ref 50–136)
ALT SERPL-CCNC: 35 U/L (ref 12–65)
ANION GAP SERPL CALC-SCNC: 10 MMOL/L (ref 2–11)
AST SERPL-CCNC: 22 U/L (ref 15–37)
BASOPHILS # BLD: 0 K/UL (ref 0–0.2)
BASOPHILS NFR BLD: 0 % (ref 0–2)
BILIRUB SERPL-MCNC: 0.6 MG/DL (ref 0.2–1.1)
BUN SERPL-MCNC: 3 MG/DL (ref 8–23)
CALCIUM SERPL-MCNC: 9.1 MG/DL (ref 8.3–10.4)
CHLORIDE SERPL-SCNC: 108 MMOL/L (ref 101–110)
CO2 SERPL-SCNC: 22 MMOL/L (ref 21–32)
CREAT SERPL-MCNC: 0.32 MG/DL (ref 0.6–1)
DIFFERENTIAL METHOD BLD: ABNORMAL
EOSINOPHIL # BLD: 0.3 K/UL (ref 0–0.8)
EOSINOPHIL NFR BLD: 5 % (ref 0.5–7.8)
ERYTHROCYTE [DISTWIDTH] IN BLOOD BY AUTOMATED COUNT: 13.2 % (ref 11.9–14.6)
GLOBULIN SER CALC-MCNC: 3.4 G/DL (ref 2.8–4.5)
GLUCOSE SERPL-MCNC: 106 MG/DL (ref 65–100)
HCT VFR BLD AUTO: 39.6 % (ref 35.8–46.3)
HGB BLD-MCNC: 13.4 G/DL (ref 11.7–15.4)
IMM GRANULOCYTES # BLD AUTO: 0 K/UL (ref 0–0.5)
IMM GRANULOCYTES NFR BLD AUTO: 0 % (ref 0–5)
LYMPHOCYTES # BLD: 1.4 K/UL (ref 0.5–4.6)
LYMPHOCYTES NFR BLD: 19 % (ref 13–44)
MAGNESIUM SERPL-MCNC: 1.8 MG/DL (ref 1.8–2.4)
MCH RBC QN AUTO: 29.6 PG (ref 26.1–32.9)
MCHC RBC AUTO-ENTMCNC: 33.8 G/DL (ref 31.4–35)
MCV RBC AUTO: 87.6 FL (ref 82–102)
MONOCYTES # BLD: 0.5 K/UL (ref 0.1–1.3)
MONOCYTES NFR BLD: 7 % (ref 4–12)
NEUTS SEG # BLD: 4.8 K/UL (ref 1.7–8.2)
NEUTS SEG NFR BLD: 68 % (ref 43–78)
NRBC # BLD: 0 K/UL (ref 0–0.2)
PLATELET # BLD AUTO: 304 K/UL (ref 150–450)
PMV BLD AUTO: 9 FL (ref 9.4–12.3)
POTASSIUM SERPL-SCNC: 3.1 MMOL/L (ref 3.5–5.1)
PROT SERPL-MCNC: 6.1 G/DL (ref 6.3–8.2)
RBC # BLD AUTO: 4.52 M/UL (ref 4.05–5.2)
SODIUM SERPL-SCNC: 140 MMOL/L (ref 133–143)
WBC # BLD AUTO: 7 K/UL (ref 4.3–11.1)

## 2023-02-16 PROCEDURE — 1100000000 HC RM PRIVATE

## 2023-02-16 PROCEDURE — 85025 COMPLETE CBC W/AUTO DIFF WBC: CPT

## 2023-02-16 PROCEDURE — C9113 INJ PANTOPRAZOLE SODIUM, VIA: HCPCS | Performed by: SURGERY

## 2023-02-16 PROCEDURE — 2580000003 HC RX 258: Performed by: SURGERY

## 2023-02-16 PROCEDURE — 97530 THERAPEUTIC ACTIVITIES: CPT

## 2023-02-16 PROCEDURE — 80053 COMPREHEN METABOLIC PANEL: CPT

## 2023-02-16 PROCEDURE — A4216 STERILE WATER/SALINE, 10 ML: HCPCS | Performed by: SURGERY

## 2023-02-16 PROCEDURE — 2500000003 HC RX 250 WO HCPCS: Performed by: NURSE PRACTITIONER

## 2023-02-16 PROCEDURE — 6370000000 HC RX 637 (ALT 250 FOR IP): Performed by: NURSE PRACTITIONER

## 2023-02-16 PROCEDURE — 6370000000 HC RX 637 (ALT 250 FOR IP): Performed by: SURGERY

## 2023-02-16 PROCEDURE — 36415 COLL VENOUS BLD VENIPUNCTURE: CPT

## 2023-02-16 PROCEDURE — 6370000000 HC RX 637 (ALT 250 FOR IP): Performed by: HOSPITALIST

## 2023-02-16 PROCEDURE — 6360000002 HC RX W HCPCS: Performed by: SURGERY

## 2023-02-16 PROCEDURE — 6360000002 HC RX W HCPCS: Performed by: HOSPITALIST

## 2023-02-16 PROCEDURE — 83735 ASSAY OF MAGNESIUM: CPT

## 2023-02-16 RX ORDER — POTASSIUM CHLORIDE 20 MEQ/1
40 TABLET, EXTENDED RELEASE ORAL 2 TIMES DAILY WITH MEALS
Status: COMPLETED | OUTPATIENT
Start: 2023-02-16 | End: 2023-02-16

## 2023-02-16 RX ADMIN — SODIUM CHLORIDE, PRESERVATIVE FREE 10 ML: 5 INJECTION INTRAVENOUS at 08:41

## 2023-02-16 RX ADMIN — POTASSIUM CHLORIDE 40 MEQ: 1500 TABLET, EXTENDED RELEASE ORAL at 10:45

## 2023-02-16 RX ADMIN — MEMANTINE 10 MG: 5 TABLET ORAL at 19:39

## 2023-02-16 RX ADMIN — MEMANTINE 10 MG: 5 TABLET ORAL at 08:41

## 2023-02-16 RX ADMIN — SODIUM CHLORIDE, PRESERVATIVE FREE 40 MG: 5 INJECTION INTRAVENOUS at 08:41

## 2023-02-16 RX ADMIN — METOPROLOL TARTRATE 5 MG: 5 INJECTION INTRAVENOUS at 17:03

## 2023-02-16 RX ADMIN — SODIUM CHLORIDE, PRESERVATIVE FREE 10 ML: 5 INJECTION INTRAVENOUS at 20:23

## 2023-02-16 RX ADMIN — DIPHENHYDRAMINE HYDROCHLORIDE 25 MG: 50 INJECTION, SOLUTION INTRAMUSCULAR; INTRAVENOUS at 19:41

## 2023-02-16 RX ADMIN — METOPROLOL TARTRATE 12.5 MG: 25 TABLET, FILM COATED ORAL at 01:24

## 2023-02-16 RX ADMIN — POTASSIUM CHLORIDE 40 MEQ: 1500 TABLET, EXTENDED RELEASE ORAL at 17:03

## 2023-02-16 RX ADMIN — SODIUM CHLORIDE: 9 INJECTION, SOLUTION INTRAVENOUS at 08:58

## 2023-02-16 RX ADMIN — DONEPEZIL HYDROCHLORIDE 5 MG: 5 TABLET, FILM COATED ORAL at 19:39

## 2023-02-16 RX ADMIN — VITAMIN D, TAB 1000IU (100/BT) 2000 UNITS: 25 TAB at 08:41

## 2023-02-16 RX ADMIN — ENOXAPARIN SODIUM 40 MG: 100 INJECTION SUBCUTANEOUS at 08:41

## 2023-02-16 RX ADMIN — METOPROLOL TARTRATE 5 MG: 5 INJECTION INTRAVENOUS at 10:45

## 2023-02-16 NOTE — PROGRESS NOTES
Patient is confused, agitated and trying to get out of bed. Patient wanting to go home. Re-oriented patient of the situation. Moved to room 351. Bed alarm on. Called JENNIFER Arciniega but unable to reach via phone call. Tried to leave a voicemail but was full.  Will try to call in the AM.

## 2023-02-16 NOTE — PROGRESS NOTES
Patient IV out. Reinserted twice but failed. Patient requested to do re-insertion in the morning. MD notified.  Will re-try to re-insert the IV in the AM.

## 2023-02-16 NOTE — PROGRESS NOTES
Hospitalist Progress Note   Admit Date:  2/10/2023  6:59 AM   Name:  Alice Tineo   Age:  80 y.o. Sex:  female  :  1940   MRN:  [id number]   Room:  [facility]/    Presenting Complaint: No chief complaint on file. Reason(s) for Admission: SBO (small bowel obstruction) [facility]) [address] [place] [address] Road Course:     Valeria Zheng is an 80 y.o. female with medical history of hypertension, hyperlipidemia and dementia admitted 2/10 for SBO. Underwent lap lysis of adhesions 2/10. KUB on  with persistent distended loops of bowel. Subjective & 24hr Events (23): Sitting up in chair. afebrile. no abd pain or N/V. +flatus and BM. If tolerating diet likely discharging tomorrow       Assessment & Plan:   SBO (small bowel obstruction) ([facility] [address]  Plan:   -Surgery following patient underwent lap lysis of adhesions 2/10.   -NGT removed yesterday. Diet advanced from  CLD to easy to chew and patient tolerated well   -Continued advancement per GS; Appreciated rec  -KUB  with persistent dilated bowel loops     Active Problems:  Hypokalemia  Hypomagnesemia:  -K+ 3.1 and will be replaced today   -Mg WNL  -Repeat lab in a.m. Breast cancer, right breast ([facility] [address]  Plan: stable       Mixed hyperlipidemia  Plan: diet controlled. Alzheimer's disease:  -Namenda and aricept       Anticipated discharge needs:      HH     Diet:  Diet NPO Exceptions are: Sips of Water with Meds  DVT PPx: Lovenox held  Code status: Full Code      Hospital Problems:  Principal Problem:    SBO (small bowel obstruction) ([facility] [address]  Active Problems:    Breast cancer, right breast ([facility] [address]    Mixed hyperlipidemia  Resolved Problems:    * No resolved hospital problems.  *      Objective:   Patient Vitals for the past 24 hrs:   Temp Pulse Resp BP SpO2   23 1137 97.9 °F (36.6 °C) 83 16 (!) 144/55 97 %   23 0737 97.7 °F (36.5 °C) 80 16 (!) 152/57 97 %   23 0339 98.2 °F (36.8 °C) 76 18 (!) 178/58 96 %   23 0130 98.4 °F (36.9 °C) 89 17 (!) 171/63 95 %   02/16/23 0124 -- 89 -- (!) 171/63 --   02/15/23 2320 97.7 °F (36.5 °C) 96 18 (!) 178/78 98 %   02/15/23 1915 98.4 °F (36.9 °C) 78 14 (!) 164/53 97 %   02/15/23 1519 97.9 °F (36.6 °C) 83 17 (!) 153/67 95 %       Oxygen Therapy  SpO2: 97 %  Pulse via Oximetry: 78 beats per minute  O2 Device: None (Room air)  O2 Flow Rate (L/min): 6 L/min    Estimated body mass index is 23.1 kg/m² as calculated from the following:    Height as of this encounter: 5' 6\" (1.676 m). Weight as of this encounter: 143 lb 1.6 oz (64.9 kg). Intake/Output Summary (Last 24 hours) at 2/16/2023 1150  Last data filed at 2/16/2023 0959  Gross per 24 hour   Intake 250 ml   Output 600 ml   Net -350 ml         Physical Exam:     Blood pressure (!) 144/55, pulse 83, temperature 97.9 °F (36.6 °C), temperature source Oral, resp. rate 16, height 5' 6\" (1.676 m), weight 143 lb 1.6 oz (64.9 kg), SpO2 97 %, not currently breastfeeding. General:          Well nourished. Head:               Normocephalic, atraumatic  CV:                  RRR. No m/r/g. No jugular venous distension. Lungs:             CTAB. No wheezing, rhonchi, or rales. Symmetric expansion. Abdomen:        Soft, nontender, lap sites no drainage, no erythema. NGT intact. Extremities:     No cyanosis or clubbing. No edema  Skin:                No rashes and normal coloration. Warm and dry. Neuro:             CN II-XII grossly intact. Psych:             Normal mood and affect.      personally reviewed labs and tests:  Recent Labs:  Recent Results (from the past 48 hour(s))   Comprehensive Metabolic Panel w/ Reflex to MG    Collection Time: 02/15/23  5:40 AM   Result Value Ref Range    Sodium 139 133 - 143 mmol/L    Potassium 3.7 3.5 - 5.1 mmol/L    Chloride 109 101 - 110 mmol/L    CO2 20 (L) 21 - 32 mmol/L    Anion Gap 10 2 - 11 mmol/L    Glucose 68 65 - 100 mg/dL    BUN 4 (L) 8 - 23 MG/DL    Creatinine 0.37 (L) 0.6 - 1.0 MG/DL    Est, Glom Filt Rate >60 >60 ml/min/1.73m2    Calcium 8.3 8.3 - 10.4 MG/DL    Total Bilirubin 0.7 0.2 - 1.1 MG/DL    ALT 38 12 - 65 U/L    AST 33 15 - 37 U/L    Alk Phosphatase 75 50 - 136 U/L    Total Protein 6.0 (L) 6.3 - 8.2 g/dL    Albumin 2.3 (L) 3.2 - 4.6 g/dL    Globulin 3.7 2.8 - 4.5 g/dL    Albumin/Globulin Ratio 0.6 0.4 - 1.6     CBC with Auto Differential    Collection Time: 02/15/23 10:48 AM   Result Value Ref Range    WBC 8.6 4.3 - 11.1 K/uL    RBC 4.32 4.05 - 5.2 M/uL    Hemoglobin 12.9 11.7 - 15.4 g/dL    Hematocrit 38.1 35.8 - 46.3 %    MCV 88.2 82.0 - 102.0 FL    MCH 29.9 26.1 - 32.9 PG    MCHC 33.9 31.4 - 35.0 g/dL    RDW 13.2 11.9 - 14.6 %    Platelets 939 750 - 829 K/uL    MPV 9.0 (L) 9.4 - 12.3 FL    nRBC 0.00 0.0 - 0.2 K/uL    Differential Type AUTOMATED      Seg Neutrophils 73 43 - 78 %    Lymphocytes 16 13 - 44 %    Monocytes 7 4.0 - 12.0 %    Eosinophils % 4 0.5 - 7.8 %    Basophils 1 0.0 - 2.0 %    Immature Granulocytes 0 0.0 - 5.0 %    Segs Absolute 6.3 1.7 - 8.2 K/UL    Absolute Lymph # 1.3 0.5 - 4.6 K/UL    Absolute Mono # 0.6 0.1 - 1.3 K/UL    Absolute Eos # 0.3 0.0 - 0.8 K/UL    Basophils Absolute 0.0 0.0 - 0.2 K/UL    Absolute Immature Granulocyte 0.0 0.0 - 0.5 K/UL   Urinalysis w rflx microscopic    Collection Time: 02/15/23  4:50 PM   Result Value Ref Range    Color, UA YELLOW/STRAW      Appearance CLEAR      Specific Gravity, UA 1.016 1.001 - 1.023      pH, Urine 6.0 5.0 - 9.0      Protein, UA 30 (A) NEG mg/dL    Glucose, UA Negative mg/dL    Ketones, Urine >80 (A) NEG mg/dL    Bilirubin Urine Negative NEG      Blood, Urine Negative NEG      Urobilinogen, Urine 1.0 0.2 - 1.0 EU/dL    Nitrite, Urine Negative NEG      Leukocyte Esterase, Urine Negative NEG      WBC, UA 0-4 U4 /hpf    RBC, UA 0-5 U5 /hpf    Epithelial Cells UA 5-10 (A) U5 /hpf    BACTERIA, URINE Negative NEG /hpf    Casts 0-2 U2 /lpf   Comprehensive Metabolic Panel w/ Reflex to MG    Collection Time: 02/16/23  6:29 AM Result Value Ref Range    Sodium 140 133 - 143 mmol/L    Potassium 3.1 (L) 3.5 - 5.1 mmol/L    Chloride 108 101 - 110 mmol/L    CO2 22 21 - 32 mmol/L    Anion Gap 10 2 - 11 mmol/L    Glucose 106 (H) 65 - 100 mg/dL    BUN 3 (L) 8 - 23 MG/DL    Creatinine 0.32 (L) 0.6 - 1.0 MG/DL    Est, Glom Filt Rate >60 >60 ml/min/1.73m2    Calcium 9.1 8.3 - 10.4 MG/DL    Total Bilirubin 0.6 0.2 - 1.1 MG/DL    ALT 35 12 - 65 U/L    AST 22 15 - 37 U/L    Alk Phosphatase 84 50 - 136 U/L    Total Protein 6.1 (L) 6.3 - 8.2 g/dL    Albumin 2.7 (L) 3.2 - 4.6 g/dL    Globulin 3.4 2.8 - 4.5 g/dL    Albumin/Globulin Ratio 0.8 0.4 - 1.6     CBC with Auto Differential    Collection Time: 02/16/23  6:29 AM   Result Value Ref Range    WBC 7.0 4.3 - 11.1 K/uL    RBC 4.52 4.05 - 5.2 M/uL    Hemoglobin 13.4 11.7 - 15.4 g/dL    Hematocrit 39.6 35.8 - 46.3 %    MCV 87.6 82.0 - 102.0 FL    MCH 29.6 26.1 - 32.9 PG    MCHC 33.8 31.4 - 35.0 g/dL    RDW 13.2 11.9 - 14.6 %    Platelets 702 988 - 808 K/uL    MPV 9.0 (L) 9.4 - 12.3 FL    nRBC 0.00 0.0 - 0.2 K/uL    Differential Type AUTOMATED      Seg Neutrophils 68 43 - 78 %    Lymphocytes 19 13 - 44 %    Monocytes 7 4.0 - 12.0 %    Eosinophils % 5 0.5 - 7.8 %    Basophils 0 0.0 - 2.0 %    Immature Granulocytes 0 0.0 - 5.0 %    Segs Absolute 4.8 1.7 - 8.2 K/UL    Absolute Lymph # 1.4 0.5 - 4.6 K/UL    Absolute Mono # 0.5 0.1 - 1.3 K/UL    Absolute Eos # 0.3 0.0 - 0.8 K/UL    Basophils Absolute 0.0 0.0 - 0.2 K/UL    Absolute Immature Granulocyte 0.0 0.0 - 0.5 K/UL   Magnesium    Collection Time: 02/16/23  6:29 AM   Result Value Ref Range    Magnesium 1.8 1.8 - 2.4 mg/dL       I have personally reviewed imaging studies:  Other Studies:  XR ABDOMEN (KUB) (SINGLE AP VIEW)   Final Result   Persistently dilated loops of small bowel. Cannot exclude underlying   obstruction. XR ABDOMEN (KUB) (SINGLE AP VIEW)   Final Result   Impression:      Tube placement as above.       Band of atelectasis or scarring in the left retrocardiac space. Sheryle Michaels, M.D.    2/13/2023 8:51:00 PM      XR ABDOMEN (KUB) (SINGLE AP VIEW)   Final Result   NG tube in good position.           Current Meds:  Current Facility-Administered Medications   Medication Dose Route Frequency    potassium chloride (KLOR-CON M) extended release tablet 40 mEq  40 mEq Oral BID WC    OLANZapine (ZYPREXA) 5 mg in sterile water 1 mL injection  5 mg IntraMUSCular Once    metoprolol (LOPRESSOR) injection 5 mg  5 mg IntraVENous Q8H    diphenhydrAMINE (BENADRYL) injection 25 mg  25 mg IntraVENous Nightly PRN    donepezil (ARICEPT) tablet 5 mg  5 mg Oral Nightly    memantine (NAMENDA) tablet 10 mg  10 mg Oral BID    Vitamin D (CHOLECALCIFEROL) tablet 2,000 Units  2,000 Units Oral Daily    sodium chloride flush 0.9 % injection 5-40 mL  5-40 mL IntraVENous 2 times per day    sodium chloride flush 0.9 % injection 5-40 mL  5-40 mL IntraVENous PRN    0.9 % sodium chloride infusion   IntraVENous PRN    enoxaparin (LOVENOX) injection 40 mg  40 mg SubCUTAneous Daily    ondansetron (ZOFRAN-ODT) disintegrating tablet 4 mg  4 mg Oral Q8H PRN    Or    ondansetron (ZOFRAN) injection 4 mg  4 mg IntraVENous Q6H PRN    polyethylene glycol (GLYCOLAX) packet 17 g  17 g Oral Daily PRN    acetaminophen (TYLENOL) tablet 650 mg  650 mg Oral Q6H PRN    Or    acetaminophen (TYLENOL) suppository 650 mg  650 mg Rectal Q6H PRN    0.9 % sodium chloride infusion   IntraVENous Continuous    morphine injection 2 mg  2 mg IntraVENous Q4H PRN    pantoprazole (PROTONIX) 40 mg in sodium chloride (PF) 0.9 % 10 mL injection  40 mg IntraVENous Daily       Signed:  Elaine Puckett, APRN - CNP

## 2023-02-16 NOTE — CARE COORDINATION
Interdisciplinary team meeting with attending, CM, nursing, PT and nutritional services for plan of care Not medically stable for d/c today monitoring labs and patient is confused. CM following.

## 2023-02-16 NOTE — PROGRESS NOTES
General Surgery Progress Note    2/16/2023    Admit Date: 2/10/2023    Post OP Day # 6:  EXPLORATORY LAPAROSCOPY LYSIS OF ADHESIONS  Findings: : Adhesions in the abdomen, there was a loop of small bowel adhesed to the anterior abdominal wall and several places which appear to cause a almost closed-loop obstruction. HPI: Farida Monk is a 80 y.o. female who we are asked by Hospitalist MD  to see for Abdominal Pain. Patient has medical history of  hypertension, Rheumatoid arthritis, hyperlipidemia and dementia. Patient presented to ED  2/9/2023 at [facility] with complaints of abdominal pain with  nausea and vomiting, which started 1 day ago. Patient was initially seen at [facility] ER then transferred to [facility] for admission. Patient reports emesis (dark hematemesis) and she has had dark tarry stools the past few days. She reports abdominal pain has since resolved with medication given at ER but reports nausea continues. Patient denies any chest pain, dyspnea, fevers, chills,cough,  dysuria or hematuria. Patient currently has order for placement of NG tube as ordered per admitting team .          Patient  reports she has been having intervals of constipation x several weeks and  has not taken any medications for relief. Patient reports she has had poor appetite the past few days. Patient reports she is not on any blood thinners and has no prior history of GI Bleed/ulcer. Patient reports past surgical history of appendectomy, cholecystectomy, hysterectomy, right breast mastectomy , bilateral total hip arthroplasty and bilateral knee arthroplasty. The history was provided by patient and her daughter who is at bedside at this the time of exam.           Subjective:     Patient sitting in chair this morning and reports pain is controlled. Patient confused as to location but re-oriented to location. Patient states her name, date of birth and current year.      NG tube removed yesterday and diet advanced to clear liquids which she tolerated well  with no nausea or vomiting. She reports + Flatus and + Bowel movement. Labs today: WBC 7.0 Hgb 13.4 K+ 3.1 Cr 0.32 Mag 1.8      Temp max past 24 hours 98.4 pulse ranges 76 to 89         Objective:     BP (!) 178/58   Pulse 76   Temp 98.2 °F (36.8 °C)   Resp 18   Ht 5' 6\" (1.676 m)   Wt 143 lb 1.6 oz (64.9 kg)   SpO2 96%   BMI 23.10 kg/m²       Intake/Output Summary (Last 24 hours) at 2/16/2023 1422  Last data filed at 2/15/2023 1758  Gross per 24 hour   Intake 755 ml   Output 700 ml   Net 55 ml        Physical Exam :  General:          Well nourished. Normal appearance, She is not in acute distress. Head:               Normocephalic, atraumatic  Eyes:               Sclerae appear normal.  Pupils equally round. ENT:                Nares appear normal.  Moist oral mucosa. Poor dentition noted. No rhinorrhea. Neck:               No restricted ROM. Trachea midline   CV:                  RRR. No m/r/g. No jugular venous distension. Lungs:             CTAB. No wheezing, rhonchi, or rales. Symmetric expansion. Abdomen:        Soft, no distention noted, Hypoactive bowel sounds. Lap sites clean/dry/approximated with no drainage. No masses or hernias appreciated. Appropriate incisional tenderness noted to trochar/lap sites. Extremities:     No cyanosis or clubbing. No edema. Patient with hx rheumatoid arthritis and noted to upper extremity digits. Skin:                No rashes and normal coloration. Warm and dry. Neuro:             CN II-XII grossly intact. Sensation intact. Psych:             Normal mood and affect.  Mental status baseline        Data Review        Latest Reference Range & Units 2/16/23 06:29   WBC 4.3 - 11.1 K/uL 7.0   RBC 4.05 - 5.2 M/uL 4.52   Hemoglobin Quant 11.7 - 15.4 g/dL 13.4   Hematocrit 35.8 - 46.3 % 39.6   MCV 82.0 - 102.0 FL 87.6   MCH 26.1 - 32.9 PG 29.6   MCHC 31.4 - 35.0 g/dL 33.8   MPV 9.4 - 12.3 FL 9.0 (L)   RDW 11.9 - 14.6 % 13.2   Platelet Count 113 - 450 K/uL 304      Latest Reference Range & Units 2/16/23 06:29   Sodium 133 - 143 mmol/L 140   Potassium 3.5 - 5.1 mmol/L 3.1 (L)   Chloride 101 - 110 mmol/L 108   CO2 21 - 32 mmol/L 22   BUN,BUNPL 8 - 23 MG/DL 3 (L)   Creatinine 0.6 - 1.0 MG/DL 0.32 (L)   Anion Gap 2 - 11 mmol/L 10   Est, Glom Filt Rate >60 ml/min/1.73m2 >60   Magnesium 1.8 - 2.4 mg/dL 1.8   Glucose, Random 65 - 100 mg/dL 106 (H)   CALCIUM, SERUM, [id number] 8.3 - 10.4 MG/DL 9.1   ALBUMIN/GLOBULIN RATIO 0.4 - 1.6   0.8   Total Protein 6.3 - 8.2 g/dL 6.1 (L)     Assessment:  Post OP Day # 6:  EXPLORATORY LAPAROSCOPY LYSIS OF ADHESIONS  Principal Problem:    SBO (small bowel obstruction) (HCC)  Active Problems:    Breast cancer, right breast (HCC)    Mixed hyperlipidemia  Resolved Problems:    * No resolved hospital problems. *        Plan:  -Further plan of care per Dr. Ana Fountain  -Advance diet to full liquid diet once evaluated per Dr. Ana Fountain  -Physical therapy and Occupational therapy evaluation  -Case management consult  for discharge planning(May need home health physical therapy, occupational therapy  and RN assessments)  -Encourage ambulation   -Encourage use of incentive spirometer  -Admitting team for electrolyte replacements/labs/etc  -Pain control    Kena Ramirez FNP-BC       Addendum  She is feeling better but is tired. She reports flatus and bowel movement. She has had no nausea vomiting since nasogastric tube removal.  She is tolerated clear liquids. Vital signs stable  Afebrile  Regular rate and rhythm  Aerating well  Abdomen soft, nondistended, nontender  Assessment and plan  Resolving bowel obstruction and status post lysis of adhesions. Advance to GI soft diet. Out of bed.

## 2023-02-16 NOTE — PROGRESS NOTES
ACUTE PHYSICAL THERAPY GOALS:   (Developed with and agreed upon by patient and/or caregiver. )    LTG:  (1.)Ms. Halima Randolph will move from supine to sit and sit to supine  in bed with SUPERVISION within 7 treatment day(s). (2.)Ms. Halima Randolph will transfer from bed to chair and chair to bed with INDEPENDENT using the least restrictive device within 7 treatment day(s). (3.)Ms. Halima Randolph will ambulate with SUPERVISION for 300 feet with the least restrictive device within 7 treatment day(s). ________________________________________________________________________________________________     PHYSICAL THERAPY Daily Note and AM  (Link to Caseload Tracking: PT Visit Days : 5  Acknowledge Orders  Time In/Out  PT Charge Capture  Rehab Caseload Tracker    Benitez Grade is a 80 y.o. female   PRIMARY DIAGNOSIS: SBO (small bowel obstruction) (HCC)  SBO (small bowel obstruction) (HCC) [K56.609]  Procedure(s) (LRB):  EXPLORATORY LAPAROSCOPY LYSIS OF ADHESIONS (N/A)  6 Days Post-Op  Reason for Referral: Generalized Muscle Weakness (M62.81)  Other abnormalities of gait and mobility (R26.89)  Inpatient: Payor: Florine Kayser / Plan: [facility] / Product Type: *No Product type* /     ASSESSMENT:     REHAB RECOMMENDATIONS:   Recommendation to date pending progress:  [address] [place] [address] pt needs some one with her all the time, balance is unsteady, memory     Equipment:    None     ASSESSMENT:  Ms. Halima Randolph presents this admission with SBO and is somewhat limited from her baseline with functional mobility. She will discharge home and has family/friend support but lives alone. She will benefit from PT to increase her functional independence and she should be able to return home without further therapy. She was supine on contact and needing to use the bathroom. Transferred supine to sit with CGA and then ambulated 20 ft to the bathroom with SBA/CGA.  She toileted with SBA and practiced sit to stand while changing brief. Needed only SBA/Supervision for toileting. She ambulated back to the bed and returned supine with SBA. Going for Laparoscopy this evening. 2/14 sitting in the chair upon arrival.  Participated well. Performs B LE exercises (see below) with chair reclined with some rest breaks. Work on sit>stand with Min A, felt dizzy when first stood up. Ambulated 40 ft to the door with Min A and verbal cues. Stood few min. Ambulated another 40 ft back to the chair. Return to chair and rested few min. Then performs some more B LE in the chair. Remain in the chair with needs in reach and instructed to call for assists. 2/15 sitting in the chair upon arrival.  Sit>stand with HHA. Ambulated 40 ft to the restroom with HHA and walk into the shower. Stood for therapist to clean her bottom without LOB. Donned her gown and ambulated 4 ft to the sink, to brush teeth and comb hair while working on maintaining her balance. Ambulated another 40 ft back to the bed with HHA. While in the chair performs B LE exercises (see below) with guidance. Remain in the chair with needs in reach and instructed to call for assists before getting up. 2/16 sitting in the chair upon arrival. Pt is more confused today then yesterday. Performs B UE/LE exercises (see below) with guidance in the chair recline/sitting upright. Sit>stand with CGA and gait belt. Ambulated 100 ft with CGA and gait belt, tends to deviate to the L and balance issues. Rested few min in the recio. Ambulated another 100 ft with CGA and gait belt. Return to the chair with needs in reach, alarm on and instructed to call for assists, before getting up. Pt needs extra time to work through activities.        [facility] [address] [id number] AM-PAC 6 Clicks Basic Mobility Inpatient Short Form  AM-PAC Basic Mobility - Inpatient   How much help is needed turning from your back to your side while in a flat bed without using bedrails?: None  How much help is needed moving from lying on your back to sitting on the side of a flat bed without using bedrails?: A Little  How much help is needed moving to and from a bed to a chair?: A Little  How much help is needed standing up from a chair using your arms?: None  How much help is needed walking in hospital room?: None  How much help is needed climbing 3-5 steps with a railing?: A Little  AM-PAC Inpatient Mobility Raw Score : 21  AM-PAC Inpatient T-Scale Score : 50.25  Mobility Inpatient CMS 0-100% Score: 28.97  Mobility Inpatient CMS G-Code Modifier : CJ    SUBJECTIVE:   Ms. Kaylah Webster said I am at [facility] [employer]. Did they tell you I am leaving today. I have to get home and throw that man out of the trailer, because he is trying to take everything I got. Social/Functional Lives With: Alone  Type of Home: House  Home Layout: One level  Home Access: Stairs to enter with rails  Entrance Stairs - Number of Steps: 2  Bathroom Shower/Tub: Walk-in shower, Shower chair with back  Bathroom Toilet: Standard  Bathroom Equipment: Shower chair  Receives Help From: Family, Friend(s), Neighbor, Personal care attendant  ADL Assistance: Needs assistance  Bath: Moderate assistance  Dressing:  Moderate assistance  Grooming: Minimal assistance  Feeding: Modified independent   Toileting: Independent  Homemaking Assistance: Needs assistance  Ambulation Assistance: Independent  Active : No  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    OBJECTIVE:     PAIN: VITALS / O2: PRECAUTION / Iqbal Rita / DRAINS:   Pre Treatment: 0/10         Post Treatment: 0/10 Vitals        Oxygen      IV and Nasogastric Tube    RESTRICTIONS/PRECAUTIONS:                    GROSS EVALUATION: Intact Impaired (Comments):   AROM []  WFL except bilateral fingers/wrists   PROM []    Strength []  WFL minus some now generalized post op weakness   Balance []     Posture [] N/A   Sensation [x]     Coordination [x]      Tone [x]     Edema []    Activity Tolerance []  Some post op decline    []      COGNITION/  PERCEPTION: Intact Impaired (Comments):   Orientation [x]     Vision [x]     Hearing [x]     Cognition  [x]       MOBILITY: I Mod I S SBA CGA Min Mod Max Total  NT x2 Comments:   Bed Mobility    Rolling [] [] [] [] [] [] [] [] [] [x] []    Supine to Sit [] [] [] [] [] [] [] [] [] [x] []    Scooting [] [] [] [] [] [] [] [] [] [x] []    Sit to Supine [] [] [] [] [] [] [] [] [] [x] []    Transfers    Sit to Stand [] [] [] [] [x] [] [] [] [] [] []    Bed to Chair [] [] [] [] [x] [] [] [] [] [] []    Stand to Sit [] [] [] [] [x] [] [] [] [] [] []     [] [] [] [] [] [] [] [] [] [] []    I=Independent, Mod I=Modified Independent, S=Supervision, SBA=Standby Assistance, CGA=Contact Guard Assistance,   Min=Minimal Assistance, Mod=Moderate Assistance, Max=Maximal Assistance, Total=Total Assistance, NT=Not Tested    GAIT: I Mod I S SBA CGA Min Mod Max Total  NT x2 Comments:   Level of Assistance [] [] [] [] [x] [] [] [] [] [] []    Distance 100 (+ 100) feet    DME Hand held assist  gait belt    Gait Quality Decreased facundo , Decreased step clearance, Decreased step length, and Shuffling     Weightbearing Status      Stairs      I=Independent, Mod I=Modified Independent, S=Supervision, SBA=Standby Assistance, CGA=Contact Guard Assistance,   Min=Minimal Assistance, Mod=Moderate Assistance, Max=Maximal Assistance, Total=Total Assistance, NT=Not Tested    PLAN:   FREQUENCY AND DURATION: Daily for duration of hospital stay or until stated goals are met, whichever comes first.    THERAPY PROGNOSIS: Good    PROBLEM LIST:   (Skilled intervention is medically necessary to address:)  Decreased ADL/Functional Activities  Decreased Activity Tolerance  Decreased Balance  Decreased Gait Ability  Decreased Safety Awareness  Decreased Strength  Decreased Transfer Abilities INTERVENTIONS PLANNED:   (Benefits and precautions of physical therapy have been discussed with the patient.)  Therapeutic Activity  Therapeutic Exercise/HEP  Gait Training       TREATMENT:     TREATMENT:   Therapeutic Activity (38 Minutes): Therapeutic activity included Ambulation on level ground, Sitting balance , and Standing balance to improve functional Activity tolerance, Balance, Coordination, Mobility, Strength, and ROM.     TREATMENT GRID:  B LE / UE Date:  2/13 Date:  2/14   Date:  2/15   Date:  2/16   Activity/Exercise Parameters Parameters Parameters    Ankle pumps 12 15 15 15   Quad sets 12 15 15 15   Heel slides 12 15 15 15   Hip abd/ad 12 15 15 15   SAQ 12 15 15 15   Marching in place in chair  15  14   LAQ in the chair  15      15   Shoulder flex/ext    15   Shoulder ab/ad    15   Elbow flex/ext    15           AFTER TREATMENT PRECAUTIONS: Alarm Activated, Bed/Chair Locked, Call light within reach, Chair, Needs within reach, RN notified, and Visitors at bedside    INTERDISCIPLINARY COLLABORATION:  RN/ PCT    EDUCATION:      TIME IN/OUT:  Time In: 0715  Time Out: [address]  Minutes: 13 [facility], \[facility] [place] [facility]\""

## 2023-02-17 LAB
BASOPHILS # BLD: 0 K/UL (ref 0–0.2)
BASOPHILS NFR BLD: 1 % (ref 0–2)
DIFFERENTIAL METHOD BLD: NORMAL
EOSINOPHIL # BLD: 0.3 K/UL (ref 0–0.8)
EOSINOPHIL NFR BLD: 5 % (ref 0.5–7.8)
ERYTHROCYTE [DISTWIDTH] IN BLOOD BY AUTOMATED COUNT: 13.4 % (ref 11.9–14.6)
HCT VFR BLD AUTO: 39.6 % (ref 35.8–46.3)
HGB BLD-MCNC: 13.2 G/DL (ref 11.7–15.4)
IMM GRANULOCYTES # BLD AUTO: 0 K/UL (ref 0–0.5)
IMM GRANULOCYTES NFR BLD AUTO: 1 % (ref 0–5)
LYMPHOCYTES # BLD: 1.6 K/UL (ref 0.5–4.6)
LYMPHOCYTES NFR BLD: 27 % (ref 13–44)
MCH RBC QN AUTO: 29.5 PG (ref 26.1–32.9)
MCHC RBC AUTO-ENTMCNC: 33.3 G/DL (ref 31.4–35)
MCV RBC AUTO: 88.6 FL (ref 82–102)
MONOCYTES # BLD: 0.6 K/UL (ref 0.1–1.3)
MONOCYTES NFR BLD: 10 % (ref 4–12)
NEUTS SEG # BLD: 3.5 K/UL (ref 1.7–8.2)
NEUTS SEG NFR BLD: 57 % (ref 43–78)
NRBC # BLD: 0 K/UL (ref 0–0.2)
PLATELET # BLD AUTO: 197 K/UL (ref 150–450)
PMV BLD AUTO: 9.5 FL (ref 9.4–12.3)
POTASSIUM SERPL-SCNC: 3.3 MMOL/L (ref 3.5–5.1)
RBC # BLD AUTO: 4.47 M/UL (ref 4.05–5.2)
WBC # BLD AUTO: 6.1 K/UL (ref 4.3–11.1)

## 2023-02-17 PROCEDURE — 2580000003 HC RX 258: Performed by: SURGERY

## 2023-02-17 PROCEDURE — 97530 THERAPEUTIC ACTIVITIES: CPT

## 2023-02-17 PROCEDURE — 84132 ASSAY OF SERUM POTASSIUM: CPT

## 2023-02-17 PROCEDURE — 6360000002 HC RX W HCPCS: Performed by: SURGERY

## 2023-02-17 PROCEDURE — A4216 STERILE WATER/SALINE, 10 ML: HCPCS | Performed by: SURGERY

## 2023-02-17 PROCEDURE — 36415 COLL VENOUS BLD VENIPUNCTURE: CPT

## 2023-02-17 PROCEDURE — 2500000003 HC RX 250 WO HCPCS: Performed by: NURSE PRACTITIONER

## 2023-02-17 PROCEDURE — 6370000000 HC RX 637 (ALT 250 FOR IP): Performed by: INTERNAL MEDICINE

## 2023-02-17 PROCEDURE — 1100000000 HC RM PRIVATE

## 2023-02-17 PROCEDURE — C9113 INJ PANTOPRAZOLE SODIUM, VIA: HCPCS | Performed by: SURGERY

## 2023-02-17 PROCEDURE — 6360000002 HC RX W HCPCS: Performed by: NURSE PRACTITIONER

## 2023-02-17 PROCEDURE — 6370000000 HC RX 637 (ALT 250 FOR IP): Performed by: SURGERY

## 2023-02-17 PROCEDURE — 97164 PT RE-EVAL EST PLAN CARE: CPT

## 2023-02-17 PROCEDURE — 85025 COMPLETE CBC W/AUTO DIFF WBC: CPT

## 2023-02-17 RX ORDER — POTASSIUM CHLORIDE 750 MG/1
10 TABLET, EXTENDED RELEASE ORAL 2 TIMES DAILY
Status: DISCONTINUED | OUTPATIENT
Start: 2023-02-17 | End: 2023-02-17

## 2023-02-17 RX ORDER — PANTOPRAZOLE SODIUM 40 MG/1
40 TABLET, DELAYED RELEASE ORAL
Status: DISCONTINUED | OUTPATIENT
Start: 2023-02-18 | End: 2023-02-19 | Stop reason: HOSPADM

## 2023-02-17 RX ORDER — DIPHENHYDRAMINE HCL 25 MG
25 CAPSULE ORAL NIGHTLY PRN
Status: DISCONTINUED | OUTPATIENT
Start: 2023-02-17 | End: 2023-02-19 | Stop reason: HOSPADM

## 2023-02-17 RX ORDER — POTASSIUM CHLORIDE 7.45 MG/ML
10 INJECTION INTRAVENOUS
Status: DISPENSED | OUTPATIENT
Start: 2023-02-17 | End: 2023-02-17

## 2023-02-17 RX ADMIN — DIPHENHYDRAMINE HYDROCHLORIDE 25 MG: 25 CAPSULE ORAL at 21:27

## 2023-02-17 RX ADMIN — METOPROLOL TARTRATE 12.5 MG: 25 TABLET, FILM COATED ORAL at 21:32

## 2023-02-17 RX ADMIN — VITAMIN D, TAB 1000IU (100/BT) 2000 UNITS: 25 TAB at 09:22

## 2023-02-17 RX ADMIN — METOPROLOL TARTRATE 5 MG: 5 INJECTION INTRAVENOUS at 11:31

## 2023-02-17 RX ADMIN — METOPROLOL TARTRATE 5 MG: 5 INJECTION INTRAVENOUS at 02:00

## 2023-02-17 RX ADMIN — ENOXAPARIN SODIUM 40 MG: 100 INJECTION SUBCUTANEOUS at 09:22

## 2023-02-17 RX ADMIN — POTASSIUM CHLORIDE 10 MEQ: 7.46 INJECTION, SOLUTION INTRAVENOUS at 14:45

## 2023-02-17 RX ADMIN — SODIUM CHLORIDE, PRESERVATIVE FREE 40 MG: 5 INJECTION INTRAVENOUS at 09:22

## 2023-02-17 RX ADMIN — POTASSIUM BICARBONATE 20 MEQ: 782 TABLET, EFFERVESCENT ORAL at 21:27

## 2023-02-17 RX ADMIN — SODIUM CHLORIDE, PRESERVATIVE FREE 10 ML: 5 INJECTION INTRAVENOUS at 09:23

## 2023-02-17 ASSESSMENT — PAIN SCALES - GENERAL: PAINLEVEL_OUTOF10: 0

## 2023-02-17 NOTE — PROGRESS NOTES
Hospitalist Progress Note   Admit Date:  2/10/2023  6:59 AM   Name:  Dona Mcburney   Age:  80 y.o. Sex:  female  :  1940   MRN:  [id number]   Room:  [facility]/    Presenting Complaint: No chief complaint on file. Reason(s) for Admission: SBO (small bowel obstruction) [facility]) [address] [place] [address] Course:     Alejandra Samuels is an 80 y.o. female with medical history of hypertension, hyperlipidemia and dementia admitted 2/10 for SBO. Underwent lap lysis of adhesions 2/10. KUB on  with persistent distended loops of bowel. Subjective & 24hr Events (23): Sitting up in chair. afebrile. no abd pain or N/V. +flatus and BM. Assessment & Plan:   SBO (small bowel obstruction) (HCC)  Plan:   -Surgery following patient underwent lap lysis of adhesions 2/10.   -NGT removed yesterday. Diet advanced from  CLD to easy to chew and patient tolerated well   -Continued advancement per GS; Appreciated rec  -KUB  with persistent dilated bowel loops  23  -Patient is stable for discharge. POA request 24 hours in order to get 24/7 sitter coverage for patient  -Discharge likely tomorrow  -Bowel function has returned  -GI soft diet   -therapy with recs for [facility]     Active Problems:  Hypokalemia  Hypomagnesemia:  -K+ 3.3 and will be replaced today   -Mg WNL  -Repeat lab in a.m. Breast cancer, right breast ([facility]  Plan: stable       Mixed hyperlipidemia  Plan: diet controlled. Alzheimer's disease:  -Namenda and aricept  23  -discontinued meds per POA request. States patient has not been taking       Anticipated discharge needs:      HH     Diet:  Diet NPO Exceptions are: Sips of Water with Meds  DVT PPx: Lovenox held  Code status: Full Code      Hospital Problems:  Principal Problem:    SBO (small bowel obstruction) ([facility] [address]  Active Problems:    Breast cancer, right breast ([facility] [address]    Mixed hyperlipidemia  Resolved Problems:    * No resolved hospital problems.  *      Objective: Patient Vitals for the past 24 hrs:   Temp Pulse Resp BP SpO2   02/17/23 1058 97.7 °F (36.5 °C) 86 18 (!) 145/74 99 %   02/17/23 0731 97.5 °F (36.4 °C) 75 18 (!) 148/62 96 %   02/17/23 0611 98 °F (36.7 °C) 83 14 (!) 148/99 94 %   02/17/23 0218 -- 59 -- (!) 149/57 94 %   02/17/23 0158 -- 85 14 (!) 139/112 95 %   02/16/23 1512 -- -- -- (!) 146/55 --   02/16/23 1511 98.2 °F (36.8 °C) 83 16 (!) 165/68 97 %       Oxygen Therapy  SpO2: 99 %  Pulse via Oximetry: 78 beats per minute  O2 Device: None (Room air)  O2 Flow Rate (L/min): 6 L/min    Estimated body mass index is 23.1 kg/m² as calculated from the following:    Height as of this encounter: 5' 6\" (1.676 m). Weight as of this encounter: 143 lb 1.6 oz (64.9 kg). Intake/Output Summary (Last 24 hours) at 2/17/2023 1417  Last data filed at 2/17/2023 1315  Gross per 24 hour   Intake 420 ml   Output --   Net 420 ml         Physical Exam:     Blood pressure (!) 145/74, pulse 86, temperature 97.7 °F (36.5 °C), temperature source Oral, resp. rate 18, height 5' 6\" (1.676 m), weight 143 lb 1.6 oz (64.9 kg), SpO2 99 %, not currently breastfeeding. General:          Well nourished. Head:               Normocephalic, atraumatic  CV:                  RRR. No m/r/g. No jugular venous distension. Lungs:             CTAB. No wheezing, rhonchi, or rales. Symmetric expansion. Abdomen:        Soft, nontender, lap sites no drainage, no erythema. NGT intact. Extremities:     No cyanosis or clubbing. No edema  Skin:                No rashes and normal coloration. Warm and dry. Neuro:             CN II-XII grossly intact. Psych:             Normal mood and affect.      personally reviewed labs and tests:  Recent Labs:  Recent Results (from the past 48 hour(s))   Urinalysis w rflx microscopic    Collection Time: 02/15/23  4:50 PM   Result Value Ref Range    Color, UA YELLOW/STRAW      Appearance CLEAR      Specific Gravity, UA 1.016 1.001 - 1.023      pH, Urine 6.0 5.0 - 9.0      Protein, UA 30 (A) NEG mg/dL    Glucose, UA Negative mg/dL    Ketones, Urine >80 (A) NEG mg/dL    Bilirubin Urine Negative NEG      Blood, Urine Negative NEG      Urobilinogen, Urine 1.0 0.2 - 1.0 EU/dL    Nitrite, Urine Negative NEG      Leukocyte Esterase, Urine Negative NEG      WBC, UA 0-4 U4 /hpf    RBC, UA 0-5 U5 /hpf    Epithelial Cells UA 5-10 (A) U5 /hpf    BACTERIA, URINE Negative NEG /hpf    Casts 0-2 U2 /lpf   Comprehensive Metabolic Panel w/ Reflex to MG    Collection Time: 02/16/23  6:29 AM   Result Value Ref Range    Sodium 140 133 - 143 mmol/L    Potassium 3.1 (L) 3.5 - 5.1 mmol/L    Chloride 108 101 - 110 mmol/L    CO2 22 21 - 32 mmol/L    Anion Gap 10 2 - 11 mmol/L    Glucose 106 (H) 65 - 100 mg/dL    BUN 3 (L) 8 - 23 MG/DL    Creatinine 0.32 (L) 0.6 - 1.0 MG/DL    Est, Glom Filt Rate >60 >60 ml/min/1.73m2    Calcium 9.1 8.3 - 10.4 MG/DL    Total Bilirubin 0.6 0.2 - 1.1 MG/DL    ALT 35 12 - 65 U/L    AST 22 15 - 37 U/L    Alk Phosphatase 84 50 - 136 U/L    Total Protein 6.1 (L) 6.3 - 8.2 g/dL    Albumin 2.7 (L) 3.2 - 4.6 g/dL    Globulin 3.4 2.8 - 4.5 g/dL    Albumin/Globulin Ratio 0.8 0.4 - 1.6     CBC with Auto Differential    Collection Time: 02/16/23  6:29 AM   Result Value Ref Range    WBC 7.0 4.3 - 11.1 K/uL    RBC 4.52 4.05 - 5.2 M/uL    Hemoglobin 13.4 11.7 - 15.4 g/dL    Hematocrit 39.6 35.8 - 46.3 %    MCV 87.6 82.0 - 102.0 FL    MCH 29.6 26.1 - 32.9 PG    MCHC 33.8 31.4 - 35.0 g/dL    RDW 13.2 11.9 - 14.6 %    Platelets 820 022 - 436 K/uL    MPV 9.0 (L) 9.4 - 12.3 FL    nRBC 0.00 0.0 - 0.2 K/uL    Differential Type AUTOMATED      Seg Neutrophils 68 43 - 78 %    Lymphocytes 19 13 - 44 %    Monocytes 7 4.0 - 12.0 %    Eosinophils % 5 0.5 - 7.8 %    Basophils 0 0.0 - 2.0 %    Immature Granulocytes 0 0.0 - 5.0 %    Segs Absolute 4.8 1.7 - 8.2 K/UL    Absolute Lymph # 1.4 0.5 - 4.6 K/UL    Absolute Mono # 0.5 0.1 - 1.3 K/UL    Absolute Eos # 0.3 0.0 - 0.8 K/UL    Basophils Absolute 0.0 0.0 - 0.2 K/UL    Absolute Immature Granulocyte 0.0 0.0 - 0.5 K/UL   Magnesium    Collection Time: 02/16/23  6:29 AM   Result Value Ref Range    Magnesium 1.8 1.8 - 2.4 mg/dL   CBC with Auto Differential    Collection Time: 02/17/23  5:49 AM   Result Value Ref Range    WBC 6.1 4.3 - 11.1 K/uL    RBC 4.47 4.05 - 5.2 M/uL    Hemoglobin 13.2 11.7 - 15.4 g/dL    Hematocrit 39.6 35.8 - 46.3 %    MCV 88.6 82.0 - 102.0 FL    MCH 29.5 26.1 - 32.9 PG    MCHC 33.3 31.4 - 35.0 g/dL    RDW 13.4 11.9 - 14.6 %    Platelets 759 378 - 211 K/uL    MPV 9.5 9.4 - 12.3 FL    nRBC 0.00 0.0 - 0.2 K/uL    Differential Type AUTOMATED      Seg Neutrophils 57 43 - 78 %    Lymphocytes 27 13 - 44 %    Monocytes 10 4.0 - 12.0 %    Eosinophils % 5 0.5 - 7.8 %    Basophils 1 0.0 - 2.0 %    Immature Granulocytes 1 0.0 - 5.0 %    Segs Absolute 3.5 1.7 - 8.2 K/UL    Absolute Lymph # 1.6 0.5 - 4.6 K/UL    Absolute Mono # 0.6 0.1 - 1.3 K/UL    Absolute Eos # 0.3 0.0 - 0.8 K/UL    Basophils Absolute 0.0 0.0 - 0.2 K/UL    Absolute Immature Granulocyte 0.0 0.0 - 0.5 K/UL   Potassium    Collection Time: 02/17/23 12:08 PM   Result Value Ref Range    Potassium 3.3 (L) 3.5 - 5.1 mmol/L       I have personally reviewed imaging studies:  Other Studies:  XR ABDOMEN (KUB) (SINGLE AP VIEW)   Final Result   Persistently dilated loops of small bowel. Cannot exclude underlying   obstruction. XR ABDOMEN (KUB) (SINGLE AP VIEW)   Final Result   Impression:      Tube placement as above. Band of atelectasis or scarring in the left retrocardiac space. Hiren Ramos M.D.    2/13/2023 8:51:00 PM      XR ABDOMEN (KUB) (SINGLE AP VIEW)   Final Result   NG tube in good position.           Current Meds:  Current Facility-Administered Medications   Medication Dose Route Frequency    [START ON 2/18/2023] pantoprazole (PROTONIX) tablet 40 mg  40 mg Oral QAM AC    potassium chloride 10 mEq/100 mL IVPB (Peripheral Line)  10 mEq IntraVENous Q2H OLANZapine (ZYPREXA) 5 mg in sterile water 1 mL injection  5 mg IntraMUSCular Once    metoprolol (LOPRESSOR) injection 5 mg  5 mg IntraVENous Q8H    diphenhydrAMINE (BENADRYL) injection 25 mg  25 mg IntraVENous Nightly PRN    Vitamin D (CHOLECALCIFEROL) tablet 2,000 Units  2,000 Units Oral Daily    sodium chloride flush 0.9 % injection 5-40 mL  5-40 mL IntraVENous 2 times per day    sodium chloride flush 0.9 % injection 5-40 mL  5-40 mL IntraVENous PRN    0.9 % sodium chloride infusion   IntraVENous PRN    enoxaparin (LOVENOX) injection 40 mg  40 mg SubCUTAneous Daily    ondansetron (ZOFRAN-ODT) disintegrating tablet 4 mg  4 mg Oral Q8H PRN    Or    ondansetron (ZOFRAN) injection 4 mg  4 mg IntraVENous Q6H PRN    polyethylene glycol (GLYCOLAX) packet 17 g  17 g Oral Daily PRN    acetaminophen (TYLENOL) tablet 650 mg  650 mg Oral Q6H PRN    Or    acetaminophen (TYLENOL) suppository 650 mg  650 mg Rectal Q6H PRN    morphine injection 2 mg  2 mg IntraVENous Q4H PRN       Signed:  Maricel Bryant, APRN - CNP

## 2023-02-17 NOTE — PROGRESS NOTES
Patient seen and examined. Current abdominal pain. She reports flatus. She tolerated a GI soft diet. She is up to the chair this morning. BP (!) 148/62   Pulse 75   Temp 97.5 °F (36.4 °C) (Oral)   Resp 18   Ht 5' 6\" (1.676 m)   Wt 143 lb 1.6 oz (64.9 kg)   SpO2 96%   BMI 23.10 kg/m²   HEENT: negative to gross visual examination  Heart: regular rate and rhythm  Lungs: Aerating well   abdomen: soft, nontender, nondistended   Neuro: alert and oriented.  No gross deficits  CBC:   Lab Results   Component Value Date/Time    WBC 6.1 02/17/2023 05:49 AM    RBC 4.47 02/17/2023 05:49 AM    HGB 13.2 02/17/2023 05:49 AM    HCT 39.6 02/17/2023 05:49 AM    MCV 88.6 02/17/2023 05:49 AM    MCH 29.5 02/17/2023 05:49 AM    MCHC 33.3 02/17/2023 05:49 AM    RDW 13.4 02/17/2023 05:49 AM     02/17/2023 05:49 AM    MPV 9.5 02/17/2023 05:49 AM     BMP:    Lab Results   Component Value Date/Time     02/16/2023 06:29 AM    K 3.1 02/16/2023 06:29 AM     02/16/2023 06:29 AM    CO2 22 02/16/2023 06:29 AM    BUN 3 02/16/2023 06:29 AM    LABALBU 2.7 02/16/2023 06:29 AM    CREATININE 0.32 02/16/2023 06:29 AM    CALCIUM 9.1 02/16/2023 06:29 AM    GFRAA >60 06/02/2022 12:30 PM    LABGLOM >60 02/16/2023 06:29 AM    GLUCOSE 106 02/16/2023 06:29 AM     Assessment:  Patient Active Problem List   Diagnosis    Sclerodactyly    Arthritis of left hip    Low serum vitamin D    Breast cancer, right breast (HCC)    Osteoarthritis of both shoulders    Malignant neoplasm of lower-outer quadrant of right breast of female, estrogen receptor positive (HCC)    Osteoarthritis of left knee    Rheumatoid arthritis involving multiple sites with positive rheumatoid factor (HCC)    Low back pain    Total knee replacement status    Hormone receptor positive breast cancer, unspecified laterality ([facility] [address]    Abnormal breast exam    [facility]'s [facility], [facility]    Mixed hyperlipidemia    S/P total hip arthroplasty    Anemia, unspecified    Osteoarthritis of right knee    SBO (small bowel obstruction) ([facility])     Plan:  Status post laparoscopic lysis of adhesions for small bowel obstruction.  She seems to have return of GI function.  She is tolerating a GI soft diet.  I would recommend that she continue with physical therapy and mobilization is much as possible.  I would recommend she continue a GI soft diet/low residue diet.  She is okay to discharge from the surgical standpoint once she is deemed medically stable.  Gavino Arredondo Jr, MD  7:39 AM  2/17/2023

## 2023-02-17 NOTE — PROGRESS NOTES
ACUTE PHYSICAL THERAPY GOALS:   (Developed with and agreed upon by patient and/or caregiver. )    LTG:  (1.)Ms. Tamiko Gibson will move from supine to sit and sit to supine  in bed with SUPERVISION within 7 treatment day(s). (2.)Ms. Tamiko Gibson will transfer from bed to chair and chair to bed with INDEPENDENT using the least restrictive device within 7 treatment day(s). (3.)Ms. Tamiko Gibson will ambulate with SUPERVISION for 300 feet with the least restrictive device within 7 treatment day(s). ________________________________________________________________________________________________     PHYSICAL THERAPY Daily Note, Re-evaluation, and AM  (Link to Caseload Tracking: PT Visit Days : 1  Acknowledge Orders  Time In/Out  PT Charge Capture  Rehab Caseload Tracker    Unique Trevino is a 80 y.o. female   PRIMARY DIAGNOSIS: SBO (small bowel obstruction) (HCC)  SBO (small bowel obstruction) (HCC) [K56.609]  Procedure(s) (LRB):  EXPLORATORY LAPAROSCOPY LYSIS OF ADHESIONS (N/A)  7 Days Post-Op  Reason for Referral: Generalized Muscle Weakness (M62.81)  Other abnormalities of gait and mobility (R26.89)  Inpatient: Payor: [employer]bury [employer]cel / Plan: Jessica Platsalud / Product Type: *No Product type* /     ASSESSMENT:     REHAB RECOMMENDATIONS:   Recommendation to date pending progress:  [address] [place] [address] pt needs some one with her all the time, balance is unsteady, memory     Equipment:    None     ASSESSMENT:  Ms. Tamiko Gibson presents this admission with SBO and is somewhat limited from her baseline with functional mobility. She will discharge home and has family/friend support but lives alone. She will benefit from PT to increase her functional independence and she should be able to return home without further therapy. Re-eval :  Pt now with significant confusion, poor safety awareness at times, and impaired recall.   Physically, she requires SBA for ambulation with RW and min A for ambulation without AD. She is somewhat unsteady and weak and will benefit from additional skilled PT to maximize functional independence. She will require HHPT and 24 hour assist at home. [address] [facility] [address] [id number] AM-PAC 6 Clicks Basic Mobility Inpatient Short Form  AM-PAC Basic Mobility - Inpatient   How much help is needed turning from your back to your side while in a flat bed without using bedrails?: None  How much help is needed moving from lying on your back to sitting on the side of a flat bed without using bedrails?: A Little  How much help is needed moving to and from a bed to a chair?: A Little  How much help is needed standing up from a chair using your arms?: None  How much help is needed walking in hospital room?: None  How much help is needed climbing 3-5 steps with a railing?: A Little  AM-[facility] Inpatient Mobility Raw Score : 21  AM-PAC Inpatient T-Scale Score : 50.25  Mobility Inpatient CMS 0-100% Score: 28.97  Mobility Inpatient CMS G-Code Modifier : CJ    SUBJECTIVE:   Ms. Mark Hsieh \"I need to get out of this nursing home. \"  Pt disoriented to place and date. Re-oriented by therapist.     Celsa Church Lives With: Alone  Type of Home: House  Home Layout: One level  Home Access: Stairs to enter with rails  Entrance Stairs - Number of Steps: 2  Bathroom Shower/Tub: Walk-in shower, Shower chair with back  Bathroom Toilet: Standard  Bathroom Equipment: Shower chair  Receives Help From: Family, Friend(s), Neighbor, Personal care attendant  ADL Assistance: Needs assistance  Bath: Moderate assistance  Dressing:  Moderate assistance  Grooming: Minimal assistance  Feeding: Modified independent   Toileting: Independent  Homemaking Assistance: Needs assistance  Ambulation Assistance: Independent  Active : No  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    OBJECTIVE:     PAIN: VITALS / O2: PRECAUTION / Izell Blazing / DRAINS:   Pre Treatment: 0/10         Post Treatment: 0/10 Vitals        Oxygen      IV and Nasogastric Tube    RESTRICTIONS/PRECAUTIONS:                    GROSS EVALUATION: Intact Impaired (Comments):   AROM []  WFL except bilateral fingers/wrists   PROM []    Strength []  WFL minus some now generalized post op weakness   Balance []     Posture [] N/A   Sensation [x]     Coordination [x]      Tone [x]     Edema []    Activity Tolerance []  Some post op decline    []      COGNITION/  PERCEPTION: Intact Impaired (Comments):   Orientation []  Disoriented to place and time.    Vision [x]     Hearing [x]     Cognition  []  Poor safety awareness, impaired recall     MOBILITY: I Mod I S SBA CGA Min Mod Max Total  NT x2 Comments:   Bed Mobility    Rolling [] [] [] [] [] [] [] [] [] [x] []    Supine to Sit [] [] [] [] [] [] [] [] [] [x] []    Scooting [] [] [] [] [] [] [] [] [] [x] []    Sit to Supine [] [] [] [] [] [] [] [] [] [x] []    Transfers    Sit to Stand [] [] [] [] [x] [] [] [] [] [] []    Bed to Chair [] [] [] [] [x] [] [] [] [] [] []    Stand to Sit [] [] [] [] [x] [] [] [] [] [] []     [] [] [] [] [] [] [] [] [] [] []    I=Independent, Mod I=Modified Independent, S=Supervision, SBA=Standby Assistance, CGA=Contact Guard Assistance,   Min=Minimal Assistance, Mod=Moderate Assistance, Max=Maximal Assistance, Total=Total Assistance, NT=Not Tested    GAIT: I Mod I S SBA CGA Min Mod Max Total  NT x2 Comments:   Level of Assistance [] [] [] [x] [x] [x] [] [] [] [] [] SBA with RW, min A without AD   Distance 200 feet    DME Hand held assist  gait belt    Gait Quality Decreased facundo , Decreased step clearance, Decreased step length, and Shuffling     Weightbearing Status      Stairs      I=Independent, Mod I=Modified Independent, S=Supervision, SBA=Standby Assistance, CGA=Contact Guard Assistance,   Min=Minimal Assistance, Mod=Moderate Assistance, Max=Maximal Assistance, Total=Total Assistance, NT=Not Tested    PLAN:   FREQUENCY AND DURATION: Daily for duration of hospital stay or until stated goals are met, whichever comes first.    THERAPY PROGNOSIS: Good    PROBLEM LIST:   (Skilled intervention is medically necessary to address:)  Decreased ADL/Functional Activities  Decreased Activity Tolerance  Decreased Balance  Decreased Gait Ability  Decreased Safety Awareness  Decreased Strength  Decreased Transfer Abilities INTERVENTIONS PLANNED:   (Benefits and precautions of physical therapy have been discussed with the patient.)  Therapeutic Activity  Therapeutic Exercise/HEP  Gait Training       TREATMENT:     TREATMENT:   Therapeutic Activity (24 Minutes): Therapeutic activity included Transfer Training, Ambulation on level ground, Sitting balance , Standing balance, and sit to stands to improve functional Activity tolerance, Balance, Coordination, Mobility, Strength, and ROM. Therex; Sit to stands 2 x 5 reps,     HEP: Ankle pumps, quad sets, and glut sets for DVT prophylaxis, to be performed hourly.       TREATMENT GRID:  B LE / UE Date:  2/17 Date:     Date:     Date:     Activity/Exercise Parameters Parameters Parameters    Ankle pumps       Quad sets       Heel slides       Hip abd/ad       SAQ       Marching in place in chair       LAQ in the chair       Shoulder flex/ext       Shoulder ab/ad       Elbow flex/ext               AFTER TREATMENT PRECAUTIONS: Alarm Activated, Bed/Chair Locked, Call light within reach, Chair, Needs within reach, RN notified, and Visitors at bedside    INTERDISCIPLINARY COLLABORATION:  RN/ PCT    EDUCATION:      TIME IN/OUT:  Time In: 0845  Time Out: 0909  Minutes: [address] PT

## 2023-02-17 NOTE — CARE COORDINATION
Interdisciplinary team meeting with attending, CM, nursing, PT and nutritional services for plan of care Patient is confused and her friend Baltazar Hernández states that she is patient healthcare POA however the only POA paper work that we have on file list a [employer]ure. Per Sravani Dial he was revoked in Nov 2022. CM explained would need to see copy of HCPOA so that could update files. CM did not see any new HCPOA on file or in chart. Per Sravani Dial she will get a copy for us to place in chart. Per Sravani Dial the plan is for patient to return home with [facility] and she is hiring sitter 24/7 to be with patient as she was at home alone prior to admission and was not confused before admission. PT recommends home health Will send referral to [facility].  Anticipate d/c in am

## 2023-02-18 LAB
ANION GAP SERPL CALC-SCNC: 8 MMOL/L (ref 2–11)
BUN SERPL-MCNC: 4 MG/DL (ref 8–23)
CALCIUM SERPL-MCNC: 9 MG/DL (ref 8.3–10.4)
CHLORIDE SERPL-SCNC: 105 MMOL/L (ref 101–110)
CO2 SERPL-SCNC: 29 MMOL/L (ref 21–32)
CREAT SERPL-MCNC: 0.36 MG/DL (ref 0.6–1)
GLUCOSE SERPL-MCNC: 114 MG/DL (ref 65–100)
MAGNESIUM SERPL-MCNC: 1.7 MG/DL (ref 1.8–2.4)
POTASSIUM SERPL-SCNC: 3.1 MMOL/L (ref 3.5–5.1)
SODIUM SERPL-SCNC: 142 MMOL/L (ref 133–143)

## 2023-02-18 PROCEDURE — 80048 BASIC METABOLIC PNL TOTAL CA: CPT

## 2023-02-18 PROCEDURE — 97530 THERAPEUTIC ACTIVITIES: CPT

## 2023-02-18 PROCEDURE — 83735 ASSAY OF MAGNESIUM: CPT

## 2023-02-18 PROCEDURE — 6370000000 HC RX 637 (ALT 250 FOR IP): Performed by: INTERNAL MEDICINE

## 2023-02-18 PROCEDURE — 6370000000 HC RX 637 (ALT 250 FOR IP): Performed by: NURSE PRACTITIONER

## 2023-02-18 PROCEDURE — 6370000000 HC RX 637 (ALT 250 FOR IP): Performed by: SURGERY

## 2023-02-18 PROCEDURE — 99024 POSTOP FOLLOW-UP VISIT: CPT | Performed by: SURGERY

## 2023-02-18 PROCEDURE — 6370000000 HC RX 637 (ALT 250 FOR IP): Performed by: STUDENT IN AN ORGANIZED HEALTH CARE EDUCATION/TRAINING PROGRAM

## 2023-02-18 PROCEDURE — 1100000000 HC RM PRIVATE

## 2023-02-18 PROCEDURE — 6360000002 HC RX W HCPCS: Performed by: SURGERY

## 2023-02-18 PROCEDURE — 36415 COLL VENOUS BLD VENIPUNCTURE: CPT

## 2023-02-18 RX ORDER — LANOLIN ALCOHOL/MO/W.PET/CERES
400 CREAM (GRAM) TOPICAL DAILY
Status: DISCONTINUED | OUTPATIENT
Start: 2023-02-18 | End: 2023-02-19 | Stop reason: HOSPADM

## 2023-02-18 RX ORDER — MAGNESIUM SULFATE IN WATER 40 MG/ML
4000 INJECTION, SOLUTION INTRAVENOUS ONCE
Status: DISCONTINUED | OUTPATIENT
Start: 2023-02-18 | End: 2023-02-18

## 2023-02-18 RX ORDER — PANTOPRAZOLE SODIUM 40 MG/1
40 TABLET, DELAYED RELEASE ORAL
Qty: 30 TABLET | Refills: 3 | Status: SHIPPED | OUTPATIENT
Start: 2023-02-19

## 2023-02-18 RX ADMIN — PANTOPRAZOLE SODIUM 40 MG: 40 TABLET, DELAYED RELEASE ORAL at 06:00

## 2023-02-18 RX ADMIN — POTASSIUM BICARBONATE 40 MEQ: 391 TABLET, EFFERVESCENT ORAL at 12:11

## 2023-02-18 RX ADMIN — ENOXAPARIN SODIUM 40 MG: 100 INJECTION SUBCUTANEOUS at 09:41

## 2023-02-18 RX ADMIN — VITAMIN D, TAB 1000IU (100/BT) 2000 UNITS: 25 TAB at 09:38

## 2023-02-18 RX ADMIN — DIPHENHYDRAMINE HYDROCHLORIDE 25 MG: 25 CAPSULE ORAL at 20:27

## 2023-02-18 RX ADMIN — Medication 400 MG: at 17:30

## 2023-02-18 RX ADMIN — METOPROLOL TARTRATE 12.5 MG: 25 TABLET, FILM COATED ORAL at 09:38

## 2023-02-18 RX ADMIN — POTASSIUM BICARBONATE 40 MEQ: 391 TABLET, EFFERVESCENT ORAL at 17:30

## 2023-02-18 RX ADMIN — METOPROLOL TARTRATE 12.5 MG: 25 TABLET, FILM COATED ORAL at 17:30

## 2023-02-18 RX ADMIN — POTASSIUM BICARBONATE 20 MEQ: 782 TABLET, EFFERVESCENT ORAL at 09:41

## 2023-02-18 NOTE — PROGRESS NOTES
ACUTE OCCUPATIONAL THERAPY GOALS:   (Developed with and agreed upon by patient and/or caregiver.)  1. Patient will perform grooming with SPV and adaptive equipment as needed. 2. Patient will perform upper body dressing with MIN A and adaptive equipment as needed. 3. Patient will perform lower body dressing with MIN A and adaptive equipment as needed. 4. Patient will perform bathing with MIN A and adaptive equipment as needed. 5. Patient will perform toileting and toilet transfer with CGA and adaptive equipment as needed. 6. Patient will perform ADL functional mobility and tranfers in room with SPV and adaptive equipment as needed. 7. Patient/family to demonstrate knowledge of home safety and DME recommendations. Timeframe: 7 visits     OCCUPATIONAL THERAPY Daily Note and PM       OT Visit Days: 4  Acknowledge Orders  Time  OT Charge Capture  Rehab Caseload Tracker      Andrea Dooley is a 80 y.o. female   PRIMARY DIAGNOSIS: SBO (small bowel obstruction) (HCC)  SBO (small bowel obstruction) (HCC) [K56.609]  Procedure(s) (LRB):  EXPLORATORY LAPAROSCOPY LYSIS OF ADHESIONS (N/A)  8 Days Post-Op  Reason for Referral: Generalized Muscle Weakness (M62.81)  Other lack of cordination (R27.8)  Difficulty in walking, Not elsewhere classified (R26.2)  Inpatient: Payor: Hal Lopez / Plan: Choco Kent / Product Type: *No Product type* /     ASSESSMENT:     REHAB RECOMMENDATIONS:   Recommendation to date pending progress:  Setting:  Home Health Therapy    Equipment:    To Be Determined     ASSESSMENT:  Ms. Christal Chaparro is admitted for the above diagnoses and presents with overall deficits in strength, functional mobility and ADL performance. Of note, patient also with diagnoses of dementia, breast cancer and arthritis (particularly in bilateral hands rendering them almost non-functional). At baseline, she lives alone in a 1 level home with 2 JACKY and rails.  Questionable historian, but she reports requiring assistance with dressing and bathing (from a hired caregiver and a friend/neighbor) and assist w meal prep and some grooming tasks. She is independent with mobility and with toileting. Today, she reports feeling bad and tired. She was able to perform bed mobility and household mobility around room but declined further ADLs. Presents with ongoing discomfort. She was left in supine with all needs met and in reach. Pt is functioning below their baseline and will benefit from skilled OT during hospital stay. Anticipate pt will benefit from home health services and resumption of hired care and friends upon discharge. 2/13/23: Patient now reporting that she dons a gown at home and stays in that all day, so does not need assist w dressing. She hires a person to come in 2-3 days to assist with housework and bathing. Today, pt performs sit<>stands at chair and household mobility with slight LOB 1 or 2 times. She declines any further ADL training/tasks this AM. She is concerned about her NG tube. Continue per OT POC.     2/15/2023   Ms. Viv Hobbs completed shower and dressing as charted below in ADL grid and is ambulating with hand held assist.  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 Pt says she has a caregiver that come 2 days per week. This pt is likely close to baseline but will need some extra support home health support. Pt reports having a daughter but unsure is she can assist. OT reviewed safety precautions throughout session. Patient instructed to call for assistance when needing to get up from recliner and all needs in reach. Patient verbalized understanding of call light. 2/18/23:   Patient sitting EOB upon arrival. She reports some confusion. Agreeable to functional mobility out of room.  She was able to walk down to gym with SBA and climbed 2 steps with min HHA before returning back to room. She required additional time to perform. She remains oriented to person, year (but not date), and partially oriented to place (knows hospital, but not specific hospital name). Patient continues to be limited by RA in bilateral hands. She declined any further ADLs this afternoon. She was positioned for comfort in recliner chair with alarm in place. Anticipate patient will benefit from being in her own environment with [facility] [employer] and someone (friend/hired caregiver) to check on her/support with household tasks. [address] [facility] [address] [address] AM-PAC 6 Clicks Daily Activity Inpatient Short Form:    AM-PAC Daily Activity - Inpatient   How much help is needed for putting on and taking off regular lower body clothing?: A Lot  How much help is needed for bathing (which includes washing, rinsing, drying)?: A Lot  How much help is needed for toileting (which includes using toilet, bedpan, or urinal)?: A Little  How much help is needed for putting on and taking off regular upper body clothing?: A Little  How much help is needed for taking care of personal grooming?: A Little  How much help for eating meals?: A Little  [facility] Raw [facility]: 16  AM-PAC Inpatient ADL T-Scale Score : 35.96  ADL Inpatient CMS 0-100% Score: 53.32  ADL Inpatient CMS G-Code Modifier : CK           SUBJECTIVE:     Ms. 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: One level  Home Access: Stairs to enter with rails  Entrance Stairs - Number of Steps: 2  Bathroom Shower/Tub: Walk-in shower, Shower chair with back  Bathroom Toilet: Standard  Bathroom Equipment: Shower chair  Receives Help From: Family, Friend(s), Neighbor, Personal care attendant  ADL Assistance: Needs assistance  Bath: Moderate assistance  Dressing:  Moderate assistance  Grooming: Minimal assistance  Feeding: Modified independent   Toileting: Independent  Homemaking Assistance: Needs assistance  Ambulation Assistance: Independent  Active : No  Type of Occupation: Owns a [employer]:     Efrain Pandey / Richelle Butt / AIRWAY: None    RESTRICTIONS/PRECAUTIONS:       PAIN: VITALS / O2:   Pre Treatment:          Post Treatment: did not rate pain       Vitals          Oxygen            GROSS EVALUATION: INTACT IMPAIRED   (See Comments)   UE AROM [] []Almost no ROM in bilateral hands and digits   UE PROM [] []   Strength []  Generalized weakness     Posture / Balance []  Good sitting and standing balance   Sensation [x]     Coordination [x]       Tone [x]       Edema []    Activity Tolerance []  Decreased from baseline     Hand Dominance R [] L []      COGNITION/  PERCEPTION: INTACT IMPAIRED   (See Comments)   Orientation [x]     Vision [x]     Hearing [x]     Cognition  []  Hx of dementia, questionable historian   Perception []       MOBILITY: I Mod I S SBA CGA Min Mod Max Total  NT x2 Comments:   Bed Mobility    Rolling [] [] [] [] [] [] [] [] [] [] []    Supine to Sit [] [] [x] [] [] [] [] [] [] [] []    Scooting [] [] [x] [] [] [] [] [] [] [] []    Sit to Supine [] [] [] [] [] [] [] [] [] [] []    Transfers    Sit to Stand [] [] [] [x] [x] [] [] [] [] [] []    Bed to Chair [] [] [] [x] [x] [] [] [] [] [] []    Stand to Sit [] [] [] [x] [x] [] [] [] [] [] []    Tub/Shower [] [] [] [] [] [] [] [] [] [] []     Toilet [] [] [] [] [] [] [] [] [] [] []      [] [] [] [] [] [] [] [] [] [] []    I=Independent, Mod I=Modified Independent, S=Supervision/Setup, SBA=Standby Assistance, CGA=Contact Guard Assistance, Min=Minimal Assistance, Mod=Moderate Assistance, Max=Maximal Assistance, Total=Total Assistance, NT=Not Tested    ACTIVITIES OF DAILY LIVING: I Mod I S SBA CGA Min Mod Max Total NT Comments   BASIC ADLs:              Upper Body Bathing  [] [] [] [] [] [] [] [x] [] []    Lower Body Bathing [] [] [] [] [] [] [] [x] [] []    Toileting [] [] [] [] [] [] [] [x] [] []    Upper Body Dressing [] [] [] [] [] [] [x] [] [] []    Lower Body Dressing [] [] [] [] [] [] [] [x] [] []    Feeding [] [] [] [] [] [] [] [] [] [] Setup at the least    Grooming [] [] [] [] [] [] [] [x] [] []    Personal Device Care [] [] [] [] [] [] [] [] [] []    Functional Mobility [] [] [] [x] [x] [] [] [] [] []    I=Independent, Mod I=Modified Independent, S=Supervision/Setup, SBA=Standby Assistance, CGA=Contact Guard Assistance, Min=Minimal Assistance, Mod=Moderate Assistance, Max=Maximal Assistance, Total=Total Assistance, NT=Not Tested    PLAN:   FREQUENCY/DURATION   OT Plan of Care: 3 times/week for duration of hospital stay or until stated goals are met, whichever comes first.    PROBLEM LIST:   (Skilled intervention is medically necessary to address:)  Decreased ADL/Functional Activities  Decreased Activity Tolerance  Decreased Coordination  Decreased Gait Ability  Decreased Safety Awareness  Decreased Transfer Abilities  Increased Pain   INTERVENTIONS PLANNED:  (Benefits and precautions of occupational therapy have been discussed with the patient.)  Self Care Training  Therapeutic Activity  Therapeutic Exercise/HEP  Neuromuscular Re-education  Manual Therapy  Education         TREATMENT:     TREATMENT:   Co-Treatment PT/OT necessary due to patient's decreased overall endurance/tolerance levels, as well as need for high level skilled assistance to complete functional transfers/mobility and functional tasks  Therapeutic Activity (26 Minutes): Patient participated in therapeutic activities including bed mobility training, functional transfer training, functional mobility of household distances, and functional mobility of community distances with minimal verbal cues and tactile cues in order to increase independence, increase safety awareness, increase activity tolerance, and prepare for discharge home.      TREATMENT GRID:  N/A    AFTER TREATMENT PRECAUTIONS: Alarm Activated, Bed/Chair Locked, Call light within reach, Chair, Needs within reach, and RN notified    INTERDISCIPLINARY COLLABORATION:  RN/ PCT and PT/ PTA    EDUCATION:  Education Given To: Patient  Education Provided: Role of Therapy;Plan of Care;ADL Adaptive Strategies;Orientation; Fall Prevention Strategies;Transfer Training;Energy Conservation    TOTAL TREATMENT DURATION AND TIME:  Time In: 1334  Time Out: 1400  Minutes: [place], [place]

## 2023-02-18 NOTE — DISCHARGE SUMMARY
Hospitalist Discharge Summary   Admit Date:  2/10/2023  6:59 AM   DC Note date: 2023  Name:  Cristobal Prieto   Age:  80 y.o. Sex:  female  :  1940   MRN:  [id number]   Room:  [facility]  PCP:  SIRENA Lane CNP    Presenting Complaint: No chief complaint on file. Initial Admission Diagnosis: SBO (small bowel obstruction) ([address] [K56.609]     Problem List for this Hospitalization (present on admission):    Principal Problem:    SBO (small bowel obstruction) ([address]  Active Problems:    Breast cancer, right breast ([address]    Mixed hyperlipidemia  Resolved Problems:    * No resolved hospital problems. *      Hospital Course:  Yovany Pena is an 80 y.o. female with medical history of hypertension, hyperlipidemia and dementia admitted 2/10 for SBO. Underwent lap lysis of adhesions 2/10. KUB on  with persistent distended loops of bowel. surgery continued to follow, she improved with flatus and diet advancement. On day of dc she continues to tolerate soft GI diet, no N/V. Abd soft/nondistended. Afebrile. K+ 3.4,oral replacement given and will continue additional 3 days. Mag 1.7 , oral replacement that will continue outpatient. She will follow up with her PCP. **Long conversation with Ho Edwards . Explained patient would be weak from hospitalization and illness but would improve upon ongoing therapy and dc. Also explained delirium with dementia and ongoing hospitalization would continue to exacerbate this. She verbalized understanding, would like PT to re eval , this was done with continued recs for [facility]. MAL updated Ho Edwards who will seek additional assistance with the recs given to her by MAL. Disposition: home   Diet: ADULT DIET; Easy to Chew  ADULT ORAL NUTRITION SUPPLEMENT; Breakfast, Lunch, Dinner; Standard High Calorie/High Protein Oral Supplement  Code Status: Full Code    Follow Ups:   Follow-up Information     SIRENA Lane CNP.  Schedule an appointment as soon as   possible for a visit. Specialty: Nurse Practitioner  Why: please see in 1 week, call  Monday for appointment. Contact information:  Marcia Wheeler [place] [address] [address] [facility], APRN - [facility] . Specialty: Nurse Practitioner  Contact information:  Marcia Wheeler [address] MD. Schedule an appointment as soon as possible for   a visit. Specialty: General Surgery  Why: 6 week follow up  Contact information:  [address] [place] [address] [address] [facility] [address] [address]  [phone]                       Time spent in patient discharge and coordination 45 minutes. Follow up labs/diagnostics (ultimately defer to outpatient provider):  Per PCP    Plan was discussed with nursing, CM, patient. All questions answered. Patient was stable at time of discharge. Instructions given to call a physician or return if any concerns.     Current Discharge Medication List        START taking these medications    Details   magnesium oxide (MAG-OX) 400 (240 Mg) MG tablet Take 1 tablet by mouth daily  Qty: 30 tablet, Refills: 0      potassium bicarb-citric acid (EFFER-K) 20 MEQ TBEF effervescent tablet Take 1 tablet by mouth daily for 3 doses  Qty: 3 tablet, Refills: 0      metoprolol tartrate (LOPRESSOR) 25 MG tablet Take 0.5 tablets by mouth with breakfast and with evening meal  Qty: 60 tablet, Refills: 3      pantoprazole (PROTONIX) 40 MG tablet Take 1 tablet by mouth every morning (before breakfast)  Qty: 30 tablet, Refills: 3           CONTINUE these medications which have NOT CHANGED    Details   donepezil (ARICEPT) 5 MG tablet Take 1 tablet by mouth nightly  Qty: 30 tablet, Refills: 5      memantine ER (NAMENDA XR) 28 MG CP24 extended release capsule Take 1 capsule by mouth daily  Qty: 30 capsule, Refills: 5      Vitamin D, Cholecalciferol, 50 MCG (2000 UT) CAPS Take 1 capsule by mouth daily  Qty: 30 capsule, Refills: 5      acetaminophen (TYLENOL) 650 MG extended release tablet Take 1,300 mg by mouth nightly as needed for Pain. STOP taking these medications       Menthol, Topical Analgesic, (BIOFREEZE ROLL-ON EX) Comments:   Reason for Stopping:         diclofenac sodium (VOLTAREN) 1 % GEL Comments:   Reason for Stopping:               Some medications may have been reported old/obsolete and marked \"stop taking\" by the system; in reality pt was already off these meds; defer to outpatient or prescribing providers. Procedures done this admission:  Procedure(s):  EXPLORATORY LAPAROSCOPY LYSIS OF ADHESIONS    Consults this admission:  [address]    Echocardiogram results:  No results found for this or any previous visit. Diagnostic Imaging/Tests:   XR ABDOMEN (KUB) (SINGLE AP VIEW)    Result Date: 2/14/2023  Persistently dilated loops of small bowel. Cannot exclude underlying obstruction. XR ABDOMEN (KUB) (SINGLE AP VIEW)    Result Date: 2/13/2023  Impression: Tube placement as above. Band of atelectasis or scarring in the left retrocardiac space. Shira Webber M.D. 2/13/2023 8:51:00 PM    XR ABDOMEN (KUB) (SINGLE AP VIEW)    Result Date: 2/10/2023  NG tube in good position. CT ABDOMEN PELVIS GI BLEED Additional Contrast? Oral    Result Date: 2/9/2023   1. Small bowel obstruction with transition zone in the right lower quadrant. Configuration raises possibility of a closed loop obstruction. Recommend surgical consultation. 2.  Minimal right middle lobe pulmonary infiltrate, possible aspiration and/or early pneumonia. 3.  No evidence of active extravasation. 4.  Chronic findings as above.   Candy Salazar M.D. 2/9/2023 11:51:00 PM       Labs: Results:       BMP, Mg, Phos Recent Labs     02/17/23  1208 02/18/23  0908 02/19/23  0601   NA  --  142 140   K 3.3* 3.1* 3.4*   CL  --  105 103   CO2  --  29 34*   ANIONGAP  --  8 3   BUN  --  4* 7*   CREATININE  -- 0.36* 0.34*   LABGLOM  --  >60 >60   CALCIUM  --  9.0 8.6   GLUCOSE  --  114* 101*   MG  --  1.7* 1.8      CBC Recent Labs     02/17/23  0549 02/19/23  0601   WBC 6.1 5.2   RBC 4.47 4.06   HGB 13.2 12.0   HCT 39.6 36.5   MCV 88.6 89.9   MCH 29.5 29.6   MCHC 33.3 32.9   RDW 13.4 13.1    280   MPV 9.5 8.9*   NRBC 0.00 0.00   SEGS 57 52   LYMPHOPCT 27 33   EOSRELPCT 5 6   MONOPCT 10 9   BASOPCT 1 0   IMMGRAN 1 0   SEGSABS 3.5 2.7   LYMPHSABS 1.6 1.7   EOSABS 0.3 0.3   MONOSABS 0.6 0.5   BASOSABS 0.0 0.0   ABSIMMGRAN 0.0 0.0      LFT No results for input(s): BILITOT, BILIDIR, ALKPHOS, AST, ALT, PROT, LABALBU, GLOB in the last 72 hours. Cardiac  No results found for: NTPROBNP, TROPHS   Coags No results found for: PROTIME, INR, APTT   A1c Lab Results   Component Value Date/Time    LABA1C 5.2 02/17/2022 11:03 AM     02/17/2022 11:03 AM      Lipids Lab Results   Component Value Date/Time    CHOL 253 02/17/2022 11:03 AM    LDLCALC 157 02/17/2022 11:03 AM    HDL 69 02/17/2022 11:03 AM    TRIG 150 02/17/2022 11:03 AM      Thyroid  Lab Results   Component Value Date/Time    LIY8KFP 0.756 11/23/2022 02:13 PM        Most Recent UA Lab Results   Component Value Date/Time    COLORU YELLOW/STRAW 02/15/2023 04:50 PM    APPEARANCE CLEAR 02/15/2023 04:50 PM    SPECGRAV 1.016 02/15/2023 04:50 PM    LABPH 6.0 02/15/2023 04:50 PM    PROTEINU 30 02/15/2023 04:50 PM    GLUCOSEU Negative 02/15/2023 04:50 PM    KETUA >80 02/15/2023 04:50 PM    BILIRUBINUR Negative 02/15/2023 04:50 PM    BLOODU Negative 02/15/2023 04:50 PM    UROBILINOGEN 1.0 02/15/2023 04:50 PM    NITRU Negative 02/15/2023 04:50 PM    LEUKOCYTESUR Negative 02/15/2023 04:50 PM    WBCUA 0-4 02/15/2023 04:50 PM    RBCUA 0-5 02/15/2023 04:50 PM    EPITHUA 5-10 02/15/2023 04:50 PM    BACTERIA Negative 02/15/2023 04:50 PM    LABCAST 0-2 02/15/2023 04:50 PM        No results for input(s): CULTURE in the last 720 hours.     All Labs from Last 24 Hrs:  Recent Results (from the past 24 hour(s))   Basic Metabolic Panel w/ Reflex to MG    Collection Time: 02/18/23  9:08 AM   Result Value Ref Range    Sodium 142 133 - 143 mmol/L    Potassium 3.1 (L) 3.5 - 5.1 mmol/L    Chloride 105 101 - 110 mmol/L    CO2 29 21 - 32 mmol/L    Anion Gap 8 2 - 11 mmol/L    Glucose 114 (H) 65 - 100 mg/dL    BUN 4 (L) 8 - 23 MG/DL    Creatinine 0.36 (L) 0.6 - 1.0 MG/DL    Est, Glom Filt Rate >60 >60 ml/min/1.73m2    Calcium 9.0 8.3 - 10.4 MG/DL   Magnesium    Collection Time: 02/18/23  9:08 AM   Result Value Ref Range    Magnesium 1.7 (L) 1.8 - 2.4 mg/dL   CBC with Auto Differential    Collection Time: 02/19/23  6:01 AM   Result Value Ref Range    WBC 5.2 4.3 - 11.1 K/uL    RBC 4.06 4.05 - 5.2 M/uL    Hemoglobin 12.0 11.7 - 15.4 g/dL    Hematocrit 36.5 35.8 - 46.3 %    MCV 89.9 82.0 - 102.0 FL    MCH 29.6 26.1 - 32.9 PG    MCHC 32.9 31.4 - 35.0 g/dL    RDW 13.1 11.9 - 14.6 %    Platelets 280 150 - 450 K/uL    MPV 8.9 (L) 9.4 - 12.3 FL    nRBC 0.00 0.0 - 0.2 K/uL    Differential Type AUTOMATED      Seg Neutrophils 52 43 - 78 %    Lymphocytes 33 13 - 44 %    Monocytes 9 4.0 - 12.0 %    Eosinophils % 6 0.5 - 7.8 %    Basophils 0 0.0 - 2.0 %    Immature Granulocytes 0 0.0 - 5.0 %    Segs Absolute 2.7 1.7 - 8.2 K/UL    Absolute Lymph # 1.7 0.5 - 4.6 K/UL    Absolute Mono # 0.5 0.1 - 1.3 K/UL    Absolute Eos # 0.3 0.0 - 0.8 K/UL    Basophils Absolute 0.0 0.0 - 0.2 K/UL    Absolute Immature Granulocyte 0.0 0.0 - 0.5 K/UL   Basic Metabolic Panel w/ Reflex to MG    Collection Time: 02/19/23  6:01 AM   Result Value Ref Range    Sodium 140 133 - 143 mmol/L    Potassium 3.4 (L) 3.5 - 5.1 mmol/L    Chloride 103 101 - 110 mmol/L    CO2 34 (H) 21 - 32 mmol/L    Anion Gap 3 2 - 11 mmol/L    Glucose 101 (H) 65 - 100 mg/dL    BUN 7 (L) 8 - 23 MG/DL    Creatinine 0.34 (L) 0.6 - 1.0 MG/DL    Est, Glom Filt Rate >60 >60 ml/min/1.73m2    Calcium 8.6 8.3 - 10.4 MG/DL   Magnesium    Collection Time: 02/19/23  6:01 AM   Result  Value Ref Range    Magnesium 1.8 1.8 - 2.4 mg/dL       Allergies   Allergen Reactions    Ciprofloxacin Other (See Comments)     Elevated BP    Gluten Meal Other (See Comments)    Hydromorphone Other (See Comments)     hallucinations  hallucinations      Hydroxychloroquine Other (See Comments)     Pt could stand up or get up. Eyes got really big but pt could not see ? Pt states that it was 2-3 days before she got her senses back. Ketoconazole Other (See Comments)     Intolerance/caused burning sensation    Meperidine Other (See Comments)     hallucinations    Prednisone Other (See Comments)     Irritability    Yellow Dye Itching     Yellow  Dye in cheddar cheese- can eat white cheddar    Codeine Palpitations     Immunization History   Administered Date(s) Administered    PPD Test 09/01/2016, 02/27/2018, 02/10/2023    Pneumococcal Conjugate 13-valent (Helen Shipman) 09/01/2020       Recent Vital Data:  Patient Vitals for the past 24 hrs:   Temp Pulse Resp BP SpO2   02/19/23 0728 97.5 °F (36.4 °C) 85 18 137/70 95 %   02/19/23 0458 97.9 °F (36.6 °C) 73 18 (!) 130/58 94 %   02/19/23 0006 97.3 °F (36.3 °C) 78 16 (!) 123/44 94 %   02/18/23 2100 97.9 °F (36.6 °C) 80 18 135/77 98 %   02/18/23 1658 97.7 °F (36.5 °C) 93 16 (!) 178/63 96 %       Oxygen Therapy  SpO2: 95 %  Pulse via Oximetry: 78 beats per minute  O2 Device: None (Room air)  O2 Flow Rate (L/min): 6 L/min    Estimated body mass index is 23.1 kg/m² as calculated from the following:    Height as of this encounter: 5' 6\" (1.676 m). Weight as of this encounter: 143 lb 1.6 oz (64.9 kg). No intake or output data in the 24 hours ending 02/19/23 0831        Physical Exam:  General:          Well nourished. Head:               Normocephalic, atraumatic  CV:                  RRR. No m/r/g. No jugular venous distension. Lungs:             CTAB. No wheezing, rhonchi, or rales. Symmetric expansion.   Abdomen:        Soft, nontender, lap sites no drainage, no erythema.   Extremities:     No cyanosis or clubbing.  No edema  Skin:                No rashes and normal coloration.   Warm and dry.    Neuro:             CN II-XII grossly intact.    Psych:             Normal mood and affect.     Signed:  SIRENA GUEVARA - CNP

## 2023-02-18 NOTE — CARE COORDINATION
Patient ready for discharge, patient's reported Anastasia Aquino called and states that she would like for the patient to go to STR. SW advised that this is not what was recommended by therapy and the patient requires pre-cert which will not be granted for STR when the recommendation from therapy is for [facility]. Richard Lopes (NP) and SW spoke with Carlos Alberto Curry on a conference call. Carlos Alberto Curry is coming to the hospital later and will bring HCPOA documents to the nurse's station to be scanned into the chart. Therapy to re-evaluate the patient. SW advised if [facility] is recommended then the patient will need to discharge home with [facility] as previously planned and caregivers which Carlos Alberto Curry has been in the process of arranging. Update: Patient does not qualify for STR at this time. SW called Carlos Alberto Curry and provided information, advised that due to the late hour and the need to arrange sitters discharge will be tomorrow. Carlos Alberto Curry is in agreement. SW provided verbal education on memory care and directory along with contact information for brokering services such as [employer] [facility] [employer].      Betsy Olsen, GEORGETTE    [address] [facility]

## 2023-02-18 NOTE — PROGRESS NOTES
Hospitalist Progress Note   Admit Date:  2/10/2023  6:59 AM   Name:  Kirstin Shook   Age:  80 y.o. Sex:  female  :  1940   MRN:  [id number]   Room:  [facility]/    Presenting Complaint: No chief complaint on file. Reason(s) for Admission: SBO (small bowel obstruction) ([facility] [K56.609]     Hospital Course:     80 y.o. female with medical history of hypertension, hyperlipidemia and dementia admitted 2/10 for SBO. Underwent lap lysis of adhesions 2/10. KUB on  with persistent distended loops of bowel. NGT remains in . surgery continued to follow, she improved with flatus and diet advancement. On day of dc she continues to tolerate soft GI diet, no N/V. Abd soft/nondistended. Afebrile. K+ 3.1 ,oral replacement given. Subjective & 24hr Events (23): Sitting up in chair. afebrile. no abd pain or N/V. Tolerating diet. Medically ready for dc, waiting on PT for re eval for possible SNF placement. **Long conversation with [employer]. Explained patient would be weak from hospitalization and illness but would improve upon ongoing therapy and dc. Also explained delirium with dementia and ongoing hospitalization would continue to exacerbate this. She verbalized understanding, would like PT to re eval today. Assessment & Plan:   SBO (small bowel obstruction) (HCC)  Plan: Underwent lap lysis of adhesions 2/10. Surgery signed off   Tolerating GI soft diet     Active Problems:  Hypokalemia:  Oral replacement  trend     Hypomagnesemia:  1.7  IV replacement and trend    Delirium:  Due to hospitalization, illness  Implement delirium protocol:  No benzo, lights on during the day with shade up  Frequent orientation          Breast cancer, right breast ([facility]  Plan: stable       Mixed hyperlipidemia  Plan: diet controlled.      Alzheimer's disease:  Namenda and aricept ongoing       Anticipated discharge needs:    POA requested another PT eval today, last note states HH with 24 hour care, POA hopeful can qualify for Snf. Diet:  GI soft  DVT PPx: Lovenox   Code status: Full Code      Hospital Problems:  Principal Problem:    SBO (small bowel obstruction) (HCC)  Active Problems:    Breast cancer, right breast ([address]    Mixed hyperlipidemia  Resolved Problems:    * No resolved hospital problems. *      Objective:   Patient Vitals for the past 24 hrs:   Temp Pulse Resp BP SpO2   02/18/23 0811 97.3 °F (36.3 °C) 81 18 (!) 132/57 95 %   02/18/23 0251 97.7 °F (36.5 °C) 80 18 (!) 151/72 96 %   02/18/23 0136 98.2 °F (36.8 °C) -- -- (!) 150/57 --   02/17/23 2132 -- 94 -- (!) 165/60 --   02/17/23 1928 97.7 °F (36.5 °C) 94 18 (!) 165/60 97 %   02/17/23 1507 97.5 °F (36.4 °C) 88 16 (!) 134/55 99 %   02/17/23 1058 97.7 °F (36.5 °C) 86 18 (!) 145/74 99 %       Oxygen Therapy  SpO2: 95 %  Pulse via Oximetry: 78 beats per minute  O2 Device: None (Room air)  O2 Flow Rate (L/min): 6 L/min    Estimated body mass index is 23.1 kg/m² as calculated from the following:    Height as of this encounter: 5' 6\" (1.676 m). Weight as of this encounter: 143 lb 1.6 oz (64.9 kg). Intake/Output Summary (Last 24 hours) at 2/18/2023 1049  Last data filed at 2/17/2023 1315  Gross per 24 hour   Intake 240 ml   Output --   Net 240 ml         Physical Exam:     Blood pressure (!) 132/57, pulse 81, temperature 97.3 °F (36.3 °C), temperature source Oral, resp. rate 18, height 5' 6\" (1.676 m), weight 143 lb 1.6 oz (64.9 kg), SpO2 95 %, not currently breastfeeding. I have  General:          Well nourished. Head:               Normocephalic, atraumatic  CV:                  RRR. No m/r/g. No jugular venous distension. Lungs:             CTAB. No wheezing, rhonchi, or rales. Symmetric expansion. Abdomen:        Soft, nontender, lap sites no drainage, no erythema. Extremities:     No cyanosis or clubbing. No edema  Skin:                No rashes and normal coloration. Warm and dry.     Neuro:             CN II-XII grossly intact. Psych:             Normal mood and affect. personally reviewed labs and tests:  Recent Labs:  Recent Results (from the past 48 hour(s))   CBC with Auto Differential    Collection Time: 02/17/23  5:49 AM   Result Value Ref Range    WBC 6.1 4.3 - 11.1 K/uL    RBC 4.47 4.05 - 5.2 M/uL    Hemoglobin 13.2 11.7 - 15.4 g/dL    Hematocrit 39.6 35.8 - 46.3 %    MCV 88.6 82.0 - 102.0 FL    MCH 29.5 26.1 - 32.9 PG    MCHC 33.3 31.4 - 35.0 g/dL    RDW 13.4 11.9 - 14.6 %    Platelets 531 169 - 405 K/uL    MPV 9.5 9.4 - 12.3 FL    nRBC 0.00 0.0 - 0.2 K/uL    Differential Type AUTOMATED      Seg Neutrophils 57 43 - 78 %    Lymphocytes 27 13 - 44 %    Monocytes 10 4.0 - 12.0 %    Eosinophils % 5 0.5 - 7.8 %    Basophils 1 0.0 - 2.0 %    Immature Granulocytes 1 0.0 - 5.0 %    Segs Absolute 3.5 1.7 - 8.2 K/UL    Absolute Lymph # 1.6 0.5 - 4.6 K/UL    Absolute Mono # 0.6 0.1 - 1.3 K/UL    Absolute Eos # 0.3 0.0 - 0.8 K/UL    Basophils Absolute 0.0 0.0 - 0.2 K/UL    Absolute Immature Granulocyte 0.0 0.0 - 0.5 K/UL   Potassium    Collection Time: 02/17/23 12:08 PM   Result Value Ref Range    Potassium 3.3 (L) 3.5 - 5.1 mmol/L   Basic Metabolic Panel w/ Reflex to MG    Collection Time: 02/18/23  9:08 AM   Result Value Ref Range    Sodium 142 133 - 143 mmol/L    Potassium 3.1 (L) 3.5 - 5.1 mmol/L    Chloride 105 101 - 110 mmol/L    CO2 29 21 - 32 mmol/L    Anion Gap 8 2 - 11 mmol/L    Glucose 114 (H) 65 - 100 mg/dL    BUN 4 (L) 8 - 23 MG/DL    Creatinine 0.36 (L) 0.6 - 1.0 MG/DL    Est, Glom Filt Rate >60 >60 ml/min/1.73m2    Calcium 9.0 8.3 - 10.4 MG/DL   Magnesium    Collection Time: 02/18/23  9:08 AM   Result Value Ref Range    Magnesium 1.7 (L) 1.8 - 2.4 mg/dL       I have personally reviewed imaging studies:  Other Studies:  XR ABDOMEN (KUB) (SINGLE AP VIEW)   Final Result   Persistently dilated loops of small bowel. Cannot exclude underlying   obstruction.       XR ABDOMEN (KUB) (SINGLE AP VIEW)   Final Result Impression:      Tube placement as above. Band of atelectasis or scarring in the left retrocardiac space. Poornima Perez M.D.    2/13/2023 8:51:00 PM      XR ABDOMEN (KUB) (SINGLE AP VIEW)   Final Result   NG tube in good position.           Current Meds:  Current Facility-Administered Medications   Medication Dose Route Frequency    pantoprazole (PROTONIX) tablet 40 mg  40 mg Oral QAM AC    metoprolol tartrate (LOPRESSOR) tablet 12.5 mg  12.5 mg Oral BID with meals    potassium bicarb-citric acid (EFFER-K) effervescent tablet 20 mEq  20 mEq Oral BID with meals    diphenhydrAMINE (BENADRYL) capsule 25 mg  25 mg Oral Nightly PRN    OLANZapine (ZYPREXA) 5 mg in sterile water 1 mL injection  5 mg IntraMUSCular Once    Vitamin D (CHOLECALCIFEROL) tablet 2,000 Units  2,000 Units Oral Daily    sodium chloride flush 0.9 % injection 5-40 mL  5-40 mL IntraVENous 2 times per day    sodium chloride flush 0.9 % injection 5-40 mL  5-40 mL IntraVENous PRN    0.9 % sodium chloride infusion   IntraVENous PRN    enoxaparin (LOVENOX) injection 40 mg  40 mg SubCUTAneous Daily    ondansetron (ZOFRAN-ODT) disintegrating tablet 4 mg  4 mg Oral Q8H PRN    Or    ondansetron (ZOFRAN) injection 4 mg  4 mg IntraVENous Q6H PRN    polyethylene glycol (GLYCOLAX) packet 17 g  17 g Oral Daily PRN    acetaminophen (TYLENOL) tablet 650 mg  650 mg Oral Q6H PRN    Or    acetaminophen (TYLENOL) suppository 650 mg  650 mg Rectal Q6H PRN    morphine injection 2 mg  2 mg IntraVENous Q4H PRN       Signed:  Ari Mcneil, APRN - CNP

## 2023-02-18 NOTE — PROGRESS NOTES
ACUTE PHYSICAL THERAPY GOALS:   (Developed with and agreed upon by patient and/or caregiver. )    LTG:  (1.)Ms. Shantal Castillo will move from supine to sit and sit to supine  in bed with SUPERVISION within 7 treatment day(s). (2.)Ms. Shantal Castillo will transfer from bed to chair and chair to bed with INDEPENDENT using the least restrictive device within 7 treatment day(s). (3.)Ms. Shantal Castillo will ambulate with SUPERVISION for 300 feet with the least restrictive device within 7 treatment day(s). ________________________________________________________________________________________________     PHYSICAL THERAPY Daily Note and PM  (Link to Caseload Tracking: PT Visit Days : 2  Acknowledge Orders  Time In/Out  PT Charge Capture  Rehab Caseload Tracker    Oscar Castano is a 80 y.o. female   PRIMARY DIAGNOSIS: SBO (small bowel obstruction) (HCC)  SBO (small bowel obstruction) (HCC) [K56.609]  Procedure(s) (LRB):  EXPLORATORY LAPAROSCOPY LYSIS OF ADHESIONS (N/A)  8 Days Post-Op  Reason for Referral: Generalized Muscle Weakness (M62.81)  Other abnormalities of gait and mobility (R26.89)  Inpatient: Payor: Christo Alanis / Plan: Jaqueline Constantino / Product Type: *No Product type* /     ASSESSMENT:     REHAB RECOMMENDATIONS:   Recommendation to date pending progress:  Setting:  Home Health Therapy     Equipment:    None     ASSESSMENT:  Ms. Shantal Castillo presents this admission with SBO and is somewhat limited from her baseline with functional mobility. She will discharge home and has family/friend support but lives alone. She will benefit from PT to increase her functional independence and she should be able to return home without further therapy. 2/18/23 - pt. Getting out of bed with alarm going off on contact. She was agreeable to get up. Pt. Very pleasant and talkative with some confusion with conversation but following commands.   She knew the year(did not know month or day) and knew she was in the hospital but did not which one. She transferred and ambulated in the recio CGA/SBA. Her hands are quite limited which limits her use of an assistive device. She did a few steps with min assistance as she says she has a couple to get in the house. She was left in chair with alarm. Pt. Wants to go home with HHPT. She says she has friends who can check on her. I expect patient will do better in her own environment. [facility] [address] [address] AM-PAC 6 Clicks Basic Mobility Inpatient Short Form  AM-PAC Basic Mobility - Inpatient   How much help is needed turning from your back to your side while in a flat bed without using bedrails?: None  How much help is needed moving from lying on your back to sitting on the side of a flat bed without using bedrails?: A Little  How much help is needed moving to and from a bed to a chair?: A Little  How much help is needed standing up from a chair using your arms?: None  How much help is needed walking in hospital room?: None  How much help is needed climbing 3-5 steps with a railing?: A Little  [facility] Inpatient Mobility Raw Score : 21  AM-PAC Inpatient T-Scale Score : 50.25  Mobility Inpatient CMS 0-100% Score: 28.97  Mobility Inpatient CMS G-Code Modifier : CJ    SUBJECTIVE:   Ms. Tamiko Gibson getting out of bed on her own on contact    Social/Functional Lives With: Alone  Type of Home: House  Home Layout: One level  Home Access: Stairs to enter with rails  Entrance Stairs - Number of Steps: 2  Bathroom Shower/Tub: Walk-in shower, Shower chair with back  Bathroom Toilet: Standard  Bathroom Equipment: Shower chair  Receives Help From: Family, Friend(s), Neighbor, Personal care attendant  ADL Assistance: Needs assistance  Bath: Moderate assistance  Dressing:  Moderate assistance  Grooming: Minimal assistance  Feeding: Modified independent   Toileting: Independent  Homemaking Assistance: Needs assistance  Ambulation Assistance: Independent  Active : No  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    OBJECTIVE:     PAIN: VITALS / O2: PRECAUTION / Kamala Kam / DRAINS:   Pre Treatment: 0/10         Post Treatment: 0/10 Vitals        Oxygen      None    RESTRICTIONS/PRECAUTIONS:                    GROSS EVALUATION: Intact Impaired (Comments):   AROM []  WFL except bilateral fingers/wrists   PROM []    Strength []  WFL, Ue's limited   Balance []  Fair   Posture [] N/A   Sensation [x]     Coordination [x]      Tone [x]     Edema []    Activity Tolerance [] Patient Tolerated treatment well, Patient limited by fatigue    []      COGNITION/  PERCEPTION: Intact Impaired (Comments):   Orientation []  Some confusion.    Vision [x]     Hearing [x]     Cognition  []  Some confusion     MOBILITY: I Mod I S SBA CGA Min Mod Max Total  NT x2 Comments:   Bed Mobility    Rolling [] [] [] [] [] [] [] [] [] [] []    Supine to Sit [] [] [x] [] [] [] [] [] [] [] []    Scooting [] [] [x] [] [] [] [] [] [] [] []    Sit to Supine [] [] [] [] [] [] [] [] [] [] []    Transfers    Sit to Stand [] [] [] [x] [x] [] [] [] [] [] []    Bed to Chair [] [] [] [x] [x] [] [] [] [] [] []    Stand to Sit [] [] [] [x] [x] [] [] [] [] [] []     [] [] [] [] [] [] [] [] [] [] []    I=Independent, Mod I=Modified Independent, S=Supervision, SBA=Standby Assistance, CGA=Contact Guard Assistance,   Min=Minimal Assistance, Mod=Moderate Assistance, Max=Maximal Assistance, Total=Total Assistance, NT=Not Tested    GAIT: I Mod I S SBA CGA Min Mod Max Total  NT x2 Comments:   Level of Assistance [] [] [] [x] [x] [] [] [] [] [] []    Distance 375 feet    DME None and very limited hand use limiting use of RW      Gait Quality Decreased facundo , Decreased step clearance, Decreased step length, and Shuffling     Weightbearing Status      Stairs Stairs/Curb  Stairs?: Yes  Stairs  # Steps : 2 (up and down)  Assistance: Minimal assistance    I=Independent, Mod I=Modified Independent, S=Supervision, SBA=Standby Assistance, CGA=Contact Guard Assistance,   Min=Minimal Assistance, Mod=Moderate Assistance, Max=Maximal Assistance, Total=Total Assistance, NT=Not Tested    PLAN:   FREQUENCY AND DURATION: Daily for duration of hospital stay or until stated goals are met, whichever comes first.    THERAPY PROGNOSIS: Good    PROBLEM LIST:   (Skilled intervention is medically necessary to address:)  Decreased ADL/Functional Activities  Decreased Activity Tolerance  Decreased Balance  Decreased Gait Ability  Decreased Safety Awareness  Decreased Strength  Decreased Transfer Abilities INTERVENTIONS PLANNED:   (Benefits and precautions of physical therapy have been discussed with the patient.)  Therapeutic Activity  Therapeutic Exercise/HEP  Gait Training       TREATMENT:     TREATMENT:   Therapeutic Activity (30 Minutes): Therapeutic activity included Supine to Sit, Scooting, Transfer Training, Ambulation on level ground, Stair Training, Sitting balance , and Standing balance to improve functional Activity tolerance, Balance, Coordination, Mobility, Strength, and ROM.         TREATMENT GRID:  B LE / UE Date:  2/17 Date:     Date:     Date:     Activity/Exercise Parameters Parameters Parameters    Ankle pumps       Quad sets       Heel slides       Hip abd/ad       SAQ       Marching in place in chair       LAQ in the chair       Shoulder flex/ext       Shoulder ab/ad       Elbow flex/ext               AFTER TREATMENT PRECAUTIONS: Alarm Activated, Bed/Chair Locked, Call light within reach, Chair, Needs within reach, and RN notified    INTERDISCIPLINARY COLLABORATION:  RN/ PCT and OT/ MEDINA    EDUCATION:      TIME IN/OUT:  Time In: 1330  Time Out: 1400  Minutes: [address], PT

## 2023-02-19 VITALS
BODY MASS INDEX: 23 KG/M2 | HEIGHT: 66 IN | OXYGEN SATURATION: 97 % | DIASTOLIC BLOOD PRESSURE: 83 MMHG | HEART RATE: 87 BPM | SYSTOLIC BLOOD PRESSURE: 92 MMHG | TEMPERATURE: 97.7 F | RESPIRATION RATE: 18 BRPM | WEIGHT: 143.1 LBS

## 2023-02-19 LAB
ANION GAP SERPL CALC-SCNC: 3 MMOL/L (ref 2–11)
BASOPHILS # BLD: 0 K/UL (ref 0–0.2)
BASOPHILS NFR BLD: 0 % (ref 0–2)
BUN SERPL-MCNC: 7 MG/DL (ref 8–23)
CALCIUM SERPL-MCNC: 8.6 MG/DL (ref 8.3–10.4)
CHLORIDE SERPL-SCNC: 103 MMOL/L (ref 101–110)
CO2 SERPL-SCNC: 34 MMOL/L (ref 21–32)
CREAT SERPL-MCNC: 0.34 MG/DL (ref 0.6–1)
DIFFERENTIAL METHOD BLD: ABNORMAL
EOSINOPHIL # BLD: 0.3 K/UL (ref 0–0.8)
EOSINOPHIL NFR BLD: 6 % (ref 0.5–7.8)
ERYTHROCYTE [DISTWIDTH] IN BLOOD BY AUTOMATED COUNT: 13.1 % (ref 11.9–14.6)
GLUCOSE SERPL-MCNC: 101 MG/DL (ref 65–100)
HCT VFR BLD AUTO: 36.5 % (ref 35.8–46.3)
HGB BLD-MCNC: 12 G/DL (ref 11.7–15.4)
IMM GRANULOCYTES # BLD AUTO: 0 K/UL (ref 0–0.5)
IMM GRANULOCYTES NFR BLD AUTO: 0 % (ref 0–5)
LYMPHOCYTES # BLD: 1.7 K/UL (ref 0.5–4.6)
LYMPHOCYTES NFR BLD: 33 % (ref 13–44)
MAGNESIUM SERPL-MCNC: 1.8 MG/DL (ref 1.8–2.4)
MCH RBC QN AUTO: 29.6 PG (ref 26.1–32.9)
MCHC RBC AUTO-ENTMCNC: 32.9 G/DL (ref 31.4–35)
MCV RBC AUTO: 89.9 FL (ref 82–102)
MONOCYTES # BLD: 0.5 K/UL (ref 0.1–1.3)
MONOCYTES NFR BLD: 9 % (ref 4–12)
NEUTS SEG # BLD: 2.7 K/UL (ref 1.7–8.2)
NEUTS SEG NFR BLD: 52 % (ref 43–78)
NRBC # BLD: 0 K/UL (ref 0–0.2)
PLATELET # BLD AUTO: 280 K/UL (ref 150–450)
PMV BLD AUTO: 8.9 FL (ref 9.4–12.3)
POTASSIUM SERPL-SCNC: 3.4 MMOL/L (ref 3.5–5.1)
RBC # BLD AUTO: 4.06 M/UL (ref 4.05–5.2)
SODIUM SERPL-SCNC: 140 MMOL/L (ref 133–143)
WBC # BLD AUTO: 5.2 K/UL (ref 4.3–11.1)

## 2023-02-19 PROCEDURE — 6370000000 HC RX 637 (ALT 250 FOR IP): Performed by: SURGERY

## 2023-02-19 PROCEDURE — 85025 COMPLETE CBC W/AUTO DIFF WBC: CPT

## 2023-02-19 PROCEDURE — 36415 COLL VENOUS BLD VENIPUNCTURE: CPT

## 2023-02-19 PROCEDURE — 6370000000 HC RX 637 (ALT 250 FOR IP): Performed by: NURSE PRACTITIONER

## 2023-02-19 PROCEDURE — 6370000000 HC RX 637 (ALT 250 FOR IP): Performed by: STUDENT IN AN ORGANIZED HEALTH CARE EDUCATION/TRAINING PROGRAM

## 2023-02-19 PROCEDURE — 80048 BASIC METABOLIC PNL TOTAL CA: CPT

## 2023-02-19 PROCEDURE — 6370000000 HC RX 637 (ALT 250 FOR IP): Performed by: INTERNAL MEDICINE

## 2023-02-19 PROCEDURE — 6360000002 HC RX W HCPCS: Performed by: SURGERY

## 2023-02-19 PROCEDURE — 83735 ASSAY OF MAGNESIUM: CPT

## 2023-02-19 RX ORDER — LANOLIN ALCOHOL/MO/W.PET/CERES
400 CREAM (GRAM) TOPICAL DAILY
Qty: 30 TABLET | Refills: 0 | Status: SHIPPED | OUTPATIENT
Start: 2023-02-20

## 2023-02-19 RX ADMIN — ENOXAPARIN SODIUM 40 MG: 100 INJECTION SUBCUTANEOUS at 08:07

## 2023-02-19 RX ADMIN — VITAMIN D, TAB 1000IU (100/BT) 2000 UNITS: 25 TAB at 08:06

## 2023-02-19 RX ADMIN — METOPROLOL TARTRATE 12.5 MG: 25 TABLET, FILM COATED ORAL at 08:06

## 2023-02-19 RX ADMIN — Medication 400 MG: at 08:07

## 2023-02-19 RX ADMIN — POTASSIUM BICARBONATE 40 MEQ: 391 TABLET, EFFERVESCENT ORAL at 08:57

## 2023-02-19 RX ADMIN — PANTOPRAZOLE SODIUM 40 MG: 40 TABLET, DELAYED RELEASE ORAL at 08:06

## 2023-02-19 NOTE — PROGRESS NOTES
General Surgery Progress Note    2/19/2023    Admit Date: 2/10/2023    Subjective:   Surgery (for Dr. Wilfred Vasquez)  The patient has no complaints this morning. She is tolerating a diet and has moved her bowels. No nausea or vomiting. Objective:     /70   Pulse 85   Temp 97.5 °F (36.4 °C) (Oral)   Resp 18   Ht 5' 6\" (1.676 m)   Wt 143 lb 1.6 oz (64.9 kg)   SpO2 95%   BMI 23.10 kg/m²       Intake/Output Summary (Last 24 hours) at 2/19/2023 1012  Last data filed at 2/19/2023 0952  Gross per 24 hour   Intake 400 ml   Output --   Net 400 ml        EXAM:  ABD soft, mild incisional tenderness, active BS'S.         Data Review    Recent Results (from the past 24 hour(s))   CBC with Auto Differential    Collection Time: 02/19/23  6:01 AM   Result Value Ref Range    WBC 5.2 4.3 - 11.1 K/uL    RBC 4.06 4.05 - 5.2 M/uL    Hemoglobin 12.0 11.7 - 15.4 g/dL    Hematocrit 36.5 35.8 - 46.3 %    MCV 89.9 82.0 - 102.0 FL    MCH 29.6 26.1 - 32.9 PG    MCHC 32.9 31.4 - 35.0 g/dL    RDW 13.1 11.9 - 14.6 %    Platelets 667 445 - 984 K/uL    MPV 8.9 (L) 9.4 - 12.3 FL    nRBC 0.00 0.0 - 0.2 K/uL    Differential Type AUTOMATED      Seg Neutrophils 52 43 - 78 %    Lymphocytes 33 13 - 44 %    Monocytes 9 4.0 - 12.0 %    Eosinophils % 6 0.5 - 7.8 %    Basophils 0 0.0 - 2.0 %    Immature Granulocytes 0 0.0 - 5.0 %    Segs Absolute 2.7 1.7 - 8.2 K/UL    Absolute Lymph # 1.7 0.5 - 4.6 K/UL    Absolute Mono # 0.5 0.1 - 1.3 K/UL    Absolute Eos # 0.3 0.0 - 0.8 K/UL    Basophils Absolute 0.0 0.0 - 0.2 K/UL    Absolute Immature Granulocyte 0.0 0.0 - 0.5 K/UL   Basic Metabolic Panel w/ Reflex to MG    Collection Time: 02/19/23  6:01 AM   Result Value Ref Range    Sodium 140 133 - 143 mmol/L    Potassium 3.4 (L) 3.5 - 5.1 mmol/L    Chloride 103 101 - 110 mmol/L    CO2 34 (H) 21 - 32 mmol/L    Anion Gap 3 2 - 11 mmol/L    Glucose 101 (H) 65 - 100 mg/dL    BUN 7 (L) 8 - 23 MG/DL    Creatinine 0.34 (L) 0.6 - 1.0 MG/DL    Est, Glom Filt Rate >60 >60 ml/min/1.73m2    Calcium 8.6 8.3 - 10.4 MG/DL   Magnesium    Collection Time: 02/19/23  6:01 AM   Result Value Ref Range    Magnesium 1.8 1.8 - 2.4 mg/dL        Principal Problem:    SBO (small bowel obstruction) (HCC)  Active Problems:    Breast cancer, right breast (HCC)    Mixed hyperlipidemia  Resolved Problems:    * No resolved hospital problems. *        Plan: 1. Soft diet  2. Clear for discharge from a surgical perspective. 3. Follow up with Dr. Aditya Mathews week of 2/27/23, [phone].   Fernando Dave MD.

## 2023-02-19 NOTE — CARE COORDINATION
Patient anticipated to be medically ready for discharge today. Resumption orders sent to [facility]. Plan is as follows:     ASSESSMENT NOTE    Attending Physician: Carlo Young MD  Admit Problem: SBO (small bowel obstruction) ([facility] [K56.609]  Date/Time of Admission: 2/10/2023  6:59 AM  Problem List:  Patient Active Problem List   Diagnosis    Sclerodactyly    Arthritis of left hip    Low serum vitamin D    Breast cancer, right breast (HCC)    Osteoarthritis of both shoulders    Malignant neoplasm of lower-outer quadrant of right breast of female, estrogen receptor positive ([facility]    Osteoarthritis of left knee    Rheumatoid arthritis involving multiple sites with positive rheumatoid factor (HCC)    Low back pain    Total knee replacement status    Hormone receptor positive breast cancer, unspecified laterality (HCC)    Abnormal breast exam    Alzheimer's disease, unspecified (CODE) ([facility]    Mixed hyperlipidemia    S/P total hip arthroplasty    Anemia, unspecified    Osteoarthritis of right knee    SBO (small bowel obstruction) [facility])       Service Assessment  Patient Orientation Alert and Oriented   Cognition Alert   History Provided By Patient   Primary Caregiver Self   Accompanied By/Relationship dtr/Milka #[phone]   Support Systems Children, Friends/Neighbors   Patient's Healthcare Decision Maker is: Named in [address] [place] [address]   PCP Verified by CM Yes   Last Visit to PCP     Prior Functional Level Independent in ADLs/IADLs   Current Functional Level Independent in ADLs/IADLs   Can patient return to prior living arrangement Yes   Ability to make needs known: Good   Family able to assist with home care needs: Yes   Would you like for me to discuss the discharge plan with any other family members/significant others, and if so, who?  No   Financial Resources Greg Blackwell ([employer])   Community Resources None   CM/SW Referral       Social/Functional History  Lives With Alone   Type of [address] [place] [address] One level Home Access Stairs to enter with rails   Entrance Stairs - Number of Steps 2   Entrance Stairs - Rails     Bathroom Shower/Tub Walk-in shower, Shower chair with back   [facility]  chair   [address] Help From [place], Friend(s), Neighbor, Personal care attendant   ADL Assistance Needs assistance   Bath Moderate assistance   Dressing Moderate assistance   Grooming Minimal assistance   Feeding Modified independent    [address] Work     Driving     Shopping          Other (Comment)     [address] [place] Paying/Finance Responsibility     [address] Management     Other (Comment)     Ambuation Assistance Independent   Transfer Assisstance     Active  No   Patient's  Info     Mode of Transportation     Education     Occupation     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 Potential Assistance Purchasing Medications     Meds-to-Beds: Does the patient want to have any new prescriptions delivered to bedside prior to discharge? Type of [address] [facility], PT, OT   Patient expects to be discharged to: House   Follow Up Appointment: Best Day/Time     One/Two Story Residence:     # of Interior Steps     Height of Each Step (in)     [employer] Available     History of Falls?        Services At/After Discharge  Transition of Care Consult (CM Consult): Internal Home Health     Internal Hospice     Reason Outside Agency [address]     Partner SNF     Reason Why Partner SNF Not Chosen     Internal Comfort Care     Reason Outside [address] Discharge     [address] Provided? Mode of Transport at [facility] Time of Discharge     Confirm Follow Up Transport       Condition of Participation: Discharge Planning  The plan for Transition of Care is related to the following treatment goals: The Patient and/or Patient Representative was provided with a Choice of Provider? Name of the Patient Representative who was provided with the Choice of Provider and agrees with the Discharge Plan? The Patient and/or Patient Representative Agree with the Discharge Plan? Freedom of Choice list was provided with basic dialogue that supports the individualized plan of care/goals, treatment preferences, and shares the quality data associated with the providers?        Documentation for Discharge Appeal  Discharge Appealed by     Date notified by [facility] of appeal request:     Time notified by QIO of appeal request:     Detailed Notice of Discharge given to:     Date Notice of Discharge given:     Time Notice of Discharge given:     Date records sent to QIO     Time records sent to Beth Piedra     Date Notified of Outcome     Time Notified of Outcome     Outcome of appeal           NANCY Kaye 02/19/23 9:15 AM    Katy Goodson, [address] Work   [address] [facility]

## 2023-02-19 NOTE — PROGRESS NOTES
Reason(s) for Admission: SBO (small bowel obstruction) [facility]) [K56.609]       80 y.o. female with medical history of hypertension, hyperlipidemia and dementia admitted 2/10 for SBO. She is s/p lap lysis of adhesions 2/10/23  KUB on 2/14 with persistent distended loops of bowel. NGT remained. Surgery continued to follow, she improved with flatus and diet advancement.      Sitting up in chair  Comfortable  No complaints  AF  VSS  Denies abd pain  +BM  Raiza PO      Waiting for SNF

## 2023-02-19 NOTE — DISCHARGE INSTRUCTIONS
DISCHARGE SUMMARY from Nurse    These are general instructions for a healthy lifestyle:    No smoking/ No tobacco products/ Avoid exposure to second hand smoke  Surgeon General's Warning:  Quitting smoking now greatly reduces serious risk to your health. Obesity, smoking, and sedentary lifestyle greatly increases your risk for illness    A healthy diet, regular physical exercise & weight monitoring are important for maintaining a healthy lifestyle    You may be retaining fluid if you have a history of heart failure or if you experience any of the following symptoms:  Weight gain of 3 pounds or more overnight or 5 pounds in a week, increased swelling in our hands or feet or shortness of breath while lying flat in bed. Please call your doctor as soon as you notice any of these symptoms; do not wait until your next office visit. Laparoscopy: What to Expect at [address] [place] [address]  After laparoscopic surgery, you are likely to have pain for the next several days. You may have a low fever and feel tired and sick to your stomach. This is common. You should feel better after 1 to 2 weeks. This care sheet gives you a general idea about how long it will take for you to recover. But each person recovers at a different pace. Follow the steps below to get better as quickly as possible. How can you care for yourself at home? Activity    Rest when you feel tired. Getting enough sleep will help you recover. Try to walk each day. Start by walking a little more than you did the day before. Bit by bit, increase the amount you walk. Walking boosts blood flow and helps prevent pneumonia and constipation. Avoid strenuous activities, such as bicycle riding, jogging, weight lifting, or aerobic exercise, until your doctor says it is okay. Avoid lifting anything that would make you strain.  This may include a child, heavy grocery bags and milk containers, a heavy briefcase or backpack, cat litter or dog food bags, or a vacuum . You may also have some shoulder or back pain. This pain is caused by the gas your doctor used to inflate your belly to help see your organs better. The pain usually lasts about 1 or 2 days. You may drive when you are no longer taking pain medicine and can quickly move your foot from the gas pedal to the brake. You must also be able to sit comfortably for a long period of time, even if you do not plan to go far. You might get caught in traffic. You may need to take a few days to a few weeks off work. It depends on the type of work you do and how you feel. You may shower 24 to 48 hours after surgery, if your doctor okays it. Pat the cut (incision) dry. Do not take a bath for the first 2 weeks, or until your doctor tells you it is okay. Diet    If your stomach is upset, try bland, low-fat foods such as plain rice, broiled chicken, toast, and yogurt. Drink plenty of fluids to prevent dehydration. Choose water and other clear liquids. If you have kidney, heart, or liver disease and have to limit fluids, talk with your doctor before you increase the amount of fluids you drink. You may notice that your bowel movements are not regular right after your surgery. This is common. Avoid constipation and straining with bowel movements. You may want to take a fiber supplement every day. If you have not had a bowel movement after a couple of days, ask your doctor about taking a mild laxative. Medicines    Your doctor will tell you if and when you can restart your medicines. You will also get instructions about taking any new medicines. If you stopped taking aspirin or some other blood thinner, your doctor will tell you when to start taking it again. Take pain medicines exactly as directed. If the doctor gave you a prescription medicine for pain, take it as prescribed.   If you are not taking a prescription pain medicine, ask your doctor if you can take an over-the-counter medicine. If your doctor prescribed antibiotics, take them as directed. Do not stop taking them just because you feel better. You need to take the full course of antibiotics. If you think your pain medicine is making you sick to your stomach: Take your medicine after meals (unless your doctor has told you not to). Ask your doctor for a different pain medicine. Incision care    If you have strips of tape on the incision, leave the tape on for a week or until it falls off. Wash the area daily with warm, soapy water and pat it dry. Don't use hydrogen peroxide or alcohol, which can slow healing. You may cover the area with a gauze bandage if it weeps or rubs against clothing. Change the bandage every day. Follow-up care is a key part of your treatment and safety. Be sure to make and go to all appointments, and call your doctor if you are having problems. It's also a good idea to know your test results and keep a list of the medicines you take. When should you call for help? Call 911 anytime you think you may need emergency care. For example, call if:    You passed out (lost consciousness). You are short of breath. Call your doctor now or seek immediate medical care if:    You have pain that does not get better after you take pain medicine. You have loose stitches, or your incision comes open. Bright red blood has soaked through your bandage. You have signs of infection, such as: Increased pain, swelling, warmth, or redness. Red streaks leading from the incision. Pus draining from the incision. A fever. You are sick to your stomach or cannot keep fluids down. You have signs of a blood clot in your leg (called a deep vein thrombosis), such as:  Pain in your calf, back of the knee, thigh, or groin. Redness and swelling in your leg or groin. You cannot pass stools or gas.    Watch closely for any changes in your health, and be sure to contact your doctor if you have any problems. Where can you learn more? Go to http://www.woods.com/ and enter G657 to learn more about \"Laparoscopy: What to Expect at Home. \"  Current as of: January 20, 2022               Content Version: 13.5  © 2006-2022 [employer]. Care instructions adapted under license by [facility] ([facility]). If you have questions about a medical condition or this instruction, always ask your healthcare professional. [address] any warranty or liability for your use of this information. The discharge information has been reviewed with the patient and caregiver. The patient and caregiver verbalized understanding. Discharge medications reviewed with the patient and caregiver and appropriate educational materials and side effects teaching were provided.   ___________________________________________________________________________________________________________________________________

## 2023-02-22 ENCOUNTER — APPOINTMENT (OUTPATIENT)
Dept: CT IMAGING | Age: 83
DRG: 177 | End: 2023-02-22
Payer: MEDICARE

## 2023-02-22 ENCOUNTER — TELEPHONE (OUTPATIENT)
Dept: INTERNAL MEDICINE CLINIC | Facility: CLINIC | Age: 83
End: 2023-02-22

## 2023-02-22 ENCOUNTER — HOSPITAL ENCOUNTER (INPATIENT)
Age: 83
LOS: 1 days | Discharge: HOME | DRG: 177 | End: 2023-02-25
Attending: EMERGENCY MEDICINE | Admitting: FAMILY MEDICINE
Payer: MEDICARE

## 2023-02-22 ENCOUNTER — APPOINTMENT (OUTPATIENT)
Dept: GENERAL RADIOLOGY | Age: 83
DRG: 177 | End: 2023-02-22
Payer: MEDICARE

## 2023-02-22 DIAGNOSIS — U07.1 COVID-19 VIRUS INFECTION: Primary | ICD-10-CM

## 2023-02-22 PROBLEM — G30.9 ALZHEIMER'S DISEASE, UNSPECIFIED (CODE) (HCC): Status: ACTIVE | Noted: 2022-03-03

## 2023-02-22 PROBLEM — R79.89 LOW SERUM VITAMIN D: Status: RESOLVED | Noted: 2022-03-03 | Resolved: 2023-02-22

## 2023-02-22 PROBLEM — K56.609 SBO (SMALL BOWEL OBSTRUCTION) (HCC): Status: RESOLVED | Noted: 2023-02-10 | Resolved: 2023-02-22

## 2023-02-22 LAB
ALBUMIN SERPL-MCNC: 2.9 G/DL (ref 3.2–4.6)
ALBUMIN/GLOB SERPL: 0.7 (ref 0.4–1.6)
ALP SERPL-CCNC: 69 U/L (ref 50–136)
ALT SERPL-CCNC: 16 U/L (ref 12–65)
ANION GAP SERPL CALC-SCNC: 6 MMOL/L (ref 2–11)
APPEARANCE UR: ABNORMAL
AST SERPL-CCNC: 24 U/L (ref 15–37)
B PERT DNA SPEC QL NAA+PROBE: NOT DETECTED
BASOPHILS # BLD: 0 K/UL (ref 0–0.2)
BASOPHILS NFR BLD: 0 % (ref 0–2)
BILIRUB SERPL-MCNC: 0.5 MG/DL (ref 0.2–1.1)
BILIRUB UR QL: NEGATIVE
BORDETELLA PARAPERTUSSIS BY PCR: NOT DETECTED
BUN SERPL-MCNC: 6 MG/DL (ref 8–23)
C PNEUM DNA SPEC QL NAA+PROBE: NOT DETECTED
CALCIUM SERPL-MCNC: 8.9 MG/DL (ref 8.3–10.4)
CHLORIDE SERPL-SCNC: 100 MMOL/L (ref 101–110)
CO2 SERPL-SCNC: 29 MMOL/L (ref 21–32)
COLOR UR: ABNORMAL
CREAT SERPL-MCNC: 0.6 MG/DL (ref 0.6–1)
DIFFERENTIAL METHOD BLD: ABNORMAL
EKG ATRIAL RATE: 92 BPM
EKG DIAGNOSIS: NORMAL
EKG P AXIS: 68 DEGREES
EKG P-R INTERVAL: 142 MS
EKG Q-T INTERVAL: 377 MS
EKG QRS DURATION: 134 MS
EKG QTC CALCULATION (BAZETT): 467 MS
EKG R AXIS: 16 DEGREES
EKG T AXIS: 31 DEGREES
EKG VENTRICULAR RATE: 92 BPM
EOSINOPHIL # BLD: 0 K/UL (ref 0–0.8)
EOSINOPHIL NFR BLD: 1 % (ref 0.5–7.8)
ERYTHROCYTE [DISTWIDTH] IN BLOOD BY AUTOMATED COUNT: 13.2 % (ref 11.9–14.6)
FLUAV SUBTYP SPEC NAA+PROBE: NOT DETECTED
FLUBV RNA SPEC QL NAA+PROBE: NOT DETECTED
GLOBULIN SER CALC-MCNC: 3.9 G/DL (ref 2.8–4.5)
GLUCOSE SERPL-MCNC: 93 MG/DL (ref 65–100)
GLUCOSE UR STRIP.AUTO-MCNC: NEGATIVE MG/DL
HADV DNA SPEC QL NAA+PROBE: NOT DETECTED
HCOV 229E RNA SPEC QL NAA+PROBE: NOT DETECTED
HCOV HKU1 RNA SPEC QL NAA+PROBE: NOT DETECTED
HCOV NL63 RNA SPEC QL NAA+PROBE: NOT DETECTED
HCOV OC43 RNA SPEC QL NAA+PROBE: NOT DETECTED
HCT VFR BLD AUTO: 42.2 % (ref 35.8–46.3)
HGB BLD-MCNC: 13.6 G/DL (ref 11.7–15.4)
HGB UR QL STRIP: NEGATIVE
HMPV RNA SPEC QL NAA+PROBE: NOT DETECTED
HPIV1 RNA SPEC QL NAA+PROBE: NOT DETECTED
HPIV2 RNA SPEC QL NAA+PROBE: NOT DETECTED
HPIV3 RNA SPEC QL NAA+PROBE: NOT DETECTED
HPIV4 RNA SPEC QL NAA+PROBE: NOT DETECTED
IMM GRANULOCYTES # BLD AUTO: 0 K/UL (ref 0–0.5)
IMM GRANULOCYTES NFR BLD AUTO: 1 % (ref 0–5)
KETONES UR QL STRIP.AUTO: 40 MG/DL
LACTATE SERPL-SCNC: 2 MMOL/L (ref 0.4–2)
LEUKOCYTE ESTERASE UR QL STRIP.AUTO: NEGATIVE
LYMPHOCYTES # BLD: 1.2 K/UL (ref 0.5–4.6)
LYMPHOCYTES NFR BLD: 19 % (ref 13–44)
M PNEUMO DNA SPEC QL NAA+PROBE: NOT DETECTED
MCH RBC QN AUTO: 30.2 PG (ref 26.1–32.9)
MCHC RBC AUTO-ENTMCNC: 32.2 G/DL (ref 31.4–35)
MCV RBC AUTO: 93.6 FL (ref 82–102)
MONOCYTES # BLD: 0.6 K/UL (ref 0.1–1.3)
MONOCYTES NFR BLD: 9 % (ref 4–12)
NEUTS SEG # BLD: 4.2 K/UL (ref 1.7–8.2)
NEUTS SEG NFR BLD: 70 % (ref 43–78)
NITRITE UR QL STRIP.AUTO: NEGATIVE
NRBC # BLD: 0 K/UL (ref 0–0.2)
PH UR STRIP: 6.5 (ref 5–9)
PLATELET # BLD AUTO: 232 K/UL (ref 150–450)
PMV BLD AUTO: 9 FL (ref 9.4–12.3)
POTASSIUM SERPL-SCNC: 3.7 MMOL/L (ref 3.5–5.1)
PROCALCITONIN SERPL-MCNC: <0.05 NG/ML (ref 0–0.49)
PROT SERPL-MCNC: 6.8 G/DL (ref 6.3–8.2)
PROT UR STRIP-MCNC: NEGATIVE MG/DL
RBC # BLD AUTO: 4.51 M/UL (ref 4.05–5.2)
RSV RNA SPEC QL NAA+PROBE: NOT DETECTED
RV+EV RNA SPEC QL NAA+PROBE: NOT DETECTED
SARS-COV-2 RNA RESP QL NAA+PROBE: DETECTED
SODIUM SERPL-SCNC: 135 MMOL/L (ref 133–143)
SP GR UR REFRACTOMETRY: 1.01 (ref 1–1.02)
UROBILINOGEN UR QL STRIP.AUTO: 0.2 EU/DL (ref 0.2–1)
WBC # BLD AUTO: 6 K/UL (ref 4.3–11.1)

## 2023-02-22 PROCEDURE — 2580000003 HC RX 258: Performed by: EMERGENCY MEDICINE

## 2023-02-22 PROCEDURE — 83605 ASSAY OF LACTIC ACID: CPT

## 2023-02-22 PROCEDURE — 0202U NFCT DS 22 TRGT SARS-COV-2: CPT

## 2023-02-22 PROCEDURE — 74177 CT ABD & PELVIS W/CONTRAST: CPT

## 2023-02-22 PROCEDURE — 96374 THER/PROPH/DIAG INJ IV PUSH: CPT

## 2023-02-22 PROCEDURE — 6360000002 HC RX W HCPCS: Performed by: EMERGENCY MEDICINE

## 2023-02-22 PROCEDURE — 71045 X-RAY EXAM CHEST 1 VIEW: CPT

## 2023-02-22 PROCEDURE — 96375 TX/PRO/DX INJ NEW DRUG ADDON: CPT

## 2023-02-22 PROCEDURE — 80053 COMPREHEN METABOLIC PANEL: CPT

## 2023-02-22 PROCEDURE — 6360000004 HC RX CONTRAST MEDICATION: Performed by: EMERGENCY MEDICINE

## 2023-02-22 PROCEDURE — 87040 BLOOD CULTURE FOR BACTERIA: CPT

## 2023-02-22 PROCEDURE — 81003 URINALYSIS AUTO W/O SCOPE: CPT

## 2023-02-22 PROCEDURE — 84145 PROCALCITONIN (PCT): CPT

## 2023-02-22 PROCEDURE — 6360000002 HC RX W HCPCS: Performed by: FAMILY MEDICINE

## 2023-02-22 PROCEDURE — 99285 EMERGENCY DEPT VISIT HI MDM: CPT

## 2023-02-22 PROCEDURE — 2500000003 HC RX 250 WO HCPCS: Performed by: FAMILY MEDICINE

## 2023-02-22 PROCEDURE — 93005 ELECTROCARDIOGRAM TRACING: CPT | Performed by: EMERGENCY MEDICINE

## 2023-02-22 PROCEDURE — G0378 HOSPITAL OBSERVATION PER HR: HCPCS

## 2023-02-22 PROCEDURE — 85025 COMPLETE CBC W/AUTO DIFF WBC: CPT

## 2023-02-22 RX ORDER — POTASSIUM CHLORIDE, DEXTROSE MONOHYDRATE AND SODIUM CHLORIDE 300; 5; 900 MG/100ML; G/100ML; MG/100ML
INJECTION, SOLUTION INTRAVENOUS CONTINUOUS
Status: DISCONTINUED | OUTPATIENT
Start: 2023-02-22 | End: 2023-02-23

## 2023-02-22 RX ORDER — 0.9 % SODIUM CHLORIDE 0.9 %
100 INTRAVENOUS SOLUTION INTRAVENOUS ONCE
Status: COMPLETED | OUTPATIENT
Start: 2023-02-22 | End: 2023-02-22

## 2023-02-22 RX ORDER — SODIUM CHLORIDE 0.9 % (FLUSH) 0.9 %
10 SYRINGE (ML) INJECTION
Status: COMPLETED | OUTPATIENT
Start: 2023-02-22 | End: 2023-02-22

## 2023-02-22 RX ORDER — THIAMINE HYDROCHLORIDE 100 MG/ML
100 INJECTION, SOLUTION INTRAMUSCULAR; INTRAVENOUS DAILY
Status: COMPLETED | OUTPATIENT
Start: 2023-02-22 | End: 2023-02-24

## 2023-02-22 RX ORDER — DEXAMETHASONE SODIUM PHOSPHATE 10 MG/ML
6 INJECTION INTRAMUSCULAR; INTRAVENOUS
Status: COMPLETED | OUTPATIENT
Start: 2023-02-22 | End: 2023-02-22

## 2023-02-22 RX ADMIN — DEXAMETHASONE SODIUM PHOSPHATE 6 MG: 10 INJECTION, SOLUTION INTRAMUSCULAR; INTRAVENOUS at 20:36

## 2023-02-22 RX ADMIN — SODIUM CHLORIDE 100 ML: 9 INJECTION, SOLUTION INTRAVENOUS at 16:45

## 2023-02-22 RX ADMIN — THIAMINE HYDROCHLORIDE 100 MG: 100 INJECTION, SOLUTION INTRAMUSCULAR; INTRAVENOUS at 22:34

## 2023-02-22 RX ADMIN — POTASSIUM CHLORIDE, DEXTROSE MONOHYDRATE AND SODIUM CHLORIDE: 300; 5; 900 INJECTION, SOLUTION INTRAVENOUS at 22:34

## 2023-02-22 RX ADMIN — SODIUM CHLORIDE, PRESERVATIVE FREE 10 ML: 5 INJECTION INTRAVENOUS at 16:45

## 2023-02-22 RX ADMIN — IOPAMIDOL 100 ML: 755 INJECTION, SOLUTION INTRAVENOUS at 16:42

## 2023-02-22 ASSESSMENT — LIFESTYLE VARIABLES
HOW OFTEN DO YOU HAVE A DRINK CONTAINING ALCOHOL: NEVER
HOW MANY STANDARD DRINKS CONTAINING ALCOHOL DO YOU HAVE ON A TYPICAL DAY: PATIENT DOES NOT DRINK

## 2023-02-22 NOTE — ED TRIAGE NOTES
Pt arrives from home via ems. Pt states she was dx with a \"twisted bowel\". Pt was d/c. HH came out and pt was found with a low grade fever. Pt was given tylenol by [facility]. Pt was found afebrile with EMS 98.3 oral. Family states pt has been lethargic.  Ems states pt is alert x 4    Vs with ems     97 2L NC  /90  HR 92

## 2023-02-22 NOTE — ED PROVIDER NOTES
[employer] Emergency Department Provider Note                   PCP:                Magda Rodriguez, APRN - EPIFANIO               Age: 80 y.o. Sex: female       Alejandra Samuels is a 80 y.o. female who presents to the Emergency Department with chief complaint of    Chief Complaint   Patient presents with    Fever      Patient's daughter states that she had a bowel obstruction surgery 2 weeks ago. She was discharged from the hospital 3 days ago. She has been having a mild cough over the past few days. Home health nurse reported she had a fever of 101 today. Patient does not communicate at baseline per her daughter. She has not had any vomiting or diarrhea. The history is provided by a relative. All other systems reviewed and are negative. Review of Systems   Unable to perform ROS: Other     Past Medical History:   Diagnosis Date    Anemia, unspecified     patient denies hx of anemia    Arthritis     osteoarthritis    Breast cancer ([facility] [address] 2019    Cancer ([facility] [address] 07/2013    right breast lumpectomy    Chronic pain     d/t rheumatoid arthritis    HTN (hypertension) 03/18/2012    patient states not a current problem, no medication. Hypercholesterolemia     no meds    Ill-defined condition     \"I can't take anything strong\"    Menopause     Nausea & vomiting     patient denies post-op N/V    Osteoarthritis of both shoulders     Osteoarthritis of right hip     Followed by Dr. Marisabel Hernandez historian     Rheumatoid arthritis [facility])     Patient does not see Rheumatologist; no prescription medication. Total knee replacement status 3/15/2012    Unspecified adverse effect of anesthesia     headache after general anesthesia x 1    Vitamin D deficiency disease     pt unaware of issue,diagnosis doc prior to 3/9/12; managed with daily vitamin D supplement.          Past Surgical History:   Procedure Laterality Date    APPENDECTOMY      with hysterectomy    BREAST LUMPECTOMY  7/31/2013    BREAST NEEDLE LOCALIZATION WITH MASS EXCISION AND SENTINAL NODE BIOPSY performed by Nadia Chapman MD at [address] (CERVIX STATUS UNKNOWN)  1987    LAPAROSCOPY N/A 2/10/2023    EXPLORATORY LAPAROSCOPY LYSIS OF ADHESIONS performed by Ruth Rosenberg MD at [facility] Right 3/27/2019    RIGHT BREAST MASTECTOMY SIMPLE performed by Margarita Gomes MD at [place] [id number] Right 2016    TOTAL HIP ARTHROPLASTY Left 2018    TOTAL KNEE ARTHROPLASTY Left 8/2010    left    TOTAL KNEE ARTHROPLASTY Right 3/2012    right    US BREAST BIOPSY W LOC DEVICE 1ST LESION RIGHT Right 2/26/2019    US BREAST NEEDLE BIOPSY RIGHT 2/26/2019 [facility]        Family History   Problem Relation Age of Onset    Hypertension Mother     No Known Problems Father     Osteoarthritis Mother     Breast Cancer Neg Hx     Other Other         family hx of HTN        Social History     Socioeconomic History    Marital status:    Tobacco Use    Smoking status: Never    Smokeless tobacco: Never   Substance and Sexual Activity    Alcohol use: No    Drug use: No     Social Determinants of Health     Physical Activity: Insufficiently Active    Days of Exercise per Week: 7 days    Minutes of Exercise per Session: 10 min        Allergies: Ciprofloxacin, Gluten meal, Hydromorphone, Hydroxychloroquine, Ketoconazole, Meperidine, Prednisone, Yellow dye, and Codeine    Previous Medications    ACETAMINOPHEN (TYLENOL) 650 MG EXTENDED RELEASE TABLET    Take 1,300 mg by mouth nightly as needed for Pain.     DONEPEZIL (ARICEPT) 5 MG TABLET    Take 1 tablet by mouth nightly    MAGNESIUM OXIDE (MAG-OX) 400 (240 MG) MG TABLET    Take 1 tablet by mouth daily    MEMANTINE ER (NAMENDA XR) 28 MG CP24 EXTENDED RELEASE CAPSULE    Take 1 capsule by mouth daily    METOPROLOL TARTRATE (LOPRESSOR) 25 MG TABLET    Take 0.5 tablets by mouth with breakfast and with evening meal    PANTOPRAZOLE (PROTONIX) 40 MG TABLET    Take 1 tablet by mouth every morning (before breakfast)    POTASSIUM BICARB-CITRIC ACID (EFFER-K) 20 MEQ TBEF EFFERVESCENT TABLET    Take 1 tablet by mouth daily for 3 doses    VITAMIN D, CHOLECALCIFEROL, 50 MCG (2000 UT) CAPS    Take 1 capsule by mouth daily        Vitals signs and nursing note reviewed. Patient Vitals for the past 4 hrs:   Pulse Resp BP SpO2   02/22/23 2036 (!) 105 22 (!) 187/73 92 %   02/22/23 1915 (!) 101 26 (!) 157/92 --   02/22/23 1830 (!) 101 (!) 31 (!) 170/61 94 %          Physical Exam  Vitals and nursing note reviewed. Constitutional:       Appearance: Normal appearance. HENT:      Head: Normocephalic and atraumatic. Cardiovascular:      Rate and Rhythm: Normal rate and regular rhythm. Pulses: Normal pulses. Heart sounds: Normal heart sounds. Pulmonary:      Effort: Pulmonary effort is normal.      Breath sounds: Normal breath sounds. Abdominal:      General: Abdomen is flat. Bowel sounds are normal.      Palpations: Abdomen is soft. Tenderness: There is no abdominal tenderness. Comments: Multiple laparoscopic surgical incisions are clean, dry, and intact without any tenderness swelling, or erythema. Musculoskeletal:         General: No swelling or tenderness. Cervical back: Normal range of motion and neck supple. No tenderness. Skin:     General: Skin is warm and dry. Neurological:      General: No focal deficit present. Mental Status: She is alert.         Orders Placed This Encounter   Procedures    Culture, Blood 1    Culture, Blood 2    Respiratory Panel, Molecular, with COVID-19 (Restricted: peds pts or suitable admitted adults)    XR CHEST PORTABLE    CT ABDOMEN PELVIS W IV CONTRAST Additional Contrast? None    Lactate, Sepsis    CBC with Auto Differential    CMP    Procalcitonin    Urinalysis w rflx microscopic    Cardiac Monitor - ED Only    Straight Cath (Select if patient is unable to provide a sample)    POCT Urine Dipstick    EKG 12 Lead    Saline lock IV       Procedures    ED EKG Interpretation  EKG was interpreted in the absence of a cardiologist.    Rate: Rate: Normal  EKG Interpretation: EKG Interpretation: sinus rhythm  ST Segments: Nonspecific ST segments - NO STEMI      Results Include:    Recent Results (from the past 24 hour(s))   EKG 12 Lead    Collection Time: 02/22/23  1:36 PM   Result Value Ref Range    Ventricular Rate 92 BPM    Atrial Rate 92 BPM    P-R Interval 142 ms    QRS Duration 134 ms    Q-T Interval 377 ms    QTc Calculation (Bazett) 467 ms    P Axis 68 degrees    R Axis 16 degrees    T Axis 31 degrees    Diagnosis Sinus rhythm    Lactate, Sepsis    Collection Time: 02/22/23  2:13 PM   Result Value Ref Range    Lactic Acid, Sepsis 2.0 0.4 - 2.0 MMOL/L   CBC with Auto Differential    Collection Time: 02/22/23  2:13 PM   Result Value Ref Range    WBC 6.0 4.3 - 11.1 K/uL    RBC 4.51 4.05 - 5.2 M/uL    Hemoglobin 13.6 11.7 - 15.4 g/dL    Hematocrit 42.2 35.8 - 46.3 %    MCV 93.6 82 - 102 FL    MCH 30.2 26.1 - 32.9 PG    MCHC 32.2 31.4 - 35.0 g/dL    RDW 13.2 11.9 - 14.6 %    Platelets 319 164 - 582 K/uL    MPV 9.0 (L) 9.4 - 12.3 FL    nRBC 0.00 0.0 - 0.2 K/uL    Differential Type AUTOMATED      Seg Neutrophils 70 43 - 78 %    Lymphocytes 19 13 - 44 %    Monocytes 9 4.0 - 12.0 %    Eosinophils % 1 0.5 - 7.8 %    Basophils 0 0.0 - 2.0 %    Immature Granulocytes 1 0.0 - 5.0 %    Segs Absolute 4.2 1.7 - 8.2 K/UL    Absolute Lymph # 1.2 0.5 - 4.6 K/UL    Absolute Mono # 0.6 0.1 - 1.3 K/UL    Absolute Eos # 0.0 0.0 - 0.8 K/UL    Basophils Absolute 0.0 0.0 - 0.2 K/UL    Absolute Immature Granulocyte 0.0 0.0 - 0.5 K/UL   CMP    Collection Time: 02/22/23  2:13 PM   Result Value Ref Range    Sodium 135 133 - 143 mmol/L    Potassium 3.7 3.5 - 5.1 mmol/L    Chloride 100 (L) 101 - 110 mmol/L    CO2 29 21 - 32 mmol/L    Anion Gap 6 2 - 11 mmol/L    Glucose 93 65 - 100 mg/dL BUN 6 (L) 8 - 23 MG/DL    Creatinine 0.60 0.6 - 1.0 MG/DL    Est, Glom Filt Rate >60 >60 ml/min/1.73m2    Calcium 8.9 8.3 - 10.4 MG/DL    Total Bilirubin 0.5 0.2 - 1.1 MG/DL    ALT 16 12 - 65 U/L    AST 24 15 - 37 U/L    Alk Phosphatase 69 50 - 136 U/L    Total Protein 6.8 6.3 - 8.2 g/dL    Albumin 2.9 (L) 3.2 - 4.6 g/dL    Globulin 3.9 2.8 - 4.5 g/dL    Albumin/Globulin Ratio 0.7 0.4 - 1.6     Procalcitonin    Collection Time: 02/22/23  2:13 PM   Result Value Ref Range    Procalcitonin <0.05 0.00 - 0.49 ng/mL   Urinalysis w rflx microscopic    Collection Time: 02/22/23  3:30 PM   Result Value Ref Range    Color, UA YELLOW/STRAW      Appearance CLOUDY      Specific [place], UA 1.011 1.001 - 1.023      pH, Urine 6.5 5.0 - 9.0      Protein, UA Negative NEG mg/dL    Glucose, UA Negative mg/dL    Ketones, Urine 40 (A) NEG mg/dL    Bilirubin Urine Negative NEG      Blood, Urine Negative NEG      Urobilinogen, Urine 0.2 0.2 - 1.0 EU/dL    Nitrite, Urine Negative NEG      Leukocyte Esterase, Urine Negative NEG     Respiratory Panel, Molecular, with COVID-19 (Restricted: peds pts or suitable admitted adults)    Collection Time: 02/22/23  4:21 PM    Specimen: Nasopharyngeal   Result Value Ref Range    Adenovirus by PCR NOT DETECTED NOTDET      Coronavirus 229E by PCR NOT DETECTED NOTDET      Coronavirus HKU1 by PCR NOT DETECTED NOTDET      Coronavirus NL63 by PCR NOT DETECTED NOTDET      Coronavirus OC43 by PCR NOT DETECTED NOTDET      SARS-CoV-2, PCR Detected (A) NOTDET      Human Metapneumovirus by PCR NOT DETECTED NOTDET      Rhinovirus Enterovirus PCR NOT DETECTED NOTDET      Influenza A by PCR NOT DETECTED NOTDET      Influenza B PCR NOT DETECTED NOTDET      Parainfluenza 1 PCR NOT DETECTED NOTDET      Parainfluenza 2 PCR NOT DETECTED NOTDET      Parainfluenza 3 PCR NOT DETECTED NOTDET      Parainfluenza 4 PCR NOT DETECTED NOTDET      Respiratory Syncytial Virus by PCR NOT DETECTED NOTDET      Bordetella parapertussis by PCR NOT DETECTED NOTDET      Bordetella pertussis by PCR NOT DETECTED NOTDET      Chlamydophila Pneumonia PCR NOT DETECTED NOTDET      Mycoplasma pneumo by PCR NOT DETECTED NOTDET            CT ABDOMEN PELVIS W IV CONTRAST Additional Contrast? None   Final Result      1. No postoperative complication. No evidence of abscess. Cause of fever is not    delineated. 2.  Mild air distention of large bowel. May represent mild colonic ileus. 3.  Previously seen right middle lobe airspace opacity is resolved since    comparison study. 4.  Benign left adrenal myelolipoma again identified and stable. Thank you for the referral of this patient. This exam was interpreted by an    [employer] Board of Rad[employer] certified radiologist with subspecialty fellowship    in [place] [id number]. If there are questions about this or other reports you can    contact a radiologist directly at [phone]   You can also reach me directly    at [email or web address]       Tiago Cox M.D., [facility]    2/22/2023 5:30:00 PM      XR CHEST PORTABLE   Final Result   The lungs are clear. The heart is normal in size. No pneumothorax. No pleural effusions. Degenerative bilateral glenohumeral joint changes are   present. ED Course as of 02/22/23 2040 Wed Feb 22, 2023 2005 Patient is resting in bed in no distress. Her heart rate is 115 with pulse ox 92% on room monitor. I updated the patient's daughter about the results and plan. [CW]      ED Course User Index  [CW] Jefferson Pittman MD       MDM  Number of Diagnoses or Management Options  COVID-19 virus infection  Diagnosis management comments: Patient with recent hospitalization for small bowel obstruction surgery presents for evaluation of cough and fever. Patient's chest x-ray showed no pneumonia or acute process. Patient's abdomen pelvic CT showed no postoperative infection.   There is mild distention of the large bowel which may indicate colonic ileus. Patient's previous lung opacity has resolved. Patient's white count and lactate are normal and no indication of severe sepsis. Patient's urinalysis was negative for infection. Patient's respiratory panel did come back positive for COVID. Patient is tachycardic but not hypoxic. With her recent hospitalization and multiple comorbid conditions, hospitalist was consulted for admission. Explanation: The diagnosis and plan as well as the results of any testing and treatments today in the department were communicated to the patient and/or their family/caregiver (if present). The patient/caregiver verbalized understanding and realize that they are being admitted to the hospital for further evaluation and treatment. All questions were answered. ICD-10-CM    1. COVID-19 virus infection  U07.1           DISPOSITION Decision To Admit 02/22/2023 08:08:23 PM       Voice dictation software was used during the making of this note. This software is not perfect and grammatical and other typographical errors may be present. This note has not been completely proofread for errors.      Beth Gaffney MD  02/22/23 1685

## 2023-02-22 NOTE — TELEPHONE ENCOUNTER
Received call from home health nurse stating EMS just left with patient. She sent there to [facility] due to what she thinks is Pneumonia. She has 101 fever, was arousal to touch but was sleeping a lot, and has crackles in her lower lungs. She states the patient was fine yesterday was up moving around all day eating and drinking.

## 2023-02-23 ENCOUNTER — APPOINTMENT (OUTPATIENT)
Dept: CT IMAGING | Age: 83
DRG: 177 | End: 2023-02-23
Payer: MEDICARE

## 2023-02-23 LAB
25(OH)D3 SERPL-MCNC: 21.1 NG/ML (ref 30–100)
ALBUMIN SERPL-MCNC: 2.9 G/DL (ref 3.2–4.6)
ALBUMIN/GLOB SERPL: 0.8 (ref 0.4–1.6)
ALP SERPL-CCNC: 67 U/L (ref 50–136)
ALT SERPL-CCNC: 26 U/L (ref 12–65)
ANION GAP SERPL CALC-SCNC: 6 MMOL/L (ref 2–11)
AST SERPL-CCNC: 29 U/L (ref 15–37)
BASOPHILS # BLD: 0 K/UL (ref 0–0.2)
BASOPHILS NFR BLD: 0 % (ref 0–2)
BILIRUB SERPL-MCNC: 0.4 MG/DL (ref 0.2–1.1)
BUN SERPL-MCNC: 10 MG/DL (ref 8–23)
CALCIUM SERPL-MCNC: 8.3 MG/DL (ref 8.3–10.4)
CHLORIDE SERPL-SCNC: 99 MMOL/L (ref 101–110)
CO2 SERPL-SCNC: 28 MMOL/L (ref 21–32)
CREAT SERPL-MCNC: 0.6 MG/DL (ref 0.6–1)
CRP SERPL-MCNC: 3.8 MG/DL (ref 0–0.9)
CRP SERPL-MCNC: 5.8 MG/DL (ref 0–0.9)
D DIMER PPP FEU-MCNC: 2.33 UG/ML(FEU)
DIFFERENTIAL METHOD BLD: ABNORMAL
EOSINOPHIL # BLD: 0 K/UL (ref 0–0.8)
EOSINOPHIL NFR BLD: 0 % (ref 0.5–7.8)
ERYTHROCYTE [DISTWIDTH] IN BLOOD BY AUTOMATED COUNT: 13.4 % (ref 11.9–14.6)
GLOBULIN SER CALC-MCNC: 3.6 G/DL (ref 2.8–4.5)
GLUCOSE SERPL-MCNC: 224 MG/DL (ref 65–100)
HCT VFR BLD AUTO: 42 % (ref 35.8–46.3)
HGB BLD-MCNC: 13.8 G/DL (ref 11.7–15.4)
IMM GRANULOCYTES # BLD AUTO: 0 K/UL (ref 0–0.5)
IMM GRANULOCYTES NFR BLD AUTO: 1 % (ref 0–5)
LACTATE SERPL-SCNC: 1.1 MMOL/L (ref 0.4–2)
LYMPHOCYTES # BLD: 0.7 K/UL (ref 0.5–4.6)
LYMPHOCYTES NFR BLD: 7 % (ref 13–44)
MAGNESIUM SERPL-MCNC: 2.2 MG/DL (ref 1.8–2.4)
MCH RBC QN AUTO: 30.1 PG (ref 26.1–32.9)
MCHC RBC AUTO-ENTMCNC: 32.9 G/DL (ref 31.4–35)
MCV RBC AUTO: 91.7 FL (ref 82–102)
MONOCYTES # BLD: 0.5 K/UL (ref 0.1–1.3)
MONOCYTES NFR BLD: 6 % (ref 4–12)
NEUTS SEG # BLD: 7.6 K/UL (ref 1.7–8.2)
NEUTS SEG NFR BLD: 86 % (ref 43–78)
NRBC # BLD: 0 K/UL (ref 0–0.2)
PLATELET # BLD AUTO: 261 K/UL (ref 150–450)
PMV BLD AUTO: 9.1 FL (ref 9.4–12.3)
POTASSIUM SERPL-SCNC: 4.3 MMOL/L (ref 3.5–5.1)
PROT SERPL-MCNC: 6.5 G/DL (ref 6.3–8.2)
RBC # BLD AUTO: 4.58 M/UL (ref 4.05–5.2)
SODIUM SERPL-SCNC: 133 MMOL/L (ref 133–143)
WBC # BLD AUTO: 8.9 K/UL (ref 4.3–11.1)

## 2023-02-23 PROCEDURE — 85025 COMPLETE CBC W/AUTO DIFF WBC: CPT

## 2023-02-23 PROCEDURE — 2580000003 HC RX 258: Performed by: HOSPITALIST

## 2023-02-23 PROCEDURE — 2500000003 HC RX 250 WO HCPCS: Performed by: FAMILY MEDICINE

## 2023-02-23 PROCEDURE — 2580000003 HC RX 258: Performed by: FAMILY MEDICINE

## 2023-02-23 PROCEDURE — 76937 US GUIDE VASCULAR ACCESS: CPT

## 2023-02-23 PROCEDURE — 83605 ASSAY OF LACTIC ACID: CPT

## 2023-02-23 PROCEDURE — 6360000002 HC RX W HCPCS: Performed by: FAMILY MEDICINE

## 2023-02-23 PROCEDURE — 83735 ASSAY OF MAGNESIUM: CPT

## 2023-02-23 PROCEDURE — 86140 C-REACTIVE PROTEIN: CPT

## 2023-02-23 PROCEDURE — G0378 HOSPITAL OBSERVATION PER HR: HCPCS

## 2023-02-23 PROCEDURE — 97166 OT EVAL MOD COMPLEX 45 MIN: CPT

## 2023-02-23 PROCEDURE — 85379 FIBRIN DEGRADATION QUANT: CPT

## 2023-02-23 PROCEDURE — 96372 THER/PROPH/DIAG INJ SC/IM: CPT

## 2023-02-23 PROCEDURE — 97535 SELF CARE MNGMENT TRAINING: CPT

## 2023-02-23 PROCEDURE — 82306 VITAMIN D 25 HYDROXY: CPT

## 2023-02-23 PROCEDURE — 6370000000 HC RX 637 (ALT 250 FOR IP): Performed by: FAMILY MEDICINE

## 2023-02-23 PROCEDURE — 36415 COLL VENOUS BLD VENIPUNCTURE: CPT

## 2023-02-23 PROCEDURE — 97530 THERAPEUTIC ACTIVITIES: CPT

## 2023-02-23 PROCEDURE — 97161 PT EVAL LOW COMPLEX 20 MIN: CPT

## 2023-02-23 PROCEDURE — 80053 COMPREHEN METABOLIC PANEL: CPT

## 2023-02-23 PROCEDURE — 96376 TX/PRO/DX INJ SAME DRUG ADON: CPT

## 2023-02-23 RX ORDER — SODIUM CHLORIDE 0.9 % (FLUSH) 0.9 %
5-40 SYRINGE (ML) INJECTION EVERY 12 HOURS SCHEDULED
Status: DISCONTINUED | OUTPATIENT
Start: 2023-02-23 | End: 2023-02-25 | Stop reason: HOSPADM

## 2023-02-23 RX ORDER — PANTOPRAZOLE SODIUM 40 MG/1
40 TABLET, DELAYED RELEASE ORAL
Status: DISCONTINUED | OUTPATIENT
Start: 2023-02-23 | End: 2023-02-25 | Stop reason: HOSPADM

## 2023-02-23 RX ORDER — ACETAMINOPHEN 325 MG/1
650 TABLET ORAL EVERY 6 HOURS PRN
Status: DISCONTINUED | OUTPATIENT
Start: 2023-02-23 | End: 2023-02-25 | Stop reason: HOSPADM

## 2023-02-23 RX ORDER — LANOLIN ALCOHOL/MO/W.PET/CERES
400 CREAM (GRAM) TOPICAL DAILY
Status: DISCONTINUED | OUTPATIENT
Start: 2023-02-23 | End: 2023-02-25 | Stop reason: HOSPADM

## 2023-02-23 RX ORDER — ACETAMINOPHEN 650 MG/1
650 SUPPOSITORY RECTAL EVERY 6 HOURS PRN
Status: DISCONTINUED | OUTPATIENT
Start: 2023-02-23 | End: 2023-02-25 | Stop reason: HOSPADM

## 2023-02-23 RX ORDER — MEMANTINE HYDROCHLORIDE 5 MG/1
10 TABLET ORAL 2 TIMES DAILY
Status: DISCONTINUED | OUTPATIENT
Start: 2023-02-23 | End: 2023-02-25 | Stop reason: HOSPADM

## 2023-02-23 RX ORDER — ONDANSETRON 4 MG/1
4 TABLET, ORALLY DISINTEGRATING ORAL EVERY 8 HOURS PRN
Status: DISCONTINUED | OUTPATIENT
Start: 2023-02-23 | End: 2023-02-25 | Stop reason: HOSPADM

## 2023-02-23 RX ORDER — SODIUM CHLORIDE 0.9 % (FLUSH) 0.9 %
10 SYRINGE (ML) INJECTION
Status: DISCONTINUED | OUTPATIENT
Start: 2023-02-23 | End: 2023-02-25 | Stop reason: HOSPADM

## 2023-02-23 RX ORDER — 0.9 % SODIUM CHLORIDE 0.9 %
100 INTRAVENOUS SOLUTION INTRAVENOUS ONCE
Status: DISCONTINUED | OUTPATIENT
Start: 2023-02-23 | End: 2023-02-25 | Stop reason: HOSPADM

## 2023-02-23 RX ORDER — SODIUM CHLORIDE 0.9 % (FLUSH) 0.9 %
5-40 SYRINGE (ML) INJECTION PRN
Status: DISCONTINUED | OUTPATIENT
Start: 2023-02-23 | End: 2023-02-25 | Stop reason: HOSPADM

## 2023-02-23 RX ORDER — VITAMIN B COMPLEX
2000 TABLET ORAL DAILY
Status: DISCONTINUED | OUTPATIENT
Start: 2023-02-23 | End: 2023-02-25 | Stop reason: HOSPADM

## 2023-02-23 RX ORDER — POLYETHYLENE GLYCOL 3350 17 G/17G
17 POWDER, FOR SOLUTION ORAL DAILY PRN
Status: DISCONTINUED | OUTPATIENT
Start: 2023-02-23 | End: 2023-02-25 | Stop reason: HOSPADM

## 2023-02-23 RX ORDER — ONDANSETRON 2 MG/ML
4 INJECTION INTRAMUSCULAR; INTRAVENOUS EVERY 6 HOURS PRN
Status: DISCONTINUED | OUTPATIENT
Start: 2023-02-23 | End: 2023-02-25 | Stop reason: HOSPADM

## 2023-02-23 RX ORDER — MAGNESIUM HYDROXIDE/ALUMINUM HYDROXICE/SIMETHICONE 120; 1200; 1200 MG/30ML; MG/30ML; MG/30ML
30 SUSPENSION ORAL EVERY 6 HOURS PRN
Status: DISCONTINUED | OUTPATIENT
Start: 2023-02-23 | End: 2023-02-25 | Stop reason: HOSPADM

## 2023-02-23 RX ORDER — SODIUM CHLORIDE 9 MG/ML
INJECTION, SOLUTION INTRAVENOUS PRN
Status: DISCONTINUED | OUTPATIENT
Start: 2023-02-23 | End: 2023-02-25 | Stop reason: HOSPADM

## 2023-02-23 RX ORDER — SODIUM CHLORIDE, SODIUM LACTATE, POTASSIUM CHLORIDE, CALCIUM CHLORIDE 600; 310; 30; 20 MG/100ML; MG/100ML; MG/100ML; MG/100ML
INJECTION, SOLUTION INTRAVENOUS CONTINUOUS
Status: ACTIVE | OUTPATIENT
Start: 2023-02-23 | End: 2023-02-24

## 2023-02-23 RX ORDER — ENOXAPARIN SODIUM 100 MG/ML
40 INJECTION SUBCUTANEOUS DAILY
Status: DISCONTINUED | OUTPATIENT
Start: 2023-02-23 | End: 2023-02-25 | Stop reason: HOSPADM

## 2023-02-23 RX ORDER — GUAIFENESIN/DEXTROMETHORPHAN 100-10MG/5
5 SYRUP ORAL EVERY 4 HOURS PRN
Status: DISCONTINUED | OUTPATIENT
Start: 2023-02-23 | End: 2023-02-25 | Stop reason: HOSPADM

## 2023-02-23 RX ADMIN — SODIUM CHLORIDE, PRESERVATIVE FREE 10 ML: 5 INJECTION INTRAVENOUS at 21:54

## 2023-02-23 RX ADMIN — MEMANTINE 10 MG: 5 TABLET ORAL at 21:54

## 2023-02-23 RX ADMIN — METOPROLOL TARTRATE 12.5 MG: 25 TABLET, FILM COATED ORAL at 17:14

## 2023-02-23 RX ADMIN — ENOXAPARIN SODIUM 40 MG: 100 INJECTION SUBCUTANEOUS at 09:06

## 2023-02-23 RX ADMIN — MEMANTINE 10 MG: 5 TABLET ORAL at 09:07

## 2023-02-23 RX ADMIN — SODIUM CHLORIDE, POTASSIUM CHLORIDE, SODIUM LACTATE AND CALCIUM CHLORIDE: 600; 310; 30; 20 INJECTION, SOLUTION INTRAVENOUS at 14:23

## 2023-02-23 RX ADMIN — VITAMIN D, TAB 1000IU (100/BT) 2000 UNITS: 25 TAB at 09:07

## 2023-02-23 RX ADMIN — PANTOPRAZOLE SODIUM 40 MG: 40 TABLET, DELAYED RELEASE ORAL at 06:37

## 2023-02-23 RX ADMIN — THIAMINE HYDROCHLORIDE 100 MG: 100 INJECTION, SOLUTION INTRAMUSCULAR; INTRAVENOUS at 09:07

## 2023-02-23 RX ADMIN — METOPROLOL TARTRATE 12.5 MG: 25 TABLET, FILM COATED ORAL at 09:07

## 2023-02-23 RX ADMIN — SODIUM CHLORIDE, PRESERVATIVE FREE 5 ML: 5 INJECTION INTRAVENOUS at 09:11

## 2023-02-23 RX ADMIN — TUBERCULIN PURIFIED PROTEIN DERIVATIVE 5 UNITS: 5 INJECTION, SOLUTION INTRADERMAL at 02:03

## 2023-02-23 RX ADMIN — MAGNESIUM GLUCONATE 500 MG ORAL TABLET 400 MG: 500 TABLET ORAL at 09:10

## 2023-02-23 NOTE — CARE COORDINATION
MSN, CM:  patient was admitted on 2/10/2023 for c/o black tarry stool and emesis; DX Small Bowel Obstruction. Patient had consults with GI and General surgery during this admission. Patient had a Laparoscopy Lysis of Adhesions. Patient was discharged on 2/19/2023 with orders to start Magnesium, Potassium, Lopressor and Protonix. Patient was also was instructed to stop Biofreeze and Voltaren. Patient had appoints with Kam Mnedieta NP (Family) and Dr. Jaya Ross (Surgery). Patient was readmitted on 2/22/2023 r/t [facility] nurse reported fever.

## 2023-02-23 NOTE — PROGRESS NOTES
Pt arrived to room 817 via stretcher. Alert and oriented to person. Daughter at bedside. Respirations are even and unlabored, on RA. S1 and S2 auscultated. Radial and pedal pulses palpated bilaterally. Pure wick in place and connected to suction. Skin assessment performed with Vani Gallardo RN. Surgical scars on abdomen from prior surgery 2 weeks ago. D5 NS infusing at 100 mL/hr. No signs of distress. Safety measures are in place and call light is within reach.

## 2023-02-23 NOTE — PROGRESS NOTES
Hospitalist Progress Note   Admit Date:  2023  1:35 PM   Name:  Toan Anthony   Age:  80 y.o. Sex:  female  :  1940   MRN:  [id number]   Room:  [facility]    Presenting Complaint: Fever     Reason(s) for Admission: Acute COVID-19 [U07.1]  COVID-19 virus infection [U07.1]     Hospital Course:   Toan Anthony is a 80 y.o. female with medical history of dementia, rheumatoid arthritis, breast cancer, hypertension, dyslipidemia, presented from home via EMS with fever of 101F.patient was recently hospitalized for small bowel obstruction. She underwent surgery with exploratory laparotomy and lysis of additions on 2/10/2023 and discharged . Patient was not eating much at home. She was also very sleepy and had urinary incontinence on admission. She tested positive for COVID on admission. She was afebrile. Oxygen saturation 92% on room air. She received a dose of dexamethasone in the ER. CRP was 3.8 and recheck was 5.8 today. She had D-dimer of 2.3 on admission. Given recent surgery, CT chest ordered. Subjective & 24hr Events (23): Patient is sitting up in chair this morning. Feels very weak and tired. No chest pain. She denies any shortness of breath. Assessment & Plan: This is a 27-year-old female with    COVID-19 infection  Symptomatic supportive management. Patient not hypoxic. Hold off on dexamethasone. CRP 3.8-5.8. Elevated D-dimer of 2.3 on admission. Given recent surgery, will obtain CT chest.  Gentle hydration with IV fluids. Recently admitted for small bowel obstruction status post exploratory laparotomy lysis of additions on 2/10/2023. Supportive care. Vitamin D deficiency  Continue supplement    Alzheimer's dementia  Continue home medication    Breast cancer  Outpatient follow-up    Debility  PT/OT/PPD. GERD  PPI    Hypertension  Blood pressure fairly well controlled. Continue metoprolol. PT/OT evals and PPD needed/ordered? Yes    Dispo: PT/OT eval.  May need rehab. Hopefully discharge in 48 hours. Diet:  ADULT DIET; Dysphagia - Soft and Bite Sized; Low Fat/Low Chol/High Fiber/MOE  ADULT ORAL NUTRITION SUPPLEMENT; Breakfast, Lunch, Dinner; Standard High Calorie/High Protein Oral Supplement  VTE prophylaxis: Lovenox  Code status: Full Code    Hospital Problems:  Principal Problem:    Acute COVID-19  Active Problems:    Vitamin D deficiency    Alzheimer's disease, unspecified (CODE) ([facility] [address]    Mixed hyperlipidemia  Resolved Problems:    * No resolved hospital problems. *      Objective:   Patient Vitals for the past 24 hrs:   Temp Pulse Resp BP SpO2   02/23/23 1151 97.3 °F (36.3 °C) 78 -- (!) 123/50 92 %   02/23/23 0758 99.5 °F (37.5 °C) 93 19 (!) 151/62 90 %   02/23/23 0336 98.1 °F (36.7 °C) 90 19 (!) 163/65 91 %   02/23/23 0117 -- (!) 102 -- (!) 168/71 --   02/23/23 0057 98.2 °F (36.8 °C) (!) 102 19 (!) 168/71 93 %   02/22/23 2225 98.1 °F (36.7 °C) -- -- -- --   02/22/23 2115 -- (!) 110 26 (!) 171/68 92 %   02/22/23 2100 -- (!) 108 30 (!) 173/74 92 %   02/22/23 2045 -- (!) 104 22 (!) 188/73 92 %   02/22/23 2036 -- (!) 105 22 (!) 187/73 92 %   02/22/23 2030 -- (!) 108 25 (!) 187/73 --   02/22/23 2015 -- (!) 106 28 (!) 186/67 --   02/22/23 2000 -- (!) 107 27 (!) 167/69 --   02/22/23 1945 -- (!) 103 (!) 34 (!) 172/95 --   02/22/23 1930 -- 100 22 (!) 170/71 --   02/22/23 1915 -- (!) 101 26 (!) 157/92 --   02/22/23 1830 -- (!) 101 (!) 31 (!) 170/61 94 %   02/22/23 1334 97.9 °F (36.6 °C) 94 18 139/62 95 %       Oxygen Therapy  SpO2: 92 %  Pulse Oximetry Type: Continuous  Pulse via Oximetry: 109 beats per minute  Pulse Oximeter Device Mode: Continuous  O2 Device: None (Room air)    Estimated body mass index is 25.81 kg/m² as calculated from the following:    Height as of this encounter: 5' 6\" (1.676 m). Weight as of this encounter: 159 lb 14.4 oz (72.5 kg).   No intake or output data in the 24 hours ending 02/23/23 1326      Physical Exam:     Blood pressure (!) 123/50, pulse 78, temperature 97.3 °F (36.3 °C), resp. rate 19, height 5' 6\" (1.676 m), weight 159 lb 14.4 oz (72.5 kg), SpO2 92 %, not currently breastfeeding. General:    Elderly female, alert, awake, ill-appearing, tired looking,  Head:  Normocephalic, atraumatic  Eyes:  Sclerae appear normal.  Pupils equally round. ENT:  Nares appear normal.  Moist oral mucosa  Neck:  No restricted ROM. Trachea midline   CV:   RRR. No m/r/g. No jugular venous distension. Lungs:   CTAB. No wheezing, rhonchi, or rales. Symmetric expansion. Abdomen:   Soft, nontender, nondistended. Extremities: No cyanosis or clubbing. No edema  Skin:     No rashes and normal coloration. Warm and dry. Neuro:  CN II-XII grossly intact. Psych:  Normal mood and affect.       I have personally reviewed labs and tests:  Recent Labs:  Recent Results (from the past 48 hour(s))   EKG 12 Lead    Collection Time: 02/22/23  1:36 PM   Result Value Ref Range    Ventricular Rate 92 BPM    Atrial Rate 92 BPM    P-R Interval 142 ms    QRS Duration 134 ms    Q-T Interval 377 ms    QTc Calculation (Bazett) 467 ms    P Axis 68 degrees    R Axis 16 degrees    T Axis 31 degrees    Diagnosis Sinus rhythm    Culture, Blood 1    Collection Time: 02/22/23  2:13 PM    Specimen: Blood   Result Value Ref Range    Special Requests NO SPECIAL REQUESTS  RIGHT  FOREARM        Culture NO GROWTH AFTER 17 HOURS     Culture, Blood 2    Collection Time: 02/22/23  2:13 PM    Specimen: Blood   Result Value Ref Range    Special Requests NO SPECIAL REQUESTS  LEFT  FOREARM        Culture NO GROWTH AFTER 17 HOURS     Lactate, Sepsis    Collection Time: 02/22/23  2:13 PM   Result Value Ref Range    Lactic Acid, Sepsis 2.0 0.4 - 2.0 MMOL/L   CBC with Auto Differential    Collection Time: 02/22/23  2:13 PM   Result Value Ref Range    WBC 6.0 4.3 - 11.1 K/uL    RBC 4.51 4.05 - 5.2 M/uL    Hemoglobin 13.6 11.7 - 15.4 g/dL    Hematocrit 42.2 35.8 - 46.3 %    MCV 93.6 82 - 102 FL    MCH 30.2 26.1 - 32.9 PG    MCHC 32.2 31.4 - 35.0 g/dL    RDW 13.2 11.9 - 14.6 %    Platelets 396 773 - 759 K/uL    MPV 9.0 (L) 9.4 - 12.3 FL    nRBC 0.00 0.0 - 0.2 K/uL    Differential Type AUTOMATED      Seg Neutrophils 70 43 - 78 %    Lymphocytes 19 13 - 44 %    Monocytes 9 4.0 - 12.0 %    Eosinophils % 1 0.5 - 7.8 %    Basophils 0 0.0 - 2.0 %    Immature Granulocytes 1 0.0 - 5.0 %    Segs Absolute 4.2 1.7 - 8.2 K/UL    Absolute Lymph # 1.2 0.5 - 4.6 K/UL    Absolute Mono # 0.6 0.1 - 1.3 K/UL    Absolute Eos # 0.0 0.0 - 0.8 K/UL    Basophils Absolute 0.0 0.0 - 0.2 K/UL    Absolute Immature Granulocyte 0.0 0.0 - 0.5 K/UL   CMP    Collection Time: 02/22/23  2:13 PM   Result Value Ref Range    Sodium 135 133 - 143 mmol/L    Potassium 3.7 3.5 - 5.1 mmol/L    Chloride 100 (L) 101 - 110 mmol/L    CO2 29 21 - 32 mmol/L    Anion Gap 6 2 - 11 mmol/L    Glucose 93 65 - 100 mg/dL    BUN 6 (L) 8 - 23 MG/DL    Creatinine 0.60 0.6 - 1.0 MG/DL    Est, Glom Filt Rate >60 >60 ml/min/1.73m2    Calcium 8.9 8.3 - 10.4 MG/DL    Total Bilirubin 0.5 0.2 - 1.1 MG/DL    ALT 16 12 - 65 U/L    AST 24 15 - 37 U/L    Alk Phosphatase 69 50 - 136 U/L    Total Protein 6.8 6.3 - 8.2 g/dL    Albumin 2.9 (L) 3.2 - 4.6 g/dL    Globulin 3.9 2.8 - 4.5 g/dL    Albumin/Globulin Ratio 0.7 0.4 - 1.6     Procalcitonin    Collection Time: 02/22/23  2:13 PM   Result Value Ref Range    Procalcitonin <0.05 0.00 - 0.49 ng/mL   Urinalysis w rflx microscopic    Collection Time: 02/22/23  3:30 PM   Result Value Ref Range    Color, UA YELLOW/STRAW      Appearance CLOUDY      Specific Jackson, UA 1.011 1.001 - 1.023      pH, Urine 6.5 5.0 - 9.0      Protein, UA Negative NEG mg/dL    Glucose, UA Negative mg/dL    Ketones, Urine 40 (A) NEG mg/dL    Bilirubin Urine Negative NEG      Blood, Urine Negative NEG      Urobilinogen, Urine 0.2 0.2 - 1.0 EU/dL    Nitrite, Urine Negative NEG      Leukocyte Esterase, Urine Negative NEG Respiratory Panel, Molecular, with COVID-19 (Restricted: peds pts or suitable admitted adults)    Collection Time: 02/22/23  4:21 PM    Specimen: Nasopharyngeal   Result Value Ref Range    Adenovirus by PCR NOT DETECTED NOTDET      Coronavirus 229E by PCR NOT DETECTED NOTDET      Coronavirus HKU1 by PCR NOT DETECTED NOTDET      Coronavirus NL63 by PCR NOT DETECTED NOTDET      Coronavirus OC43 by PCR NOT DETECTED NOTDET      SARS-CoV-2, PCR Detected (A) NOTDET      Human Metapneumovirus by PCR NOT DETECTED NOTDET      Rhinovirus Enterovirus PCR NOT DETECTED NOTDET      Influenza A by PCR NOT DETECTED NOTDET      Influenza B PCR NOT DETECTED NOTDET      Parainfluenza 1 PCR NOT DETECTED NOTDET      Parainfluenza 2 PCR NOT DETECTED NOTDET      Parainfluenza 3 PCR NOT DETECTED NOTDET      Parainfluenza 4 PCR NOT DETECTED NOTDET      Respiratory Syncytial Virus by PCR NOT DETECTED NOTDET      Bordetella parapertussis by PCR NOT DETECTED NOTDET      Bordetella pertussis by PCR NOT DETECTED NOTDET      Chlamydophila Pneumonia PCR NOT DETECTED NOTDET      Mycoplasma pneumo by PCR NOT DETECTED NOTDET     Lactate, Sepsis    Collection Time: 02/23/23  1:32 AM   Result Value Ref Range    Lactic Acid, Sepsis 1.1 0.4 - 2.0 MMOL/L   C-Reactive Protein    Collection Time: 02/23/23  1:32 AM   Result Value Ref Range    CRP 3.8 (H) 0.0 - 0.9 mg/dL   Vitamin D 25 Hydroxy    Collection Time: 02/23/23  5:37 AM   Result Value Ref Range    Vit D, 25-Hydroxy 21.1 (L) 30.0 - 100.0 ng/mL   CBC with Auto Differential    Collection Time: 02/23/23  5:37 AM   Result Value Ref Range    WBC 8.9 4.3 - 11.1 K/uL    RBC 4.58 4.05 - 5.2 M/uL    Hemoglobin 13.8 11.7 - 15.4 g/dL    Hematocrit 42.0 35.8 - 46.3 %    MCV 91.7 82 - 102 FL    MCH 30.1 26.1 - 32.9 PG    MCHC 32.9 31.4 - 35.0 g/dL    RDW 13.4 11.9 - 14.6 %    Platelets 086 396 - 837 K/uL    MPV 9.1 (L) 9.4 - 12.3 FL    nRBC 0.00 0.0 - 0.2 K/uL    Differential Type AUTOMATED      Seg Neutrophils 86 (H) 43 - 78 %    Lymphocytes 7 (L) 13 - 44 %    Monocytes 6 4.0 - 12.0 %    Eosinophils % 0 (L) 0.5 - 7.8 %    Basophils 0 0.0 - 2.0 %    Immature Granulocytes 1 0.0 - 5.0 %    Segs Absolute 7.6 1.7 - 8.2 K/UL    Absolute Lymph # 0.7 0.5 - 4.6 K/UL    Absolute Mono # 0.5 0.1 - 1.3 K/UL    Absolute Eos # 0.0 0.0 - 0.8 K/UL    Basophils Absolute 0.0 0.0 - 0.2 K/UL    Absolute Immature Granulocyte 0.0 0.0 - 0.5 K/UL   Magnesium    Collection Time: 02/23/23  5:37 AM   Result Value Ref Range    Magnesium 2.2 1.8 - 2.4 mg/dL   Comprehensive Metabolic Panel    Collection Time: 02/23/23  5:37 AM   Result Value Ref Range    Sodium 133 133 - 143 mmol/L    Potassium 4.3 3.5 - 5.1 mmol/L    Chloride 99 (L) 101 - 110 mmol/L    CO2 28 21 - 32 mmol/L    Anion Gap 6 2 - 11 mmol/L    Glucose 224 (H) 65 - 100 mg/dL    BUN 10 8 - 23 MG/DL    Creatinine 0.60 0.6 - 1.0 MG/DL    Est, Glom Filt Rate >60 >60 ml/min/1.73m2    Calcium 8.3 8.3 - 10.4 MG/DL    Total Bilirubin 0.4 0.2 - 1.1 MG/DL    ALT 26 12 - 65 U/L    AST 29 15 - 37 U/L    Alk Phosphatase 67 50 - 136 U/L    Total Protein 6.5 6.3 - 8.2 g/dL    Albumin 2.9 (L) 3.2 - 4.6 g/dL    Globulin 3.6 2.8 - 4.5 g/dL    Albumin/Globulin Ratio 0.8 0.4 - 1.6     C-Reactive Protein    Collection Time: 02/23/23  5:37 AM   Result Value Ref Range    CRP 5.8 (H) 0.0 - 0.9 mg/dL   D-Dimer, Quantitative    Collection Time: 02/23/23  5:37 AM   Result Value Ref Range    D-Dimer, Quant 2.33 (H) <0.56 ug/ml(FEU)       I have personally reviewed imaging studies:  Other Studies:  CT ABDOMEN PELVIS W IV CONTRAST Additional Contrast? None   Final Result      1. No postoperative complication. No evidence of abscess. Cause of fever is not    delineated. 2.  Mild air distention of large bowel. May represent mild colonic ileus. 3.  Previously seen right middle lobe airspace opacity is resolved since    comparison study.       4.  Benign left adrenal myelolipoma again identified and stable. Thank you for the referral of this patient. This exam was interpreted by an    [employer] certified radiologist with subspecialty fellowship    in [place] [id number]. If there are questions about this or other reports you can    contact a radiologist directly at [phone]   You can also reach me directly    at [email or web address]       Gabby Prescott M.D., [facility]    2/22/2023 5:30:00 PM      XR CHEST PORTABLE   Final Result   The lungs are clear. The heart is normal in size. No pneumothorax. No pleural effusions. Degenerative bilateral glenohumeral joint changes are   present.              Current Meds:  Current Facility-Administered Medications   Medication Dose Route Frequency    magnesium oxide (MAG-OX) tablet 400 mg  400 mg Oral Daily    memantine (NAMENDA) tablet 10 mg  10 mg Oral BID    metoprolol tartrate (LOPRESSOR) tablet 12.5 mg  12.5 mg Oral BID with meals    pantoprazole (PROTONIX) tablet 40 mg  40 mg Oral QAM AC    Vitamin D (CHOLECALCIFEROL) tablet 2,000 Units  2,000 Units Oral Daily    sodium chloride flush 0.9 % injection 5-40 mL  5-40 mL IntraVENous 2 times per day    sodium chloride flush 0.9 % injection 5-40 mL  5-40 mL IntraVENous PRN    0.9 % sodium chloride infusion   IntraVENous PRN    ondansetron (ZOFRAN-ODT) disintegrating tablet 4 mg  4 mg Oral Q8H PRN    Or    ondansetron (ZOFRAN) injection 4 mg  4 mg IntraVENous Q6H PRN    polyethylene glycol (GLYCOLAX) packet 17 g  17 g Oral Daily PRN    acetaminophen (TYLENOL) tablet 650 mg  650 mg Oral Q6H PRN    Or    acetaminophen (TYLENOL) suppository 650 mg  650 mg Rectal Q6H PRN    tuberculin injection 5 Units  5 Units IntraDERmal Once    enoxaparin (LOVENOX) injection 40 mg  40 mg SubCUTAneous Daily    guaiFENesin-dextromethorphan (ROBITUSSIN DM) 100-10 MG/5ML syrup 5 mL  5 mL Oral Q4H PRN    aluminum & magnesium hydroxide-simethicone (MAALOX) 200-200-20 MG/5ML suspension 30 mL  30 mL Oral Q6H PRN    thiamine (B-1) injection 100 mg  100 mg IntraVENous Daily       Signed:  Rohini Barrios MD    Part of this note may have been written by using a voice dictation software. The note has been proof read but may still contain some grammatical/other typographical errors.

## 2023-02-23 NOTE — PROGRESS NOTES
ACUTE OCCUPATIONAL THERAPY GOALS:   (Developed with and agreed upon by patient and/or caregiver.)  1) Patient will complete lower body bathing and dressing with min A and adaptive equipment as needed. 2) Patient will complete toileting with min A. 3) Patient will complete functional transfers with mod I and adaptive equipment as needed. 4) Patient will tolerate at least 15 minutes of OT activity with less than 2 rest breaks while maintaining O2 sats >90%. 5) Patient will verbalize at least 3 energy conservation technique to utilize during ADL/IADL. Timeframe: 7 visits      OCCUPATIONAL THERAPY Initial Assessment, Daily Note, and PM       OT Visit Days: 1  Acknowledge Orders  Time  OT Charge Capture  Rehab Caseload Tracker    C19+    Perla Weiss is a 80 y.o. female   PRIMARY DIAGNOSIS: Acute COVID-19  Acute COVID-19 [U07.1]  COVID-19 virus infection [U07.1]       Reason for Referral: Generalized Muscle Weakness (M62.81)  Other lack of cordination (R27.8)  Difficulty in walking, Not elsewhere classified (R26.2)  Other abnormalities of gait and mobility (R26.89)  Observation: Payor: [employer] MEDICARE / Plan: Arn Leak / Product Type: *No Product type* /     ASSESSMENT:     REHAB RECOMMENDATIONS:   Recommendation to date pending progress:  [address] [place] [address] [place]  And more family support    Equipment:    To Be Determined     ASSESSMENT:  Ms. Roxanna Chaves is an 79 YO R dominant WF admitted from home with increased SOB and found to be C19+. Pt has severe RA B hands, fisted and contracted. Recent h/o SBO s/p surgery x 2 weeks ago. Per chart, lives with daughter, no family present in the room. Today, pt up in recliner upon contact. Lunch tray sitting untouched. Pt alert but oriented to self only. States she is in [place], has no idea why she is here in the hospital and the year is 5.  Reported her  had been dead for 2 years, but earlier today told PT her  helped her dress. Obviously confused, so PLOF is questionable. She reports she lives alone, but that her family helps her eat at home, but that she cooks the food she wants. Noted to have poor dentition. Pt states she has a CG 2 dy/wk for showering, but dresses herself. So conflicting information all around. 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 OT assited pt with lunch today: handed her wash cloth and she wiped off hands. Pt ate a few bites only in total. RN alerted to poor PO intake and need for assistance eating. Had 3 bites mashed potatoes (\"too bland\"), 2 bites finely cut up green beans (\"I never eat green beans\" and pt did spit up 2 bites during lunch d/t choking and not fully chewing them up), 1 small cut up piece of fish (\"that will tear up my stomach, I can't eat that. \"), 4 bites of chocolate mousse (\"that's good, but then reported she was full), refused small cut up canned peaches and all beverages but water stating \"I don't drink anything but water at home\" and did drink a full large cup of water. Was able to use 2 hands and hold cup with straw to drink, but required setup, was not able to reach up to tray and get it. Pt reports she eats \"her own food\" at home, but was unable to load the spoon to eat here. Dep feeding. Once loaded did get potatoes to mouth with one bite once OT placed spoon in her hand, but to encourage eating and nutrition, OT loaded the rest of the food and fed pt. Pt with very decreased B shoulders AROM and reports she has trouble getting clothes over her head. Left sitting up in recliner. Pt is functioning below her reported baseline and could benefit from skilled OT to address her deficits and maximize her independence, safety and activity tolerance for ADLs and functional mobility. Pt refuses STR she tells me she has a business to run. Rec HHOT and more family support.      Swati Sal Activity Inpatient Short Form:    AM-PAC Daily Activity - Inpatient   How much help is needed for putting on and taking off regular lower body clothing?: A Lot  How much help is needed for bathing (which includes washing, rinsing, drying)?: A Lot  How much help is needed for toileting (which includes using toilet, bedpan, or urinal)?: Total  How much help is needed for putting on and taking off regular upper body clothing?: A Lot  How much help is needed for taking care of personal grooming?: A Lot  How much help for eating meals?: A Lot  [facility] Daily [facility] Raw Score: 11  AM-PAC Inpatient ADL T-Scale Score : 29.04  ADL Inpatient CMS 0-100% Score: 70.42  ADL Inpatient CMS G-Code Modifier : CL        SUBJECTIVE:     Ms. Eric Adan states, \"I need to go home. I have a business to run. \"   Below per case management notes:   Social/Functional Lives With: Alone  Type of Home: House  Home Layout: One level  Home Access: Stairs to enter with rails  Entrance Stairs - Number of Steps: 2  Entrance Stairs - Rails: Both  Bathroom Shower/Tub: Walk-in shower  Bathroom Toilet: Standard  Bathroom Equipment: Shower chair  Bathroom Accessibility: Accessible  Receives Help From: Family  ADL Assistance: Independent  Toileting: Independent  Homemaking Assistance: Independent  Homemaking Responsibilities: Yes  Ambulation Assistance: Independent  Transfer Assistance: Independent  Active : No  Patient's  Info: Daughter  Mode of Transportation: Car  Occupation: Retired  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    OBJECTIVE:     Jeremy Vicente / Andry Sorenson / Kasandra Abarca: Sheila Buckner    RESTRICTIONS/PRECAUTIONS:  Restrictions/Precautions: Fall Risk    PAIN: Florissant Rivera / O2:   Pre Treatment: none         Post Treatment: none       Vitals stable         Oxygen room air, sats >90%            GROSS EVALUATION: INTACT IMPAIRED   (See Comments)   UE AROM [] []B limited Ues shoulders about 60 degrees flexion, RA in all joints, can extend elbows and wrists, but fisted and contracted hands   UE PROM [] []   Strength []  decreased     Posture / Balance []  Forward head, rounded shoulders, fair sitting balance   Sensation [x]  B UEs   Coordination []  Severely impaired     Tone []       Edema []    Activity Tolerance []  Generally decreased, fatigued     Hand Dominance R [x] L []      COGNITION/  PERCEPTION: INTACT IMPAIRED   (See Comments)   Orientation []  To name and  only   Vision [x]     Hearing [x]     Cognition  []  AMS, confusion   Perception []  decreased     MOBILITY: I Mod I S SBA CGA Min Mod Max Total  NT x2 Comments:   Bed Mobility    Rolling [] [] [] [] [] [] [] [] [] [x] []    Supine to Sit [] [] [] [] [] [] [] [] [] [x] []    Scooting [] [] [] [] [] [] [] [] [] [x] []    Sit to Supine [] [] [] [] [] [] [] [] [] [x] []    Transfers    Sit to Stand [] [] [] [] [] [x] [] [] [] [] [] From recliner   Bed to Chair [] [] [] [] [] [] [] [] [] [x] []    Stand to Sit [] [] [] [] [] [x] [] [] [] [] []    Tub/Shower [] [] [] [] [] [] [] [] [] [x] []     Toilet [] [] [] [] [] [] [] [] [] [x] []      [] [] [] [] [] [] [] [] [] [] []    I=Independent, Mod I=Modified Independent, S=Supervision/Setup, SBA=Standby Assistance, CGA=Contact Guard Assistance, Min=Minimal Assistance, Mod=Moderate Assistance, Max=Maximal Assistance, Total=Total Assistance, NT=Not Tested    ACTIVITIES OF DAILY LIVING: I Mod I S SBA CGA Min Mod Max Total NT Comments   BASIC ADLs:              Upper Body Bathing  [] [] [] [] [] [] [] [] [] [x]    Lower Body Bathing [] [] [] [] [] [] [] [] [] [x]    Toileting [] [] [] [] [] [] [] [] [x] [] Purewick and after BM   Upper Body Dressing [] [] [] [] [] [] [] [x] [] []    Lower Body Dressing [] [] [] [] [] [] [] [] [x] []    Feeding [] [] [] [] [] [] [] [x] [] [] Can hold cup, once spoon loaded could get to her mouth   Grooming [] [] [] [] [] [] [] [x] [] []    Personal Device Care [] [] [] [] [] [] [] [] [] []    Functional Mobility [] [] [] [] [] [] [x] [] [] [] Unsteady on feet   I=Independent, Mod I=Modified Independent, S=Supervision/Setup, SBA=Standby Assistance, CGA=Contact Guard Assistance, Min=Minimal Assistance, Mod=Moderate Assistance, Max=Maximal Assistance, Total=Total Assistance, NT=Not Tested    PLAN:   [facility] of Care: 3 times/week for duration of hospital stay or until stated goals are met, whichever comes first.    PROBLEM LIST:   (Skilled intervention is medically necessary to address:)  Decreased ADL/Functional Activities  Decreased Activity Tolerance  Decreased AROM/PROM  Decreased Balance  Decreased Cognition  Decreased Coordination  Decreased Gait Ability  Decreased Safety Awareness  Decreased Strength  Decreased Transfer Abilities   INTERVENTIONS PLANNED:  (Benefits and precautions of occupational therapy have been discussed with the patient.)  Self Care Training  Therapeutic Activity  Therapeutic Exercise/HEP  Neuromuscular Re-education  Gait Training  Manual Therapy  Education         TREATMENT:     EVALUATION: MODERATE COMPLEXITY: (Untimed Charge)    TREATMENT:   Self Care (35 minutes): Patient participated in self feeding and grooming ADLs in supported sitting with maximal verbal and physical assist and cueing to increase independence, decrease assistance required, increase activity tolerance, and increase safety awareness. Patient also participated in energy conservation training to increase activity tolerance and increase safety awareness. TREATMENT GRID:  N/A    AFTER TREATMENT PRECAUTIONS: Alarm Activated, Bed/Chair Locked, Call light within reach, Chair, Heels floated, Needs within reach, and RN notified    INTERDISCIPLINARY COLLABORATION:  RN/ PCT, PT/ PTA, OT/ MEDINA, and RN Case Manager/      EDUCATION:  Education Given To: Patient;Staff  Education Provided: Role of Therapy;Plan of Care;Home Exercise Program;Precautions; ADL Adaptive Strategies;Transfer Training;Energy Conservation;Orientation;Equipment; Fall Prevention Strategies  Education Method: Demonstration;Verbal  Barriers to Learning: Cognition;Lack of Family Support  Education Outcome: Continued education needed    TOTAL TREATMENT DURATION AND TIME:  Time In: 200  Time Out: [facility]  Minutes: 28    CHRISTA OLIVAREZ, OT   Christa Olivarez, MS, OTR/L

## 2023-02-23 NOTE — PROGRESS NOTES
ACUTE PHYSICAL THERAPY GOALS:   (Developed with and agreed upon by patient and/or caregiver. )  LTG:  (1.)Ms. Roxanna Chaves will move from supine to sit and sit to supine , scoot up and down, and roll side to side in bed with CONTACT GUARD ASSIST within 7 treatment day(s). (2.)Ms. Roxanna Chaves will transfer from bed to chair and chair to bed with CONTACT GUARD ASSIST using the least restrictive device within 7 treatment day(s). (3.)Ms. Roxanna Chaves will ambulate with CONTACT GUARD ASSIST for 100+ feet with the least restrictive device within 7 treatment day(s). (4.)Ms. Roxanna Chaves will tolerated 23+ minutes of therapeutic activity within 7 treatment days for increased activity tolerance and general functional mobility. (5.)Ms. Roxanna Chaves will perform static and dynamic standing balance activities with SBA x 10 min within 7 treatment days. _________________________________________________________________________________     PHYSICAL THERAPY Initial Assessment and AM  (Link to Caseload Tracking: PT Visit Days : 1  Acknowledge Orders  Time In/Out  PT Charge Capture  Rehab Caseload Tracker    Perla Weiss is a 80 y.o. female   PRIMARY DIAGNOSIS: Acute COVID-19  Acute COVID-19 [U07.1]  COVID-19 virus infection [U07.1]       Reason for Referral: Generalized Muscle Weakness (M62.81)  Difficulty in walking, Not elsewhere classified (R26.2)  Observation: Payor: Herlinda Oseillor / Plan: Arn Leak / Product Type: *No Product type* /     ASSESSMENT:     REHAB RECOMMENDATIONS:   Recommendation to date pending progress:  Setting:  Home Health Therapy    Equipment:    To Be Determined     ASSESSMENT:  Ms. Roxanna Chaves presents to hospital on 2/22/23 with fever, lethargic, from home, lives with dtr. Pt with hx of recent SBO s/p surgery x 2 weeks. Pt lives with daughter per chart, no family present currently.  Pt is A & O x 2 this date, responds to therapists; some confusion, states spouse assists with ADLs, per chart he passed away 2 years ago. Pt with severe RA B hands, limited mobility shoulders. Pt min A bed mobility for rolling, supine to sit. Pt with fair + sitting EOB, SPT to [facility] with DIANE, shuffled steps, unsteady. Pt required total A for cleaning after toileting needs, shuffled steps to chair, sitting too early, MOD A for safety to sit to chair. PT to cont to follow for acute care needs to address deficits noted above, anticipate pt to return home at discharge, unclear of set up at this time.       [facility] [address] [address] AM-PAC 6 Clicks Basic Mobility Inpatient Short Form  AM-PAC Basic Mobility - Inpatient   How much help is needed turning from your back to your side while in a flat bed without using bedrails?: A Lot  How much help is needed moving from lying on your back to sitting on the side of a flat bed without using bedrails?: A Lot  How much help is needed moving to and from a bed to a chair?: A Little  How much help is needed standing up from a chair using your arms?: A Little  How much help is needed walking in hospital room?: A Little  How much help is needed climbing 3-5 steps with a railing?: A Lot  AM-PAC Inpatient Mobility Raw Score : 15  AM-PAC Inpatient T-Scale Score : 39.45  Mobility Inpatient CMS 0-100% Score: 57.7  Mobility Inpatient CMS G-Code Modifier : CK    SUBJECTIVE:   Ms. Melissa Carrasco states, \"I don't think I need to use the bathroom\"     Social/Functional Lives With: Alone  Type of Home: House  Home Layout: One level  Home Access: Stairs to enter with rails  Entrance Stairs - Number of Steps: 2  Entrance Stairs - Rails: Both  Bathroom Shower/Tub: Walk-in shower  Bathroom Toilet: Standard  Bathroom Equipment: Shower chair  Bathroom Accessibility: Accessible  Receives Help From: Family  ADL Assistance: Independent  Toileting: Independent  Homemaking Assistance: Independent  Homemaking Responsibilities: Yes  Ambulation Assistance: Independent  Transfer Assistance: Independent  Active : No  Patient's  Info: Daughter  Mode of Transportation: Car  Occupation: Retired  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  No family present, pt A & O x 2, on RA; some confusion, reports CG 2 days/week for ADL assist; ambulates independently  OBJECTIVE:     PAIN: VITALS / O2: PRECAUTION / Pamela Broody / DRAINS:   Pre Treatment:    0/10      Post Treatment: 0/10 Vitals    WDL    Oxygen   RA   Purewick    RESTRICTIONS/PRECAUTIONS:  Restrictions/Precautions: Fall Risk                 GROSS EVALUATION: Intact Impaired (Comments):   AROM [x]  Limited B hands/shoulders   PROM []    Strength []  Grossly 3/5   Balance [] Sitting - Static: Fair, +  Sitting - Dynamic: Fair  Standing - Static: Fair  Standing - Dynamic: Fair, -   Posture [] Forward Head  Rounded Shoulders   Sensation [x]     Coordination []   limited   Tone []     Edema [x]    Activity Tolerance []  General fatigue    []      COGNITION/  PERCEPTION: Intact Impaired (Comments):   Orientation []  To self and place   Vision [x]     Hearing [x]     Cognition  []  AMS, some confusion     MOBILITY: I Mod I S SBA CGA Min Mod Max Total  NT x2 Comments:   Bed Mobility    Rolling [] [] [] [] [] [x] [] [] [] [] []    Supine to Sit [] [] [] [] [] [x] [] [] [] [] []    Scooting [] [] [] [] [x] [] [] [] [] [] []    Sit to Supine [] [] [] [] [] [] [] [] [] [x] [] In chair   Transfers    Sit to Stand [] [] [] [] [] [x] [] [] [] [] []    Bed to Chair [] [] [] [] [] [x] [] [] [] [] [] Toileting, recliner   Stand to Sit [] [] [] [] [] [x] [] [] [] [] []     [] [] [] [] [] [] [] [] [] [] []    I=Independent, Mod I=Modified Independent, S=Supervision, SBA=Standby Assistance, CGA=Contact Guard Assistance,   Min=Minimal Assistance, Mod=Moderate Assistance, Max=Maximal Assistance, Total=Total Assistance, NT=Not Tested    GAIT: I Mod I S SBA CGA Min Mod Max Total  NT x2 Comments:   Level of Assistance [] [] [] [] [] [x] [] [] [] [] []    Distance 2-3  feet    DME Gait Belt and HHA     Gait Quality Decreased facundo , Decreased step clearance, Decreased step length, Narrow base of support, and Shuffling     Weightbearing Status Restrictions/Precautions  Restrictions/Precautions: Fall Risk    Stairs      I=Independent, Mod I=Modified Independent, S=Supervision, SBA=Standby Assistance, CGA=Contact Guard Assistance,   Min=Minimal Assistance, Mod=Moderate Assistance, Max=Maximal Assistance, Total=Total Assistance, NT=Not Tested    PLAN:   FREQUENCY AND DURATION: 3 times/week for duration of hospital stay or until stated goals are met, whichever comes first.    THERAPY PROGNOSIS: Fair    PROBLEM LIST:   (Skilled intervention is medically necessary to address:)  Decreased ADL/Functional Activities  Decreased Activity Tolerance  Decreased Balance  Decreased Gait Ability  Decreased Strength  Decreased Transfer Abilities INTERVENTIONS PLANNED:   (Benefits and precautions of physical therapy have been discussed with the patient.)  Therapeutic Activity  Therapeutic Exercise/HEP  Neuromuscular Re-education  Gait Training  Education       TREATMENT:   EVALUATION: LOW COMPLEXITY: (Untimed Charge)    TREATMENT:   Therapeutic Activity (10 Minutes): Therapeutic activity included Rolling, Supine to Sit, Scooting, Transfer Training, Ambulation on level ground, Sitting balance , and Standing balance to improve functional Activity tolerance, Balance, Coordination, and Mobility.     TREATMENT GRID:  N/A    AFTER TREATMENT PRECAUTIONS: Alarm Activated, Bed/Chair Locked, Call light within reach, Chair, Needs within reach, and RN notified    INTERDISCIPLINARY COLLABORATION:  RN/ PCT and PT/ PTA    EDUCATION: Education Given To: Patient  Education Provided: Plan of Care;Role of Therapy  Education Method: Verbal  Barriers to Learning: Cognition  Education Outcome: Continued education needed    TIME IN/OUT:  Time In: 0926  Time Out: 6582  Minutes: Aaron Penn0DILAN

## 2023-02-23 NOTE — ED NOTES
TRANSFER - OUT REPORT:    [facility]  [facility] transferred to [address] [place] [address] for routine progression of patient care       Report consisted of patient's Situation, Background, Assessment and   Recommendations(SBAR). Information from the following report(s) ED SBAR was reviewed with the receiving nurse. [place] Assessment: Presents to emergency department  because of falls (Syncope, seizure, or loss of consciousness): No, Age > 79: Yes, Altered Mental Status, Intoxication with alcohol or substance confusion (Disorientation, impaired judgment, poor safety awaremess, or inability to follow instructions): Yes, Impaired Mobility: Ambulates or transfers with assistive devices or assistance; Unable to ambulate or transer.: Yes, Nursing Judgement: Yes  Lines:   Peripheral IV 02/22/23 Right Antecubital (Active)        Opportunity for questions and clarification was provided.       Patient transported with:  Abilio Pineda RN  02/22/23 1083

## 2023-02-23 NOTE — CARE COORDINATION
Spoke with patient this AM about discharge planning. Patient lives alone in own house with 2 steps for entrance. Patient is independent with all ADL's and requires no equipment for ambulation. Patient is current with [facility]. 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 Patient's daughter drives her to all appointments. Patient does not have a PCP and declines a [facility] referral.  Patient is agreeable to palliative care and continuing her [facility] services. PT/OT consulted for evaluation and recommendations. Case Management will continue to follow for any discharge needs. 02/23/23 8536   Service Assessment   Patient Orientation Alert and Oriented   Cognition Alert   History Provided By Patient   Primary [address]   Patient's Healthcare Decision Maker is: Named in [address] [place] [address]   PCP Verified by CM No  (Refuses [facility] referal)   Prior Functional Level Independent in ADLs/IADLs   Current Functional Level Other (see comment)  (PT/OT consulted)   Can patient return to prior living arrangement Yes   Ability to make needs known: Good   Family able to assist with home care needs: Yes   Would you like for me to discuss the discharge plan with any other family members/significant others, and if so, who?  No   Financial Resources Medicare   Community Resources None   Social/Functional History   Lives With Alone   Type of [address] One level   Home Access Stairs to enter with rails   Entrance Stairs - Number of Steps 2   Entrance Stairs - Rails Both   Bathroom Shower/Tub Walk-in shower   Bathroom Toilet Standard   Bathroom Equipment Shower chair   Bathroom Accessibility Accessible   Receives Help From Family   ADL Assistance Independent   [address]   Homemaking Responsibilities Yes   Ambulation Assistance Independent   Transfer Assistance Independent   Active  No   Patient's  Info Daughter Mode of Transportation Car   Occupation Retired   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  No   Potential Assistance Purchasing Medications No   Type of Home Care Services Aide Services;PT;OT  ([facility])   Patient expects to be discharged to: House   One/Two Story Residence One story   History of falls? 0

## 2023-02-23 NOTE — H&P
Hospitalist History and Physical   Admit Date:  2023  1:35 PM   Name:  Alice Tineo   Age:  80 y.o. Sex:  female  :  1940   MRN:  [id number]   [facility]    Presenting Complaint: Fever     Reason(s) for Admission: No admission diagnoses are documented for this encounter. History of Present Illness:   Alice Tineo is a 80 y.o. female with medical history of dementia, RA, breast cancer, HTN, HLD who presented from home via EMS  with fever of 101 today associated with lethargy and cough. Rec'd tylenol by [facility]. Sent to ED. Afebrile on EMS arrival.   Recent hospitalization for SBO   S/p  ex-lap with lysis of adhesions on 2/10/23 and dishcarged . Was doing well initally. Her mentation was at baseline. She wasn't eating much but had some bites of spheghetti last night. Has not really had solid food for over 2 weeks. Then this AM was more lethargic with some AMS. Had episode of incontinence on the floor which she has never done before. History obtained by daughter at bedside who is an only child.  who is helping them is her POA.   2 years ago. Afebrile. RR 34 /61  spo2 92%   COVID+ in ED   S/p decadron 6 mg IV   Assessment & Plan:     Acute COVID - non hypoxic. S/p IV decadron in ED. Monitor oximetry. Check CRP. Start dextrose-NS-kcl 40 x 24 hours. Alzheimer's dementia - resume aricept and namenda. R breast cancer - stable     Vit D deficency - resume supplement. Check level in AM     Physical debility - PT/OT PPD     GERD - PPI     Hypertension - no indication for hospital treatment at this time. Likely related to acute illness     PT/OT evals and PPD needed/ordered? Yes    Diet: soft bite sized   VTE prophylaxis: Lovenox  Code status: Full     Hospital Problems:  Active Problems:    * No active hospital problems. *  Resolved Problems:    * No resolved hospital problems.  *       Past History:     Past Medical History:   Diagnosis Date Anemia, unspecified     patient denies hx of anemia    Arthritis     osteoarthritis    Breast cancer ([facility] 2019    Cancer ([facility] 07/2013    right breast lumpectomy    Chronic pain     d/t rheumatoid arthritis    HTN (hypertension) 03/18/2012    patient states not a current problem, no medication. Hypercholesterolemia     no meds    Ill-defined condition     \"I can't take anything strong\"    Menopause     Nausea & vomiting     patient denies post-op N/V    Osteoarthritis of both shoulders     Osteoarthritis of right hip     Followed by Dr. Terry Murphy     Poor historian     Rheumatoid arthritis [facility])     Patient does not see Rheumatologist; no prescription medication. Total knee replacement status 3/15/2012    Unspecified adverse effect of anesthesia     headache after general anesthesia x 1    Vitamin D deficiency disease     pt unaware of issue,diagnosis doc prior to 3/9/12; managed with daily vitamin D supplement.         Past Surgical History:   Procedure Laterality Date    APPENDECTOMY      with hysterectomy    BREAST LUMPECTOMY  7/31/2013    BREAST NEEDLE LOCALIZATION WITH MASS EXCISION AND SENTINAL NODE BIOPSY performed by Marisela Mckoy MD at [address] [place] [address] (CERVIX STATUS UNKNOWN)  1987    LAPAROSCOPY N/A 2/10/2023    EXPLORATORY LAPAROSCOPY LYSIS OF ADHESIONS performed by Danelle Lyn., MD at [facility] Right 3/27/2019    RIGHT BREAST MASTECTOMY SIMPLE performed by Everette Jameson MD at [place] [address] Right 2016    TOTAL HIP ARTHROPLASTY Left 2018    TOTAL KNEE ARTHROPLASTY Left 8/2010    left    TOTAL KNEE ARTHROPLASTY Right 3/2012    right    US BREAST BIOPSY W LOC DEVICE 1ST LESION RIGHT Right 2/26/2019    US BREAST NEEDLE BIOPSY RIGHT 2/26/2019 [facility]        Social History     Tobacco Use    Smoking status: Never    Smokeless tobacco: Never   Substance Use Topics    Alcohol use: No      Social History     Substance and Sexual Activity   Drug Use No       Family History   Problem Relation Age of Onset    Hypertension Mother     No Known Problems Father     Osteoarthritis Mother     Breast Cancer Neg Hx     Other Other         family hx of HTN        Immunization History   Administered Date(s) Administered    PPD Test 09/01/2016, 02/27/2018, 02/10/2023    Pneumococcal Conjugate 13-valent ([id number]) 09/01/2020     Allergies   Allergen Reactions    Ciprofloxacin Other (See Comments)     Elevated BP    Gluten Meal Other (See Comments)    Hydromorphone Other (See Comments)     hallucinations  hallucinations      Hydroxychloroquine Other (See Comments)     Pt could stand up or get up. Eyes got really big but pt could not see ? Pt states that it was 2-3 days before she got her senses back.      Ketoconazole Other (See Comments)     Intolerance/caused burning sensation    Meperidine Other (See Comments)     hallucinations    Prednisone Other (See Comments)     Irritability    Yellow Dye Itching     Yellow  Dye in cheddar cheese- can eat white cheddar    Codeine Palpitations     Prior to Admit Medications:  Current Outpatient Medications   Medication Instructions    acetaminophen (TYLENOL) 1,300 mg, Oral, BEDTIME PRN    donepezil (ARICEPT) 5 mg, Oral, NIGHTLY    magnesium oxide (MAG-OX) 400 mg, Oral, DAILY    memantine ER (NAMENDA XR) 28 mg, Oral, DAILY    metoprolol tartrate (LOPRESSOR) 12.5 mg, Oral, 2 times daily with meals    pantoprazole (PROTONIX) 40 mg, Oral, DAILY BEFORE BREAKFAST    potassium bicarb-citric acid (EFFER-K) 20 MEQ TBEF effervescent tablet 20 mEq, Oral, DAILY    Vitamin D, Cholecalciferol, 50 MCG (2000 UT) CAPS 1 capsule, Oral, DAILY         Objective:   Patient Vitals for the past 24 hrs:   Temp Pulse Resp BP SpO2   02/22/23 2036 -- (!) 105 22 (!) 187/73 92 %   02/22/23 1915 -- (!) 101 26 (!) 157/92 --   02/22/23 1830 -- (!) 101 (!) 31 (!) 170/61 94 %   02/22/23 1334 97.9 °F (36.6 °C) 94 18 139/62 95 %       Oxygen Therapy  SpO2: 92 %  Pulse Oximetry Type: Continuous  Pulse via Oximetry: 100 beats per minute  Pulse Oximeter Device Mode: Continuous  O2 Device: None (Room air)    Estimated body mass index is 25.81 kg/m² as calculated from the following:    Height as of this encounter: 5' 6\" (1.676 m). Weight as of this encounter: 159 lb 14.4 oz (72.5 kg). No intake or output data in the 24 hours ending 02/22/23 2116      Physical Exam:    Blood pressure (!) 187/73, pulse (!) 105, temperature 97.9 °F (36.6 °C), temperature source Oral, resp. rate 22, height 5' 6\" (1.676 m), weight 159 lb 14.4 oz (72.5 kg), SpO2 92 %, not currently breastfeeding. Physical Exam  Vitals and nursing note reviewed. Constitutional:       Appearance: She is ill-appearing and diaphoretic. She is not toxic-appearing. HENT:      Head: Normocephalic and atraumatic. Nose: Nose normal.      Mouth/Throat:      Mouth: Mucous membranes are dry. Eyes:      Extraocular Movements: Extraocular movements intact. Conjunctiva/sclera: Conjunctivae normal.      Pupils: Pupils are equal, round, and reactive to light. Cardiovascular:      Rate and Rhythm: Regular rhythm. Tachycardia present. Heart sounds: No murmur heard. Pulmonary:      Effort: Pulmonary effort is normal. Tachypnea present. Breath sounds: Normal breath sounds. Decreased air movement present. No wheezing, rhonchi or rales. Abdominal:      General: Abdomen is flat. Bowel sounds are normal. There is distension. Palpations: Abdomen is soft. Tenderness: There is no abdominal tenderness. Musculoskeletal:      Cervical back: Neck supple. Right lower leg: No edema. Left lower leg: No edema. Skin:     General: Skin is warm. Capillary Refill: Capillary refill takes less than 2 seconds. Coloration: Skin is pale.       Comments: Midline abdominal surgical incision intact, clean Neurological:      General: No focal deficit present. Mental Status: She is lethargic and disoriented. Cranial Nerves: No cranial nerve deficit or facial asymmetry.    Psychiatric:         Mood and Affect: Mood normal.         Behavior: Behavior normal.       I have personally reviewed labs and tests:  Recent Labs:  Recent Results (from the past 24 hour(s))   EKG 12 Lead    Collection Time: 02/22/23  1:36 PM   Result Value Ref Range    Ventricular Rate 92 BPM    Atrial Rate 92 BPM    P-R Interval 142 ms    QRS Duration 134 ms    Q-T Interval 377 ms    QTc Calculation (Bazett) 467 ms    P Axis 68 degrees    R Axis 16 degrees    T Axis 31 degrees    Diagnosis Sinus rhythm    Lactate, Sepsis    Collection Time: 02/22/23  2:13 PM   Result Value Ref Range    Lactic Acid, Sepsis 2.0 0.4 - 2.0 MMOL/L   CBC with Auto Differential    Collection Time: 02/22/23  2:13 PM   Result Value Ref Range    WBC 6.0 4.3 - 11.1 K/uL    RBC 4.51 4.05 - 5.2 M/uL    Hemoglobin 13.6 11.7 - 15.4 g/dL    Hematocrit 42.2 35.8 - 46.3 %    MCV 93.6 82 - 102 FL    MCH 30.2 26.1 - 32.9 PG    MCHC 32.2 31.4 - 35.0 g/dL    RDW 13.2 11.9 - 14.6 %    Platelets 120 239 - 670 K/uL    MPV 9.0 (L) 9.4 - 12.3 FL    nRBC 0.00 0.0 - 0.2 K/uL    Differential Type AUTOMATED      Seg Neutrophils 70 43 - 78 %    Lymphocytes 19 13 - 44 %    Monocytes 9 4.0 - 12.0 %    Eosinophils % 1 0.5 - 7.8 %    Basophils 0 0.0 - 2.0 %    Immature Granulocytes 1 0.0 - 5.0 %    Segs Absolute 4.2 1.7 - 8.2 K/UL    Absolute Lymph # 1.2 0.5 - 4.6 K/UL    Absolute Mono # 0.6 0.1 - 1.3 K/UL    Absolute Eos # 0.0 0.0 - 0.8 K/UL    Basophils Absolute 0.0 0.0 - 0.2 K/UL    Absolute Immature Granulocyte 0.0 0.0 - 0.5 K/UL   CMP    Collection Time: 02/22/23  2:13 PM   Result Value Ref Range    Sodium 135 133 - 143 mmol/L    Potassium 3.7 3.5 - 5.1 mmol/L    Chloride 100 (L) 101 - 110 mmol/L    CO2 29 21 - 32 mmol/L    Anion Gap 6 2 - 11 mmol/L    Glucose 93 65 - 100 mg/dL    BUN 6 (L) 8 - 23 MG/DL    Creatinine 0.60 0.6 - 1.0 MG/DL    Est, Glom Filt Rate >60 >60 ml/min/1.73m2    Calcium 8.9 8.3 - 10.4 MG/DL    Total Bilirubin 0.5 0.2 - 1.1 MG/DL    ALT 16 12 - 65 U/L    AST 24 15 - 37 U/L    Alk Phosphatase 69 50 - 136 U/L    Total Protein 6.8 6.3 - 8.2 g/dL    Albumin 2.9 (L) 3.2 - 4.6 g/dL    Globulin 3.9 2.8 - 4.5 g/dL    Albumin/Globulin Ratio 0.7 0.4 - 1.6     Procalcitonin    Collection Time: 02/22/23  2:13 PM   Result Value Ref Range    Procalcitonin <0.05 0.00 - 0.49 ng/mL   Urinalysis w rflx microscopic    Collection Time: 02/22/23  3:30 PM   Result Value Ref Range    Color, UA YELLOW/STRAW      Appearance CLOUDY      Specific [place], UA 1.011 1.001 - 1.023      pH, Urine 6.5 5.0 - 9.0      Protein, UA Negative NEG mg/dL    Glucose, UA Negative mg/dL    Ketones, Urine 40 (A) NEG mg/dL    Bilirubin Urine Negative NEG      Blood, Urine Negative NEG      Urobilinogen, Urine 0.2 0.2 - 1.0 EU/dL    Nitrite, Urine Negative NEG      Leukocyte Esterase, Urine Negative NEG     Respiratory Panel, Molecular, with COVID-19 (Restricted: peds pts or suitable admitted adults)    Collection Time: 02/22/23  4:21 PM    Specimen: Nasopharyngeal   Result Value Ref Range    Adenovirus by PCR NOT DETECTED NOTDET      Coronavirus 229E by PCR NOT DETECTED NOTDET      Coronavirus HKU1 by PCR NOT DETECTED NOTDET      Coronavirus NL63 by PCR NOT DETECTED NOTDET      Coronavirus OC43 by PCR NOT DETECTED NOTDET      SARS-CoV-2, PCR Detected (A) NOTDET      Human Metapneumovirus by PCR NOT DETECTED NOTDET      Rhinovirus Enterovirus PCR NOT DETECTED NOTDET      Influenza A by PCR NOT DETECTED NOTDET      Influenza B PCR NOT DETECTED NOTDET      Parainfluenza 1 PCR NOT DETECTED NOTDET      Parainfluenza 2 PCR NOT DETECTED NOTDET      Parainfluenza 3 PCR NOT DETECTED NOTDET      Parainfluenza 4 PCR NOT DETECTED NOTDET      Respiratory Syncytial Virus by PCR NOT DETECTED NOTDET      Bordetella parapertussis by PCR NOT DETECTED NOTDET      Bordetella pertussis by PCR NOT DETECTED NOTDET      Chlamydophila Pneumonia PCR NOT DETECTED NOTDET      Mycoplasma pneumo by PCR NOT DETECTED NOTDET         I have personally reviewed imaging studies:  CT ABDOMEN PELVIS W IV CONTRAST Additional Contrast? None    Result Date: 2/22/2023  EXAMINATION: CT SCAN OF THE ABDOMEN AND PELVIS WITH INTRAVENOUS CONTRAST DATE OF EXAM: 2/22/2023 4:43 PM HISTORY: Postoperative fever COMPARISON: February 9, 2023. TECHNIQUE: CT examination of the abdomen and pelvis was performed following the intravenous administration of iodinated contrast.. CT dose lowering techniques were used, to include: automated exposure control, adjustment for patient size, and/or use of iterative reconstruction. FINDINGS: ABDOMEN/PELVIS: Lower Chest: Previously identified nodular airspace opacities in the right middle lobe are resolved. No new finding. Liver: Normal. Gallbladder/Biliary: Surgically removed. No biliary ductal dilatation. Pancreas: Normal. Spleen: Normal. Adrenal Glands: Large fatty adrenal mass again identified consistent with mild low lipoma this measures 3.1 cm. Kidneys: Cyst mid left kidney. Kidneys is otherwise enhance normally. GI Tract: Stomach is nondistended. Small bowel nondilated. Large intestine is air distended suggestive of mild colonic ileus with no evidence of obstruction. Mild sigmoid diverticulosis with no evidence of diverticulitis. Mesentery/Peritoneum: No free fluid. No fluid collection. No free air. No evidence of abscess. Vasculature: Atherosclerotic plaque no aneurysm. Lymph Nodes: Normal. Abdominal Wall: Prior right mastectomy. Extensive linear soft tissue calcifications again identified uncertain etiology. Bladder: Normal. Reproductive: Uterus removed. No evidence of adnexal mass. Musculoskeletal: Bilateral hip arthroplasties causes spray artifact in the pelvis. Moderate L1 compression fracture again identified.      1.  No postoperative complication. No evidence of abscess. Cause of fever is not  delineated. 2.  Mild air distention of large bowel. May represent mild colonic ileus. 3.  Previously seen right middle lobe airspace opacity is resolved since comparison study. 4.  Benign left adrenal myelolipoma again identified and stable. Thank you for the referral of this patient. This exam was interpreted by an American Board of Radiology certified radiologist with subspecialty fellowship in [place] [address]. If there are questions about this or other reports you can contact a radiologist directly at [phone]   You can also reach me directly at [email or web address]  Pearl Wakefield M.D., [facility] 2/22/2023 5:30:00 PM    XR CHEST PORTABLE    Result Date: 2/22/2023  XR CHEST PORTABLE 2/22/2023 1:48 PM HISTORY: Sepsis COMPARISON: Chest x-ray 8/7/2013 A portable AP view of the chest was obtained. The lungs are clear. The heart is normal in size. No pneumothorax. No pleural effusions. Degenerative bilateral glenohumeral joint changes are present. Echocardiogram:  No results found for this or any previous visit. Orders Placed This Encounter   Medications    iopamidol (ISOVUE-370) 76 % injection 100 mL    0.9 % sodium chloride bolus    sodium chloride flush 0.9 % injection 10 mL    dexamethasone (DECADRON) injection 6 mg         Signed:  Salvatore Patten DO, DO    Part of this note may have been written by using a voice dictation software. The note has been proof read but may still contain some grammatical/other typographical errors.

## 2023-02-23 NOTE — CONSULTS
Comprehensive Nutrition Assessment    Type and Reason for Visit: Initial, Consult  Poor Intake/Appetite 5 or More Days (Hospitalists)    Nutrition Recommendations/Plan:   Meals and Snacks:  Diet: Continue current order  Nutrition Supplement Therapy:  Medical food supplement therapy:  Initiate Ensure Enlive three times per day (this provides 350 kcal and 20 grams protein per bottle)     Malnutrition Assessment:  Malnutrition Status: Insufficient data  NFPE deferred d/t COVID-19 isolation    Nutrition Assessment:  Nutrition History: Unable to obtain 2/2 to AMS. No family present. Pt previously admitted on 2/10/23 for SBO and was on liquid diet for much of that admission. Hx from RD at last admission:     \"Unable to provide any quantifiable or reliable nutrition history. Pt has missing teeth and says she just can't anything that os \"thick\". Reports variable intake PTA of 3 meals per day but depending on how she is feeling that day. Reports she weighed 143# PTA but then says she lost down to 123# because she couldn't eat. \"     Do You Have Any Cultural, Spiritism, or Ethnic Food Preferences?: No   Nutrition Background:       PMH significant for dementia, RA, breast cancer, HTN, and HLD. Pt presents this admission for fever, lethargy, and cough. Found to be COVID-19+ upon admission. Nutrition Interval:  Per RN, pt w/AMS and would not be able to provide nutrition information at time of RD visit. RN states she is not sure how much patient is eating but does report poor appetite and states pt has been eating/drinking mostly liquids in recent weeks d/t SBO. RD started EE TID. Will follow to monitor po and supplement intake throughout admission. Current Nutrition Therapies:  ADULT DIET; Dysphagia - Soft and Bite Sized;  Low Fat/Low Chol/High Fiber/MOE    Current Intake:   Average Meal Intake: 1-25% (poor po per RN) Average Supplements Intake: None Ordered      Anthropometric Measures:  Height: 5' 6\" (167.6 cm)  Current Body Wt: 159 lb 13.3 oz (72.5 kg) (2/22), Weight source: Bed Scale  BMI: 25.8, Overweight (BMI 25.0-29. 9)  Admission Body Weight: 159 lb 13.3 oz (72.5 kg)  Ideal Body Weight (Kg) (Calculated): 59 kg (130 lbs), 122.9 %  Usual Body Wt:  , Percent weight change:         BMI Category Overweight (BMI 25.0-29. 9)    Estimated Daily Nutrient Needs:  Energy (kcal/day): 5129-3267 (20-25 kcal/kg) (Kcal/kg Weight used: 72.5 kg Current  Protein (g/day): 73-87 (1-1.2 g/kg) Weight Used: (Current) 72.5 kg  Fluid (ml/day):   (1 ml/kcal)    Nutrition Diagnosis:   Predicted inadequate energy intake related to cognitive or neurological impairment as evidenced by intake 0-25% (poor appetite/intake reported by RN)    Nutrition Interventions:   Food and/or Nutrient Delivery: Continue Current Diet, Start Oral Nutrition Supplement     Coordination of Nutrition Care: Continue to monitor while inpatient       Goals:       Active Goal: Meet at least 75% of estimated needs, by next RD assessment       Nutrition Monitoring and Evaluation:      Food/Nutrient Intake Outcomes: Food and Nutrient Intake, Supplement Intake  Physical Signs/Symptoms Outcomes: Weight, Meal Time Behavior    Discharge Planning:    Continue current diet, Continue Oral Nutrition Supplement    Sunitha Seth RD

## 2023-02-24 ENCOUNTER — APPOINTMENT (OUTPATIENT)
Dept: CT IMAGING | Age: 83
DRG: 177 | End: 2023-02-24
Payer: MEDICARE

## 2023-02-24 PROBLEM — J69.0 ASPIRATION PNEUMONIA OF LEFT LUNG, UNSPECIFIED ASPIRATION PNEUMONIA TYPE, UNSPECIFIED PART OF LUNG (HCC): Status: ACTIVE | Noted: 2023-02-24

## 2023-02-24 LAB
ALBUMIN SERPL-MCNC: 2.3 G/DL (ref 3.2–4.6)
ALBUMIN/GLOB SERPL: 0.6 (ref 0.4–1.6)
ALP SERPL-CCNC: 50 U/L (ref 50–136)
ALT SERPL-CCNC: 12 U/L (ref 12–65)
ANION GAP SERPL CALC-SCNC: 5 MMOL/L (ref 2–11)
AST SERPL-CCNC: 20 U/L (ref 15–37)
BASOPHILS # BLD: 0 K/UL (ref 0–0.2)
BASOPHILS NFR BLD: 0 % (ref 0–2)
BILIRUB SERPL-MCNC: 0.3 MG/DL (ref 0.2–1.1)
BUN SERPL-MCNC: 13 MG/DL (ref 8–23)
CALCIUM SERPL-MCNC: 8.5 MG/DL (ref 8.3–10.4)
CHLORIDE SERPL-SCNC: 100 MMOL/L (ref 101–110)
CO2 SERPL-SCNC: 29 MMOL/L (ref 21–32)
CREAT SERPL-MCNC: 0.7 MG/DL (ref 0.6–1)
CRP SERPL-MCNC: 8.6 MG/DL (ref 0–0.9)
DIFFERENTIAL METHOD BLD: ABNORMAL
EOSINOPHIL # BLD: 0 K/UL (ref 0–0.8)
EOSINOPHIL NFR BLD: 0 % (ref 0.5–7.8)
ERYTHROCYTE [DISTWIDTH] IN BLOOD BY AUTOMATED COUNT: 13.6 % (ref 11.9–14.6)
GLOBULIN SER CALC-MCNC: 3.6 G/DL (ref 2.8–4.5)
GLUCOSE SERPL-MCNC: 84 MG/DL (ref 65–100)
HCT VFR BLD AUTO: 36.5 % (ref 35.8–46.3)
HGB BLD-MCNC: 11.9 G/DL (ref 11.7–15.4)
IMM GRANULOCYTES # BLD AUTO: 0 K/UL (ref 0–0.5)
IMM GRANULOCYTES NFR BLD AUTO: 0 % (ref 0–5)
LYMPHOCYTES # BLD: 1.3 K/UL (ref 0.5–4.6)
LYMPHOCYTES NFR BLD: 15 % (ref 13–44)
MAGNESIUM SERPL-MCNC: 2.2 MG/DL (ref 1.8–2.4)
MCH RBC QN AUTO: 29.9 PG (ref 26.1–32.9)
MCHC RBC AUTO-ENTMCNC: 32.6 G/DL (ref 31.4–35)
MCV RBC AUTO: 91.7 FL (ref 82–102)
MM INDURATION, POC: 0 MM (ref 0–5)
MONOCYTES # BLD: 0.7 K/UL (ref 0.1–1.3)
MONOCYTES NFR BLD: 9 % (ref 4–12)
NEUTS SEG # BLD: 6.3 K/UL (ref 1.7–8.2)
NEUTS SEG NFR BLD: 76 % (ref 43–78)
NRBC # BLD: 0 K/UL (ref 0–0.2)
PLATELET # BLD AUTO: 259 K/UL (ref 150–450)
PMV BLD AUTO: 10.2 FL (ref 9.4–12.3)
POTASSIUM SERPL-SCNC: 3.8 MMOL/L (ref 3.5–5.1)
PPD, POC: NEGATIVE
PROT SERPL-MCNC: 5.9 G/DL (ref 6.3–8.2)
RBC # BLD AUTO: 3.98 M/UL (ref 4.05–5.2)
SODIUM SERPL-SCNC: 134 MMOL/L (ref 133–143)
WBC # BLD AUTO: 8.4 K/UL (ref 4.3–11.1)

## 2023-02-24 PROCEDURE — 86140 C-REACTIVE PROTEIN: CPT

## 2023-02-24 PROCEDURE — 96372 THER/PROPH/DIAG INJ SC/IM: CPT

## 2023-02-24 PROCEDURE — 71260 CT THORAX DX C+: CPT | Performed by: HOSPITALIST

## 2023-02-24 PROCEDURE — 6360000002 HC RX W HCPCS: Performed by: FAMILY MEDICINE

## 2023-02-24 PROCEDURE — 85025 COMPLETE CBC W/AUTO DIFF WBC: CPT

## 2023-02-24 PROCEDURE — 6360000004 HC RX CONTRAST MEDICATION: Performed by: HOSPITALIST

## 2023-02-24 PROCEDURE — 6360000002 HC RX W HCPCS: Performed by: HOSPITALIST

## 2023-02-24 PROCEDURE — 83735 ASSAY OF MAGNESIUM: CPT

## 2023-02-24 PROCEDURE — 1100000000 HC RM PRIVATE

## 2023-02-24 PROCEDURE — 36415 COLL VENOUS BLD VENIPUNCTURE: CPT

## 2023-02-24 PROCEDURE — 92610 EVALUATE SWALLOWING FUNCTION: CPT

## 2023-02-24 PROCEDURE — 6370000000 HC RX 637 (ALT 250 FOR IP): Performed by: FAMILY MEDICINE

## 2023-02-24 PROCEDURE — 97530 THERAPEUTIC ACTIVITIES: CPT

## 2023-02-24 PROCEDURE — 96376 TX/PRO/DX INJ SAME DRUG ADON: CPT

## 2023-02-24 PROCEDURE — 2580000003 HC RX 258: Performed by: FAMILY MEDICINE

## 2023-02-24 PROCEDURE — 2580000003 HC RX 258: Performed by: HOSPITALIST

## 2023-02-24 PROCEDURE — 80053 COMPREHEN METABOLIC PANEL: CPT

## 2023-02-24 PROCEDURE — 96365 THER/PROPH/DIAG IV INF INIT: CPT

## 2023-02-24 RX ADMIN — ENOXAPARIN SODIUM 40 MG: 100 INJECTION SUBCUTANEOUS at 10:18

## 2023-02-24 RX ADMIN — IOPAMIDOL 100 ML: 755 INJECTION, SOLUTION INTRAVENOUS at 04:37

## 2023-02-24 RX ADMIN — METOPROLOL TARTRATE 12.5 MG: 25 TABLET, FILM COATED ORAL at 10:16

## 2023-02-24 RX ADMIN — CEFTRIAXONE 1000 MG: 1 INJECTION, POWDER, FOR SOLUTION INTRAMUSCULAR; INTRAVENOUS at 10:17

## 2023-02-24 RX ADMIN — VITAMIN D, TAB 1000IU (100/BT) 2000 UNITS: 25 TAB at 10:16

## 2023-02-24 RX ADMIN — MEMANTINE 10 MG: 5 TABLET ORAL at 10:17

## 2023-02-24 RX ADMIN — SODIUM CHLORIDE, POTASSIUM CHLORIDE, SODIUM LACTATE AND CALCIUM CHLORIDE: 600; 310; 30; 20 INJECTION, SOLUTION INTRAVENOUS at 02:27

## 2023-02-24 RX ADMIN — SODIUM CHLORIDE, PRESERVATIVE FREE 10 ML: 5 INJECTION INTRAVENOUS at 21:32

## 2023-02-24 RX ADMIN — MAGNESIUM GLUCONATE 500 MG ORAL TABLET 400 MG: 500 TABLET ORAL at 10:16

## 2023-02-24 RX ADMIN — THIAMINE HYDROCHLORIDE 100 MG: 100 INJECTION, SOLUTION INTRAMUSCULAR; INTRAVENOUS at 10:17

## 2023-02-24 RX ADMIN — METOPROLOL TARTRATE 12.5 MG: 25 TABLET, FILM COATED ORAL at 16:34

## 2023-02-24 RX ADMIN — SODIUM CHLORIDE, PRESERVATIVE FREE 5 ML: 5 INJECTION INTRAVENOUS at 10:25

## 2023-02-24 ASSESSMENT — PAIN SCALES - GENERAL
PAINLEVEL_OUTOF10: 0

## 2023-02-24 NOTE — PROGRESS NOTES
ACUTE PHYSICAL THERAPY GOALS:   (Developed with and agreed upon by patient and/or caregiver.)  (1.)Ms. NARANJO [facility] will move from supine to sit and sit to supine , scoot up and down, and roll side to side in bed with CONTACT GUARD ASSIST within 7 treatment day(s). (2.)[facility] transfer from bed to chair and chair to bed with CONTACT GUARD ASSIST using the least restrictive device within 7 treatment day(s). (3.)[facility] will ambulate with CONTACT GUARD ASSIST for 100+ feet with the least restrictive device within 7 treatment day(s). (4.)[facility] will tolerated 23+ minutes of therapeutic activity within 7 treatment days for increased activity tolerance and general functional mobility. (5.)[facility] will perform static and dynamic standing balance activities with SBA x 10 min within 7 treatment days. PHYSICAL THERAPY: Daily Note PM   (Link to Caseload Tracking: PT Visit Days : 2  Time In/Out PT Charge Capture  Rehab Caseload Tracker  Orders    Valerio Barrett is a 80 y.o. female   PRIMARY DIAGNOSIS: Acute COVID-19  Acute COVID-19 [U07.1]  COVID-19 virus infection [U07.1]  Aspiration pneumonia of left lung, unspecified aspiration pneumonia type, unspecified part of lung ([facility] [J69.0]       Inpatient: Payor: Son Vargas / Plan: DIPAK [facility] [employer] / Product Type: *No Product type* /     ASSESSMENT:     REHAB RECOMMENDATIONS:   Recommendation to date pending progress:  Setting:  Home Health Therapy    Equipment:    To Be Determined     ASSESSMENT:  Ms. NARANJO [facility] was supine on contact, dozing. Awakened and agreed to work with therapy. She got to EOB with mod a working on scooting. After sitting several minutes she  stood with HHA and amb 35'  slowly with CGA. Returned to EOB. Worked on scooting toward [facility]. EOB to supine with min a. SUBJECTIVE:   Ms. NARANJO [facility]II states \"Where do you want to go? \"     Social/Functional Lives With: Alone  Type of Home: House  Home Layout: One level  Home Access: Stairs to enter with rails  Entrance Stairs - Number of Steps: 2  Entrance Stairs - Rails: Both  Bathroom Shower/Tub: Walk-in shower  Bathroom Toilet: Standard  Bathroom Equipment: Shower chair  Bathroom Accessibility: Accessible  Receives Help From: Family  ADL Assistance: Independent  Toileting: Independent  Homemaking Assistance: Independent  Homemaking Responsibilities: Yes  Ambulation Assistance: Independent  Transfer Assistance: Independent  Active : No  Patient's  Info: Daughter  Mode of Transportation: Car  Occupation: Retired  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  OBJECTIVE:     PAIN: [place] / O2: PRECAUTION / Gregoria Baljinder / Redia Post:   Pre Treatment:          Post Treatment: 0 Vitals        Oxygen    IV    RESTRICTIONS/PRECAUTIONS:  Restrictions/Precautions  Restrictions/Precautions: Fall Risk  Restrictions/Precautions: Fall Risk     MOBILITY: I Mod I S SBA CGA Min Mod Max Total  NT x2 Comments:   Bed Mobility    Rolling [] [] [] [] [] [] [] [] [] [x] []    Supine to Sit [] [] [] [] [] [] [x] [] [] [] []    Scooting [] [] [] [] [] [x] [] [] [] [] []    Sit to Supine [] [] [] [] [] [x] [] [] [] [] []    Transfers    Sit to Stand [] [] [] [] [] [x] [] [] [] [] []    Bed to Chair [] [] [] [] [] [] [] [] [] [] []    Stand to Sit [] [] [] [] [] [x] [] [] [] [] []     [] [] [] [] [] [] [] [] [] [] []    I=Independent, Mod I=Modified Independent, S=Supervision, SBA=Standby Assistance, CGA=Contact Guard Assistance,   Min=Minimal Assistance, Mod=Moderate Assistance, Max=Maximal Assistance, Total=Total Assistance, NT=Not Tested    BALANCE: Good Fair+ Fair Fair- Poor NT Comments   Sitting Static [x] [] [] [] [] []    Sitting Dynamic [] [x] [] [] [] []              Standing Static [] [x] [] [] [] []    Standing Dynamic [] [] [x] [] [] []      GAIT: I Mod I S SBA CGA Min Mod Max Total  NT x2 Comments:   Level of Assistance [] [] [] [] [x] [] [] [] [] [] []    Distance 35  feet    DME Gait Belt    Gait Quality Decreased facundo , Decreased step clearance, and Path deviations     Weightbearing Status      Stairs      I=Independent, Mod I=Modified Independent, S=Supervision, SBA=Standby Assistance, CGA=Contact Guard Assistance,   Min=Minimal Assistance, Mod=Moderate Assistance, Max=Maximal Assistance, Total=Total Assistance, NT=Not Tested    PLAN:   FREQUENCY AND DURATION: 3 times/week for duration of hospital stay or until stated goals are met, whichever comes first.    TREATMENT:   TREATMENT:   Therapeutic Activity (23 Minutes): Therapeutic activity included Supine to Sit, Sit to Supine, Ambulation on level ground, Sitting balance , and Standing balance to improve functional Activity tolerance, Balance, Mobility, and Strength.     TREATMENT GRID:  N/A    AFTER TREATMENT PRECAUTIONS: Bed, Bed/Chair Locked, Needs within reach, and Visitors at bedside    INTERDISCIPLINARY COLLABORATION:  RN/ PCT and PT/ PTA    EDUCATION:      TIME IN/OUT:  Time In: 1342  Time Out: [facility]  Minutes: 23    CHARLOTTE LAWRENCE PTA

## 2023-02-24 NOTE — PROGRESS NOTES
Physician Progress Note      PATIENT:               Rosalinda Gresham  CSN #:                  [id number]  :                       1940  ADMIT DATE:       2023 1:35 PM  [address] [place] DATE:  Connor Fountain  PROVIDER #:        Puma Kemp MD          QUERY TEXT:    Pt admitted with COVID . Pt noted to have SIRS criteria with COVID  . If   possible, please document in the progress notes and discharge summary if you   are evaluating and /or treating any of the following: The medical record reflects the following:  Risk Factors: COVID 19 infection, dementia, breast cancer, debility  Clinical Indicators: Covid positive, pulse 108, resp 30, CRP-- 3.8, 8.6, AMS   away from baseline dementia. CT chest-- Posterior left lower lobe infiltrate   possibly pneumonia  Treatment: decadron, non-hypoxic, labs, xray, PT/OT, essential home meds. Options provided:  -- Sepsis, present on admission  -- Sepsis, present on admission, now resolved  -- Sepsis, developed following admission  -- COVID  without Sepsis  -- Other - I will add my own diagnosis  -- Disagree - Not applicable / Not valid  -- Disagree - Clinically unable to determine / Unknown  -- Refer to Clinical Documentation Reviewer    PROVIDER RESPONSE TEXT:    This patient has COVID without Sepsis. Query created by: Nuzhat Collins on 2023 2:17 PM      QUERY TEXT:    Pt admitted with COVID . Pt noted to have AMS away from her baseline dementia. If possible, please document in the progress notes and discharge summary if   you are evaluating and / or treating any of the following: The medical record reflects the following:  Risk Factors: COVID 19, dementia, debility, breast cancer  Clinical Indicators: per family--pt with lethargy, worsening confusion away   from baseline dementia-- H&P stating pt is disoriented. Mentation improving   with treatment. Treatment: aricept, namenda, decadron, fluids, labs, essential home meds,.   Options provided:  -- Metabolic encephalopathy  -- Septic encephalopathy  -- Wernicke?s encephalopathy  -- Delirium due to, Please specify cause.   -- Delirium  -- Other - I will add my own diagnosis  -- Disagree - Not applicable / Not valid  -- Disagree - Clinically unable to determine / Unknown  -- Refer to Clinical Documentation Reviewer    PROVIDER RESPONSE TEXT:    Patient has delirium due to [address] created by: Dewitte Mcardle on 2/24/2023 2:22 PM      Electronically signed by:  Martinez Pittman MD 2/24/2023 4:10 PM

## 2023-02-24 NOTE — CARE COORDINATION
MSN, CM:  patient to have SLP evaluation today r/t aspiration pneumonia. Patient is current with [facility] and will continue this services upon discharge. Case Management will continue to follow.

## 2023-02-24 NOTE — PROGRESS NOTES
OBSERVATION STATUS  SPEECH LANGUAGE PATHOLOGY: DYSPHAGIA  Initial Assessment and Discharge    NAME: Kinjal Foley  : 1940  MRN: [id number]    ADMISSION DATE: 2023  PRIMARY DIAGNOSIS: Acute COVID-19  Acute COVID-19 [U07.1]  COVID-19 virus infection [U07.1]    ICD-10: Treatment Diagnosis: R13.12 Dysphagia, Oropharyngeal Phase    RECOMMENDATIONS   Diet:  Diet Solids Recommendation: Soft & Bite Sized  Liquid Consistency Recommendation: Thin    Medications: PO     Recommendations: Assistance with meals        Compensatory Swallowing Strategies: Upright as possible for all oral intake;Remain upright for 30-45 minutes after meals;Eat/Feed slowly; Small bites/sips;Assist feed   Patient continues to require skilled intervention: No  D/C Recommendations: No follow up therapy recommended post discharge     ASSESSMENT    Dysphagia Diagnosis: Mild oral stage dysphagia due to limited dentition. no s/sx aspiration at bedside. Please feel free to re-consult speech therapy services if new concerns arise or if feel instrumental assessment, such as modified barium swallow study, is warranted. GENERAL    History of Present Injury/Illness: Ms. Saint Clair  has a past medical history of Anemia, unspecified, Arthritis, Breast cancer ([address] Cancer ([address] Chronic pain, HTN (hypertension), Hypercholesterolemia, Ill-defined condition, Low serum vitamin D, Menopause, Nausea & vomiting, Osteoarthritis of both shoulders, Osteoarthritis of right hip, Poor historian, Rheumatoid arthritis ([address] SBO (small bowel obstruction) ([address] Total knee replacement status, Unspecified adverse effect of anesthesia, and Vitamin D deficiency disease. . She also  has a past surgical history that includes Total hip arthroplasty (Right, ); Breast lumpectomy (2013); Total knee arthroplasty (Left, 2010); Total knee arthroplasty (Right, 3/2012); total hip arthroplasty (Left, ); Hysterectomy ();  Dilation and curettage of uterus; Cholecystectomy (1987); Appendectomy; Mastectomy (Right, 3/27/2019); US BREAST BIOPSY W LOC DEVICE 1ST LESION RIGHT (Right, 2/26/2019); and laparoscopy (N/A, 2/10/2023). General Comment  Comments: 2/24/23 CT CHEST 1. No evidence of pulmonary embolism. Thoracic aorta is unremarkable. 2. Posterior left lower lobe infiltrate possibly pneumonia. Mucoid impactions  within the posterior left lower lobe airways could reflect aspiration. Lungs are  otherwise clear. No enlarged lymph nodes. Subjective: upright in bed, alert. poor historian. Communication Observation: Functional (some confusion about situation noted)  Follows Directions: Simple    Prior Dysphagia History: none. Current Diet : Soft and Bite-Sized  Current Liquid Diet : Thin    Respiratory Status: Room air              Pain:   Patient does not appear in pain                                        OBJECTIVE        Oral Motor   Labial: No impairment  Dentition: Limited (limited lower; no upper)  Lingual: No impairment           Baseline Vocal Quality: Normal    Oropharyngeal Phase:   Patient presented with thin liquids (single and serial sips via straw), puree, mixed fruit. Mildly increased prep due to limited dentition but functional with soft solids. Patient accepted ~3 bites of puree and ~5 bites of fruit then declined further trials. endorses decreased intake/appetite and primarily desiring liquids. No overt s/sx aspiration. 1-2 swallows upon palpation. PLAN    Duration/Frequency: No treatment indicated at this time    Dysphagia Outcome and Severity Scale (FAUSTINO)  Dysphagia Outcome Severity Scale: Level 5: Mild dysphagia- Distant supervision.  May need one diet consistency restricted  Interpretation of Tool: The Dysphagia Outcome and Severity Scale (FAUSTINO) is a simple, easy-to-use, 7-point scale developed to systematically rate the functional severity of dysphagia based on objective assessment and make recommendations for diet level, independence level, and type of nutrition. Normal(7), Functional(6), Mild(5), Mild-Moderate(4), Moderate(3), Moderate-Severe(2), Severe(1)    Speech Therapy Prognosis  Prognosis: Good    Education: Patient, RN  Patient Education: diet consistencies, aspiration precautions  Patient Education Response: Verbalizes understanding, Demonstrated understanding    PRECAUTIONS/ALLERGIES: Ciprofloxacin, Gluten meal, Hydromorphone, Hydroxychloroquine, Ketoconazole, Meperidine, Prednisone, Yellow dye, and Codeine   Safety Devices in place: Yes  Type of devices: Call light within reach; Left in bed;Nurse notified        Therapy Time  SLP Individual Minutes  Time In: 0940  Time Out: 9434  Minutes: MADDIE Aguila MEDICO DEL [facility], [facility], [address] [place] [address]  2/24/2023 10:36 AM

## 2023-02-24 NOTE — PROGRESS NOTES
Hospitalist Progress Note   Admit Date:  2023  1:35 PM   Name:  Verito Lee   Age:  80 y.o. Sex:  female  :  1940   MRN:  [id number]   Room:  [facility]    Presenting Complaint: Fever     Reason(s) for Admission: Acute COVID-19 [U07.1]  COVID-19 virus infection [U07.1]  Aspiration pneumonia of left lung, unspecified aspiration pneumonia type, unspecified part of lung [facility]) [J69.0]     Hospital Course:   Verito Lee is a 80 y.o. female with medical history of dementia, rheumatoid arthritis, breast cancer, hypertension, dyslipidemia, presented from home via EMS with fever of 101F.patient was recently hospitalized for small bowel obstruction. She underwent surgery with exploratory laparotomy and lysis of additions on 2/10/2023 and discharged . Patient was not eating much at home. She was also very sleepy and had urinary incontinence on admission. She tested positive for COVID on admission. She was afebrile. Oxygen saturation 92% on room air. She received a dose of dexamethasone in the ER. CRP was 3.8 and recheck was 5.8 today. She had D-dimer of 2.3 on admission. Given recent surgery, CT chest negative for PE. Showed left lower lobe opacity. Patient started on antibiotics. Speech therapy consulted. Subjective & 24hr Events (23): Patient is alert awake oriented and sitting in chair. She reports feeling better today. No shortness of breath. Assessment & Plan: This is a 40-year-old female with    COVID-19 infection  Symptomatic supportive management. Patient not hypoxic. Hold off on dexamethasone. CRP 3.8-5.8. Elevated D-dimer of 2.3 on admission. CT chest was done yesterday and negative for PE. Left lower lobe opacity. Patient started on ceftriaxone. DC IV fluids. Home oxygen screen prior to discharge. ? Aspiration pneumonia  CT chest done yesterday shows left lower lobe opacity. Speech therapy consulted.   Patient started on ceftriaxone. Plan to switch to oral antibiotics on discharge. Recently admitted for small bowel obstruction status post exploratory laparotomy lysis of additions on 2/10/2023. Supportive care. Vitamin D deficiency  Continue supplement    Alzheimer's dementia  Continue home medication    Breast cancer  Outpatient follow-up    Debility  PT/OT/PPD. GERD  PPI    Hypertension  Blood pressure fairly well controlled. Continue metoprolol. PT/OT evals and PPD needed/ordered? Yes    Dispo: Patient prefers to go home with home health. Hopefully discharge home with home health in the next 24 to 48 hours. Diet:  ADULT DIET; Dysphagia - Soft and Bite Sized; Low Fat/Low Chol/High Fiber/MOE  ADULT ORAL NUTRITION SUPPLEMENT; Breakfast, Lunch, Dinner; Standard High Calorie/High Protein Oral Supplement  VTE prophylaxis: Lovenox  Code status: Full Code    Hospital Problems:  Principal Problem:    Acute COVID-19  Active Problems:    Vitamin D deficiency    Aspiration pneumonia of left lung, unspecified aspiration pneumonia type, unspecified part of lung ([address]    Alzheimer's disease, unspecified (CODE) ([facility]    Mixed hyperlipidemia  Resolved Problems:    * No resolved hospital problems.  *      Objective:   Patient Vitals for the past 24 hrs:   Temp Pulse Resp BP SpO2   02/24/23 1538 98.2 °F (36.8 °C) 82 -- (!) 135/54 95 %   02/24/23 1139 98.8 °F (37.1 °C) 84 18 (!) 129/57 93 %   02/24/23 0805 99.1 °F (37.3 °C) 79 18 109/73 92 %   02/24/23 0334 97.9 °F (36.6 °C) 83 20 (!) 144/52 92 %   02/23/23 2245 98.1 °F (36.7 °C) 83 18 (!) 130/48 91 %   02/23/23 1915 97.3 °F (36.3 °C) 79 18 (!) 117/44 93 %   02/23/23 1709 -- 92 -- (!) 118/51 --       Oxygen Therapy  SpO2: 95 %  Pulse Oximetry Type: Continuous  Pulse via Oximetry: 109 beats per minute  Pulse Oximeter Device Mode: Continuous  O2 Device: None (Room air)    Estimated body mass index is 25.81 kg/m² as calculated from the following:    Height as of this encounter: 5' 6\" (1.676 m). Weight as of this encounter: 159 lb 14.4 oz (72.5 kg). Intake/Output Summary (Last 24 hours) at 2/24/2023 1554  Last data filed at 2/24/2023 1454  Gross per 24 hour   Intake 440 ml   Output --   Net 440 ml         Physical Exam:     Blood pressure (!) 135/54, pulse 82, temperature 98.2 °F (36.8 °C), resp. rate 18, height 5' 6\" (1.676 m), weight 159 lb 14.4 oz (72.5 kg), SpO2 95 %, not currently breastfeeding. General:    Elderly female, alert, awake, no overt distress,  Head:  Normocephalic, atraumatic  Eyes:  Sclerae appear normal.  Pupils equally round. ENT:  Nares appear normal.  Moist oral mucosa  Neck:  No restricted ROM. Trachea midline   CV:   RRR. No m/r/g. No jugular venous distension. Lungs:   Left basilar crackles. No wheezing. Symmetric expansion. Abdomen:   Soft, nontender, nondistended. Extremities: No cyanosis or clubbing. No edema  Skin:     No rashes and normal coloration. Warm and dry. Neuro:  CN II-XII grossly intact. Psych:  Normal mood and affect.       I have personally reviewed labs and tests:  Recent Labs:  Recent Results (from the past 48 hour(s))   Respiratory Panel, Molecular, with COVID-19 (Restricted: peds pts or suitable admitted adults)    Collection Time: 02/22/23  4:21 PM    Specimen: Nasopharyngeal   Result Value Ref Range    Adenovirus by PCR NOT DETECTED NOTDET      Coronavirus 229E by PCR NOT DETECTED NOTDET      Coronavirus HKU1 by PCR NOT DETECTED NOTDET      Coronavirus NL63 by PCR NOT DETECTED NOTDET      Coronavirus OC43 by PCR NOT DETECTED NOTDET      SARS-CoV-2, PCR Detected (A) NOTDET      Human Metapneumovirus by PCR NOT DETECTED NOTDET      Rhinovirus Enterovirus PCR NOT DETECTED NOTDET      Influenza A by PCR NOT DETECTED NOTDET      Influenza B PCR NOT DETECTED NOTDET      Parainfluenza 1 PCR NOT DETECTED NOTDET      Parainfluenza 2 PCR NOT DETECTED NOTDET      Parainfluenza 3 PCR NOT DETECTED NOTDET      Parainfluenza 4 PCR NOT DETECTED NOTDET      Respiratory Syncytial Virus by PCR NOT DETECTED NOTDET      Bordetella parapertussis by PCR NOT DETECTED NOTDET      Bordetella pertussis by PCR NOT DETECTED NOTDET      Chlamydophila Pneumonia PCR NOT DETECTED NOTDET      Mycoplasma pneumo by PCR NOT DETECTED NOTDET     PLEASE READ & DOCUMENT PPD TEST IN 24 HRS    Collection Time: 02/23/23 12:00 AM   Result Value Ref Range    PPD, (POC) Negative Negative    mm Induration 0 0 - 5 mm   Lactate, Sepsis    Collection Time: 02/23/23  1:32 AM   Result Value Ref Range    Lactic Acid, Sepsis 1.1 0.4 - 2.0 MMOL/L   C-Reactive Protein    Collection Time: 02/23/23  1:32 AM   Result Value Ref Range    CRP 3.8 (H) 0.0 - 0.9 mg/dL   Vitamin D 25 Hydroxy    Collection Time: 02/23/23  5:37 AM   Result Value Ref Range    Vit D, 25-Hydroxy 21.1 (L) 30.0 - 100.0 ng/mL   CBC with Auto Differential    Collection Time: 02/23/23  5:37 AM   Result Value Ref Range    WBC 8.9 4.3 - 11.1 K/uL    RBC 4.58 4.05 - 5.2 M/uL    Hemoglobin 13.8 11.7 - 15.4 g/dL    Hematocrit 42.0 35.8 - 46.3 %    MCV 91.7 82 - 102 FL    MCH 30.1 26.1 - 32.9 PG    MCHC 32.9 31.4 - 35.0 g/dL    RDW 13.4 11.9 - 14.6 %    Platelets 937 686 - 704 K/uL    MPV 9.1 (L) 9.4 - 12.3 FL    nRBC 0.00 0.0 - 0.2 K/uL    Differential Type AUTOMATED      Seg Neutrophils 86 (H) 43 - 78 %    Lymphocytes 7 (L) 13 - 44 %    Monocytes 6 4.0 - 12.0 %    Eosinophils % 0 (L) 0.5 - 7.8 %    Basophils 0 0.0 - 2.0 %    Immature Granulocytes 1 0.0 - 5.0 %    Segs Absolute 7.6 1.7 - 8.2 K/UL    Absolute Lymph # 0.7 0.5 - 4.6 K/UL    Absolute Mono # 0.5 0.1 - 1.3 K/UL    Absolute Eos # 0.0 0.0 - 0.8 K/UL    Basophils Absolute 0.0 0.0 - 0.2 K/UL    Absolute Immature Granulocyte 0.0 0.0 - 0.5 K/UL   Magnesium    Collection Time: 02/23/23  5:37 AM   Result Value Ref Range    Magnesium 2.2 1.8 - 2.4 mg/dL   Comprehensive Metabolic Panel    Collection Time: 02/23/23  5:37 AM   Result Value Ref Range    Sodium 133 133 - 143 mmol/L    Potassium 4.3 3.5 - 5.1 mmol/L    Chloride 99 (L) 101 - 110 mmol/L    CO2 28 21 - 32 mmol/L    Anion Gap 6 2 - 11 mmol/L    Glucose 224 (H) 65 - 100 mg/dL    BUN 10 8 - 23 MG/DL    Creatinine 0.60 0.6 - 1.0 MG/DL    Est, Glom Filt Rate >60 >60 ml/min/1.73m2    Calcium 8.3 8.3 - 10.4 MG/DL    Total Bilirubin 0.4 0.2 - 1.1 MG/DL    ALT 26 12 - 65 U/L    AST 29 15 - 37 U/L    Alk Phosphatase 67 50 - 136 U/L    Total Protein 6.5 6.3 - 8.2 g/dL    Albumin 2.9 (L) 3.2 - 4.6 g/dL    Globulin 3.6 2.8 - 4.5 g/dL    Albumin/Globulin Ratio 0.8 0.4 - 1.6     C-Reactive Protein    Collection Time: 02/23/23  5:37 AM   Result Value Ref Range    CRP 5.8 (H) 0.0 - 0.9 mg/dL   D-Dimer, Quantitative    Collection Time: 02/23/23  5:37 AM   Result Value Ref Range    D-Dimer, Quant 2.33 (H) <0.56 ug/ml(FEU)   CBC with Auto Differential    Collection Time: 02/24/23  6:10 AM   Result Value Ref Range    WBC 8.4 4.3 - 11.1 K/uL    RBC 3.98 (L) 4.05 - 5.2 M/uL    Hemoglobin 11.9 11.7 - 15.4 g/dL    Hematocrit 36.5 35.8 - 46.3 %    MCV 91.7 82 - 102 FL    MCH 29.9 26.1 - 32.9 PG    MCHC 32.6 31.4 - 35.0 g/dL    RDW 13.6 11.9 - 14.6 %    Platelets 454 314 - 972 K/uL    MPV 10.2 9.4 - 12.3 FL    nRBC 0.00 0.0 - 0.2 K/uL    Differential Type AUTOMATED      Seg Neutrophils 76 43 - 78 %    Lymphocytes 15 13 - 44 %    Monocytes 9 4.0 - 12.0 %    Eosinophils % 0 (L) 0.5 - 7.8 %    Basophils 0 0.0 - 2.0 %    Immature Granulocytes 0 0.0 - 5.0 %    Segs Absolute 6.3 1.7 - 8.2 K/UL    Absolute Lymph # 1.3 0.5 - 4.6 K/UL    Absolute Mono # 0.7 0.1 - 1.3 K/UL    Absolute Eos # 0.0 0.0 - 0.8 K/UL    Basophils Absolute 0.0 0.0 - 0.2 K/UL    Absolute Immature Granulocyte 0.0 0.0 - 0.5 K/UL   Magnesium    Collection Time: 02/24/23  6:10 AM   Result Value Ref Range    Magnesium 2.2 1.8 - 2.4 mg/dL   Comprehensive Metabolic Panel    Collection Time: 02/24/23  6:10 AM   Result Value Ref Range    Sodium 134 133 - 143 mmol/L    Potassium 3.8 3.5 - 5.1 mmol/L    Chloride 100 (L) 101 - 110 mmol/L    CO2 29 21 - 32 mmol/L    Anion Gap 5 2 - 11 mmol/L    Glucose 84 65 - 100 mg/dL    BUN 13 8 - 23 MG/DL    Creatinine 0.70 0.6 - 1.0 MG/DL    Est, Glom Filt Rate >60 >60 ml/min/1.73m2    Calcium 8.5 8.3 - 10.4 MG/DL    Total Bilirubin 0.3 0.2 - 1.1 MG/DL    ALT 12 12 - 65 U/L    AST 20 15 - 37 U/L    Alk Phosphatase 50 50 - 136 U/L    Total Protein 5.9 (L) 6.3 - 8.2 g/dL    Albumin 2.3 (L) 3.2 - 4.6 g/dL    Globulin 3.6 2.8 - 4.5 g/dL    Albumin/Globulin Ratio 0.6 0.4 - 1.6     C-Reactive Protein    Collection Time: 02/24/23  6:10 AM   Result Value Ref Range    CRP 8.6 (H) 0.0 - 0.9 mg/dL       I have personally reviewed imaging studies:  Other Studies:  CT CHEST PULMONARY EMBOLISM W CONTRAST   Final Result   1. No evidence of pulmonary embolism. Thoracic aorta is unremarkable. 2. Posterior left lower lobe infiltrate possibly pneumonia. Mucoid impactions   within the posterior left lower lobe airways could reflect aspiration. Lungs are   otherwise clear. No enlarged lymph nodes. CT ABDOMEN PELVIS W IV CONTRAST Additional Contrast? None   Final Result      1. No postoperative complication. No evidence of abscess. Cause of fever is not    delineated. 2.  Mild air distention of large bowel. May represent mild colonic ileus. 3.  Previously seen right middle lobe airspace opacity is resolved since    comparison study. 4.  Benign left adrenal myelolipoma again identified and stable. Thank you for the referral of this patient. This exam was interpreted by an    American Board of Radiology certified radiologist with subspecialty fellowship    in [place] [id number]. If there are questions about this or other reports you can    contact a radiologist directly at [phone]   You can also reach me directly    at [email or web address]       Oleg Martinez M.D., [facility]    2/22/2023 5:30:00 PM      XR CHEST PORTABLE   Final Result   The lungs are clear.   The heart is normal in size. No pneumothorax. No pleural effusions. Degenerative bilateral glenohumeral joint changes are   present. Current Meds:  Current Facility-Administered Medications   Medication Dose Route Frequency    cefTRIAXone (ROCEPHIN) 1,000 mg in sodium chloride 0.9 % 50 mL IVPB (mini-bag)  1,000 mg IntraVENous Q24H    magnesium oxide (MAG-OX) tablet 400 mg  400 mg Oral Daily    memantine (NAMENDA) tablet 10 mg  10 mg Oral BID    metoprolol tartrate (LOPRESSOR) tablet 12.5 mg  12.5 mg Oral BID with meals    pantoprazole (PROTONIX) tablet 40 mg  40 mg Oral QAM AC    Vitamin D (CHOLECALCIFEROL) tablet 2,000 Units  2,000 Units Oral Daily    sodium chloride flush 0.9 % injection 5-40 mL  5-40 mL IntraVENous 2 times per day    sodium chloride flush 0.9 % injection 5-40 mL  5-40 mL IntraVENous PRN    0.9 % sodium chloride infusion   IntraVENous PRN    ondansetron (ZOFRAN-ODT) disintegrating tablet 4 mg  4 mg Oral Q8H PRN    Or    ondansetron (ZOFRAN) injection 4 mg  4 mg IntraVENous Q6H PRN    polyethylene glycol (GLYCOLAX) packet 17 g  17 g Oral Daily PRN    acetaminophen (TYLENOL) tablet 650 mg  650 mg Oral Q6H PRN    Or    acetaminophen (TYLENOL) suppository 650 mg  650 mg Rectal Q6H PRN    enoxaparin (LOVENOX) injection 40 mg  40 mg SubCUTAneous Daily    guaiFENesin-dextromethorphan (ROBITUSSIN DM) 100-10 MG/5ML syrup 5 mL  5 mL Oral Q4H PRN    aluminum & magnesium hydroxide-simethicone (MAALOX) 200-200-20 MG/5ML suspension 30 mL  30 mL Oral Q6H PRN    0.9 % sodium chloride bolus  100 mL IntraVENous Once    sodium chloride flush 0.9 % injection 10 mL  10 mL IntraVENous ONCE PRN       Signed:  Willam Whiting MD    Part of this note may have been written by using a voice dictation software. The note has been proof read but may still contain some grammatical/other typographical errors.

## 2023-02-24 NOTE — PROGRESS NOTES
US Guided PIV access    Called for assistance with IV access. Ultrasound was used to find the vein which was compressible and does not have any features of an artery or nerve bundle. Skin was cleaned and disinfected prior to IV puncture. Under real-time ultrasound guidance peripheral access was obtained in the left forearm using 20 G 1.75\" Peripheral IV catheter x 1 attempt. Blood return was present and IV flushed without difficulty. IV dressing applied, no immediate complications noted, and patient tolerated the procedure well.     Fernando Norwood RN, PCCN, VA-BC

## 2023-02-25 VITALS
OXYGEN SATURATION: 93 % | HEIGHT: 66 IN | DIASTOLIC BLOOD PRESSURE: 56 MMHG | RESPIRATION RATE: 18 BRPM | SYSTOLIC BLOOD PRESSURE: 126 MMHG | HEART RATE: 76 BPM | TEMPERATURE: 97.7 F | WEIGHT: 159.9 LBS | BODY MASS INDEX: 25.7 KG/M2

## 2023-02-25 LAB
ALBUMIN SERPL-MCNC: 2.2 G/DL (ref 3.2–4.6)
ALBUMIN/GLOB SERPL: 0.6 (ref 0.4–1.6)
ALP SERPL-CCNC: 49 U/L (ref 50–136)
ALT SERPL-CCNC: <6 U/L (ref 12–65)
ANION GAP SERPL CALC-SCNC: 6 MMOL/L (ref 2–11)
AST SERPL-CCNC: 23 U/L (ref 15–37)
BASOPHILS # BLD: 0 K/UL (ref 0–0.2)
BASOPHILS NFR BLD: 0 % (ref 0–2)
BILIRUB SERPL-MCNC: 0.7 MG/DL (ref 0.2–1.1)
BUN SERPL-MCNC: 5 MG/DL (ref 8–23)
CALCIUM SERPL-MCNC: 8 MG/DL (ref 8.3–10.4)
CHLORIDE SERPL-SCNC: 98 MMOL/L (ref 101–110)
CO2 SERPL-SCNC: 28 MMOL/L (ref 21–32)
CREAT SERPL-MCNC: 0.4 MG/DL (ref 0.6–1)
CRP SERPL-MCNC: 9.7 MG/DL (ref 0–0.9)
DIFFERENTIAL METHOD BLD: ABNORMAL
EOSINOPHIL # BLD: 0 K/UL (ref 0–0.8)
EOSINOPHIL NFR BLD: 0 % (ref 0.5–7.8)
ERYTHROCYTE [DISTWIDTH] IN BLOOD BY AUTOMATED COUNT: 13.2 % (ref 11.9–14.6)
GLOBULIN SER CALC-MCNC: 3.6 G/DL (ref 2.8–4.5)
GLUCOSE SERPL-MCNC: 91 MG/DL (ref 65–100)
HCT VFR BLD AUTO: 33.6 % (ref 35.8–46.3)
HGB BLD-MCNC: 11.1 G/DL (ref 11.7–15.4)
IMM GRANULOCYTES # BLD AUTO: 0 K/UL (ref 0–0.5)
IMM GRANULOCYTES NFR BLD AUTO: 0 % (ref 0–5)
LYMPHOCYTES # BLD: 0.9 K/UL (ref 0.5–4.6)
LYMPHOCYTES NFR BLD: 18 % (ref 13–44)
MAGNESIUM SERPL-MCNC: 1.9 MG/DL (ref 1.8–2.4)
MCH RBC QN AUTO: 29.8 PG (ref 26.1–32.9)
MCHC RBC AUTO-ENTMCNC: 33 G/DL (ref 31.4–35)
MCV RBC AUTO: 90.3 FL (ref 82–102)
MM INDURATION, POC: 0 MM (ref 0–5)
MONOCYTES # BLD: 0.4 K/UL (ref 0.1–1.3)
MONOCYTES NFR BLD: 7 % (ref 4–12)
NEUTS SEG # BLD: 4 K/UL (ref 1.7–8.2)
NEUTS SEG NFR BLD: 75 % (ref 43–78)
NRBC # BLD: 0 K/UL (ref 0–0.2)
PLATELET # BLD AUTO: 200 K/UL (ref 150–450)
PMV BLD AUTO: 9.5 FL (ref 9.4–12.3)
POTASSIUM SERPL-SCNC: 3 MMOL/L (ref 3.5–5.1)
PPD, POC: NEGATIVE
PROT SERPL-MCNC: 5.8 G/DL (ref 6.3–8.2)
RBC # BLD AUTO: 3.72 M/UL (ref 4.05–5.2)
SODIUM SERPL-SCNC: 132 MMOL/L (ref 133–143)
WBC # BLD AUTO: 5.3 K/UL (ref 4.3–11.1)

## 2023-02-25 PROCEDURE — 86140 C-REACTIVE PROTEIN: CPT

## 2023-02-25 PROCEDURE — 6370000000 HC RX 637 (ALT 250 FOR IP): Performed by: FAMILY MEDICINE

## 2023-02-25 PROCEDURE — 85025 COMPLETE CBC W/AUTO DIFF WBC: CPT

## 2023-02-25 PROCEDURE — 2580000003 HC RX 258: Performed by: HOSPITALIST

## 2023-02-25 PROCEDURE — 36415 COLL VENOUS BLD VENIPUNCTURE: CPT

## 2023-02-25 PROCEDURE — 83735 ASSAY OF MAGNESIUM: CPT

## 2023-02-25 PROCEDURE — 6360000002 HC RX W HCPCS: Performed by: HOSPITALIST

## 2023-02-25 PROCEDURE — 80053 COMPREHEN METABOLIC PANEL: CPT

## 2023-02-25 PROCEDURE — 2580000003 HC RX 258: Performed by: FAMILY MEDICINE

## 2023-02-25 PROCEDURE — 6360000002 HC RX W HCPCS: Performed by: FAMILY MEDICINE

## 2023-02-25 PROCEDURE — 6370000000 HC RX 637 (ALT 250 FOR IP): Performed by: STUDENT IN AN ORGANIZED HEALTH CARE EDUCATION/TRAINING PROGRAM

## 2023-02-25 RX ORDER — CEFPODOXIME PROXETIL 200 MG/1
200 TABLET, FILM COATED ORAL 2 TIMES DAILY
Qty: 10 TABLET | Refills: 0 | Status: SHIPPED | OUTPATIENT
Start: 2023-02-25 | End: 2023-03-02

## 2023-02-25 RX ORDER — POTASSIUM CHLORIDE 20 MEQ/1
40 TABLET, EXTENDED RELEASE ORAL 2 TIMES DAILY WITH MEALS
Status: DISCONTINUED | OUTPATIENT
Start: 2023-02-25 | End: 2023-02-25 | Stop reason: HOSPADM

## 2023-02-25 RX ADMIN — METOPROLOL TARTRATE 12.5 MG: 25 TABLET, FILM COATED ORAL at 17:24

## 2023-02-25 RX ADMIN — VITAMIN D, TAB 1000IU (100/BT) 2000 UNITS: 25 TAB at 08:19

## 2023-02-25 RX ADMIN — GUAIFENESIN AND DEXTROMETHORPHAN 5 ML: 100; 10 SYRUP ORAL at 11:37

## 2023-02-25 RX ADMIN — POTASSIUM CHLORIDE 40 MEQ: 1500 TABLET, EXTENDED RELEASE ORAL at 09:31

## 2023-02-25 RX ADMIN — ACETAMINOPHEN 650 MG: 325 TABLET ORAL at 08:22

## 2023-02-25 RX ADMIN — ENOXAPARIN SODIUM 40 MG: 100 INJECTION SUBCUTANEOUS at 08:16

## 2023-02-25 RX ADMIN — MEMANTINE 10 MG: 5 TABLET ORAL at 08:19

## 2023-02-25 RX ADMIN — METOPROLOL TARTRATE 12.5 MG: 25 TABLET, FILM COATED ORAL at 08:19

## 2023-02-25 RX ADMIN — SODIUM CHLORIDE, PRESERVATIVE FREE 10 ML: 5 INJECTION INTRAVENOUS at 08:19

## 2023-02-25 RX ADMIN — CEFTRIAXONE 1000 MG: 1 INJECTION, POWDER, FOR SOLUTION INTRAMUSCULAR; INTRAVENOUS at 08:16

## 2023-02-25 RX ADMIN — MAGNESIUM GLUCONATE 500 MG ORAL TABLET 400 MG: 500 TABLET ORAL at 08:19

## 2023-02-25 ASSESSMENT — PAIN SCALES - GENERAL
PAINLEVEL_OUTOF10: 0

## 2023-02-25 NOTE — PROGRESS NOTES
Reviewed notes for new spiritual concerns      Will continue to assess how we can best serve this family        [place].       Per notes:       [address]

## 2023-02-25 NOTE — CARE COORDINATION
Patient has discharge orders for today. Patient has a referral made to [facility] through the [facility]. Patient has beeb accepted and selected. Family will transport patient home. Patient has met all treatment goals / milestones. CM will continue to follow and remain available for any needs, concerns or questions that may arise. 02/23/23 1136   Service Assessment   Patient Orientation Alert and Oriented   Cognition Alert   History Provided By Patient   Primary [address]   Patient's Healthcare Decision Maker is: Named in [address] [place] [address]   PCP Verified by CM No  (Refuses [facility] referal)   Prior Functional Level Independent in ADLs/IADLs   Current Functional Level Other (see comment)  (PT/OT consulted)   Can patient return to prior living arrangement Yes   Ability to make needs known: Good   Family able to assist with home care needs: Yes   Would you like for me to discuss the discharge plan with any other family members/significant others, and if so, who? No   Financial Resources Medicare   Community Resources None   Social/Functional History   Lives With Alone   Type of [address] [place] [address] One level   Home Access Stairs to enter with rails   Entrance Stairs - Number of Steps 2   Entrance Stairs - Rails Both   Bathroom Shower/Tub Walk-in shower   Bathroom Toilet Standard   Bathroom Equipment Shower chair   Bathroom Accessibility Accessible   Receives Help From Family   ADL Assistance Independent   Toileting Independent   Homemaking Assistance Independent   Homemaking Responsibilities Yes   Ambulation Assistance Independent   Transfer Assistance Independent   Active  No   Patient's  Info Daughter   Mode of Transportation Car   Occupation Retired   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  No   Potential Assistance Purchasing Medications No   Type of Home Care Services Aide Services;PT;OT  ([facility])   Patient expects to be discharged to: House   One/Two Story Residence One story   History of falls? 0

## 2023-02-25 NOTE — DISCHARGE SUMMARY
Hospitalist Discharge Summary   Admit Date:  2023  1:35 PM   DC Note date: 2023  Name:  Edwige Wiggins   Age:  80 y.o. Sex:  female  :  1940   MRN:  [id number]   Room:  0[facility]  PCP:  SIRENA Palomino CNP    Presenting Complaint: Fever     Initial Admission Diagnosis: Acute COVID-19 [U07.1]  COVID-19 virus infection [U07.1]  Aspiration pneumonia of left lung, unspecified aspiration pneumonia type, unspecified part of lung ([facility] [J69.0]     Problem List for this Hospitalization (present on admission):    Principal Problem:    Acute COVID-19  Active Problems:    Vitamin D deficiency    Aspiration pneumonia of left lung, unspecified aspiration pneumonia type, unspecified part of lung ([facility] [address]    Alzheimer's disease, unspecified (CODE) ([facility] [address]    Mixed hyperlipidemia  Resolved Problems:    * No resolved hospital problems. *      Hospital Course:  Edwige Wiggins is a 80 y.o. female with medical history of dementia, rheumatoid arthritis, breast cancer, hypertension, dyslipidemia, presented from home via EMS with fever of 101F.patient was recently hospitalized for small bowel obstruction. She underwent surgery with exploratory laparotomy and lysis of additions on 2/10/2023 and discharged . Patient was not eating much at home. She was also very sleepy and had urinary incontinence on admission. She tested positive for COVID on admission. She was afebrile. Oxygen saturation 92% on room air. She received a dose of dexamethasone in the ER. CRP was 3.8 and recheck was 5.8 today. She had D-dimer of 2.3 on admission. Given recent surgery, CT chest negative for PE. Showed left lower lobe opacity. Patient started on antibiotics. Speech therapy consulted and started on diet. She is saturating well on room air. Decadron was discontinued. RT assessed and patient does not qualify for home oxygen. She was transitioned to p.o. antibiotics.   Patient is hemodynamically stable for discharge. Disposition: Home  Diet: ADULT DIET; Dysphagia - Soft and Bite Sized; Low Fat/Low Chol/High Fiber/MOE  ADULT ORAL NUTRITION SUPPLEMENT; Breakfast, Lunch, Dinner; Standard High Calorie/High Protein Oral Supplement  Code Status: Full Code    Follow Ups:   Contact information for after-discharge care     Discharge [address] [facility] . Service: [facility] information:  [address] [place] [id number]  [phone]                           Time spent in patient discharge and coordination 35 minutes. Follow up labs/diagnostics (ultimately defer to outpatient provider): Follow-up with PCP in 3 to 5 days  Plan was discussed with patient. All questions answered. Patient was stable at time of discharge. Instructions given to call a physician or return if any concerns.     Current Discharge Medication List        START taking these medications    Details   cefpodoxime (VANTIN) 200 MG tablet Take 1 tablet by mouth 2 times daily for 5 days  Qty: 10 tablet, Refills: 0           CONTINUE these medications which have NOT CHANGED    Details   magnesium oxide (MAG-OX) 400 (240 Mg) MG tablet Take 1 tablet by mouth daily  Qty: 30 tablet, Refills: 0      metoprolol tartrate (LOPRESSOR) 25 MG tablet Take 0.5 tablets by mouth with breakfast and with evening meal  Qty: 60 tablet, Refills: 3      pantoprazole (PROTONIX) 40 MG tablet Take 1 tablet by mouth every morning (before breakfast)  Qty: 30 tablet, Refills: 3      donepezil (ARICEPT) 5 MG tablet Take 1 tablet by mouth nightly  Qty: 30 tablet, Refills: 5      memantine ER (NAMENDA XR) 28 MG CP24 extended release capsule Take 1 capsule by mouth daily  Qty: 30 capsule, Refills: 5      Vitamin D, Cholecalciferol, 50 MCG (2000 UT) CAPS Take 1 capsule by mouth daily  Qty: 30 capsule, Refills: 5      acetaminophen (TYLENOL) 650 MG extended release tablet Take 1,300 mg by mouth nightly as needed for Pain. STOP taking these medications       potassium bicarb-citric acid (EFFER-K) 20 MEQ TBEF effervescent tablet Comments:   Reason for Stopping:               Some medications may have been reported old/obsolete and marked \"stop taking\" by the system; in reality pt was already off these meds; defer to outpatient or prescribing providers. Procedures done this admission:  * No surgery found *    Consults this admission:  IP CONSULT TO Douglas Lam    Echocardiogram results:  No results found for this or any previous visit. Diagnostic Imaging/Tests:   XR ABDOMEN (KUB) (SINGLE AP VIEW)    Result Date: 2/14/2023  Persistently dilated loops of small bowel. Cannot exclude underlying obstruction. XR ABDOMEN (KUB) (SINGLE AP VIEW)    Result Date: 2/13/2023  Impression: Tube placement as above. Band of atelectasis or scarring in the left retrocardiac space. Kathrine Loja M.D. 2/13/2023 8:51:00 PM    XR ABDOMEN (KUB) (SINGLE AP VIEW)    Result Date: 2/10/2023  NG tube in good position. CT ABDOMEN PELVIS W IV CONTRAST Additional Contrast? None    Result Date: 2/22/2023  1. No postoperative complication. No evidence of abscess. Cause of fever is not  delineated. 2.  Mild air distention of large bowel. May represent mild colonic ileus. 3.  Previously seen right middle lobe airspace opacity is resolved since comparison study. 4.  Benign left adrenal myelolipoma again identified and stable. Thank you for the referral of this patient. This exam was interpreted by an American Board of Radiology certified radiologist with subspecialty fellowship in [place] [address]. If there are questions about this or other reports you can contact a radiologist directly at [phone]   You can also reach me directly at [email or web address]  Marc Rivas M.D., [facility] 2/22/2023 5:30:00 PM    XR CHEST PORTABLE    Result Date: 2/22/2023  The lungs are clear. The heart is normal in size.   No pneumothorax. No pleural effusions. Degenerative bilateral glenohumeral joint changes are present. CT CHEST PULMONARY EMBOLISM W CONTRAST    Result Date: 2/24/2023  1. No evidence of pulmonary embolism. Thoracic aorta is unremarkable. 2. Posterior left lower lobe infiltrate possibly pneumonia. Mucoid impactions within the posterior left lower lobe airways could reflect aspiration. Lungs are otherwise clear. No enlarged lymph nodes. CT ABDOMEN PELVIS GI BLEED Additional Contrast? Oral    Result Date: 2/9/2023   1. Small bowel obstruction with transition zone in the right lower quadrant. Configuration raises possibility of a closed loop obstruction. Recommend surgical consultation. 2.  Minimal right middle lobe pulmonary infiltrate, possible aspiration and/or early pneumonia. 3.  No evidence of active extravasation. 4.  Chronic findings as above.   Asberry Cushing, M.D. 2/9/2023 11:51:00 PM       Labs: Results:       BMP, Mg, Phos Recent Labs     02/23/23  0537 02/24/23  0610 02/25/23  0433    134 132*   K 4.3 3.8 3.0*   CL 99* 100* 98*   CO2 28 29 28   ANIONGAP 6 5 6   BUN 10 13 5*   CREATININE 0.60 0.70 0.40*   LABGLOM >60 >60 >60   CALCIUM 8.3 8.5 8.0*   GLUCOSE 224* 84 91   MG 2.2 2.2 1.9      CBC Recent Labs     02/23/23  0537 02/24/23  0610 02/25/23  0433   WBC 8.9 8.4 5.3   RBC 4.58 3.98* 3.72*   HGB 13.8 11.9 11.1*   HCT 42.0 36.5 33.6*   MCV 91.7 91.7 90.3   MCH 30.1 29.9 29.8   MCHC 32.9 32.6 33.0   RDW 13.4 13.6 13.2    259 200   MPV 9.1* 10.2 9.5   NRBC 0.00 0.00 0.00   SEGS 86* 76 75   LYMPHOPCT 7* 15 18   EOSRELPCT 0* 0* 0*   MONOPCT 6 9 7   BASOPCT 0 0 0   IMMGRAN 1 0 0   SEGSABS 7.6 6.3 4.0   LYMPHSABS 0.7 1.3 0.9   EOSABS 0.0 0.0 0.0   MONOSABS 0.5 0.7 0.4   BASOSABS 0.0 0.0 0.0   ABSIMMGRAN 0.0 0.0 0.0      LFT Recent Labs     02/23/23  0537 02/24/23  0610 02/25/23  0433   BILITOT 0.4 0.3 0.7   ALKPHOS 67 50 49*   AST 29 20 23   ALT 26 12 <6*   PROT 6.5 5.9* 5.8*   LABALBU 2.9* 2. 3* 2.2*   GLOB 3.6 3.6 3.6      Cardiac  No results found for: NTPROBNP, TROPHS   Coags No results found for: PROTIME, INR, APTT   A1c Lab Results   Component Value Date/Time    LABA1C 5.2 02/17/2022 11:03 AM     02/17/2022 11:03 AM      Lipids Lab Results   Component Value Date/Time    CHOL 253 02/17/2022 11:03 AM    LDLCALC 157 02/17/2022 11:03 AM    HDL 69 02/17/2022 11:03 AM    TRIG 150 02/17/2022 11:03 AM      Thyroid  Lab Results   Component Value Date/Time    CJB1WQZ 0.756 11/23/2022 02:13 PM        Most Recent UA Lab Results   Component Value Date/Time    COLORU YELLOW/STRAW 02/22/2023 03:30 PM    APPEARANCE CLOUDY 02/22/2023 03:30 PM    SPECGRAV 1.011 02/22/2023 03:30 PM    LABPH 6.5 02/22/2023 03:30 PM    PROTEINU Negative 02/22/2023 03:30 PM    GLUCOSEU Negative 02/22/2023 03:30 PM    KETUA 40 02/22/2023 03:30 PM    BILIRUBINUR Negative 02/22/2023 03:30 PM    BLOODU Negative 02/22/2023 03:30 PM    UROBILINOGEN 0.2 02/22/2023 03:30 PM    NITRU Negative 02/22/2023 03:30 PM    LEUKOCYTESUR Negative 02/22/2023 03:30 PM    WBCUA 0-4 02/15/2023 04:50 PM    RBCUA 0-5 02/15/2023 04:50 PM    EPITHUA 5-10 02/15/2023 04:50 PM    BACTERIA Negative 02/15/2023 04:50 PM    LABCAST 0-2 02/15/2023 04:50 PM        Recent Labs     02/22/23  1413   CULTURE NO GROWTH 3 DAYS  NO GROWTH 3 DAYS       All Labs from Last 24 Hrs:  Recent Results (from the past 24 hour(s))   PLEASE READ & DOCUMENT PPD TEST IN 48 HRS    Collection Time: 02/25/23 12:00 AM   Result Value Ref Range    PPD, (POC) Negative Negative    mm Induration 0 0 - 5 mm   CBC with Auto Differential    Collection Time: 02/25/23  4:33 AM   Result Value Ref Range    WBC 5.3 4.3 - 11.1 K/uL    RBC 3.72 (L) 4.05 - 5.2 M/uL    Hemoglobin 11.1 (L) 11.7 - 15.4 g/dL    Hematocrit 33.6 (L) 35.8 - 46.3 %    MCV 90.3 82 - 102 FL    MCH 29.8 26.1 - 32.9 PG    MCHC 33.0 31.4 - 35.0 g/dL    RDW 13.2 11.9 - 14.6 %    Platelets 582 975 - 170 K/uL    MPV 9.5 9.4 - 12.3 FL nRBC 0.00 0.0 - 0.2 K/uL    Differential Type AUTOMATED      Seg Neutrophils 75 43 - 78 %    Lymphocytes 18 13 - 44 %    Monocytes 7 4.0 - 12.0 %    Eosinophils % 0 (L) 0.5 - 7.8 %    Basophils 0 0.0 - 2.0 %    Immature Granulocytes 0 0.0 - 5.0 %    Segs Absolute 4.0 1.7 - 8.2 K/UL    Absolute Lymph # 0.9 0.5 - 4.6 K/UL    Absolute Mono # 0.4 0.1 - 1.3 K/UL    Absolute Eos # 0.0 0.0 - 0.8 K/UL    Basophils Absolute 0.0 0.0 - 0.2 K/UL    Absolute Immature Granulocyte 0.0 0.0 - 0.5 K/UL   Magnesium    Collection Time: 02/25/23  4:33 AM   Result Value Ref Range    Magnesium 1.9 1.8 - 2.4 mg/dL   Comprehensive Metabolic Panel    Collection Time: 02/25/23  4:33 AM   Result Value Ref Range    Sodium 132 (L) 133 - 143 mmol/L    Potassium 3.0 (L) 3.5 - 5.1 mmol/L    Chloride 98 (L) 101 - 110 mmol/L    CO2 28 21 - 32 mmol/L    Anion Gap 6 2 - 11 mmol/L    Glucose 91 65 - 100 mg/dL    BUN 5 (L) 8 - 23 MG/DL    Creatinine 0.40 (L) 0.6 - 1.0 MG/DL    Est, Glom Filt Rate >60 >60 ml/min/1.73m2    Calcium 8.0 (L) 8.3 - 10.4 MG/DL    Total Bilirubin 0.7 0.2 - 1.1 MG/DL    ALT <6 (L) 12 - 65 U/L    AST 23 15 - 37 U/L    Alk Phosphatase 49 (L) 50 - 136 U/L    Total Protein 5.8 (L) 6.3 - 8.2 g/dL    Albumin 2.2 (L) 3.2 - 4.6 g/dL    Globulin 3.6 2.8 - 4.5 g/dL    Albumin/Globulin Ratio 0.6 0.4 - 1.6     C-Reactive Protein    Collection Time: 02/25/23  4:33 AM   Result Value Ref Range    CRP 9.7 (H) 0.0 - 0.9 mg/dL       Allergies   Allergen Reactions    Ciprofloxacin Other (See Comments)     Elevated BP    Gluten Meal Other (See Comments)    Hydromorphone Other (See Comments)     hallucinations  hallucinations      Hydroxychloroquine Other (See Comments)     Pt could stand up or get up. Eyes got really big but pt could not see ? Pt states that it was 2-3 days before she got her senses back.      Ketoconazole Other (See Comments)     Intolerance/caused burning sensation    Meperidine Other (See Comments)     hallucinations Prednisone Other (See Comments)     Irritability    Yellow Dye Itching     Yellow  Dye in cheddar cheese- can eat white cheddar    Codeine Palpitations     Immunization History   Administered Date(s) Administered    PPD Test 09/01/2016, 02/27/2018, 02/10/2023, 02/23/2023    Pneumococcal Conjugate 13-valent (Abril Goodman) 09/01/2020       Recent Vital Data:  Patient Vitals for the past 24 hrs:   Temp Pulse Resp BP SpO2   02/25/23 1146 98.1 °F (36.7 °C) -- -- -- --   02/25/23 0810 100 °F (37.8 °C) (!) 102 18 (!) 149/67 90 %   02/25/23 0303 97.7 °F (36.5 °C) 94 20 (!) 150/57 90 %   02/24/23 2238 99.5 °F (37.5 °C) 86 18 (!) 146/56 91 %   02/24/23 1912 98.8 °F (37.1 °C) 80 18 (!) 134/49 93 %   02/24/23 1538 98.2 °F (36.8 °C) 82 18 (!) 135/54 95 %       Oxygen Therapy  SpO2: 90 %  Pulse Oximetry Type: Continuous  Pulse via Oximetry: 109 beats per minute  Pulse Oximeter Device Mode: Continuous  O2 Device: None (Room air)    Estimated body mass index is 25.81 kg/m² as calculated from the following:    Height as of this encounter: 5' 6\" (1.676 m). Weight as of this encounter: 159 lb 14.4 oz (72.5 kg). No intake or output data in the 24 hours ending 02/25/23 1503      Physical Exam:    General:    Well nourished. No overt distress  Head:  Normocephalic, atraumatic  Eyes:  Sclerae appear normal.  Pupils equally round. HENT:  Nares appear normal, no drainage. Moist mucous membranes  Neck:  No restricted ROM. Trachea midline  CV:   RRR. No m/r/g. No JVD  Lungs:   CTAB. No wheezing, rhonchi, or rales. Respirations even, unlabored  Abdomen:   Soft, nontender, nondistended. Extremities: Warm and dry. No cyanosis or clubbing. No edema. Skin:     No rashes. Normal coloration  Neuro:  CN II-XII grossly intact. Psych:  Normal mood and affect. Signed:  Charles Albrecht MD    Part of this note may have been written by using a voice dictation software.   The note has been proof read but may still contain some grammatical/other typographical errors.

## 2023-02-25 NOTE — PROGRESS NOTES
Oxygen Qualifier       Room air:    Resting SpO2  95%    Ambulating SpO2  94%        Completed by:     Zully Samuel RRT

## 2023-02-25 NOTE — PROGRESS NOTES
Reviewed discharge paper work with Jalil, patients daughter, at bedside. Discussed follow up appts, and prescriptions. IV removed. Discharge paper work signed and placed in patients chart. Awaiting for transportation from [employer] who also has the key to patients house. Estimated  time was stated between [id number].

## 2023-02-26 LAB
BACTERIA SPEC CULT: NORMAL
BACTERIA SPEC CULT: NORMAL
SERVICE CMNT-IMP: NORMAL
SERVICE CMNT-IMP: NORMAL

## 2023-02-27 ENCOUNTER — CARE COORDINATION (OUTPATIENT)
Dept: CARE COORDINATION | Facility: CLINIC | Age: 83
End: 2023-02-27

## 2023-02-27 NOTE — CARE COORDINATION
[facility] Care Transitions Initial Follow Up Call    Call within 2 business days of discharge: Yes    Patient: Oscar Núñez Patient : 1940   MRN: [id number]  Reason for Admission: COVID-19 virus infection  Discharge Date: 23 RARS: Readmission Risk Score: 18.1  CTN attempted to outreach per hospital discharge on 2023 for [place] call. Unable to reach patient. Will continue to outreach per protocol. Last Discharge  Street       Date Complaint Diagnosis Description Type Department Provider    23 Fever COVID-19 virus infection ED to Hosp-Admission (Discharged) (ADMITTED) [id number] Prakash Lincoln MD; Caitlyn Hicks. .. Was this an external facility discharge?  No Discharge Facility: [facility]    Follow Up  Future Appointments   Date Time Provider Young Monge   2023  9:45 AM TRIM LAB RESOURCE TRIM [facility] AMB   2023 11:00 AM Yesenia Luna APRN - CNP TRIM [facility] AMB     Aneta Alvarado RN

## 2023-02-28 ENCOUNTER — CARE COORDINATION (OUTPATIENT)
Dept: CARE COORDINATION | Facility: CLINIC | Age: 83
End: 2023-02-28

## 2023-02-28 NOTE — CARE COORDINATION
[address] Care Transitions Initial Follow Up Call    Call within 2 business days of discharge: Yes    Patient: Yokasta Huston Patient : 1940   MRN: [id number]  Reason for Admission: COVID-19 virus infection  Discharge Date: 23 RARS: Readmission Risk Score: 18.1    Multiple unsuccessful attempts to reach patient for follow up call. Unable to reach patient. Will resolve episode at this time. Last Discharge  Street       Date Complaint Diagnosis Description Type Department Provider    23 Fever COVID-19 virus infection ED to Hosp-Admission (Discharged) (ADMITTED) [id number] Savi Lloyd MD; Jose Gordon. .. Was this an external facility discharge?  No Discharge Facility: [facility]    Follow Up  Future Appointments   Date Time Provider Young Monge   2023  9:45 AM TRIM LAB RESOURCE TRIM [facility] AMB   2023 11:00 AM SIRENA Waller - CNP TRIM [facility] AMB     Queen Ernst RN

## 2023-03-01 ENCOUNTER — TELEPHONE (OUTPATIENT)
Dept: INTERNAL MEDICINE CLINIC | Facility: CLINIC | Age: 83
End: 2023-03-01

## 2023-03-01 NOTE — TELEPHONE ENCOUNTER
Personally called and spoke with patient's caretaker/healthcare power of  Maki Hassan. Patient has had 2 recent hospitalizations in a short period of time 1 for small bowel obstruction undergoing emergent surgery, the other for COVID 19 infection and suspected aspiration pneumonia. Recently discharged from the hospital.  Of note patient has underlying dementia but at baseline caretaker states that she is able to articulate and generally reasonable. Has had rapid significant decline in cognitive function since hospitalizations. Was told it was likely due to sundowning and the patient being out of her normal environment. However caretaker and other caregivers have had to remove weapons and other potential weapons from the home as they believe and even home health nurse who came out today believes that patient is a threat to herself and others. Home health nurse had told caretaker that they need to contact provider to administer something to help her sleep as patient has had little to no sleep in the past couple of days with minimal oral intake. I advised caretaker to go to the emergency room to be further evaluated as I do not know the patient and I am not comfortable prescribing a potentially sedating medication which could cause the opposite effect in somebody with underlying dementia. I personally called and spoke with the ER attending at [facility] informing them of my concerns.

## 2023-03-02 ENCOUNTER — OFFICE VISIT (OUTPATIENT)
Dept: INTERNAL MEDICINE CLINIC | Facility: CLINIC | Age: 83
End: 2023-03-02

## 2023-03-02 VITALS
HEART RATE: 108 BPM | SYSTOLIC BLOOD PRESSURE: 120 MMHG | DIASTOLIC BLOOD PRESSURE: 70 MMHG | HEIGHT: 66 IN | OXYGEN SATURATION: 97 % | BODY MASS INDEX: 25.81 KG/M2

## 2023-03-02 DIAGNOSIS — K56.609 SBO (SMALL BOWEL OBSTRUCTION) (HCC): ICD-10-CM

## 2023-03-02 DIAGNOSIS — E87.1 HYPONATREMIA: ICD-10-CM

## 2023-03-02 DIAGNOSIS — L89.309 PRESSURE INJURY OF SKIN OF BUTTOCK, UNSPECIFIED INJURY STAGE, UNSPECIFIED LATERALITY: ICD-10-CM

## 2023-03-02 DIAGNOSIS — G30.9 ALZHEIMER'S DISEASE, UNSPECIFIED (CODE) (HCC): Primary | ICD-10-CM

## 2023-03-02 DIAGNOSIS — R53.1 WEAKNESS: ICD-10-CM

## 2023-03-02 DIAGNOSIS — E87.6 HYPOKALEMIA: ICD-10-CM

## 2023-03-02 DIAGNOSIS — U07.1 COVID: ICD-10-CM

## 2023-03-02 DIAGNOSIS — Z09 HOSPITAL DISCHARGE FOLLOW-UP: ICD-10-CM

## 2023-03-02 LAB
BASOPHILS # BLD: 0.1 K/UL (ref 0–0.2)
BASOPHILS NFR BLD: 1 % (ref 0–2)
DIFFERENTIAL METHOD BLD: ABNORMAL
EOSINOPHIL # BLD: 0.2 K/UL (ref 0–0.8)
EOSINOPHIL NFR BLD: 3 % (ref 0.5–7.8)
ERYTHROCYTE [DISTWIDTH] IN BLOOD BY AUTOMATED COUNT: 13.3 % (ref 11.9–14.6)
HCT VFR BLD AUTO: 40.3 % (ref 35.8–46.3)
HGB BLD-MCNC: 13 G/DL (ref 11.7–15.4)
IMM GRANULOCYTES # BLD AUTO: 0.1 K/UL (ref 0–0.5)
IMM GRANULOCYTES NFR BLD AUTO: 1 % (ref 0–5)
LYMPHOCYTES # BLD: 2 K/UL (ref 0.5–4.6)
LYMPHOCYTES NFR BLD: 28 % (ref 13–44)
MCH RBC QN AUTO: 29.7 PG (ref 26.1–32.9)
MCHC RBC AUTO-ENTMCNC: 32.3 G/DL (ref 31.4–35)
MCV RBC AUTO: 92 FL (ref 82–102)
MONOCYTES # BLD: 0.6 K/UL (ref 0.1–1.3)
MONOCYTES NFR BLD: 9 % (ref 4–12)
NEUTS SEG # BLD: 4.3 K/UL (ref 1.7–8.2)
NEUTS SEG NFR BLD: 58 % (ref 43–78)
NRBC # BLD: 0 K/UL (ref 0–0.2)
PLATELET # BLD AUTO: 484 K/UL (ref 150–450)
PMV BLD AUTO: 9.9 FL (ref 9.4–12.3)
RBC # BLD AUTO: 4.38 M/UL (ref 4.05–5.2)
WBC # BLD AUTO: 7.2 K/UL (ref 4.3–11.1)

## 2023-03-02 RX ORDER — ALBUTEROL SULFATE 90 UG/1
2 AEROSOL, METERED RESPIRATORY (INHALATION) 4 TIMES DAILY PRN
Qty: 54 G | Refills: 1 | Status: SHIPPED | OUTPATIENT
Start: 2023-03-02

## 2023-03-02 RX ORDER — MEMANTINE HYDROCHLORIDE 28 MG/1
28 CAPSULE, EXTENDED RELEASE ORAL DAILY
Qty: 30 CAPSULE | Refills: 5 | Status: SHIPPED | OUTPATIENT
Start: 2023-03-02

## 2023-03-02 SDOH — ECONOMIC STABILITY: HOUSING INSECURITY
IN THE LAST 12 MONTHS, WAS THERE A TIME WHEN YOU DID NOT HAVE A STEADY PLACE TO SLEEP OR SLEPT IN A SHELTER (INCLUDING NOW)?: PATIENT REFUSED

## 2023-03-02 SDOH — ECONOMIC STABILITY: INCOME INSECURITY: HOW HARD IS IT FOR YOU TO PAY FOR THE VERY BASICS LIKE FOOD, HOUSING, MEDICAL CARE, AND HEATING?: PATIENT DECLINED

## 2023-03-02 SDOH — ECONOMIC STABILITY: FOOD INSECURITY: WITHIN THE PAST 12 MONTHS, YOU WORRIED THAT YOUR FOOD WOULD RUN OUT BEFORE YOU GOT MONEY TO BUY MORE.: PATIENT DECLINED

## 2023-03-02 SDOH — ECONOMIC STABILITY: FOOD INSECURITY: WITHIN THE PAST 12 MONTHS, THE FOOD YOU BOUGHT JUST DIDN'T LAST AND YOU DIDN'T HAVE MONEY TO GET MORE.: PATIENT DECLINED

## 2023-03-02 ASSESSMENT — PATIENT HEALTH QUESTIONNAIRE - PHQ9
SUM OF ALL RESPONSES TO PHQ QUESTIONS 1-9: 1
SUM OF ALL RESPONSES TO PHQ9 QUESTIONS 1 & 2: 1
SUM OF ALL RESPONSES TO PHQ QUESTIONS 1-9: 1
2. FEELING DOWN, DEPRESSED OR HOPELESS: 1
SUM OF ALL RESPONSES TO PHQ QUESTIONS 1-9: 1
1. LITTLE INTEREST OR PLEASURE IN DOING THINGS: 0
SUM OF ALL RESPONSES TO PHQ QUESTIONS 1-9: 1

## 2023-03-02 ASSESSMENT — ENCOUNTER SYMPTOMS: SHORTNESS OF BREATH: 0

## 2023-03-02 NOTE — PROGRESS NOTES
Post-Discharge Transitional Care Follow Up      Rosi Mancuso   YOB: 1940    Date of Office Visit:  3/2/2023  Date of Hospital Admission: 2/22/23  Date of Hospital Discharge: 2/25/23  Readmission Risk Score (high >=14%. Medium >=10%):Readmission Risk Score: 18.1      Care management risk score Rising risk (score 2-5) and Complex Care (Scores >=6): No Risk Score On File     Non face to face  following discharge, date last encounter closed (first attempt may have been earlier): 02/28/2023     Call initiated 2 business days of discharge: Yes     Alzheimer's disease, unspecified (CODE) ([facility] [address]  Hyponatremia  -     Comprehensive Metabolic Panel; Future  -     CBC with Auto Differential; Future  -     Magnesium; Future  Hypokalemia  -     Comprehensive Metabolic Panel; Future  -     CBC with Auto Differential; Future  -     Magnesium; Future  COVID  Comments:  recoving still wet cough, afebrile, feeling better    SBO (small bowel obstruction) ([facility])  Comments:  normal BM's now  Weakness  Pressure injury of skin of buttock, unspecified injury stage, unspecified laterality  Hospital discharge follow-up  -     [place] DISCHARGE MEDS RECONCILED W/ CURRENT OUTPATIENT MED LIST  -     [place] DISCHARGE MEDS RECONCILED W/ CURRENT OUTPATIENT MED LIST      Medical Decision Making: high complexity  Return in 1 month (on 4/2/2023). Subjective:   HPI  Patient is here for hospital follow up. She was admitted with SBO and had surgery. Was discharged home but then got feverish and cough. Was readmitted with COVID. She finished abx for pneumonia. She is improved but has skin irritation on her buttocks. She was irritated on her chest but hydrocortisone improved the rash on her chest.   Inpatient course: Discharge summary reviewed- see chart.     Interval history/Current status: improved    Patient Active Problem List   Diagnosis    Sclerodactyly    Arthritis of left hip    Breast cancer, right breast ([facility] [address] Osteoarthritis of both shoulders    Malignant neoplasm of lower-outer quadrant of right breast of female, estrogen receptor positive ([facility] [address]    Osteoarthritis of left knee    Rheumatoid arthritis involving multiple sites with positive rheumatoid factor (HCC)    Total knee replacement status    Hormone receptor positive breast cancer, unspecified laterality (HCC)    Abnormal breast exam    Alzheimer's disease, unspecified (CODE) ([facility] [address]    Mixed hyperlipidemia    S/P total hip arthroplasty    Anemia, unspecified    Osteoarthritis of right knee    Acute COVID-19    Vitamin D deficiency    Insomnia due to medical condition    Aspiration pneumonia of left lung, unspecified aspiration pneumonia type, unspecified part of lung ([facility] [address]       Medications listed as ordered at the time of discharge from hospital     Medication List            Accurate as of March 2, 2023  2:18 PM. If you have any questions, ask your nurse or doctor. START taking these medications      albuterol sulfate  (90 Base) MCG/ACT inhaler  Commonly known as: Ventolin HFA  Inhale 2 puffs into the lungs 4 times daily as needed for Wheezing  Started by: SIRENA Pool CNP     miconazole 2 % vaginal cream  Commonly known as: Miconazole 7  Place to vulva and vaginally nightly. Started by: SIRENA Pool CNP     Spacer/Aero-Holding Darryn Tg  1 Device by Does not apply route daily as needed (wheeze)  Started by:  SIRENA Pool CNP            CONTINUE taking these medications      acetaminophen 650 MG extended release tablet  Commonly known as: TYLENOL     cefpodoxime 200 MG tablet  Commonly known as: VANTIN  Take 1 tablet by mouth 2 times daily for 5 days     donepezil 5 MG tablet  Commonly known as: ARICEPT  Take 1 tablet by mouth nightly     magnesium oxide 400 (240 Mg) MG tablet  Commonly known as: MAG-OX  Take 1 tablet by mouth daily     memantine ER 28 MG Cp24 extended release capsule  Commonly known as: NAMENDA XR  Take 1 capsule by mouth daily     metoprolol tartrate 25 MG tablet  Commonly known as: LOPRESSOR  Take 0.5 tablets by mouth with breakfast and with evening meal     pantoprazole 40 MG tablet  Commonly known as: PROTONIX  Take 1 tablet by mouth every morning (before breakfast)     Vitamin D (Cholecalciferol) 50 MCG (2000 UT) Caps  Take 1 capsule by mouth daily               Where to Get Your Medications        These medications were sent to [facility] [address] [place], [place] - [address] - P [phone] - F [phone]  [address] [facility] [address] , [facility] [address]      Phone: [phone]   albuterol sulfate  (90 Base) MCG/ACT inhaler  memantine ER 28 MG Cp24 extended release capsule  miconazole 2 % vaginal cream  Spacer/Aero-Holding Shelbie Chafe          Medications marked \"taking\" at this time  Outpatient Medications Marked as Taking for the 3/2/23 encounter (Office Visit) with SIRENA Villarreal CNP   Medication Sig Dispense Refill    memantine ER (NAMENDA XR) 28 MG CP24 extended release capsule Take 1 capsule by mouth daily 30 capsule 5    miconazole (MICONAZOLE 7) 2 % vaginal cream Place to vulva and vaginally nightly.  45 g 1    albuterol sulfate HFA (VENTOLIN HFA) 108 (90 Base) MCG/ACT inhaler Inhale 2 puffs into the lungs 4 times daily as needed for Wheezing 54 g 1    Spacer/Aero-Holding Chambers DARLENE 1 Device by Does not apply route daily as needed (wheeze) 1 each 0    cefpodoxime (VANTIN) 200 MG tablet Take 1 tablet by mouth 2 times daily for 5 days 10 tablet 0    magnesium oxide (MAG-OX) 400 (240 Mg) MG tablet Take 1 tablet by mouth daily 30 tablet 0    metoprolol tartrate (LOPRESSOR) 25 MG tablet Take 0.5 tablets by mouth with breakfast and with evening meal 60 tablet 3    pantoprazole (PROTONIX) 40 MG tablet Take 1 tablet by mouth every morning (before breakfast) 30 tablet 3    donepezil (ARICEPT) 5 MG tablet Take 1 tablet by mouth nightly 30 tablet 5    Vitamin D, Cholecalciferol, 50 MCG (2000 UT) CAPS Take 1 capsule by mouth daily 30 capsule 5    acetaminophen (TYLENOL) 650 MG extended release tablet Take 1,300 mg by mouth nightly as needed for Pain. Medications patient taking as of now reconciled against medications ordered at time of hospital discharge: Yes  Feeling orthostatic and weak on metoprolol, hold metoprolol    Review of Systems    Objective:    /70 (Site: Right Upper Arm, Position: Sitting, Cuff Size: Medium Adult)   Pulse (!) 108   Ht 5' 6\" (1.676 m)   SpO2 97%   BMI 25.81 kg/m²   Physical Exam    An electronic signature was used to authenticate this note.   --SIRENA Kelly - CNP
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
## 2023-03-03 LAB
ALBUMIN SERPL-MCNC: 2.9 G/DL (ref 3.2–4.6)
ALBUMIN/GLOB SERPL: 0.8 (ref 0.4–1.6)
ALP SERPL-CCNC: 56 U/L (ref 50–136)
ALT SERPL-CCNC: 20 U/L (ref 12–65)
ANION GAP SERPL CALC-SCNC: 4 MMOL/L (ref 2–11)
AST SERPL-CCNC: 25 U/L (ref 15–37)
BILIRUB SERPL-MCNC: 0.3 MG/DL (ref 0.2–1.1)
BUN SERPL-MCNC: 6 MG/DL (ref 8–23)
CALCIUM SERPL-MCNC: 9 MG/DL (ref 8.3–10.4)
CHLORIDE SERPL-SCNC: 102 MMOL/L (ref 101–110)
CO2 SERPL-SCNC: 30 MMOL/L (ref 21–32)
CREAT SERPL-MCNC: 0.5 MG/DL (ref 0.6–1)
GLOBULIN SER CALC-MCNC: 3.5 G/DL (ref 2.8–4.5)
GLUCOSE SERPL-MCNC: 135 MG/DL (ref 65–100)
MAGNESIUM SERPL-MCNC: 2.3 MG/DL (ref 1.8–2.4)
POTASSIUM SERPL-SCNC: 3.6 MMOL/L (ref 3.5–5.1)
PROT SERPL-MCNC: 6.4 G/DL (ref 6.3–8.2)
SODIUM SERPL-SCNC: 136 MMOL/L (ref 133–143)

## 2023-03-06 ENCOUNTER — TELEPHONE (OUTPATIENT)
Dept: INTERNAL MEDICINE CLINIC | Facility: CLINIC | Age: 83
End: 2023-03-06

## 2023-03-15 ENCOUNTER — TELEPHONE (OUTPATIENT)
Dept: INTERNAL MEDICINE CLINIC | Facility: CLINIC | Age: 83
End: 2023-03-15

## 2023-03-15 NOTE — TELEPHONE ENCOUNTER
Received call from Carolyne with [facility] stating that she spoke with POA for patient today. She was looking for placement for patient, and has a place they are looking at. She agreed to one time  visit. She was given resources for medicaid once private pay has been exhausted.

## 2023-03-30 ENCOUNTER — OFFICE VISIT (OUTPATIENT)
Dept: INTERNAL MEDICINE CLINIC | Facility: CLINIC | Age: 83
End: 2023-03-30
Payer: MEDICARE

## 2023-03-30 VITALS
BODY MASS INDEX: 21.86 KG/M2 | TEMPERATURE: 98.1 F | HEIGHT: 66 IN | DIASTOLIC BLOOD PRESSURE: 80 MMHG | OXYGEN SATURATION: 98 % | WEIGHT: 136 LBS | SYSTOLIC BLOOD PRESSURE: 130 MMHG | HEART RATE: 85 BPM

## 2023-03-30 DIAGNOSIS — L94.3 SCLERODACTYLY: ICD-10-CM

## 2023-03-30 DIAGNOSIS — G30.9 ALZHEIMER'S DISEASE, UNSPECIFIED (CODE) (HCC): ICD-10-CM

## 2023-03-30 DIAGNOSIS — M05.79 RHEUMATOID ARTHRITIS INVOLVING MULTIPLE SITES WITH POSITIVE RHEUMATOID FACTOR (HCC): ICD-10-CM

## 2023-03-30 DIAGNOSIS — Z11.1 SCREENING-PULMONARY TB: Primary | ICD-10-CM

## 2023-03-30 PROBLEM — U07.1 ACUTE COVID-19: Status: RESOLVED | Noted: 2023-02-22 | Resolved: 2023-03-30

## 2023-03-30 PROCEDURE — 99214 OFFICE O/P EST MOD 30 MIN: CPT | Performed by: NURSE PRACTITIONER

## 2023-03-30 PROCEDURE — G8484 FLU IMMUNIZE NO ADMIN: HCPCS | Performed by: NURSE PRACTITIONER

## 2023-03-30 PROCEDURE — G8427 DOCREV CUR MEDS BY ELIG CLIN: HCPCS | Performed by: NURSE PRACTITIONER

## 2023-03-30 PROCEDURE — G8420 CALC BMI NORM PARAMETERS: HCPCS | Performed by: NURSE PRACTITIONER

## 2023-03-30 PROCEDURE — 1123F ACP DISCUSS/DSCN MKR DOCD: CPT | Performed by: NURSE PRACTITIONER

## 2023-03-30 PROCEDURE — 1090F PRES/ABSN URINE INCON ASSESS: CPT | Performed by: NURSE PRACTITIONER

## 2023-03-30 PROCEDURE — G8399 PT W/DXA RESULTS DOCUMENT: HCPCS | Performed by: NURSE PRACTITIONER

## 2023-03-30 PROCEDURE — 1036F TOBACCO NON-USER: CPT | Performed by: NURSE PRACTITIONER

## 2023-03-30 RX ORDER — DONEPEZIL HYDROCHLORIDE 5 MG/1
5 TABLET, FILM COATED ORAL NIGHTLY
Qty: 30 TABLET | Refills: 5 | Status: SHIPPED | OUTPATIENT
Start: 2023-03-30

## 2023-03-30 RX ORDER — MEMANTINE HYDROCHLORIDE 28 MG/1
28 CAPSULE, EXTENDED RELEASE ORAL DAILY
Qty: 30 CAPSULE | Refills: 5 | Status: SHIPPED | OUTPATIENT
Start: 2023-03-30

## 2023-03-30 ASSESSMENT — PATIENT HEALTH QUESTIONNAIRE - PHQ9
1. LITTLE INTEREST OR PLEASURE IN DOING THINGS: 0
SUM OF ALL RESPONSES TO PHQ QUESTIONS 1-9: 0
SUM OF ALL RESPONSES TO PHQ QUESTIONS 1-9: 0
2. FEELING DOWN, DEPRESSED OR HOPELESS: 0
SUM OF ALL RESPONSES TO PHQ QUESTIONS 1-9: 0
SUM OF ALL RESPONSES TO PHQ9 QUESTIONS 1 & 2: 0
SUM OF ALL RESPONSES TO PHQ QUESTIONS 1-9: 0
1. LITTLE INTEREST OR PLEASURE IN DOING THINGS: 0
SUM OF ALL RESPONSES TO PHQ9 QUESTIONS 1 & 2: 0
2. FEELING DOWN, DEPRESSED OR HOPELESS: 0

## 2023-03-30 ASSESSMENT — MINI MENTAL STATE EXAM
WHAT IS THE NAME OF THIS BUILDING [IN FACILITY]?/WHAT IS THE STREET ADDRESS OF THIS HOUSE [IN HOME]?: 0
WHAT DAY OF THE WEEK IS THIS?: 1
SAY: READ THE WORDS ON THE PAGE AND THEN DO WHAT IT SAYS. THEN HAND THE PERSON
THE SHEET WITH CLOSE YOUR EYES ON IT. IF THE SUBJECT READS AND DOES NOT CLOSE THEIR EYES, REPEAT UP TO THREE TIMES. SCORE ONLY IF SUBJECT CLOSES EYES.: 1
ASK THE PERSON IF HE IS RIGHT OR LEFT-HANDED. TAKE A PIECE OF PAPER AND HOLD IT UP IN
FRONT OF THE PERSON. SAY: TAKE THIS PAPER IN YOUR RIGHT/LEFT HAND (WHICHEVER IS NON-
DOMINANT), SCORE IF PAPER IS PICKED UP IN CORRECT HAND.: 1
SAY: PUT THE PAPER DOWN ON THE FLOOR, SCORE IF PAPER IS PLACED BACK ON FLOOR: 1
WHAT MONTH IS THIS?: 1
SHOW: PENCIL [OBJECT] ASK: WHAT IS THIS CALLED?: 1
NOW WHAT WERE THE THREE OBJECTS I ASKED YOU TO REMEMBER?: 0
SAY: I AM GOING TO NAME THREE OBJECTS. WHEN I AM FINISHED, I WANT YOU TO REPEAT
THEM. REMEMBER WHAT THEY ARE BECAUSE I AM GOING TO ASK YOU TO NAME THEM AGAIN IN
A FEW MINUTES.  SAY THE FOLLOWING WORDS SLOWLY AT 1-SECOND INTERVALS - BALL/ CAR/ MAN [ITERATIONS FOR REPEAT ADMINISTRATION]: 3
PLACE DESIGN, ERASER AND PENCIL IN FRONT OF THE PERSON.  SAY:  COPY THIS DESIGN PLEASE.  SHOW: DESIGN. ALLOW: MULTIPLE TRIES. WAIT UNTIL PERSON IS FINISHED AND HANDS IT BACK. SCORE: ONLY FOR DIAGRAM WITH 4-SIDED FIGURE BETWEEN TWO 5-SIDED FIGURES: 0
SAY: I WOULD LIKE YOU TO COUNT BACKWARD FROM 100 BY SEVENS: 2
WHAT FLOOR ARE WE ON [IN FACILITY]?/ WHAT ROOM ARE WE IN [IN HOME]?: 1
SAY: FOLD THE PAPER IN HALF ONCE WITH BOTH HANDS, SCORE IF PAPER IS CORRECTLY FOLDED IN HALF.: 1
WHICH SEASON IS THIS?: 1
WHAT YEAR IS THIS?: 0
WHAT IS TODAY'S DATE?: 0
SUM ALL MMSE QUESTIONS FOR TOTAL SCORE [OUT OF 30].: 19
WHAT CITY/TOWN ARE WE IN?: 1
WHAT STATE [OR PROVINCE] ARE WE IN?: 1
HAND THE PERSON A PENCIL AND PAPER. SAY: WRITE ANY COMPLETE SENTENCE ON THAT
PIECE OF PAPER. (NOTE: THE SENTENCE MUST MAKE SENSE. IGNORE SPELLING ERRORS): 0
WHAT COUNTRY ARE WE IN?: 1
SHOW: WRISTWATCH [OBJECT] ASK: WHAT IS THIS CALLED?: 1
SAY: I WOULD LIKE YOU TO REPEAT THIS PHRASE AFTER ME: NO IFS, ANDS, OR BUTS.: 1

## 2023-03-30 NOTE — PROGRESS NOTES
Rafaela Cabrera (:  1940) is a 80 y.o. female,Established patient, here for evaluation of the following chief complaint(s):  Memory Loss         ASSESSMENT/PLAN:  1. Screening-pulmonary TB  -     QuantiFERON In Tube; Future  2. Alzheimer's disease, unspecified (CODE) ([facility] [address]  3. Rheumatoid arthritis involving multiple sites with positive rheumatoid factor ([facility] [address]  4. Sclerodactyly    No follow-ups on file. Screen for TB with quantiferon  Would benefit from assisted living   Discussed with patient and POA  RA, limited ROM in hands and difficulty writing   MMSE 19 today, continue aricept and namenda      Subjective   SUBJECTIVE/OBJECTIVE:  Patient is here for f/u and forms for assisted living with her POA. She is now unsure she wants to go to the assisted living. She is having home health and aids  at home. She is doing better with the care. Her pressure ulcer is clearing up. Review of Systems       Objective   Physical Exam  Vitals reviewed. Constitutional:       Appearance: Normal appearance. HENT:      Head: Normocephalic. Right Ear: External ear normal.      Left Ear: External ear normal.   Eyes:      Pupils: Pupils are equal, round, and reactive to light. Cardiovascular:      Rate and Rhythm: Normal rate and regular rhythm. Pulmonary:      Effort: Pulmonary effort is normal.      Breath sounds: Normal breath sounds. Musculoskeletal:      Cervical back: Neck supple. Neurological:      General: No focal deficit present. Mental Status: She is alert and oriented to person, place, and time. Psychiatric:         Mood and Affect: Mood normal.                An electronic signature was used to authenticate this note.     --SIRENA Renae - CNP

## 2023-03-31 ENCOUNTER — NURSE ONLY (OUTPATIENT)
Dept: INTERNAL MEDICINE CLINIC | Facility: CLINIC | Age: 83
End: 2023-03-31

## 2023-03-31 DIAGNOSIS — Z11.1 SCREENING-PULMONARY TB: ICD-10-CM

## 2023-04-04 ENCOUNTER — TELEPHONE (OUTPATIENT)
Dept: INTERNAL MEDICINE CLINIC | Facility: CLINIC | Age: 83
End: 2023-04-04

## 2023-04-04 DIAGNOSIS — Z11.1 SCREENING-PULMONARY TB: Primary | ICD-10-CM

## 2023-04-04 NOTE — TELEPHONE ENCOUNTER
Called Suze. The quantiferon was cancelled by our lab.  Will reprint requisition for patient to take to [facility]

## 2023-04-06 DIAGNOSIS — Z11.1 SCREENING-PULMONARY TB: Primary | ICD-10-CM

## 2023-11-13 ENCOUNTER — OFFICE VISIT (OUTPATIENT)
Dept: INTERNAL MEDICINE CLINIC | Facility: CLINIC | Age: 83
End: 2023-11-13
Payer: MEDICARE

## 2023-11-13 VITALS
HEIGHT: 66 IN | WEIGHT: 137 LBS | DIASTOLIC BLOOD PRESSURE: 60 MMHG | TEMPERATURE: 97.7 F | OXYGEN SATURATION: 97 % | BODY MASS INDEX: 22.02 KG/M2 | SYSTOLIC BLOOD PRESSURE: 120 MMHG | HEART RATE: 80 BPM

## 2023-11-13 DIAGNOSIS — R41.89 COGNITIVE DECLINE: ICD-10-CM

## 2023-11-13 DIAGNOSIS — H57.12 EYE DISCOMFORT, LEFT: Primary | ICD-10-CM

## 2023-11-13 DIAGNOSIS — L30.9 ECZEMA, UNSPECIFIED TYPE: ICD-10-CM

## 2023-11-13 DIAGNOSIS — E78.2 MIXED HYPERLIPIDEMIA: ICD-10-CM

## 2023-11-13 DIAGNOSIS — E55.9 VITAMIN D DEFICIENCY: ICD-10-CM

## 2023-11-13 PROCEDURE — 1123F ACP DISCUSS/DSCN MKR DOCD: CPT | Performed by: INTERNAL MEDICINE

## 2023-11-13 PROCEDURE — G8399 PT W/DXA RESULTS DOCUMENT: HCPCS | Performed by: INTERNAL MEDICINE

## 2023-11-13 PROCEDURE — G8484 FLU IMMUNIZE NO ADMIN: HCPCS | Performed by: INTERNAL MEDICINE

## 2023-11-13 PROCEDURE — 99214 OFFICE O/P EST MOD 30 MIN: CPT | Performed by: INTERNAL MEDICINE

## 2023-11-13 PROCEDURE — G8427 DOCREV CUR MEDS BY ELIG CLIN: HCPCS | Performed by: INTERNAL MEDICINE

## 2023-11-13 PROCEDURE — 1036F TOBACCO NON-USER: CPT | Performed by: INTERNAL MEDICINE

## 2023-11-13 PROCEDURE — 1090F PRES/ABSN URINE INCON ASSESS: CPT | Performed by: INTERNAL MEDICINE

## 2023-11-13 PROCEDURE — G8420 CALC BMI NORM PARAMETERS: HCPCS | Performed by: INTERNAL MEDICINE

## 2023-11-13 RX ORDER — VITAMIN E 268 MG
400 CAPSULE ORAL DAILY
COMMUNITY

## 2023-11-13 RX ORDER — ERGOCALCIFEROL 1.25 MG/1
50000 CAPSULE ORAL WEEKLY
COMMUNITY

## 2023-11-13 ASSESSMENT — ENCOUNTER SYMPTOMS: ABDOMINAL PAIN: 0

## 2023-11-13 NOTE — PROGRESS NOTES
using  the 2021 CKD-EPI equation. Careful clinical correlation is recommended, particularly when comparing to results calculated using previous equations. The CKD-EPI equation is less accurate in patients with extremes of muscle mass, extra-renal metabolism of creatinine, excessive creatine ingestion, or following therapy that affects renal tubular secretion.       Calcium 03/02/2023 9.0  8.3 - 10.4 MG/DL Final    Total Bilirubin 03/02/2023 0.3  0.2 - 1.1 MG/DL Final    ALT 03/02/2023 20  12 - 65 U/L Final    AST 03/02/2023 25  15 - 37 U/L Final    Alk Phosphatase 03/02/2023 56  50 - 136 U/L Final    Total Protein 03/02/2023 6.4  6.3 - 8.2 g/dL Final    Albumin 03/02/2023 2.9 (L)  3.2 - 4.6 g/dL Final    Globulin 03/02/2023 3.5  2.8 - 4.5 g/dL Final    Albumin/Globulin Ratio 03/02/2023 0.8  0.4 - 1.6   Final    WBC 03/02/2023 7.2  4.3 - 11.1 K/uL Final    RBC 03/02/2023 4.38  4.05 - 5.2 M/uL Final    Hemoglobin 03/02/2023 13.0  11.7 - 15.4 g/dL Final    Hematocrit 03/02/2023 40.3  35.8 - 46.3 % Final    MCV 03/02/2023 92.0  82 - 102 FL Final    MCH 03/02/2023 29.7  26.1 - 32.9 PG Final    MCHC 03/02/2023 32.3  31.4 - 35.0 g/dL Final    RDW 03/02/2023 13.3  11.9 - 14.6 % Final    Platelets 77/00/6048 484 (H)  150 - 450 K/uL Final    MPV 03/02/2023 9.9  9.4 - 12.3 FL Final    nRBC 03/02/2023 0.00  0.0 - 0.2 K/uL Final    **Note: Absolute NRBC parameter is now reported with Hemogram**    Differential Type 03/02/2023 AUTOMATED    Final    Seg Neutrophils 03/02/2023 58  43 - 78 % Final    Lymphocytes 03/02/2023 28  13 - 44 % Final    Monocytes 03/02/2023 9  4.0 - 12.0 % Final    Eosinophils % 03/02/2023 3  0.5 - 7.8 % Final    Basophils 03/02/2023 1  0.0 - 2.0 % Final    Immature Granulocytes 03/02/2023 1  0.0 - 5.0 % Final    Segs Absolute 03/02/2023 4.3  1.7 - 8.2 K/UL Final    Absolute Lymph # 03/02/2023 2.0  0.5 - 4.6 K/UL Final    Absolute Mono # 03/02/2023 0.6  0.1 - 1.3 K/UL Final    Absolute Eos # 03/02/2023 0.2

## 2024-02-22 ENCOUNTER — TELEPHONE (OUTPATIENT)
Dept: INTERNAL MEDICINE CLINIC | Facility: CLINIC | Age: 84
End: 2024-02-22

## 2024-02-22 NOTE — TELEPHONE ENCOUNTER
Pt Daughter (POA, but not on DEEPTI) called, requ time of appt. Pt was not present, informed her I was not able to give any information due to her not being on DEEPTI paperwork.

## 2024-02-28 ENCOUNTER — TELEPHONE (OUTPATIENT)
Dept: INTERNAL MEDICINE CLINIC | Facility: CLINIC | Age: 84
End: 2024-02-28

## 2024-03-22 ENCOUNTER — OFFICE VISIT (OUTPATIENT)
Dept: INTERNAL MEDICINE CLINIC | Facility: CLINIC | Age: 84
End: 2024-03-22
Payer: MEDICARE

## 2024-03-22 VITALS
SYSTOLIC BLOOD PRESSURE: 132 MMHG | DIASTOLIC BLOOD PRESSURE: 60 MMHG | BODY MASS INDEX: 20.7 KG/M2 | TEMPERATURE: 98.1 F | HEIGHT: 66 IN | OXYGEN SATURATION: 96 % | RESPIRATION RATE: 16 BRPM | HEART RATE: 87 BPM | WEIGHT: 128.8 LBS

## 2024-03-22 DIAGNOSIS — Z76.89 ENCOUNTER TO ESTABLISH CARE WITH NEW DOCTOR: Primary | ICD-10-CM

## 2024-03-22 DIAGNOSIS — M05.79 RHEUMATOID ARTHRITIS INVOLVING MULTIPLE SITES WITH POSITIVE RHEUMATOID FACTOR (HCC): ICD-10-CM

## 2024-03-22 DIAGNOSIS — E55.9 VITAMIN D DEFICIENCY: ICD-10-CM

## 2024-03-22 DIAGNOSIS — Z85.3 HISTORY OF BREAST CANCER: ICD-10-CM

## 2024-03-22 DIAGNOSIS — E78.2 MIXED HYPERLIPIDEMIA: ICD-10-CM

## 2024-03-22 DIAGNOSIS — G30.9 ALZHEIMER'S DISEASE, UNSPECIFIED (CODE) (HCC): ICD-10-CM

## 2024-03-22 DIAGNOSIS — M81.0 AGE-RELATED OSTEOPOROSIS WITHOUT CURRENT PATHOLOGICAL FRACTURE: ICD-10-CM

## 2024-03-22 PROBLEM — J69.0 ASPIRATION PNEUMONIA OF LEFT LUNG, UNSPECIFIED ASPIRATION PNEUMONIA TYPE, UNSPECIFIED PART OF LUNG (HCC): Status: RESOLVED | Noted: 2023-02-24 | Resolved: 2024-03-22

## 2024-03-22 PROBLEM — Z17.0 MALIGNANT NEOPLASM OF LOWER-OUTER QUADRANT OF RIGHT BREAST OF FEMALE, ESTROGEN RECEPTOR POSITIVE (HCC): Status: RESOLVED | Noted: 2019-02-28 | Resolved: 2024-03-22

## 2024-03-22 PROBLEM — C50.511 MALIGNANT NEOPLASM OF LOWER-OUTER QUADRANT OF RIGHT BREAST OF FEMALE, ESTROGEN RECEPTOR POSITIVE (HCC): Status: RESOLVED | Noted: 2019-02-28 | Resolved: 2024-03-22

## 2024-03-22 PROCEDURE — G8399 PT W/DXA RESULTS DOCUMENT: HCPCS | Performed by: INTERNAL MEDICINE

## 2024-03-22 PROCEDURE — G8427 DOCREV CUR MEDS BY ELIG CLIN: HCPCS | Performed by: INTERNAL MEDICINE

## 2024-03-22 PROCEDURE — 1123F ACP DISCUSS/DSCN MKR DOCD: CPT | Performed by: INTERNAL MEDICINE

## 2024-03-22 PROCEDURE — G8420 CALC BMI NORM PARAMETERS: HCPCS | Performed by: INTERNAL MEDICINE

## 2024-03-22 PROCEDURE — 1090F PRES/ABSN URINE INCON ASSESS: CPT | Performed by: INTERNAL MEDICINE

## 2024-03-22 PROCEDURE — 1036F TOBACCO NON-USER: CPT | Performed by: INTERNAL MEDICINE

## 2024-03-22 PROCEDURE — G8484 FLU IMMUNIZE NO ADMIN: HCPCS | Performed by: INTERNAL MEDICINE

## 2024-03-22 PROCEDURE — 99215 OFFICE O/P EST HI 40 MIN: CPT | Performed by: INTERNAL MEDICINE

## 2024-03-22 RX ORDER — MEMANTINE HYDROCHLORIDE 28 MG/1
28 CAPSULE, EXTENDED RELEASE ORAL DAILY
COMMUNITY
Start: 2024-03-05

## 2024-03-22 RX ORDER — ESCITALOPRAM OXALATE 10 MG/1
10 TABLET ORAL DAILY
COMMUNITY
Start: 2024-03-18 | End: 2025-03-18

## 2024-03-22 SDOH — ECONOMIC STABILITY: FOOD INSECURITY: WITHIN THE PAST 12 MONTHS, YOU WORRIED THAT YOUR FOOD WOULD RUN OUT BEFORE YOU GOT MONEY TO BUY MORE.: PATIENT DECLINED

## 2024-03-22 SDOH — ECONOMIC STABILITY: FOOD INSECURITY: WITHIN THE PAST 12 MONTHS, THE FOOD YOU BOUGHT JUST DIDN'T LAST AND YOU DIDN'T HAVE MONEY TO GET MORE.: PATIENT DECLINED

## 2024-03-22 SDOH — ECONOMIC STABILITY: INCOME INSECURITY: HOW HARD IS IT FOR YOU TO PAY FOR THE VERY BASICS LIKE FOOD, HOUSING, MEDICAL CARE, AND HEATING?: PATIENT DECLINED

## 2024-03-22 SDOH — ECONOMIC STABILITY: HOUSING INSECURITY
IN THE LAST 12 MONTHS, WAS THERE A TIME WHEN YOU DID NOT HAVE A STEADY PLACE TO SLEEP OR SLEPT IN A SHELTER (INCLUDING NOW)?: PATIENT DECLINED

## 2024-03-22 ASSESSMENT — ENCOUNTER SYMPTOMS
SHORTNESS OF BREATH: 0
BLOOD IN STOOL: 0
VOMITING: 0
SINUS PRESSURE: 0
ABDOMINAL PAIN: 0
DIARRHEA: 0
COUGH: 0
RHINORRHEA: 0
NAUSEA: 0
CHEST TIGHTNESS: 0

## 2024-03-22 ASSESSMENT — PATIENT HEALTH QUESTIONNAIRE - PHQ9
2. FEELING DOWN, DEPRESSED OR HOPELESS: NOT AT ALL
SUM OF ALL RESPONSES TO PHQ QUESTIONS 1-9: 0
SUM OF ALL RESPONSES TO PHQ9 QUESTIONS 1 & 2: 0
SUM OF ALL RESPONSES TO PHQ QUESTIONS 1-9: 0
1. LITTLE INTEREST OR PLEASURE IN DOING THINGS: NOT AT ALL

## 2024-03-22 NOTE — PROGRESS NOTES
[facility]      3/22/2024    Patient Name: Yi Tatum  :  1940    Subjective:    Chief Complaint:  Chief Complaint   Patient presents with    New Patient     NP here to establish care w/ PCP.          HPI Here to establish care, was previously seeing Dr. Clancy in [place];   She has Alzheimer's dementia, saw Cherly GUPTA at [facility] and is to f/u in April; she lives alone, daughter takes her to appointments and gets groceries and assists as she can; daughter feels like she is confused often; she is no longer taking Aricept, felt \"dizzy\"; she does take Namenda XR 28 mg qd and Lexapro 10 mg qd; daughter fixes her pill boxes;   She has hx of OA, RA, has not seen Rheumatologist, Dr. Campo, since ;   She has hx of breast ca, s/p R mastectomy; she declined further mammo;   She has osteoporosis, taking weekly vitamin D supplement, taking daily calcium and vitamin D Supplement; she has never taken Boniva, Fosamax or other meds for osteoporosis;     Past Medical History:   Diagnosis Date    Anemia, unspecified     patient denies hx of anemia    Arthritis     osteoarthritis    Breast cancer ([facility]) 2019    Cancer ([facility]) 2013    right breast lumpectomy    Chronic pain     d/t rheumatoid arthritis    HTN (hypertension) 2012    patient states not a current problem, no medication.     Hypercholesterolemia     no meds    Ill-defined condition     \"I can't take anything strong\"    Low serum vitamin D 3/3/2022    Menopause     Nausea & vomiting     patient denies post-op N/V    Osteoarthritis of both shoulders     Osteoarthritis of right hip     Followed by Dr. Gerald Hernandez historian     Rheumatoid arthritis ([facility])     Patient does not see Rheumatologist; no prescription medication.     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

## 2024-03-26 ENCOUNTER — CARE COORDINATION (OUTPATIENT)
Dept: CARE COORDINATION | Facility: CLINIC | Age: 84
End: 2024-03-26

## 2024-03-27 ENCOUNTER — CARE COORDINATION (OUTPATIENT)
Dept: CARE COORDINATION | Facility: CLINIC | Age: 84
End: 2024-03-27

## 2024-03-27 NOTE — CARE COORDINATION
YOSEPH RESENDEZ received return VM from pt's dx Milka.  YOSEPH RESENDEZ reached back out.  No answer-left VM.  Will wait to hear back from her.

## 2024-03-28 ENCOUNTER — CARE COORDINATION (OUTPATIENT)
Dept: CARE COORDINATION | Facility: CLINIC | Age: 84
End: 2024-03-28

## 2024-03-28 NOTE — CARE COORDINATION
Initial Contact Social Work Note - Ambulatory  3/28/2024      Date of referral: 3.28.24  Referral received from: PCP  Reason for referral: resources    Previous SW referral: No  If yes, brief summary of outcome:     Two Identifiers Verified: Yes    Insurance Provider: [employer]    Support System:  Adult Child/Children     Status:     Transportation Concern: none    Medication Cost Concern: none     Medication Adherence Concern: none    Financial Concern(s): none    Income (only if applicable):     Ability to Read/Write: Yes    Advance Care Plan:   dx Milka is POA    Other: Pt lives alone.  Dx Milka is POA.  Pt recently fired in-home aide due to cost.  Reluctant to have strangers in her home.  Dx concerned about med mgmt but pt will not let her help.  Pt has [employer].  Spouse  unexpectedly and per dx, pt has not been the same since.  Dx feeling overwhelmed and does not know how to help.    Identified Needs:  Pt lives alone and refuses help in the home.  Dx feeling frustrated and overwhelmed     Social Work Plan:  Discussed restart of aide,  discussed PCP encouraging pt to let people help her, discussed APS and [place]  Next Steps: remain on care team for 30 days    Method of Communication With Provider (if appropriate): Chart Routing       Goals Addressed    None

## 2024-04-29 ENCOUNTER — CARE COORDINATION (OUTPATIENT)
Dept: CARE COORDINATION | Facility: CLINIC | Age: 84
End: 2024-04-29

## 2024-05-09 ENCOUNTER — CARE COORDINATION (OUTPATIENT)
Dept: CARE COORDINATION | Facility: CLINIC | Age: 84
End: 2024-05-09

## 2024-05-09 NOTE — CARE COORDINATION
No return call from pt's dx.  Resources provided during initial encounter.  YOSEPH CM removed from care team for now.  Can rejoin in the future if indicated.

## 2024-05-22 ENCOUNTER — APPOINTMENT (OUTPATIENT)
Dept: GENERAL RADIOLOGY | Age: 84
DRG: 181 | End: 2024-05-22
Payer: MEDICARE

## 2024-05-22 ENCOUNTER — HOSPITAL ENCOUNTER (INPATIENT)
Age: 84
LOS: 8 days | Discharge: HOME HEALTH | DRG: 181 | End: 2024-05-30
Attending: EMERGENCY MEDICINE | Admitting: FAMILY MEDICINE
Payer: MEDICARE

## 2024-05-22 DIAGNOSIS — J90 PLEURAL EFFUSION ON RIGHT: Primary | ICD-10-CM

## 2024-05-22 DIAGNOSIS — J90 PLEURAL EFFUSION: ICD-10-CM

## 2024-05-22 LAB
ALBUMIN SERPL-MCNC: 3.1 G/DL (ref 3.2–4.6)
ALBUMIN/GLOB SERPL: 1 (ref 1–1.9)
ALP SERPL-CCNC: 87 U/L (ref 35–104)
ALT SERPL-CCNC: 7 U/L (ref 12–65)
ANION GAP SERPL CALC-SCNC: 11 MMOL/L (ref 9–18)
AST SERPL-CCNC: 25 U/L (ref 15–37)
BASOPHILS # BLD: 0 K/UL (ref 0–0.2)
BASOPHILS NFR BLD: 0 % (ref 0–2)
BILIRUB SERPL-MCNC: 0.3 MG/DL (ref 0–1.2)
BUN SERPL-MCNC: 10 MG/DL (ref 8–23)
CALCIUM SERPL-MCNC: 9.1 MG/DL (ref 8.8–10.2)
CHLORIDE SERPL-SCNC: 98 MMOL/L (ref 98–107)
CO2 SERPL-SCNC: 24 MMOL/L (ref 20–28)
CREAT SERPL-MCNC: 0.6 MG/DL (ref 0.6–1.1)
DIFFERENTIAL METHOD BLD: NORMAL
EOSINOPHIL # BLD: 0.1 K/UL (ref 0–0.8)
EOSINOPHIL NFR BLD: 2 % (ref 0.5–7.8)
ERYTHROCYTE [DISTWIDTH] IN BLOOD BY AUTOMATED COUNT: 12.9 % (ref 11.9–14.6)
GLOBULIN SER CALC-MCNC: 3.2 G/DL (ref 2.3–3.5)
GLUCOSE SERPL-MCNC: 101 MG/DL (ref 70–99)
HCT VFR BLD AUTO: 42.6 % (ref 35.8–46.3)
HGB BLD-MCNC: 14.1 G/DL (ref 11.7–15.4)
IMM GRANULOCYTES # BLD AUTO: 0 K/UL (ref 0–0.5)
IMM GRANULOCYTES NFR BLD AUTO: 0 % (ref 0–5)
LYMPHOCYTES # BLD: 1.8 K/UL (ref 0.5–4.6)
LYMPHOCYTES NFR BLD: 30 % (ref 13–44)
MAGNESIUM SERPL-MCNC: 1.9 MG/DL (ref 1.8–2.4)
MCH RBC QN AUTO: 28.1 PG (ref 26.1–32.9)
MCHC RBC AUTO-ENTMCNC: 33.1 G/DL (ref 31.4–35)
MCV RBC AUTO: 84.9 FL (ref 82–102)
MONOCYTES # BLD: 0.6 K/UL (ref 0.1–1.3)
MONOCYTES NFR BLD: 10 % (ref 4–12)
NEUTS SEG # BLD: 3.4 K/UL (ref 1.7–8.2)
NEUTS SEG NFR BLD: 58 % (ref 43–78)
NRBC # BLD: 0 K/UL (ref 0–0.2)
PLATELET # BLD AUTO: 283 K/UL (ref 150–450)
PMV BLD AUTO: 9.4 FL (ref 9.4–12.3)
POTASSIUM SERPL-SCNC: 3.7 MMOL/L (ref 3.5–5.1)
PROT SERPL-MCNC: 6.3 G/DL (ref 6.3–8.2)
RBC # BLD AUTO: 5.02 M/UL (ref 4.05–5.2)
SODIUM SERPL-SCNC: 133 MMOL/L (ref 136–145)
WBC # BLD AUTO: 5.9 K/UL (ref 4.3–11.1)

## 2024-05-22 PROCEDURE — 1100000000 HC RM PRIVATE

## 2024-05-22 PROCEDURE — 80053 COMPREHEN METABOLIC PANEL: CPT

## 2024-05-22 PROCEDURE — 85025 COMPLETE CBC W/AUTO DIFF WBC: CPT

## 2024-05-22 PROCEDURE — 2580000003 HC RX 258: Performed by: FAMILY MEDICINE

## 2024-05-22 PROCEDURE — 93005 ELECTROCARDIOGRAM TRACING: CPT | Performed by: EMERGENCY MEDICINE

## 2024-05-22 PROCEDURE — 71045 X-RAY EXAM CHEST 1 VIEW: CPT

## 2024-05-22 PROCEDURE — 83735 ASSAY OF MAGNESIUM: CPT

## 2024-05-22 PROCEDURE — 99285 EMERGENCY DEPT VISIT HI MDM: CPT

## 2024-05-22 PROCEDURE — 94762 N-INVAS EAR/PLS OXIMTRY CONT: CPT

## 2024-05-22 RX ORDER — MAGNESIUM SULFATE IN WATER 40 MG/ML
2000 INJECTION, SOLUTION INTRAVENOUS PRN
Status: DISCONTINUED | OUTPATIENT
Start: 2024-05-22 | End: 2024-05-30 | Stop reason: HOSPADM

## 2024-05-22 RX ORDER — ACETAMINOPHEN 650 MG/1
650 SUPPOSITORY RECTAL EVERY 6 HOURS PRN
Status: DISCONTINUED | OUTPATIENT
Start: 2024-05-22 | End: 2024-05-30 | Stop reason: HOSPADM

## 2024-05-22 RX ORDER — POTASSIUM CHLORIDE 7.45 MG/ML
10 INJECTION INTRAVENOUS PRN
Status: DISCONTINUED | OUTPATIENT
Start: 2024-05-22 | End: 2024-05-30 | Stop reason: HOSPADM

## 2024-05-22 RX ORDER — SODIUM CHLORIDE 0.9 % (FLUSH) 0.9 %
5-40 SYRINGE (ML) INJECTION PRN
Status: DISCONTINUED | OUTPATIENT
Start: 2024-05-22 | End: 2024-05-30 | Stop reason: HOSPADM

## 2024-05-22 RX ORDER — POLYETHYLENE GLYCOL 3350 17 G/17G
17 POWDER, FOR SOLUTION ORAL DAILY PRN
Status: DISCONTINUED | OUTPATIENT
Start: 2024-05-22 | End: 2024-05-30 | Stop reason: HOSPADM

## 2024-05-22 RX ORDER — MEMANTINE HYDROCHLORIDE 5 MG/1
10 TABLET ORAL 2 TIMES DAILY
Status: DISCONTINUED | OUTPATIENT
Start: 2024-05-23 | End: 2024-05-30 | Stop reason: HOSPADM

## 2024-05-22 RX ORDER — ESCITALOPRAM OXALATE 10 MG/1
10 TABLET ORAL DAILY
Status: DISCONTINUED | OUTPATIENT
Start: 2024-05-23 | End: 2024-05-30 | Stop reason: HOSPADM

## 2024-05-22 RX ORDER — SODIUM CHLORIDE 9 MG/ML
INJECTION, SOLUTION INTRAVENOUS PRN
Status: DISCONTINUED | OUTPATIENT
Start: 2024-05-22 | End: 2024-05-30 | Stop reason: HOSPADM

## 2024-05-22 RX ORDER — FUROSEMIDE 10 MG/ML
40 INJECTION INTRAMUSCULAR; INTRAVENOUS DAILY
Status: DISCONTINUED | OUTPATIENT
Start: 2024-05-23 | End: 2024-05-30 | Stop reason: HOSPADM

## 2024-05-22 RX ORDER — ACETAMINOPHEN 325 MG/1
650 TABLET ORAL EVERY 6 HOURS PRN
Status: DISCONTINUED | OUTPATIENT
Start: 2024-05-22 | End: 2024-05-30 | Stop reason: HOSPADM

## 2024-05-22 RX ORDER — POTASSIUM CHLORIDE 20 MEQ/1
40 TABLET, EXTENDED RELEASE ORAL PRN
Status: DISCONTINUED | OUTPATIENT
Start: 2024-05-22 | End: 2024-05-30 | Stop reason: HOSPADM

## 2024-05-22 RX ORDER — ONDANSETRON 4 MG/1
4 TABLET, ORALLY DISINTEGRATING ORAL EVERY 8 HOURS PRN
Status: DISCONTINUED | OUTPATIENT
Start: 2024-05-22 | End: 2024-05-30 | Stop reason: HOSPADM

## 2024-05-22 RX ORDER — ENOXAPARIN SODIUM 100 MG/ML
40 INJECTION SUBCUTANEOUS DAILY
Status: DISCONTINUED | OUTPATIENT
Start: 2024-05-23 | End: 2024-05-30 | Stop reason: HOSPADM

## 2024-05-22 RX ORDER — SODIUM CHLORIDE 0.9 % (FLUSH) 0.9 %
5-40 SYRINGE (ML) INJECTION EVERY 12 HOURS SCHEDULED
Status: DISCONTINUED | OUTPATIENT
Start: 2024-05-22 | End: 2024-05-30 | Stop reason: HOSPADM

## 2024-05-22 RX ORDER — ONDANSETRON 2 MG/ML
4 INJECTION INTRAMUSCULAR; INTRAVENOUS EVERY 6 HOURS PRN
Status: DISCONTINUED | OUTPATIENT
Start: 2024-05-22 | End: 2024-05-30 | Stop reason: HOSPADM

## 2024-05-22 RX ADMIN — SODIUM CHLORIDE, PRESERVATIVE FREE 10 ML: 5 INJECTION INTRAVENOUS at 22:22

## 2024-05-22 ASSESSMENT — PAIN - FUNCTIONAL ASSESSMENT: PAIN_FUNCTIONAL_ASSESSMENT: 0-10

## 2024-05-22 ASSESSMENT — PAIN SCALES - GENERAL: PAINLEVEL_OUTOF10: 0
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
## 2024-05-22 NOTE — ED TRIAGE NOTES
Pt coming from home via [facility]. Pt states she feels like \"liquid is stuck in her throat\" and it is making her SOB. Pt also states she had one episode of bloody diarrhea yesterday and is coughing up red phlegm     EMS vitals; /62, HR 84, 94% RA, Bgl 189, afebrile

## 2024-05-23 ENCOUNTER — APPOINTMENT (OUTPATIENT)
Dept: NON INVASIVE DIAGNOSTICS | Age: 84
DRG: 181 | End: 2024-05-23
Attending: FAMILY MEDICINE
Payer: MEDICARE

## 2024-05-23 ENCOUNTER — APPOINTMENT (OUTPATIENT)
Dept: GENERAL RADIOLOGY | Age: 84
DRG: 181 | End: 2024-05-23
Payer: MEDICARE

## 2024-05-23 LAB
ANION GAP SERPL CALC-SCNC: 10 MMOL/L (ref 9–18)
APPEARANCE FLD: NORMAL
BASOPHILS # BLD: 0 K/UL (ref 0–0.2)
BASOPHILS NFR BLD: 0 % (ref 0–2)
BUN SERPL-MCNC: 7 MG/DL (ref 8–23)
CALCIUM SERPL-MCNC: 8.8 MG/DL (ref 8.8–10.2)
CHLORIDE SERPL-SCNC: 101 MMOL/L (ref 98–107)
CO2 SERPL-SCNC: 26 MMOL/L (ref 20–28)
COLOR FLD: YELLOW
CREAT SERPL-MCNC: 0.49 MG/DL (ref 0.6–1.1)
DIFFERENTIAL METHOD BLD: ABNORMAL
ECHO AO ASC DIAM: 2.9 CM
ECHO AO ASCENDING AORTA INDEX: 1.73 CM/M2
ECHO AO ROOT DIAM: 2.9 CM
ECHO AO ROOT INDEX: 1.73 CM/M2
ECHO AR MAX VEL PISA: 3.9 M/S
ECHO AV AREA PEAK VELOCITY: 2.2 CM2
ECHO AV AREA VTI: 2.5 CM2
ECHO AV AREA/BSA PEAK VELOCITY: 1.3 CM2/M2
ECHO AV AREA/BSA VTI: 1.5 CM2/M2
ECHO AV MEAN GRADIENT: 4 MMHG
ECHO AV MEAN VELOCITY: 0.9 M/S
ECHO AV PEAK GRADIENT: 8 MMHG
ECHO AV PEAK VELOCITY: 1.4 M/S
ECHO AV REGURGITANT PHT: 330 MS
ECHO AV VELOCITY RATIO: 0.71
ECHO AV VTI: 24.1 CM
ECHO BSA: 1.66 M2
ECHO EST RA PRESSURE: 3 MMHG
ECHO LA AREA 2C: 12.6 CM2
ECHO LA AREA 4C: 9.5 CM2
ECHO LA DIAMETER INDEX: 1.37 CM/M2
ECHO LA DIAMETER: 2.3 CM
ECHO LA MAJOR AXIS: 4 CM
ECHO LA MINOR AXIS: 4 CM
ECHO LA TO AORTIC ROOT RATIO: 0.79
ECHO LA VOL BP: 24 ML (ref 22–52)
ECHO LA VOL MOD A2C: 31 ML (ref 22–52)
ECHO LA VOL MOD A4C: 18 ML (ref 22–52)
ECHO LA VOL/BSA BIPLANE: 14 ML/M2 (ref 16–34)
ECHO LA VOLUME INDEX MOD A2C: 18 ML/M2 (ref 16–34)
ECHO LA VOLUME INDEX MOD A4C: 11 ML/M2 (ref 16–34)
ECHO LV E' LATERAL VELOCITY: 6 CM/S
ECHO LV E' SEPTAL VELOCITY: 7 CM/S
ECHO LV EDV A2C: 48 ML
ECHO LV EDV A4C: 45 ML
ECHO LV EDV INDEX A4C: 27 ML/M2
ECHO LV EDV NDEX A2C: 29 ML/M2
ECHO LV EJECTION FRACTION A2C: 66 %
ECHO LV EJECTION FRACTION A4C: 66 %
ECHO LV EJECTION FRACTION BIPLANE: 66 % (ref 55–100)
ECHO LV ESV A2C: 16 ML
ECHO LV ESV A4C: 15 ML
ECHO LV ESV INDEX A2C: 10 ML/M2
ECHO LV ESV INDEX A4C: 9 ML/M2
ECHO LV FRACTIONAL SHORTENING: 36 % (ref 28–44)
ECHO LV INTERNAL DIMENSION DIASTOLE INDEX: 2.14 CM/M2
ECHO LV INTERNAL DIMENSION DIASTOLIC: 3.6 CM (ref 3.9–5.3)
ECHO LV INTERNAL DIMENSION SYSTOLIC INDEX: 1.37 CM/M2
ECHO LV INTERNAL DIMENSION SYSTOLIC: 2.3 CM
ECHO LV IVSD: 1 CM (ref 0.6–0.9)
ECHO LV MASS 2D: 107.9 G (ref 67–162)
ECHO LV MASS INDEX 2D: 64.2 G/M2 (ref 43–95)
ECHO LV POSTERIOR WALL DIASTOLIC: 1 CM (ref 0.6–0.9)
ECHO LV RELATIVE WALL THICKNESS RATIO: 0.56
ECHO LVOT AREA: 3.1 CM2
ECHO LVOT AV VTI INDEX: 0.8
ECHO LVOT DIAM: 2 CM
ECHO LVOT MEAN GRADIENT: 2 MMHG
ECHO LVOT PEAK GRADIENT: 4 MMHG
ECHO LVOT PEAK VELOCITY: 1 M/S
ECHO LVOT STROKE VOLUME INDEX: 36.3 ML/M2
ECHO LVOT SV: 60.9 ML
ECHO LVOT VTI: 19.4 CM
ECHO MV A VELOCITY: 0.91 M/S
ECHO MV AREA VTI: 3.2 CM2
ECHO MV E DECELERATION TIME (DT): 202 MS
ECHO MV E VELOCITY: 0.69 M/S
ECHO MV E/A RATIO: 0.76
ECHO MV E/E' LATERAL: 11.5
ECHO MV E/E' RATIO (AVERAGED): 10.68
ECHO MV E/E' SEPTAL: 9.86
ECHO MV LVOT VTI INDEX: 0.99
ECHO MV MAX VELOCITY: 1.2 M/S
ECHO MV MEAN VELOCITY: 0.6 M/S
ECHO MV PEAK GRADIENT: 5 MMHG
ECHO MV VTI: 19.3 CM
ECHO PV ACCELERATION TIME (AT): 70 MS
ECHO PV MAX VELOCITY: 1.4 M/S
ECHO PV PEAK GRADIENT: 7 MMHG
ECHO RIGHT VENTRICULAR SYSTOLIC PRESSURE (RVSP): 26 MMHG
ECHO RV BASAL DIMENSION: 2.8 CM
ECHO RV INTERNAL DIMENSION: 2.5 CM
ECHO RV TAPSE: 1.3 CM (ref 1.7–?)
ECHO TV REGURGITANT MAX VELOCITY: 2.42 M/S
ECHO TV REGURGITANT PEAK GRADIENT: 23 MMHG
EKG ATRIAL RATE: 81 BPM
EKG DIAGNOSIS: NORMAL
EKG P AXIS: 83 DEGREES
EKG P-R INTERVAL: 155 MS
EKG Q-T INTERVAL: 398 MS
EKG QRS DURATION: 116 MS
EKG QTC CALCULATION (BAZETT): 462 MS
EKG R AXIS: 25 DEGREES
EKG T AXIS: 36 DEGREES
EKG VENTRICULAR RATE: 81 BPM
EOSINOPHIL # BLD: 0.1 K/UL (ref 0–0.8)
EOSINOPHIL NFR BLD: 3 % (ref 0.5–7.8)
EOSINOPHIL NFR BRONCH MANUAL: 1 %
ERYTHROCYTE [DISTWIDTH] IN BLOOD BY AUTOMATED COUNT: 12.9 % (ref 11.9–14.6)
GLUCOSE FLD-MCNC: 93 MG/DL
GLUCOSE SERPL-MCNC: 98 MG/DL (ref 70–99)
HCT VFR BLD AUTO: 41.4 % (ref 35.8–46.3)
HGB BLD-MCNC: 13.3 G/DL (ref 11.7–15.4)
IMM GRANULOCYTES # BLD AUTO: 0 K/UL (ref 0–0.5)
IMM GRANULOCYTES NFR BLD AUTO: 0 % (ref 0–5)
LDH FLD L TO P-CCNC: 256 U/L
LDH SERPL L TO P-CCNC: 185 U/L (ref 127–281)
LYMPHOCYTES # BLD: 1.7 K/UL (ref 0.5–4.6)
LYMPHOCYTES NFR BLD: 33 % (ref 13–44)
LYMPHOCYTES NFR BRONCH MANUAL: 59 %
MACROPHAGES NFR BRONCH MANUAL: 33 %
MAGNESIUM SERPL-MCNC: 1.8 MG/DL (ref 1.8–2.4)
MCH RBC QN AUTO: 27.8 PG (ref 26.1–32.9)
MCHC RBC AUTO-ENTMCNC: 32.1 G/DL (ref 31.4–35)
MCV RBC AUTO: 86.6 FL (ref 82–102)
MONOCYTES # BLD: 0.4 K/UL (ref 0.1–1.3)
MONOCYTES NFR BLD: 7 % (ref 4–12)
NEUTROPHILS NFR BRONCH MANUAL: 7 %
NEUTS SEG # BLD: 3 K/UL (ref 1.7–8.2)
NEUTS SEG NFR BLD: 57 % (ref 43–78)
NRBC # BLD: 0 K/UL (ref 0–0.2)
NUC CELL # FLD: 673 /CU MM
OTHER CELLS NFR BLD MANUAL: 6
PLATELET # BLD AUTO: 261 K/UL (ref 150–450)
PMV BLD AUTO: 9.1 FL (ref 9.4–12.3)
POTASSIUM SERPL-SCNC: 3.5 MMOL/L (ref 3.5–5.1)
PROT FLD-MCNC: 4.1 G/DL
RBC # BLD AUTO: 4.78 M/UL (ref 4.05–5.2)
RBC # FLD: 3000 /CU MM
SODIUM SERPL-SCNC: 137 MMOL/L (ref 136–145)
SPECIMEN SOURCE FLD: NORMAL
WBC # BLD AUTO: 5.3 K/UL (ref 4.3–11.1)

## 2024-05-23 PROCEDURE — 97112 NEUROMUSCULAR REEDUCATION: CPT

## 2024-05-23 PROCEDURE — 82945 GLUCOSE OTHER FLUID: CPT

## 2024-05-23 PROCEDURE — 99223 1ST HOSP IP/OBS HIGH 75: CPT | Performed by: INTERNAL MEDICINE

## 2024-05-23 PROCEDURE — 0W993ZZ DRAINAGE OF RIGHT PLEURAL CAVITY, PERCUTANEOUS APPROACH: ICD-10-PCS | Performed by: INTERNAL MEDICINE

## 2024-05-23 PROCEDURE — C1729 CATH, DRAINAGE: HCPCS | Performed by: INTERNAL MEDICINE

## 2024-05-23 PROCEDURE — 87205 SMEAR GRAM STAIN: CPT

## 2024-05-23 PROCEDURE — 1100000000 HC RM PRIVATE

## 2024-05-23 PROCEDURE — 85025 COMPLETE CBC W/AUTO DIFF WBC: CPT

## 2024-05-23 PROCEDURE — 2580000003 HC RX 258: Performed by: FAMILY MEDICINE

## 2024-05-23 PROCEDURE — 83735 ASSAY OF MAGNESIUM: CPT

## 2024-05-23 PROCEDURE — 2709999900 HC NON-CHARGEABLE SUPPLY: Performed by: INTERNAL MEDICINE

## 2024-05-23 PROCEDURE — 84157 ASSAY OF PROTEIN OTHER: CPT

## 2024-05-23 PROCEDURE — 93306 TTE W/DOPPLER COMPLETE: CPT | Performed by: INTERNAL MEDICINE

## 2024-05-23 PROCEDURE — 88305 TISSUE EXAM BY PATHOLOGIST: CPT

## 2024-05-23 PROCEDURE — 32555 ASPIRATE PLEURA W/ IMAGING: CPT | Performed by: INTERNAL MEDICINE

## 2024-05-23 PROCEDURE — 97166 OT EVAL MOD COMPLEX 45 MIN: CPT

## 2024-05-23 PROCEDURE — 71045 X-RAY EXAM CHEST 1 VIEW: CPT

## 2024-05-23 PROCEDURE — 36415 COLL VENOUS BLD VENIPUNCTURE: CPT

## 2024-05-23 PROCEDURE — 83615 LACTATE (LD) (LDH) ENZYME: CPT

## 2024-05-23 PROCEDURE — 97530 THERAPEUTIC ACTIVITIES: CPT

## 2024-05-23 PROCEDURE — 93306 TTE W/DOPPLER COMPLETE: CPT

## 2024-05-23 PROCEDURE — 87070 CULTURE OTHR SPECIMN AEROBIC: CPT

## 2024-05-23 PROCEDURE — 88112 CYTOPATH CELL ENHANCE TECH: CPT

## 2024-05-23 PROCEDURE — 80048 BASIC METABOLIC PNL TOTAL CA: CPT

## 2024-05-23 PROCEDURE — 97535 SELF CARE MNGMENT TRAINING: CPT

## 2024-05-23 PROCEDURE — 97161 PT EVAL LOW COMPLEX 20 MIN: CPT

## 2024-05-23 PROCEDURE — 6360000002 HC RX W HCPCS: Performed by: FAMILY MEDICINE

## 2024-05-23 PROCEDURE — 3609027000 HC THORACENTESIS W/ULTRASOUND: Performed by: INTERNAL MEDICINE

## 2024-05-23 PROCEDURE — 6370000000 HC RX 637 (ALT 250 FOR IP): Performed by: FAMILY MEDICINE

## 2024-05-23 PROCEDURE — 89050 BODY FLUID CELL COUNT: CPT

## 2024-05-23 PROCEDURE — 93010 ELECTROCARDIOGRAM REPORT: CPT | Performed by: INTERNAL MEDICINE

## 2024-05-23 RX ADMIN — MEMANTINE 10 MG: 5 TABLET ORAL at 21:04

## 2024-05-23 RX ADMIN — SODIUM CHLORIDE, PRESERVATIVE FREE 5 ML: 5 INJECTION INTRAVENOUS at 10:40

## 2024-05-23 RX ADMIN — FUROSEMIDE 40 MG: 10 INJECTION, SOLUTION INTRAMUSCULAR; INTRAVENOUS at 10:39

## 2024-05-23 RX ADMIN — MEMANTINE 10 MG: 5 TABLET ORAL at 10:40

## 2024-05-23 RX ADMIN — ESCITALOPRAM OXALATE 10 MG: 10 TABLET ORAL at 10:39

## 2024-05-23 RX ADMIN — SODIUM CHLORIDE, PRESERVATIVE FREE 5 ML: 5 INJECTION INTRAVENOUS at 21:04

## 2024-05-23 ASSESSMENT — PAIN - FUNCTIONAL ASSESSMENT
PAIN_FUNCTIONAL_ASSESSMENT: NONE - DENIES PAIN

## 2024-05-23 ASSESSMENT — PAIN SCALES - GENERAL
PAINLEVEL_OUTOF10: 0
PAINLEVEL_OUTOF10: 0

## 2024-05-23 NOTE — PROCEDURES
PROCEDURE NOTE  Date: 5/23/2024   Name: Yi Tatum  YOB: 1940    Procedures      PROCEDURE:  DIAGNOSTIC THORACENTESIS, THERAPEUTIC THORACENTESIS     PRE-OP DIAGNOSIS:  right PLEURAL EFFUSION - COMPLEX    POST-OP DIAGNOSIS: right PLEURAL EFFUSION - Complex    VOLUME REMOVED:  1400 cc    ANESTHESIA:  LOCAL ANESTHESIA WITH 1% LIDOCAINE 10 CC TOTAL.    CHEST ULTRASOUND FINDINGS:    A Turbo-M, Sonosite ultrasound with a 5-16 mHz probe was used to image the chest and localize the pleural effusion on the right chest.    A large anechoic space was seen on the right consistent with an uncomplicated pleural effusion.       DESCRIPTION OF PROCEDURE:    After obtaining informed consent and localizing the safest location for thoracentesis, the  5th intercostal space was marked with a blunt, plastic needle cap in the mid scapular line.    An [employer] AK-0100 Pleral-Seal thoracentesis kit was used to perform the procedure.    The skin was cleansed with the supplied chlorhexidine swab and then draped in the usual fashion.    Using the previously marked location as a guide, a 22 G 1.5 inch needle was used to inject 1% lidocaine into the skin and subcutaneous tissue, as well as onto the underlying rib and inter-costal muscles.  Pleural fluid was aspirated to assure proper location and additional lidocaine was injected into the pleural space prior to removing the anesthesia needle.    A 3mm incision was then made with the supplied scalpel in the usual fashion to facilitate the insertion of the thoracentesis needle.    The needle with an 8 Serbian thoracentesis catheter was then introduced into the chest through the previously made incision in the usual fashion, the rib localized with the needle, and the catheter then marched over the rib into the pleural space.    After aspirating fluid, the thoracentesis catheter was then placed into the chest using the needle itself as a trocar.  The needle was then removed

## 2024-05-23 NOTE — ED NOTES
TRANSFER - OUT REPORT:    Verbal report given to Lili TURNER on Yi Tatum  being transferred to [facility] for routine progression of patient care       Report consisted of patient's Situation, Background, Assessment and   Recommendations(SBAR).     Information from the following report(s) Nurse Handoff Report was reviewed with the receiving nurse.    Midnight Fall Assessment:    Presents to emergency department  because of falls (Syncope, seizure, or loss of consciousness): No  Age > 70: Yes  Altered Mental Status, Intoxication with alcohol or substance confusion (Disorientation, impaired judgment, poor safety awaremess, or inability to follow instructions): Yes  Impaired Mobility: Ambulates or transfers with assistive devices or assistance; Unable to ambulate or transer.: Yes  Nursing Judgement: Yes          Lines:   Peripheral IV 05/22/24 Posterior;Right Forearm (Active)   Site Assessment Clean, dry & intact 05/22/24 2139   Line Status Blood return noted 05/22/24 2139   Line Care Cap changed 05/22/24 2139   Phlebitis Assessment No symptoms 05/22/24 2139   Infiltration Assessment 0 05/22/24 2139   Dressing Status Clean, dry & intact 05/22/24 2139   Dressing Type Transparent 05/22/24 2139        Opportunity for questions and clarification was provided.      Patient transported with:  Marcos Christiansen RN  05/22/24 2143

## 2024-05-23 NOTE — CARE COORDINATION
CM spoke to Ms. Tatum in room 824 about discharge planning. She is inpatient status for a pleural effusion, and just had a thoracentesis.    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  Her  of 61 years  4 years ago. Her neighbors help her out, mostly with driving her to the store and appointments.      At discharge, she would like to return home.  She said she was in [facility] earlier this year ([facility] is not sure[facility]  Ms. Marilyn Eric with [facility] is interviewing Ms. Tatum now.  CM will follow.     Addendum:  Home health referral for OT, PT, and SN sent to [facility] [employer] via [employer].      [id number] [phone]   Service Assessment   Patient Orientation Alert and Oriented  (alert and oriented, although chart says hx of SILVER)   Cognition Alert   History Provided By Patient   Support Systems Friends/Neighbors   PCP Verified by CM Yes   Prior Functional Level Assistance with the following:;Other (see comment)  (does not drive, so relies on neighbors to take her to physician and stores)   Current Functional Level Assistance with the following:;Other (see comment)  (TBD)   Can patient return to prior living arrangement Yes   Ability to make needs known: Good   Family able to assist with home care needs: Other (comment)  (no family, but neighbors help her out)   Would you like for me to discuss the discharge plan with any other family members/significant others, and if so, who? No   Condition of Participation: Discharge Planning   The Plan for Transition of Care is related to the following treatment goals: she would like to return home when stable

## 2024-05-23 NOTE — H&P (VIEW-ONLY)
labs, cultures., performed a medically appropriate history and exam, ordered and/or reviewed medications, tests or procedures, independently interpreted images, and coordinated care. which accounted for 24 minutes clinical time.     Impression: Patient has large effusion associated also with marked weight loss recently.  Prior history of having breast cancer.  Unfortunately I have high suspicion may be due to a another cancerous process.  Did check with ultrasound does have a large effusion.  Will plan to drain today at about 1 PM.  Will check oxygen needs with exertion overnight afterwards.  Agree with remaining treatment as per above.  Patient is aware the differential unfortunate does include cancer.    Odin Vernon MD
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
## 2024-05-23 NOTE — INTERVAL H&P NOTE
Update History & Physical    The patient's History and Physical of May 23, chart was reviewed with the patient and I examined the patient. There was no change. The surgical site was confirmed by the patient and me.     Plan: The risks, benefits, expected outcome, and alternative to the recommended procedure have been discussed with the patient. Patient understands and wants to proceed with the procedure.     Electronically signed by Odin Vernon MD on 5/23/2024 at 2:06 PM

## 2024-05-24 ENCOUNTER — APPOINTMENT (OUTPATIENT)
Dept: INTERVENTIONAL RADIOLOGY/VASCULAR | Age: 84
DRG: 181 | End: 2024-05-24
Attending: INTERNAL MEDICINE
Payer: MEDICARE

## 2024-05-24 PROBLEM — R06.09 DYSPNEA ON EXERTION: Status: ACTIVE | Noted: 2024-05-24

## 2024-05-24 PROBLEM — E87.6 HYPOKALEMIA: Status: ACTIVE | Noted: 2024-05-24

## 2024-05-24 PROBLEM — E83.42 HYPOMAGNESEMIA: Status: ACTIVE | Noted: 2024-05-24

## 2024-05-24 LAB
ANION GAP SERPL CALC-SCNC: 11 MMOL/L (ref 9–18)
BASOPHILS # BLD: 0 K/UL (ref 0–0.2)
BASOPHILS NFR BLD: 0 % (ref 0–2)
BUN SERPL-MCNC: 9 MG/DL (ref 8–23)
CALCIUM SERPL-MCNC: 8.7 MG/DL (ref 8.8–10.2)
CHLORIDE SERPL-SCNC: 100 MMOL/L (ref 98–107)
CO2 SERPL-SCNC: 27 MMOL/L (ref 20–28)
CREAT SERPL-MCNC: 0.57 MG/DL (ref 0.6–1.1)
DIFFERENTIAL METHOD BLD: NORMAL
EOSINOPHIL # BLD: 0.1 K/UL (ref 0–0.8)
EOSINOPHIL NFR BLD: 2 % (ref 0.5–7.8)
ERYTHROCYTE [DISTWIDTH] IN BLOOD BY AUTOMATED COUNT: 12.6 % (ref 11.9–14.6)
GLUCOSE SERPL-MCNC: 87 MG/DL (ref 70–99)
HCT VFR BLD AUTO: 40.8 % (ref 35.8–46.3)
HGB BLD-MCNC: 13.3 G/DL (ref 11.7–15.4)
IMM GRANULOCYTES # BLD AUTO: 0 K/UL (ref 0–0.5)
IMM GRANULOCYTES NFR BLD AUTO: 0 % (ref 0–5)
LYMPHOCYTES # BLD: 1.5 K/UL (ref 0.5–4.6)
LYMPHOCYTES NFR BLD: 27 % (ref 13–44)
MAGNESIUM SERPL-MCNC: 1.7 MG/DL (ref 1.8–2.4)
MCH RBC QN AUTO: 28.2 PG (ref 26.1–32.9)
MCHC RBC AUTO-ENTMCNC: 32.6 G/DL (ref 31.4–35)
MCV RBC AUTO: 86.4 FL (ref 82–102)
MONOCYTES # BLD: 0.4 K/UL (ref 0.1–1.3)
MONOCYTES NFR BLD: 8 % (ref 4–12)
NEUTS SEG # BLD: 3.6 K/UL (ref 1.7–8.2)
NEUTS SEG NFR BLD: 63 % (ref 43–78)
NRBC # BLD: 0 K/UL (ref 0–0.2)
PLATELET # BLD AUTO: 299 K/UL (ref 150–450)
PMV BLD AUTO: 10 FL (ref 9.4–12.3)
POTASSIUM SERPL-SCNC: 3.4 MMOL/L (ref 3.5–5.1)
RBC # BLD AUTO: 4.72 M/UL (ref 4.05–5.2)
SODIUM SERPL-SCNC: 138 MMOL/L (ref 136–145)
WBC # BLD AUTO: 5.6 K/UL (ref 4.3–11.1)

## 2024-05-24 PROCEDURE — 1100000000 HC RM PRIVATE

## 2024-05-24 PROCEDURE — 32557 INSERT CATH PLEURA W/ IMAGE: CPT | Performed by: RADIOLOGY

## 2024-05-24 PROCEDURE — 94762 N-INVAS EAR/PLS OXIMTRY CONT: CPT

## 2024-05-24 PROCEDURE — 80048 BASIC METABOLIC PNL TOTAL CA: CPT

## 2024-05-24 PROCEDURE — 32557 INSERT CATH PLEURA W/ IMAGE: CPT

## 2024-05-24 PROCEDURE — 85025 COMPLETE CBC W/AUTO DIFF WBC: CPT

## 2024-05-24 PROCEDURE — 6370000000 HC RX 637 (ALT 250 FOR IP): Performed by: FAMILY MEDICINE

## 2024-05-24 PROCEDURE — C1729 CATH, DRAINAGE: HCPCS

## 2024-05-24 PROCEDURE — 2580000003 HC RX 258: Performed by: FAMILY MEDICINE

## 2024-05-24 PROCEDURE — 6360000002 HC RX W HCPCS: Performed by: RADIOLOGY

## 2024-05-24 PROCEDURE — 2500000003 HC RX 250 WO HCPCS: Performed by: RADIOLOGY

## 2024-05-24 PROCEDURE — 6360000002 HC RX W HCPCS: Performed by: FAMILY MEDICINE

## 2024-05-24 PROCEDURE — 83735 ASSAY OF MAGNESIUM: CPT

## 2024-05-24 PROCEDURE — 36415 COLL VENOUS BLD VENIPUNCTURE: CPT

## 2024-05-24 PROCEDURE — 2709999900 IR GUIDED PERC PLEURAL DRAIN W CATH INSERT

## 2024-05-24 PROCEDURE — 99232 SBSQ HOSP IP/OBS MODERATE 35: CPT | Performed by: INTERNAL MEDICINE

## 2024-05-24 PROCEDURE — 0W9930Z DRAINAGE OF RIGHT PLEURAL CAVITY WITH DRAINAGE DEVICE, PERCUTANEOUS APPROACH: ICD-10-PCS | Performed by: RADIOLOGY

## 2024-05-24 RX ORDER — FENTANYL CITRATE 50 UG/ML
INJECTION, SOLUTION INTRAMUSCULAR; INTRAVENOUS PRN
Status: DISCONTINUED | OUTPATIENT
Start: 2024-05-24 | End: 2024-05-28

## 2024-05-24 RX ORDER — POTASSIUM CHLORIDE 20 MEQ/1
40 TABLET, EXTENDED RELEASE ORAL EVERY 4 HOURS
Status: COMPLETED | OUTPATIENT
Start: 2024-05-24 | End: 2024-05-24

## 2024-05-24 RX ORDER — MAGNESIUM SULFATE IN WATER 40 MG/ML
2000 INJECTION, SOLUTION INTRAVENOUS ONCE
Status: COMPLETED | OUTPATIENT
Start: 2024-05-24 | End: 2024-05-24

## 2024-05-24 RX ORDER — LIDOCAINE HYDROCHLORIDE 10 MG/ML
INJECTION, SOLUTION EPIDURAL; INFILTRATION; INTRACAUDAL; PERINEURAL PRN
Status: DISCONTINUED | OUTPATIENT
Start: 2024-05-24 | End: 2024-05-28

## 2024-05-24 RX ADMIN — LIDOCAINE HYDROCHLORIDE 5 ML: 10 INJECTION, SOLUTION EPIDURAL; INFILTRATION; INTRACAUDAL; PERINEURAL at 16:56

## 2024-05-24 RX ADMIN — ESCITALOPRAM OXALATE 10 MG: 10 TABLET ORAL at 09:20

## 2024-05-24 RX ADMIN — POTASSIUM CHLORIDE 40 MEQ: 1500 TABLET, EXTENDED RELEASE ORAL at 09:20

## 2024-05-24 RX ADMIN — POTASSIUM CHLORIDE 40 MEQ: 1500 TABLET, EXTENDED RELEASE ORAL at 13:36

## 2024-05-24 RX ADMIN — SODIUM CHLORIDE, PRESERVATIVE FREE 10 ML: 5 INJECTION INTRAVENOUS at 21:14

## 2024-05-24 RX ADMIN — MEMANTINE 10 MG: 5 TABLET ORAL at 21:14

## 2024-05-24 RX ADMIN — FENTANYL CITRATE 100 MCG: 50 INJECTION, SOLUTION INTRAMUSCULAR; INTRAVENOUS at 16:52

## 2024-05-24 RX ADMIN — SODIUM CHLORIDE, PRESERVATIVE FREE 5 ML: 5 INJECTION INTRAVENOUS at 09:37

## 2024-05-24 RX ADMIN — MAGNESIUM SULFATE HEPTAHYDRATE 2000 MG: 40 INJECTION, SOLUTION INTRAVENOUS at 09:37

## 2024-05-24 NOTE — OP NOTE
[facility]  Department of Interventional Radiology  [phone]        Interventional Radiology Brief Procedure Note    Patient: Yi Tatum MRN: [id number]  SSN: [id number]    YOB: 1940  Age: 83 y.o.  Sex: female      Date of Procedure: 5/24/2024    Pre-Procedure Diagnosis: pleural effusion    Post-Procedure Diagnosis: SAME    Procedure(s): Image Guided Drain Placement    Brief Description of Procedure: chest tube    Performed By: NIKI ALEMAN MD     Assistants: None    Anesthesia:Lidocaine    Estimated Blood Loss: None    Specimens:  None    Implants:  All Purpose Drain    Findings: large right effusion.  10 f drain placed    Complications: None    Recommendations: drainage through atrium     Follow Up: prn    Signed By: NIKI ALEMAN MD     May 24, 2024

## 2024-05-24 NOTE — WOUND CARE
Consulted for Buttocks. Recommend foam dressing every other day/PRN with continence. Cause most likely reopenings of old wounds d/t friction/sheering since patient is continent and using bedside commode.    L buttock with open 1x1x0.2cm, partial thickness, pink/red, blanchable erythema, defined/flat edges, small serosanguinous drainage.     R buttocks with 3.5x2cm blanchable erythema with intact skin/scarring.

## 2024-05-25 PROBLEM — R09.02 HYPOXIA: Status: ACTIVE | Noted: 2024-05-25

## 2024-05-25 LAB
ANION GAP SERPL CALC-SCNC: 7 MMOL/L (ref 9–18)
BACTERIA SPEC CULT: NORMAL
BASOPHILS # BLD: 0 K/UL (ref 0–0.2)
BASOPHILS NFR BLD: 1 % (ref 0–2)
BUN SERPL-MCNC: 11 MG/DL (ref 8–23)
CALCIUM SERPL-MCNC: 8.7 MG/DL (ref 8.8–10.2)
CHLORIDE SERPL-SCNC: 103 MMOL/L (ref 98–107)
CO2 SERPL-SCNC: 27 MMOL/L (ref 20–28)
CREAT SERPL-MCNC: 0.56 MG/DL (ref 0.6–1.1)
DIFFERENTIAL METHOD BLD: ABNORMAL
EOSINOPHIL # BLD: 0.1 K/UL (ref 0–0.8)
EOSINOPHIL NFR BLD: 2 % (ref 0.5–7.8)
ERYTHROCYTE [DISTWIDTH] IN BLOOD BY AUTOMATED COUNT: 12.8 % (ref 11.9–14.6)
GLUCOSE SERPL-MCNC: 114 MG/DL (ref 70–99)
GRAM STN SPEC: NORMAL
GRAM STN SPEC: NORMAL
HCT VFR BLD AUTO: 46.8 % (ref 35.8–46.3)
HGB BLD-MCNC: 14.7 G/DL (ref 11.7–15.4)
IMM GRANULOCYTES # BLD AUTO: 0 K/UL (ref 0–0.5)
IMM GRANULOCYTES NFR BLD AUTO: 0 % (ref 0–5)
LYMPHOCYTES # BLD: 1.4 K/UL (ref 0.5–4.6)
LYMPHOCYTES NFR BLD: 19 % (ref 13–44)
MAGNESIUM SERPL-MCNC: 1.7 MG/DL (ref 1.8–2.4)
MCH RBC QN AUTO: 27.8 PG (ref 26.1–32.9)
MCHC RBC AUTO-ENTMCNC: 31.4 G/DL (ref 31.4–35)
MCV RBC AUTO: 88.5 FL (ref 82–102)
MONOCYTES # BLD: 0.4 K/UL (ref 0.1–1.3)
MONOCYTES NFR BLD: 6 % (ref 4–12)
NEUTS SEG # BLD: 5.3 K/UL (ref 1.7–8.2)
NEUTS SEG NFR BLD: 72 % (ref 43–78)
NRBC # BLD: 0 K/UL (ref 0–0.2)
PLATELET # BLD AUTO: 268 K/UL (ref 150–450)
PMV BLD AUTO: 8.9 FL (ref 9.4–12.3)
POTASSIUM SERPL-SCNC: 4.8 MMOL/L (ref 3.5–5.1)
POTASSIUM SERPL-SCNC: 5.1 MMOL/L (ref 3.5–5.1)
RBC # BLD AUTO: 5.29 M/UL (ref 4.05–5.2)
SERVICE CMNT-IMP: NORMAL
SODIUM SERPL-SCNC: 136 MMOL/L (ref 136–145)
WBC # BLD AUTO: 7.3 K/UL (ref 4.3–11.1)

## 2024-05-25 PROCEDURE — 84132 ASSAY OF SERUM POTASSIUM: CPT

## 2024-05-25 PROCEDURE — 6370000000 HC RX 637 (ALT 250 FOR IP): Performed by: FAMILY MEDICINE

## 2024-05-25 PROCEDURE — 94762 N-INVAS EAR/PLS OXIMTRY CONT: CPT

## 2024-05-25 PROCEDURE — 99232 SBSQ HOSP IP/OBS MODERATE 35: CPT | Performed by: INTERNAL MEDICINE

## 2024-05-25 PROCEDURE — 83735 ASSAY OF MAGNESIUM: CPT

## 2024-05-25 PROCEDURE — 86431 RHEUMATOID FACTOR QUANT: CPT

## 2024-05-25 PROCEDURE — 1100000000 HC RM PRIVATE

## 2024-05-25 PROCEDURE — 86430 RHEUMATOID FACTOR TEST QUAL: CPT

## 2024-05-25 PROCEDURE — 80048 BASIC METABOLIC PNL TOTAL CA: CPT

## 2024-05-25 PROCEDURE — 85025 COMPLETE CBC W/AUTO DIFF WBC: CPT

## 2024-05-25 PROCEDURE — 6360000002 HC RX W HCPCS: Performed by: FAMILY MEDICINE

## 2024-05-25 PROCEDURE — 2700000000 HC OXYGEN THERAPY PER DAY

## 2024-05-25 PROCEDURE — 36415 COLL VENOUS BLD VENIPUNCTURE: CPT

## 2024-05-25 PROCEDURE — 2580000003 HC RX 258: Performed by: FAMILY MEDICINE

## 2024-05-25 RX ORDER — MAGNESIUM SULFATE IN WATER 40 MG/ML
2000 INJECTION, SOLUTION INTRAVENOUS ONCE
Status: COMPLETED | OUTPATIENT
Start: 2024-05-25 | End: 2024-05-25

## 2024-05-25 RX ADMIN — POTASSIUM CHLORIDE 40 MEQ: 1500 TABLET, EXTENDED RELEASE ORAL at 08:16

## 2024-05-25 RX ADMIN — MAGNESIUM SULFATE HEPTAHYDRATE 2000 MG: 40 INJECTION, SOLUTION INTRAVENOUS at 08:23

## 2024-05-25 RX ADMIN — MEMANTINE 10 MG: 5 TABLET ORAL at 21:32

## 2024-05-25 RX ADMIN — MEMANTINE 10 MG: 5 TABLET ORAL at 08:19

## 2024-05-25 RX ADMIN — ESCITALOPRAM OXALATE 10 MG: 10 TABLET ORAL at 08:19

## 2024-05-25 RX ADMIN — SODIUM CHLORIDE, PRESERVATIVE FREE 10 ML: 5 INJECTION INTRAVENOUS at 08:19

## 2024-05-25 RX ADMIN — SODIUM CHLORIDE, PRESERVATIVE FREE 10 ML: 5 INJECTION INTRAVENOUS at 20:19

## 2024-05-26 ENCOUNTER — APPOINTMENT (OUTPATIENT)
Dept: GENERAL RADIOLOGY | Age: 84
DRG: 181 | End: 2024-05-26
Payer: MEDICARE

## 2024-05-26 LAB
ANION GAP SERPL CALC-SCNC: 9 MMOL/L (ref 9–18)
BASOPHILS # BLD: 0 K/UL (ref 0–0.2)
BASOPHILS NFR BLD: 1 % (ref 0–2)
BUN SERPL-MCNC: 11 MG/DL (ref 8–23)
CALCIUM SERPL-MCNC: 8.2 MG/DL (ref 8.8–10.2)
CHLORIDE SERPL-SCNC: 98 MMOL/L (ref 98–107)
CO2 SERPL-SCNC: 25 MMOL/L (ref 20–28)
CREAT SERPL-MCNC: 0.45 MG/DL (ref 0.6–1.1)
DIFFERENTIAL METHOD BLD: ABNORMAL
EOSINOPHIL # BLD: 0.3 K/UL (ref 0–0.8)
EOSINOPHIL NFR BLD: 3 % (ref 0.5–7.8)
ERYTHROCYTE [DISTWIDTH] IN BLOOD BY AUTOMATED COUNT: 12.9 % (ref 11.9–14.6)
GLUCOSE SERPL-MCNC: 100 MG/DL (ref 70–99)
HCT VFR BLD AUTO: 44.4 % (ref 35.8–46.3)
HGB BLD-MCNC: 13.8 G/DL (ref 11.7–15.4)
IMM GRANULOCYTES # BLD AUTO: 0 K/UL (ref 0–0.5)
IMM GRANULOCYTES NFR BLD AUTO: 0 % (ref 0–5)
LYMPHOCYTES # BLD: 1.7 K/UL (ref 0.5–4.6)
LYMPHOCYTES NFR BLD: 22 % (ref 13–44)
MCH RBC QN AUTO: 27.9 PG (ref 26.1–32.9)
MCHC RBC AUTO-ENTMCNC: 31.1 G/DL (ref 31.4–35)
MCV RBC AUTO: 89.9 FL (ref 82–102)
MONOCYTES # BLD: 0.7 K/UL (ref 0.1–1.3)
MONOCYTES NFR BLD: 9 % (ref 4–12)
NEUTS SEG # BLD: 5 K/UL (ref 1.7–8.2)
NEUTS SEG NFR BLD: 65 % (ref 43–78)
NRBC # BLD: 0 K/UL (ref 0–0.2)
PLATELET # BLD AUTO: 264 K/UL (ref 150–450)
PMV BLD AUTO: 9.2 FL (ref 9.4–12.3)
POTASSIUM SERPL-SCNC: 4.8 MMOL/L (ref 3.5–5.1)
RBC # BLD AUTO: 4.94 M/UL (ref 4.05–5.2)
SODIUM SERPL-SCNC: 132 MMOL/L (ref 136–145)
WBC # BLD AUTO: 7.8 K/UL (ref 4.3–11.1)

## 2024-05-26 PROCEDURE — 6370000000 HC RX 637 (ALT 250 FOR IP): Performed by: FAMILY MEDICINE

## 2024-05-26 PROCEDURE — 1100000000 HC RM PRIVATE

## 2024-05-26 PROCEDURE — 36415 COLL VENOUS BLD VENIPUNCTURE: CPT

## 2024-05-26 PROCEDURE — 85025 COMPLETE CBC W/AUTO DIFF WBC: CPT

## 2024-05-26 PROCEDURE — 80048 BASIC METABOLIC PNL TOTAL CA: CPT

## 2024-05-26 PROCEDURE — 2580000003 HC RX 258: Performed by: FAMILY MEDICINE

## 2024-05-26 PROCEDURE — 71045 X-RAY EXAM CHEST 1 VIEW: CPT

## 2024-05-26 PROCEDURE — 99232 SBSQ HOSP IP/OBS MODERATE 35: CPT | Performed by: INTERNAL MEDICINE

## 2024-05-26 RX ADMIN — SODIUM CHLORIDE, PRESERVATIVE FREE 10 ML: 5 INJECTION INTRAVENOUS at 08:15

## 2024-05-26 RX ADMIN — SODIUM CHLORIDE, PRESERVATIVE FREE 10 ML: 5 INJECTION INTRAVENOUS at 20:47

## 2024-05-26 RX ADMIN — ESCITALOPRAM OXALATE 10 MG: 10 TABLET ORAL at 08:14

## 2024-05-26 RX ADMIN — MEMANTINE 10 MG: 5 TABLET ORAL at 20:47

## 2024-05-26 RX ADMIN — MEMANTINE 10 MG: 5 TABLET ORAL at 08:14

## 2024-05-26 ASSESSMENT — PAIN SCALES - GENERAL: PAINLEVEL_OUTOF10: 0

## 2024-05-27 ENCOUNTER — APPOINTMENT (OUTPATIENT)
Dept: CT IMAGING | Age: 84
DRG: 181 | End: 2024-05-27
Payer: MEDICARE

## 2024-05-27 LAB
ANION GAP SERPL CALC-SCNC: 10 MMOL/L (ref 9–18)
BASOPHILS # BLD: 0 K/UL (ref 0–0.2)
BASOPHILS NFR BLD: 0 % (ref 0–2)
BUN SERPL-MCNC: 11 MG/DL (ref 8–23)
CALCIUM SERPL-MCNC: 8.5 MG/DL (ref 8.8–10.2)
CHLORIDE SERPL-SCNC: 97 MMOL/L (ref 98–107)
CO2 SERPL-SCNC: 26 MMOL/L (ref 20–28)
CREAT SERPL-MCNC: 0.46 MG/DL (ref 0.6–1.1)
DIFFERENTIAL METHOD BLD: ABNORMAL
EOSINOPHIL # BLD: 0.2 K/UL (ref 0–0.8)
EOSINOPHIL NFR BLD: 3 % (ref 0.5–7.8)
ERYTHROCYTE [DISTWIDTH] IN BLOOD BY AUTOMATED COUNT: 12.9 % (ref 11.9–14.6)
GLUCOSE SERPL-MCNC: 104 MG/DL (ref 70–99)
HCT VFR BLD AUTO: 42 % (ref 35.8–46.3)
HGB BLD-MCNC: 13.4 G/DL (ref 11.7–15.4)
IMM GRANULOCYTES # BLD AUTO: 0 K/UL (ref 0–0.5)
IMM GRANULOCYTES NFR BLD AUTO: 0 % (ref 0–5)
LYMPHOCYTES # BLD: 1.5 K/UL (ref 0.5–4.6)
LYMPHOCYTES NFR BLD: 23 % (ref 13–44)
MCH RBC QN AUTO: 27.5 PG (ref 26.1–32.9)
MCHC RBC AUTO-ENTMCNC: 31.9 G/DL (ref 31.4–35)
MCV RBC AUTO: 86.2 FL (ref 82–102)
MONOCYTES # BLD: 0.5 K/UL (ref 0.1–1.3)
MONOCYTES NFR BLD: 8 % (ref 4–12)
NEUTS SEG # BLD: 4.1 K/UL (ref 1.7–8.2)
NEUTS SEG NFR BLD: 66 % (ref 43–78)
NRBC # BLD: 0 K/UL (ref 0–0.2)
PLATELET # BLD AUTO: 271 K/UL (ref 150–450)
PMV BLD AUTO: 9.2 FL (ref 9.4–12.3)
POTASSIUM SERPL-SCNC: 4.3 MMOL/L (ref 3.5–5.1)
RBC # BLD AUTO: 4.87 M/UL (ref 4.05–5.2)
SODIUM SERPL-SCNC: 132 MMOL/L (ref 136–145)
WBC # BLD AUTO: 6.4 K/UL (ref 4.3–11.1)

## 2024-05-27 PROCEDURE — 2580000003 HC RX 258: Performed by: FAMILY MEDICINE

## 2024-05-27 PROCEDURE — 6370000000 HC RX 637 (ALT 250 FOR IP): Performed by: FAMILY MEDICINE

## 2024-05-27 PROCEDURE — 71260 CT THORAX DX C+: CPT

## 2024-05-27 PROCEDURE — 1100000000 HC RM PRIVATE

## 2024-05-27 PROCEDURE — 99232 SBSQ HOSP IP/OBS MODERATE 35: CPT | Performed by: INTERNAL MEDICINE

## 2024-05-27 PROCEDURE — 36415 COLL VENOUS BLD VENIPUNCTURE: CPT

## 2024-05-27 PROCEDURE — 80048 BASIC METABOLIC PNL TOTAL CA: CPT

## 2024-05-27 PROCEDURE — 85025 COMPLETE CBC W/AUTO DIFF WBC: CPT

## 2024-05-27 PROCEDURE — 6360000004 HC RX CONTRAST MEDICATION: Performed by: INTERNAL MEDICINE

## 2024-05-27 RX ADMIN — SODIUM CHLORIDE, PRESERVATIVE FREE 10 ML: 5 INJECTION INTRAVENOUS at 20:46

## 2024-05-27 RX ADMIN — MEMANTINE 10 MG: 5 TABLET ORAL at 20:43

## 2024-05-27 RX ADMIN — MEMANTINE 10 MG: 5 TABLET ORAL at 08:56

## 2024-05-27 RX ADMIN — ESCITALOPRAM OXALATE 10 MG: 10 TABLET ORAL at 08:56

## 2024-05-27 RX ADMIN — SODIUM CHLORIDE, PRESERVATIVE FREE 10 ML: 5 INJECTION INTRAVENOUS at 08:56

## 2024-05-27 RX ADMIN — IOPAMIDOL 70 ML: 755 INJECTION, SOLUTION INTRAVENOUS at 12:22

## 2024-05-27 ASSESSMENT — PAIN SCALES - GENERAL: PAINLEVEL_OUTOF10: 0

## 2024-05-27 NOTE — PLAN OF CARE
Problem: Safety - Adult  Goal: Free from fall injury  5/27/2024 0759 by Tata Alexis RN  Outcome: Progressing  5/26/2024 2320 by Lili Lopes RN  Outcome: Progressing  Flowsheets  Taken 5/26/2024 1922 by Lili Lopes RN  Free From Fall Injury: Instruct family/caregiver on patient safety  Taken 5/26/2024 1518 by Jazmin Kim RN  Free From Fall Injury: Instruct family/caregiver on patient safety     Problem: Discharge Planning  Goal: Discharge to home or other facility with appropriate resources  5/27/2024 0759 by Tata Alexis RN  Outcome: Progressing  5/26/2024 2320 by Lili Lopes RN  Outcome: Progressing     Problem: Pain  Goal: Verbalizes/displays adequate comfort level or baseline comfort level  5/27/2024 0759 by Tata Alexis RN  Outcome: Progressing  5/26/2024 2320 by Lili Lopes RN  Outcome: Progressing     Problem: Skin/Tissue Integrity  Goal: Absence of new skin breakdown  Description: 1.  Monitor for areas of redness and/or skin breakdown  2.  Assess vascular access sites hourly  3.  Every 4-6 hours minimum:  Change oxygen saturation probe site  4.  Every 4-6 hours:  If on nasal continuous positive airway pressure, respiratory therapy assess nares and determine need for appliance change or resting period.  5/27/2024 0759 by Tata Alexis RN  Outcome: Progressing  5/26/2024 2320 by Lili Lopes RN  Outcome: Progressing

## 2024-05-28 ENCOUNTER — APPOINTMENT (OUTPATIENT)
Dept: GENERAL RADIOLOGY | Age: 84
DRG: 181 | End: 2024-05-28
Payer: MEDICARE

## 2024-05-28 ENCOUNTER — APPOINTMENT (OUTPATIENT)
Dept: CT IMAGING | Age: 84
DRG: 181 | End: 2024-05-28
Payer: MEDICARE

## 2024-05-28 PROBLEM — R93.89 ABNORMAL CT OF THE CHEST: Status: ACTIVE | Noted: 2024-05-28

## 2024-05-28 LAB
ANION GAP SERPL CALC-SCNC: 8 MMOL/L (ref 9–18)
BUN SERPL-MCNC: 13 MG/DL (ref 8–23)
CALCIUM SERPL-MCNC: 8.7 MG/DL (ref 8.8–10.2)
CHLORIDE SERPL-SCNC: 97 MMOL/L (ref 98–107)
CO2 SERPL-SCNC: 30 MMOL/L (ref 20–28)
CREAT SERPL-MCNC: 0.53 MG/DL (ref 0.6–1.1)
GLUCOSE SERPL-MCNC: 99 MG/DL (ref 70–99)
POTASSIUM SERPL-SCNC: 5 MMOL/L (ref 3.5–5.1)
RHEUMATOID FACT SER QL LA: POSITIVE
RHEUMATOID FACT TITR SER LA: NORMAL {TITER}
SODIUM SERPL-SCNC: 134 MMOL/L (ref 136–145)

## 2024-05-28 PROCEDURE — 2580000003 HC RX 258: Performed by: FAMILY MEDICINE

## 2024-05-28 PROCEDURE — 94762 N-INVAS EAR/PLS OXIMTRY CONT: CPT

## 2024-05-28 PROCEDURE — 6360000004 HC RX CONTRAST MEDICATION: Performed by: NURSE PRACTITIONER

## 2024-05-28 PROCEDURE — 97530 THERAPEUTIC ACTIVITIES: CPT

## 2024-05-28 PROCEDURE — 99231 SBSQ HOSP IP/OBS SF/LOW 25: CPT | Performed by: PHYSICIAN ASSISTANT

## 2024-05-28 PROCEDURE — 97535 SELF CARE MNGMENT TRAINING: CPT

## 2024-05-28 PROCEDURE — 74177 CT ABD & PELVIS W/CONTRAST: CPT

## 2024-05-28 PROCEDURE — 36415 COLL VENOUS BLD VENIPUNCTURE: CPT

## 2024-05-28 PROCEDURE — 80048 BASIC METABOLIC PNL TOTAL CA: CPT

## 2024-05-28 PROCEDURE — 1100000000 HC RM PRIVATE

## 2024-05-28 PROCEDURE — 71045 X-RAY EXAM CHEST 1 VIEW: CPT

## 2024-05-28 PROCEDURE — 97112 NEUROMUSCULAR REEDUCATION: CPT

## 2024-05-28 PROCEDURE — 6370000000 HC RX 637 (ALT 250 FOR IP): Performed by: FAMILY MEDICINE

## 2024-05-28 PROCEDURE — 99232 SBSQ HOSP IP/OBS MODERATE 35: CPT | Performed by: INTERNAL MEDICINE

## 2024-05-28 RX ADMIN — ESCITALOPRAM OXALATE 10 MG: 10 TABLET ORAL at 09:17

## 2024-05-28 RX ADMIN — SODIUM CHLORIDE, PRESERVATIVE FREE 10 ML: 5 INJECTION INTRAVENOUS at 20:48

## 2024-05-28 RX ADMIN — IOPAMIDOL 100 ML: 755 INJECTION, SOLUTION INTRAVENOUS at 19:34

## 2024-05-28 RX ADMIN — SODIUM CHLORIDE, PRESERVATIVE FREE 10 ML: 5 INJECTION INTRAVENOUS at 09:22

## 2024-05-28 RX ADMIN — MEMANTINE 10 MG: 5 TABLET ORAL at 09:17

## 2024-05-28 RX ADMIN — MEMANTINE 10 MG: 5 TABLET ORAL at 20:48

## 2024-05-28 RX ADMIN — DIATRIZOATE MEGLUMINE AND DIATRIZOATE SODIUM 15 ML: 660; 100 LIQUID ORAL; RECTAL at 18:14

## 2024-05-28 NOTE — PLAN OF CARE
Spoke with Dr Kruse, cytology + for malignant cells. Spoke with the patient and she in interested in at least talking to Hem/onc, consult placed. Plan discussed with Dr Clement.    Delisa Hernandez, SIRENA - CNP

## 2024-05-29 DIAGNOSIS — C78.2 MALIGNANT NEOPLASM METASTATIC TO PLEURA (HCC): Primary | ICD-10-CM

## 2024-05-29 LAB
ANION GAP SERPL CALC-SCNC: 9 MMOL/L (ref 9–18)
BUN SERPL-MCNC: 10 MG/DL (ref 8–23)
CALCIUM SERPL-MCNC: 8.8 MG/DL (ref 8.8–10.2)
CHLORIDE SERPL-SCNC: 95 MMOL/L (ref 98–107)
CO2 SERPL-SCNC: 31 MMOL/L (ref 20–28)
CREAT SERPL-MCNC: 0.41 MG/DL (ref 0.6–1.1)
GLUCOSE SERPL-MCNC: 92 MG/DL (ref 70–99)
POTASSIUM SERPL-SCNC: 4.8 MMOL/L (ref 3.5–5.1)
SODIUM SERPL-SCNC: 134 MMOL/L (ref 136–145)

## 2024-05-29 PROCEDURE — 1100000000 HC RM PRIVATE

## 2024-05-29 PROCEDURE — 36415 COLL VENOUS BLD VENIPUNCTURE: CPT

## 2024-05-29 PROCEDURE — 97535 SELF CARE MNGMENT TRAINING: CPT

## 2024-05-29 PROCEDURE — 80048 BASIC METABOLIC PNL TOTAL CA: CPT

## 2024-05-29 PROCEDURE — 97530 THERAPEUTIC ACTIVITIES: CPT

## 2024-05-29 PROCEDURE — 99222 1ST HOSP IP/OBS MODERATE 55: CPT | Performed by: INTERNAL MEDICINE

## 2024-05-29 PROCEDURE — APPSS60 APP SPLIT SHARED TIME 46-60 MINUTES: Performed by: NURSE PRACTITIONER

## 2024-05-29 PROCEDURE — 2580000003 HC RX 258: Performed by: FAMILY MEDICINE

## 2024-05-29 PROCEDURE — 6370000000 HC RX 637 (ALT 250 FOR IP): Performed by: FAMILY MEDICINE

## 2024-05-29 PROCEDURE — 99232 SBSQ HOSP IP/OBS MODERATE 35: CPT | Performed by: INTERNAL MEDICINE

## 2024-05-29 RX ADMIN — SODIUM CHLORIDE, PRESERVATIVE FREE 10 ML: 5 INJECTION INTRAVENOUS at 08:44

## 2024-05-29 RX ADMIN — ESCITALOPRAM OXALATE 10 MG: 10 TABLET ORAL at 08:44

## 2024-05-29 RX ADMIN — MEMANTINE 10 MG: 5 TABLET ORAL at 20:39

## 2024-05-29 RX ADMIN — SODIUM CHLORIDE, PRESERVATIVE FREE 10 ML: 5 INJECTION INTRAVENOUS at 20:39

## 2024-05-29 RX ADMIN — MEMANTINE 10 MG: 5 TABLET ORAL at 08:44

## 2024-05-29 NOTE — CONSULTS
History and Physical Initial Visit NOTE           2024    Yi Tatum                        Date of Admission:  2024    The patient's chart is reviewed and the patient is discussed with the staff.    Subjective:     Patient is a 83 y.o.  female seen and evaluated at the request of Dr. Griffith for large right pleural effusion. She has a PMH of Alzheimer's dementia, breast cancer s/p right mastectomy, osteoporosis, RA.     She presented to the ER on  via EMS with shortness of breath feeling like \"liquid stuck in throat\". Also reported 1 episode of bloody diarrhea and coughing up red sputum. ED MD reported the she \"has had a delusion of someone pouring something down her throat and ears for many years\". In ED she was on room air with sats >90%. Labs unremarkable. She was started on lasix IVP. CXR showed a massive right pleural effusion. ECHO was ordered by primary.     On exam, she is alert and oriented, answers all questions appropriately. She states she feels like something stays stuck in her throat when she swallows. She was a little short of breath on arrival but in no distress now and on room air. Denies any cough or congestion. Reports she has never been a smoker. Denies any lung or heart issues. Denies any swelling. She states she has lost \"a lot of weight\" after losing her desire to eat after her  .    Review of Systems: Comprehensive ROS negative except in HPI    Current Outpatient Medications   Medication Instructions    acetaminophen (TYLENOL) 1,300 mg, Oral, BEDTIME PRN    escitalopram (LEXAPRO) 10 mg, DAILY    memantine ER (NAMENDA XR) 28 mg, Oral, DAILY    pantoprazole (PROTONIX) 40 mg, Oral, DAILY BEFORE BREAKFAST    vitamin D (ERGOCALCIFEROL) 50,000 Units, Oral, WEEKLY    vitamin E (VITAMIN E) 400 Units, Oral, DAILY      Past Medical History:   Diagnosis Date    Anemia, unspecified     patient denies hx of anemia    Arthritis     osteoarthritis    
Procedure performed  
ULTRASOUND performed by Odin Vernon MD at [facility]    TOTAL HIP ARTHROPLASTY Right 2016    TOTAL HIP ARTHROPLASTY Left 2018    TOTAL KNEE ARTHROPLASTY Left 8/2010    left    TOTAL KNEE ARTHROPLASTY Right 3/2012    right    US BREAST BIOPSY W LOC DEVICE 1ST LESION RIGHT Right 2/26/2019    US BREAST NEEDLE BIOPSY RIGHT 2/26/2019 [facility]     Family History   Problem Relation Age of Onset    Hypertension Mother     No Known Problems Father     Osteoarthritis Mother     Breast Cancer Neg Hx     Other Other         family hx of HTN     Social History     Socioeconomic History    Marital status:      Spouse name: Not on file    Number of children: 1    Years of education: Not on file    Highest education level: Not on file   Occupational History    Occupation: retired, 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    Tobacco Use    Smoking status: Never     Passive exposure: Never    Smokeless tobacco: Never   Vaping Use    Vaping Use: Never used   Substance and Sexual Activity    Alcohol use: No    Drug use: No    Sexual activity: Not Currently   Other Topics Concern    Not on file   Social History Narrative    Has 1 adopted daughter;     Social Determinants of Health     Financial Resource Strain: Patient Declined (3/22/2024)    Overall Financial Resource Strain (CARDIA)     Difficulty of Paying Living Expenses: Patient declined   Food Insecurity: No Food Insecurity (5/22/2024)    Hunger Vital Sign     Worried About Running Out of Food in the Last Year: Never true     Ran Out of Food in the Last Year: Never true   Transportation Needs: No Transportation Needs (5/22/2024)    PRAPARE - Transportation     Lack of Transportation (Medical): No     Lack of Transportation (Non-Medical): No   Physical Activity: Insufficiently Active (12/1/2022)    Exercise Vital Sign     Days of Exercise per Week: 7 days     Minutes of Exercise per Session: 10 min   Stress: Not on file   Social Connections: Not on file

## 2024-05-30 ENCOUNTER — TELEPHONE (OUTPATIENT)
Dept: PULMONOLOGY | Age: 84
End: 2024-05-30

## 2024-05-30 ENCOUNTER — TELEPHONE (OUTPATIENT)
Dept: ONCOLOGY | Age: 84
End: 2024-05-30

## 2024-05-30 VITALS
DIASTOLIC BLOOD PRESSURE: 50 MMHG | BODY MASS INDEX: 19.14 KG/M2 | HEIGHT: 66 IN | RESPIRATION RATE: 18 BRPM | TEMPERATURE: 99 F | SYSTOLIC BLOOD PRESSURE: 121 MMHG | HEART RATE: 77 BPM | OXYGEN SATURATION: 94 % | WEIGHT: 119.1 LBS

## 2024-05-30 DIAGNOSIS — C80.1 METASTATIC MALIGNANT NEOPLASM OF UNKNOWN PRIMARY SITE (HCC): ICD-10-CM

## 2024-05-30 DIAGNOSIS — C78.2 MALIGNANT NEOPLASM METASTATIC TO PLEURA (HCC): Primary | ICD-10-CM

## 2024-05-30 DIAGNOSIS — C79.9 METASTATIC MALIGNANT NEOPLASM OF UNKNOWN PRIMARY SITE (HCC): ICD-10-CM

## 2024-05-30 PROCEDURE — 99232 SBSQ HOSP IP/OBS MODERATE 35: CPT | Performed by: INTERNAL MEDICINE

## 2024-05-30 PROCEDURE — 2580000003 HC RX 258: Performed by: FAMILY MEDICINE

## 2024-05-30 PROCEDURE — 6370000000 HC RX 637 (ALT 250 FOR IP): Performed by: FAMILY MEDICINE

## 2024-05-30 RX ADMIN — ESCITALOPRAM OXALATE 10 MG: 10 TABLET ORAL at 08:05

## 2024-05-30 RX ADMIN — SODIUM CHLORIDE, PRESERVATIVE FREE 10 ML: 5 INJECTION INTRAVENOUS at 08:06

## 2024-05-30 RX ADMIN — MEMANTINE 10 MG: 5 TABLET ORAL at 08:05

## 2024-05-30 NOTE — TELEPHONE ENCOUNTER
Received: Today  Yaneli Rubi PA P [facility] Pulmonary [facility] Critical Care Triage  Please contact patient and arrange a MINS: 40 minute  7 day TCM Follow up with CXR prior to appointment.        Thank you,  CANDY Cochran    Remains admitted on 5/30/2024.

## 2024-05-30 NOTE — CARE COORDINATION
05/30/24 1545   Services At/After Discharge   Transition of Care Consult (CM Consult) Home Health   Services At/After Discharge Home Health   [place] Resource Information Provided? No   Mode of Transport at Discharge   (home with )   Hospital Transport Time of Discharge 1546   Confirm Follow Up Transport Friends   Condition of Participation: Discharge Planning   The Plan for Transition of Care is related to the following treatment goals: HH   The Patient and/or Patient Representative was provided with a Choice of Provider? Patient   The Patient and/Or Patient Representative agree with the Discharge Plan? Yes   Freedom of Choice list was provided with basic dialogue that supports the patient's individualized plan of care/goals, treatment preferences, and shares the quality data associated with the providers?  Yes     Patient is discharging home with [facility]. CM received a call from JENNIFER Monroy. She's concerned about patient going home because patient's daughter isn't supervising her. Patient stated that she doesn't want her daughter or Suze (JENNIFER) to be around her because they take her money. Patient told CM that Jordin her neighbor looks after her and will transport her home. MD spoke with Jordin while CM and RN were in the patient's room. He was given updates on the patient. Discharge instructions will also be shared with the patient when he arrives.

## 2024-05-30 NOTE — PROGRESS NOTES
Yi RODRIGUEZ Rc  Admission Date: 5/22/2024         Daily Progress Note: 5/27/2024    The patient's chart is reviewed and the patient is discussed with the staff.    Background: Patient is a 83 y.o.  female seen and evaluated at the request of Dr. Griffith for large right pleural effusion. She has a PMH of Alzheimer's dementia, breast cancer s/p right mastectomy, osteoporosis, RA.   Thoracentesis on 5/23 with 1.4 L removed. Cell differential show lymphocytotic predominant at 59%. Exudative fluid with protein 4.1 and . Cytology P. No growth per fluid  Chest tube inserted on 5/25 per IR    Subjective:     Chest tube inserted by IR on 5/24 (2500 ml drained)  Only 100 ml over the past 24 hours.        Current Facility-Administered Medications   Medication Dose Route Frequency    fentaNYL (SUBLIMAZE) injection    PRN    lidocaine PF 1 % injection    PRN    escitalopram (LEXAPRO) tablet 10 mg  10 mg Oral Daily    memantine (NAMENDA) tablet 10 mg  10 mg Oral BID    sodium chloride flush 0.9 % injection 5-40 mL  5-40 mL IntraVENous 2 times per day    sodium chloride flush 0.9 % injection 5-40 mL  5-40 mL IntraVENous PRN    0.9 % sodium chloride infusion   IntraVENous PRN    potassium chloride (KLOR-CON M) extended release tablet 40 mEq  40 mEq Oral PRN    Or    potassium bicarb-citric acid (EFFER-K) effervescent tablet 40 mEq  40 mEq Oral PRN    Or    potassium chloride 10 mEq/100 mL IVPB (Peripheral Line)  10 mEq IntraVENous PRN    magnesium sulfate 2000 mg in 50 mL IVPB premix  2,000 mg IntraVENous PRN    [Held by provider] enoxaparin (LOVENOX) injection 40 mg  40 mg SubCUTAneous Daily    ondansetron (ZOFRAN-ODT) disintegrating tablet 4 mg  4 mg Oral Q8H PRN    Or    ondansetron (ZOFRAN) injection 4 mg  4 mg IntraVENous Q6H PRN    polyethylene glycol (GLYCOLAX) packet 17 g  17 g Oral Daily PRN    acetaminophen (TYLENOL) tablet 650 mg  650 mg Oral Q6H PRN    Or    acetaminophen (TYLENOL) 
               Yi RODRIGUEZ Rc  Admission Date: 5/22/2024         Daily Progress Note: 5/29/2024    The patient's chart is reviewed and the patient is discussed with the staff.    Background: Patient is a 83 y.o.  female seen and evaluated at the request of Dr. Griffith for large right pleural effusion. She has a PMH of Alzheimer's dementia, breast cancer s/p right mastectomy, osteoporosis, RA.   Thoracentesis on 5/23 with 1.4 L removed. Cell differential show lymphocytotic predominant at 59%. Exudative fluid with protein 4.1 and . Cytology +malignant cells. No growth per fluid  Chest tube inserted on 5/25 per IR. CT removed 5/28.   Subjective:     CT removed by IR on 5/28.   Pt lying  in bed on RA asleep. Pt awakes easily and denies complaints. She has not been seen by oncology yet.     Current Facility-Administered Medications   Medication Dose Route Frequency    diatrizoate meglumine-sodium (GASTROGRAFIN) 66-10 % solution 15 mL  15 mL Oral ONCE PRN    escitalopram (LEXAPRO) tablet 10 mg  10 mg Oral Daily    memantine (NAMENDA) tablet 10 mg  10 mg Oral BID    sodium chloride flush 0.9 % injection 5-40 mL  5-40 mL IntraVENous 2 times per day    sodium chloride flush 0.9 % injection 5-40 mL  5-40 mL IntraVENous PRN    0.9 % sodium chloride infusion   IntraVENous PRN    potassium chloride (KLOR-CON M) extended release tablet 40 mEq  40 mEq Oral PRN    Or    potassium bicarb-citric acid (EFFER-K) effervescent tablet 40 mEq  40 mEq Oral PRN    Or    potassium chloride 10 mEq/100 mL IVPB (Peripheral Line)  10 mEq IntraVENous PRN    magnesium sulfate 2000 mg in 50 mL IVPB premix  2,000 mg IntraVENous PRN    [Held by provider] enoxaparin (LOVENOX) injection 40 mg  40 mg SubCUTAneous Daily    ondansetron (ZOFRAN-ODT) disintegrating tablet 4 mg  4 mg Oral Q8H PRN    Or    ondansetron (ZOFRAN) injection 4 mg  4 mg IntraVENous Q6H PRN    polyethylene glycol (GLYCOLAX) packet 17 g  17 g Oral Daily PRN    
               Yi Tatum  Admission Date: 2024         Daily Progress Note: 2024    The patient's chart is reviewed and the patient is discussed with the staff.    Background: Patient is a 83 y.o.  female seen and evaluated at the request of Dr. Griffith for large right pleural effusion. She has a PMH of Alzheimer's dementia, breast cancer s/p right mastectomy, osteoporosis, RA.      She presented to the ER on  via EMS with shortness of breath feeling like \"liquid stuck in throat\". Also reported 1 episode of bloody diarrhea and coughing up red sputum. ED MD reported the she \"has had a delusion of someone pouring something down her throat and ears for many years\". In ED she was on room air with sats >90%. Labs unremarkable. She was started on lasix IVP. CXR showed a massive right pleural effusion. ECHO was ordered by primary.      On exam, she is alert and oriented, answers all questions appropriately. She states she feels like something stays stuck in her throat when she swallows. She was a little short of breath on arrival but in no distress now and on room air. Denies any cough or congestion. Reports she has never been a smoker. Denies any lung or heart issues. Denies any swelling. She states she has lost \"a lot of weight\" after losing her desire to eat after her  .    Subjective:     Chest tube still draining. Feels less dyspnea. Off oxygen now. Still awaiting pathology from effusion from the other day.        Current Facility-Administered Medications   Medication Dose Route Frequency    fentaNYL (SUBLIMAZE) injection    PRN    lidocaine PF 1 % injection    PRN    escitalopram (LEXAPRO) tablet 10 mg  10 mg Oral Daily    memantine (NAMENDA) tablet 10 mg  10 mg Oral BID    sodium chloride flush 0.9 % injection 5-40 mL  5-40 mL IntraVENous 2 times per day    sodium chloride flush 0.9 % injection 5-40 mL  5-40 mL IntraVENous PRN    0.9 % sodium chloride infusion   IntraVENous 
               Yi Tatum  Admission Date: 2024         Daily Progress Note: 2024    The patient's chart is reviewed and the patient is discussed with the staff.    Background: Patient is a 83 y.o.  female seen and evaluated at the request of Dr. Griffith for large right pleural effusion. She has a PMH of Alzheimer's dementia, breast cancer s/p right mastectomy, osteoporosis, RA.      She presented to the ER on  via EMS with shortness of breath feeling like \"liquid stuck in throat\". Also reported 1 episode of bloody diarrhea and coughing up red sputum. ED MD reported the she \"has had a delusion of someone pouring something down her throat and ears for many years\". In ED she was on room air with sats >90%. Labs unremarkable. She was started on lasix IVP. CXR showed a massive right pleural effusion. ECHO was ordered by primary.      On exam, she is alert and oriented, answers all questions appropriately. She states she feels like something stays stuck in her throat when she swallows. She was a little short of breath on arrival but in no distress now and on room air. Denies any cough or congestion. Reports she has never been a smoker. Denies any lung or heart issues. Denies any swelling. She states she has lost \"a lot of weight\" after losing her desire to eat after her  .    Subjective:     States she does feel better today and can now tell the difference in how she felt yesterday prior to thora. Has nasal cannula in nose but flow is off. Eating breakfast.     Current Facility-Administered Medications   Medication Dose Route Frequency    escitalopram (LEXAPRO) tablet 10 mg  10 mg Oral Daily    memantine (NAMENDA) tablet 10 mg  10 mg Oral BID    sodium chloride flush 0.9 % injection 5-40 mL  5-40 mL IntraVENous 2 times per day    sodium chloride flush 0.9 % injection 5-40 mL  5-40 mL IntraVENous PRN    0.9 % sodium chloride infusion   IntraVENous PRN    potassium chloride 
               Yi Tatum  Admission Date: 5/22/2024         Daily Progress Note: 5/28/2024    The patient's chart is reviewed and the patient is discussed with the staff.    Background: Patient is a 83 y.o.  female seen and evaluated at the request of Dr. Griffith for large right pleural effusion. She has a PMH of Alzheimer's dementia, breast cancer s/p right mastectomy, osteoporosis, RA.   Thoracentesis on 5/23 with 1.4 L removed. Cell differential show lymphocytotic predominant at 59%. Exudative fluid with protein 4.1 and . Cytology P. No growth per fluid  Chest tube inserted on 5/25 per IR    Subjective:     On RA.   Chest tube inserted by IR on 5/24 (2500 ml drained). < 30 ml over the past 24 hours.        Current Facility-Administered Medications   Medication Dose Route Frequency    fentaNYL (SUBLIMAZE) injection    PRN    lidocaine PF 1 % injection    PRN    escitalopram (LEXAPRO) tablet 10 mg  10 mg Oral Daily    memantine (NAMENDA) tablet 10 mg  10 mg Oral BID    sodium chloride flush 0.9 % injection 5-40 mL  5-40 mL IntraVENous 2 times per day    sodium chloride flush 0.9 % injection 5-40 mL  5-40 mL IntraVENous PRN    0.9 % sodium chloride infusion   IntraVENous PRN    potassium chloride (KLOR-CON M) extended release tablet 40 mEq  40 mEq Oral PRN    Or    potassium bicarb-citric acid (EFFER-K) effervescent tablet 40 mEq  40 mEq Oral PRN    Or    potassium chloride 10 mEq/100 mL IVPB (Peripheral Line)  10 mEq IntraVENous PRN    magnesium sulfate 2000 mg in 50 mL IVPB premix  2,000 mg IntraVENous PRN    [Held by provider] enoxaparin (LOVENOX) injection 40 mg  40 mg SubCUTAneous Daily    ondansetron (ZOFRAN-ODT) disintegrating tablet 4 mg  4 mg Oral Q8H PRN    Or    ondansetron (ZOFRAN) injection 4 mg  4 mg IntraVENous Q6H PRN    polyethylene glycol (GLYCOLAX) packet 17 g  17 g Oral Daily PRN    acetaminophen (TYLENOL) tablet 650 mg  650 mg Oral Q6H PRN    Or    acetaminophen (TYLENOL) 
               Yi Tatum  Admission Date: 5/22/2024         Daily Progress Note: 5/30/2024    The patient's chart is reviewed and the patient is discussed with the staff.    Background: Patient is a 83 y.o.  female seen and evaluated at the request of Dr. Griffith for large right pleural effusion. She has a PMH of Alzheimer's dementia, breast cancer s/p right mastectomy, osteoporosis, RA.   Thoracentesis on 5/23 with 1.4 L removed. Cell differential show lymphocytotic predominant at 59%. Exudative fluid with protein 4.1 and . Cytology +malignant cells. No growth per fluid  Chest tube inserted on 5/25 per IR. CT removed 5/28.   Subjective:     Pt was seen by oncology and felt that pt would need workup after discharge including brain MRI and PET scan.   Pt sitting in bed on RA. She states that she is going home.     Current Facility-Administered Medications   Medication Dose Route Frequency    diatrizoate meglumine-sodium (GASTROGRAFIN) 66-10 % solution 15 mL  15 mL Oral ONCE PRN    escitalopram (LEXAPRO) tablet 10 mg  10 mg Oral Daily    memantine (NAMENDA) tablet 10 mg  10 mg Oral BID    sodium chloride flush 0.9 % injection 5-40 mL  5-40 mL IntraVENous 2 times per day    sodium chloride flush 0.9 % injection 5-40 mL  5-40 mL IntraVENous PRN    0.9 % sodium chloride infusion   IntraVENous PRN    potassium chloride (KLOR-CON M) extended release tablet 40 mEq  40 mEq Oral PRN    Or    potassium bicarb-citric acid (EFFER-K) effervescent tablet 40 mEq  40 mEq Oral PRN    Or    potassium chloride 10 mEq/100 mL IVPB (Peripheral Line)  10 mEq IntraVENous PRN    magnesium sulfate 2000 mg in 50 mL IVPB premix  2,000 mg IntraVENous PRN    [Held by provider] enoxaparin (LOVENOX) injection 40 mg  40 mg SubCUTAneous Daily    ondansetron (ZOFRAN-ODT) disintegrating tablet 4 mg  4 mg Oral Q8H PRN    Or    ondansetron (ZOFRAN) injection 4 mg  4 mg IntraVENous Q6H PRN    polyethylene glycol (GLYCOLAX) packet 17 
       Hospitalist Progress Note   Admit Date:  2024  6:13 PM   Name:  Yi Tatum   Age:  83 y.o.  Sex:  female  :  1940   MRN:  [id number]   Room:  [facility]    Presenting/Chief Complaint: Shortness of Breath     Reason(s) for Admission: Pleural effusion [J90]  Pleural effusion on right [J90]     Hospital Course:   Yi Tatum is a 83 y.o. female with medical history of Alzheimer's dementia, history of breast cancer s/p right mastectomy, osteoporosis, rheumatoid arthritis presented to ED with shortness of breath.  Patient is a poor historian.  Report worsening shortness of breath for past couple of days.  Patient is delusional and reports someone has done so many things to her for many years and likely has poured something down in her throat and that is why she is more short of breath. Denies fever, chills, cough, congestion, nausea, vomiting, abdominal pain.  Denies chest pain or palpitation.      In the ED patient was vitally stable, on room air saturating 93%.  Labs unremarkable.  X-ray showed massive right-sided pleural effusion.  Hospitalist consulted for admission.    Subjective & 24hr Events:     Generalized weakness  Later on today had -  1400 cc of duc fluid was aspirated and sent for analysis.       On 1 lit/min  K 3.4, mag 1.7  Says breathing better  Pulmonary consulted IR    Assessment & Plan:   Right large pleural effusion:  Start patient on Lasix 40 mg IV daily  Echocardiogram to rule out cardiac etiology  Pulmonology consult tomorrow a.m. for thoracentesis evaluation  -1400 cc of duc fluid was aspirated and sent for analysis.  - Pulmonary consulted IR for thoracentesis    Hypokalemia and Hypomagnesemia  -K 3.4, mag 1.7  Ordered 40 meq kcl q 4hr x2, iv mag sulphate 2 gm     Alzheimer's dementia:  Continue memantine     Mood disorder:  Continue Lexapro     History of breast cancer:  Status post right mastectomy, not following oncology, has declined 
       Hospitalist Progress Note   Admit Date:  2024  6:13 PM   Name:  Yi Tatum   Age:  83 y.o.  Sex:  female  :  1940   MRN:  [id number]   Room:  [facility]/    Presenting/Chief Complaint: Shortness of Breath     Reason(s) for Admission: Pleural effusion [J90]  Pleural effusion on right [J90]     Hospital Course:   Yi Tatum is a 83 y.o. female with medical history of Alzheimer's dementia, history of breast cancer s/p right mastectomy, osteoporosis, rheumatoid arthritis presented to ED with shortness of breath.  Patient is a poor historian.  Report worsening shortness of breath for past couple of days.  Patient is delusional and reports someone has done so many things to her for many years and likely has poured something down in her throat and that is why she is more short of breath. Denies fever, chills, cough, congestion, nausea, vomiting, abdominal pain.  Denies chest pain or palpitation.      In the ED patient was vitally stable, on room air saturating 93%.  Labs unremarkable.  X-ray showed massive right-sided pleural effusion.  Hospitalist consulted for admission.    Subjective & 24hr Events:     Generalized weakness  Later on today had -  1400 cc of duc fluid was aspirated and sent for analysis.       On 1 lit/min  K 3.4, mag 1.7  Says breathing better  Pulmonary consulted IR      Says breathing better, presently on 2 L/min, IR placed chest tube right chest yesterday -  Says breathing okay, off oxygen, drain 1260 cc since yesterday      Since doing okay, only had 100 cc of output from right chest tube since yesterday, was asking when she will be discharged, path pending      Only 30 cc drained in 24 hrs.  CT Chest reviewed  As per Pulmonary note cytology positive for malignancy- oncology consulted- ordered   Ct Abd pelvis      Assessment & Plan:   Right large pleural effusion:  Start patient on Lasix 40 mg IV daily  Echocardiogram to rule out 
       Hospitalist Progress Note   Admit Date:  2024  6:13 PM   Name:  Yi Tatum   Age:  83 y.o.  Sex:  female  :  1940   MRN:  [id number]   Room:  [facility]    Presenting/Chief Complaint: Shortness of Breath     Reason(s) for Admission: Pleural effusion [J90]  Pleural effusion on right [J90]     Hospital Course:   Yi Tatum is a 83 y.o. female with medical history of Alzheimer's dementia, history of breast cancer s/p right mastectomy, osteoporosis, rheumatoid arthritis presented to ED with shortness of breath.  Patient is a poor historian.  Report worsening shortness of breath for past couple of days.  Patient is delusional and reports someone has done so many things to her for many years and likely has poured something down in her throat and that is why she is more short of breath. Denies fever, chills, cough, congestion, nausea, vomiting, abdominal pain.  Denies chest pain or palpitation.      In the ED patient was vitally stable, on room air saturating 93%.  Labs unremarkable.  X-ray showed massive right-sided pleural effusion.  Hospitalist consulted for admission.    Subjective & 24hr Events:     Generalized weakness  Later on today had -  1400 cc of duc fluid was aspirated and sent for analysis.       On 1 lit/min  K 3.4, mag 1.7  Says breathing better  Pulmonary consulted IR      Says breathing better, presently on 2 L/min, IR placed chest tube right chest yesterday     Assessment & Plan:   Right large pleural effusion:  Start patient on Lasix 40 mg IV daily  Echocardiogram to rule out cardiac etiology  Pulmonology consult tomorrow a.m. for thoracentesis evaluation  -1400 cc of duc fluid was aspirated and sent for analysis.  - Pulmonary consulted IR for thoracentesis  - IR placed chest tube right chest yesterday - 2515cc output since placement  Pleural Fluid Only Analysis (PFO3)  If any one of the 3 are positive, the pleural fluid is considered 
       Hospitalist Progress Note   Admit Date:  2024  6:13 PM   Name:  Yi Tatum   Age:  83 y.o.  Sex:  female  :  1940   MRN:  [id number]   Room:  [facility]    Presenting/Chief Complaint: Shortness of Breath     Reason(s) for Admission: Pleural effusion [J90]  Pleural effusion on right [J90]     Hospital Course:   Yi Tatum is a 83 y.o. female with medical history of Alzheimer's dementia, history of breast cancer s/p right mastectomy, osteoporosis, rheumatoid arthritis presented to ED with shortness of breath.  Patient is a poor historian.  Report worsening shortness of breath for past couple of days.  Patient is delusional and reports someone has done so many things to her for many years and likely has poured something down in her throat and that is why she is more short of breath. Denies fever, chills, cough, congestion, nausea, vomiting, abdominal pain.  Denies chest pain or palpitation.      In the ED patient was vitally stable, on room air saturating 93%.  Labs unremarkable.  X-ray showed massive right-sided pleural effusion.  Hospitalist consulted for admission.    Subjective & 24hr Events:     Generalized weakness  Later on today had -  1400 cc of duc fluid was aspirated and sent for analysis.       On 1 lit/min  K 3.4, mag 1.7  Says breathing better  Pulmonary consulted IR      Says breathing better, presently on 2 L/min, IR placed chest tube right chest yesterday -  Says breathing okay, off oxygen, drain 1260 cc since yesterday      Since doing okay, only had 100 cc of output from right chest tube since yesterday, was asking when she will be discharged, path pending    Assessment & Plan:   Right large pleural effusion:  Start patient on Lasix 40 mg IV daily  Echocardiogram to rule out cardiac etiology  Pulmonology consult tomorrow a.m. for thoracentesis evaluation  -1400 cc of duc fluid was aspirated and sent for analysis.  - Pulmonary 
       Hospitalist Progress Note   Admit Date:  2024  6:13 PM   Name:  Yi Tatum   Age:  83 y.o.  Sex:  female  :  1940   MRN:  [id number]   Room:  [facility]    Presenting/Chief Complaint: Shortness of Breath     Reason(s) for Admission: Pleural effusion [J90]  Pleural effusion on right [J90]     Hospital Course:   Yi Tatum is a 83 y.o. female with medical history of Alzheimer's dementia, history of breast cancer s/p right mastectomy, osteoporosis, rheumatoid arthritis presented to ED with shortness of breath.  Patient is a poor historian.  Report worsening shortness of breath for past couple of days.  Patient is delusional and reports someone has done so many things to her for many years and likely has poured something down in her throat and that is why she is more short of breath. Denies fever, chills, cough, congestion, nausea, vomiting, abdominal pain.  Denies chest pain or palpitation.      In the ED patient was vitally stable, on room air saturating 93%.  Labs unremarkable.  X-ray showed massive right-sided pleural effusion.  Hospitalist consulted for admission.    Subjective & 24hr Events:   Generalized weakness  Later on today had -  1400 cc of duc fluid was aspirated and sent for analysis.     Assessment & Plan:   Right large pleural effusion:  Start patient on Lasix 40 mg IV daily  Echocardiogram to rule out cardiac etiology  Pulmonology consult tomorrow a.m. for thoracentesis evaluation  -1400 cc of duc fluid was aspirated and sent for analysis.     Alzheimer's dementia:  Continue memantine     Mood disorder:  Continue Lexapro     History of breast cancer:  Status post right mastectomy, not following oncology, has declined further breast imaging     History of rheumatoid arthritis:  Not following rheumatologist and has declined referral in the past        PT/OT evals ordered?  Therapy evals ordered  Diet: ADULT DIET; Regular  VTE prophylaxis: Lovenox  Code status: 
    [facility]  Department of Interventional Radiology  [phone]                                 Progress Note  Patient: Yi Tatum MRN: [id number]  SSN: [id number]    YOB: 1940  Age: 83 y.o.  Sex: female      Subjective:   Sitting up in recliner, c/o pain to her buttocks.  CT scan yesterday much improved regarding pleural effusion. Small volume persists in the fissure.  Cytology + malignant cells, hx breast cancer.     Review of Systems:    Pertinent items are noted in HPI.    Objective:     Vitals:    05/27/24 2305 05/28/24 0330 05/28/24 0721 05/28/24 1050   BP: (!) 129/59 (!) 139/56 (!) 145/51 133/63   Pulse: 74 73 71 87   Resp: 18 16 18 18   Temp: 97.9 °F (36.6 °C) 97.5 °F (36.4 °C) 97.9 °F (36.6 °C) 97.9 °F (36.6 °C)   TempSrc: Oral Oral     SpO2: 95% 94% 92% 95%   Weight:       Height:            Pain Assessment                      Pain Level: 0   Patient's Stated Pain Goal: 0 - No pain                                 Intake and Output:  Chest Tube Right Pleural 1 (Active)   Chest Tube Airleak No 05/28/24 1031   Status Gravity 05/28/24 1031   Suction -20 cm H2O 05/28/24 1031   Y Connector Used No 05/28/24 1031   Drainage Description Serosanguinous 05/28/24 1031   Dressing Status Clean, dry & intact 05/28/24 1031   Chest Tube Dressing Dry 05/27/24 1937   Site Assessment Clean, dry & intact 05/28/24 1031   Surrounding Skin Clean, dry & intact 05/28/24 1031   Output (ml) 0 ml 05/28/24 1031             Physical Exam:   HEART: regular rate and rhythm  LUNG: diminished right  ABDOMEN: normal findings: soft, non-tender  EXTREMITIES: warm, no edema  Lab/Data Review:  CBC:   Lab Results   Component Value Date/Time    WBC 6.4 05/27/2024 04:36 AM    RBC 4.87 05/27/2024 04:36 AM    HGB 13.4 05/27/2024 04:36 AM    HCT 42.0 05/27/2024 04:36 AM    MCV 86.2 05/27/2024 04:36 AM    MCH 27.5 05/27/2024 04:36 AM    MCHC 31.9 05/27/2024 04:36 AM    RDW 12.9 05/27/2024 04:36 AM 
  Discharge education had been review with patient and neighbors. Understanding of all discharge instructions both written and verbal have been expressed by  patient and neighbors.  Discharge medications reviewed with the patient and neighbors and appropriate educational materials and side effects teaching were provided in written form.     All new medications have been sent to patient's pharmacy of record.    All PIV's removed with dressing applied.    Patient leaving the floor on room air no further acute needs noted at this time.   
ACUTE OCCUPATIONAL THERAPY GOALS:   (Developed with and agreed upon by patient and/or caregiver.)  1. Patient will complete full body bathing and dressing with mod I and adaptive equipment as needed.   2. Patient will complete toileting with mod I.   3. Patient will complete functional transfers with mod I and adaptive equipment as needed.   4. Patient will tolerate at least 15 minutes of OT activity with less than 2 rest breaks while maintaining O2 sats >90%.   5. Patient will verbalize at least 3 energy conservation technique to utilize during ADL/IADL.   6. Patient will complete grooming in standing at sink level with mod I.  7. Patient will complete household distances with mod I and adaptive equipment as needed.     Timeframe: 7 visits    OCCUPATIONAL THERAPY: Daily Note AM   OT Visit Days: 2   Time In/Out  OT Charge Capture  Rehab Caseload Tracker  OT Orders    Yi Tatum is a 83 y.o. female   PRIMARY DIAGNOSIS: Pleural effusion on right  Pleural effusion [J90]  Pleural effusion on right [J90]  Procedure(s) (LRB):  THORACENTESIS ULTRASOUND (N/A)  5 Days Post-Op  Inpatient: Payor: [email or web address] MEDICARE / Plan: [employer] CHOICE-O MEDICARE / Product Type: *No Product type* /     ASSESSMENT:     REHAB RECOMMENDATIONS:   Recommendation to date pending progress:  Setting:  Home Health Therapy    Equipment:    To Be Determined     ASSESSMENT:  Ms. Tatum is doing fair today. Pt presents supine upon arrival. Still with chest tube. Pt required min/mod A for supine to sit transfer today. Fair sitting balance at edge of bed. Pt was able to stand with CGA x2 and walk around room to sink. Pt able to perform self care tasks while seated/standing. Required more assistance due to decreased use in both hands. Left in chair with all needs in reach. Making some progress with goals. Will continue to benefit from skilled OT during stay.       SUBJECTIVE:     Ms. Tatum states, \"I was  for 61 years\" 
ACUTE OCCUPATIONAL THERAPY GOALS:   (Developed with and agreed upon by patient and/or caregiver.)  1. Patient will complete full body bathing and dressing with mod I and adaptive equipment as needed.   2. Patient will complete toileting with mod I.   3. Patient will complete functional transfers with mod I and adaptive equipment as needed.   4. Patient will tolerate at least 15 minutes of OT activity with less than 2 rest breaks while maintaining O2 sats >90%.   5. Patient will verbalize at least 3 energy conservation technique to utilize during ADL/IADL.   6. Patient will complete grooming in standing at sink level with mod I.  7. Patient will complete household distances with mod I and adaptive equipment as needed.     Timeframe: 7 visits    OCCUPATIONAL THERAPY: Daily Note AM   OT Visit Days: 3   Time In/Out  OT Charge Capture  Rehab Caseload Tracker  OT Orders    Yi Tatum is a 83 y.o. female   PRIMARY DIAGNOSIS: Pleural effusion on right  Pleural effusion [J90]  Pleural effusion on right [J90]  Procedure(s) (LRB):  THORACENTESIS ULTRASOUND (N/A)  6 Days Post-Op  Inpatient: Payor: [employer] MEDICARE / Plan: [employer] CHOICE-O MEDICARE / Product Type: *No Product type* /     ASSESSMENT:     REHAB RECOMMENDATIONS:   Recommendation to date pending progress:  Setting:  Home Health Therapy    Equipment:    To Be Determined     ASSESSMENT:  Ms. Tatum presents with decreased activity tolerance and decreased strength for mobility. Pt has little hand ROM from arthritis impacting her independence with self care. She required total assist to don socks and set up with washing her face and UB. Pt was left up in the chair working with PTA. Good effort. Continue POC.       SUBJECTIVE:     Ms. Tatum states, \"The doctor said I have cancer but did not tell me what kind I have.\"     Social/Functional Lives With: Alone  Type of Home: House  Home Layout: One level  Home Access: Stairs to enter with rails  Entrance 
ACUTE OCCUPATIONAL THERAPY GOALS:   (Developed with and agreed upon by patient and/or caregiver.)  1. Patient will complete full body bathing and dressing with mod I and adaptive equipment as needed.   2. Patient will complete toileting with mod I.   3. Patient will complete functional transfers with mod I and adaptive equipment as needed.   4. Patient will tolerate at least 15 minutes of OT activity with less than 2 rest breaks while maintaining O2 sats >90%.   5. Patient will verbalize at least 3 energy conservation technique to utilize during ADL/IADL.   6. Patient will complete grooming in standing at sink level with mod I.  7. Patient will complete household distances with mod I and adaptive equipment as needed.    Timeframe: 7 visits      OCCUPATIONAL THERAPY Initial Assessment, Daily Note, and AM       OT Visit Days: 1  Acknowledge Orders  Time  OT Charge Capture  Rehab Caseload Tracker      FALLS risk    Yi Tatum is a 83 y.o. female   PRIMARY DIAGNOSIS: Pleural effusion  Pleural effusion [J90]  Pleural effusion on right [J90]  Procedure(s) (LRB):  THORACENTESIS ULTRASOUND (N/A)     Reason for Referral: Generalized Muscle Weakness (M62.81)  Other lack of cordination (R27.8)  Difficulty in walking, Not elsewhere classified (R26.2)  Other abnormalities of gait and mobility (R26.89)  Dizziness and Giddiness (R42)  Inpatient: Payor: [employer] MEDICARE / Plan: [employer] CHOICE-O MEDICARE / Product Type: *No Product type* /     ASSESSMENT:     REHAB RECOMMENDATIONS:   Recommendation to date pending progress:  Setting:  Home Health Therapy    Equipment:    To Be Determined  Might benefit from platform rolling walker with tray support only due to B hands in room air, has rolling walker and SPC, has lifeline as well     ASSESSMENT:  Ms. Tatum is a 84 YO right hand dominant (but uses L hand since it has more mobility) WF admitted with increased shortness of breath and diagnosed with above. Scheduled for 
ACUTE PHYSICAL THERAPY GOALS:   (Developed with and agreed upon by patient and/or caregiver.)  LTG:  (1.)Ms. Tatum will move from supine to sit and sit to supine , scoot up and down, and roll side to side in bed with INDEPENDENT within 7 treatment day(s).    (2.)Ms. Tatum will transfer from bed to chair and chair to bed with INDEPENDENT using the least restrictive device within 7 treatment day(s).    (3.)Ms. Tatum will ambulate with INDEPENDENT for 300 feet with the least restrictive device within 7 treatment day(s).  (4.)Ms. Tatum will tolerate at least 23 min of dynamic standing activity to assist standing ADLs with the least restrictive device within 7 treatment days.    PHYSICAL THERAPY: Daily Note AM   (Link to Caseload Tracking: PT Visit Days : 2  Time In/Out PT Charge Capture  Rehab Caseload Tracker  Orders    Yi Tatum is a 83 y.o. female   PRIMARY DIAGNOSIS: Pleural effusion on right  Pleural effusion [J90]  Pleural effusion on right [J90]  Procedure(s) (LRB):  THORACENTESIS ULTRASOUND (N/A)  5 Days Post-Op  Inpatient: Payor: [employer] MEDICARE / Plan: [employer] CHOICE-PP[facility] MEDICARE / Product Type: *No Product type* /     ASSESSMENT:     REHAB RECOMMENDATIONS:   Recommendation to date pending progress:  Setting:  Home Health Therapy    Equipment:    To Be Determined     ASSESSMENT:  Ms. Tatum presents in supine. Today's mobility is limited to within the room due to pt now on chest tube on duction. MEDINA present to assist in ADL cueing.       Upon entering, Pnt is agreeable to PT treatment.  she reports no pain at rest. Pnt performed supine > sit with min-mod A, sitting EOB with good sitting balance control. Sit > stand with CGAx2 using under axillary support. She stands at sink for 10-15 min w/ tactile and verbal balance cues from PT with cues to reach outside of [facility] for dressing and washing in standing. She requires one seated rest break due to fatigue. Following this,  Gait x 8, 10 ft 
Arrives back to room from IR via stretcher and IR nursing staff. Right chest tube in place to 20cm suction draining yellow drainage. Groggy but awakens easily when called. NAD at present. Will continue to monitor closely.   
Bedside report received from night nurse Cherise. Assessment done as noted  Respiration even and unlabored 20/min; denies pain or nausea at present. Right chest tube CDI draining serosanguinous drainage. CT to 20cm suction. No family at bedside. Door open. Encouraged to call with needs.  
Bedside report received from night nurse Tony.  Assessment done as noted  Respiration even and unlabored 20/min; denies pain or nausea at present. Right chest tube CDI, to 20cm suction draining yellow drainage. Encouraged to call with needs.  
Bedside report received from night nurse Tony. Assessment done as noted  Respiration even and unlabored 20/min; denies pain or nausea at present. Encouraged to call with needs.  
PM assessment done. No changes noted. Respiration even and unlabored 20/min at rest. No s/s of pain noted at present. Encouraged to call with needs.  
PM assessment done. No changes noted. Respiration even and unlabored 20/min at rest. No s/s of pain noted at present. Report given to Marilyn with IR. To IR via stretcher and transport. Encouraged to call with needs.  
Patient resting in bed with no acute events overnight. Respirations even and unlabored on 1LNC. A/Ox2.  Chest tube in place connected to suction with serosanguinous drainage, 70mL output overnight, dressing clean dry and intact. Safety measures in place. No needs expressed at this time. Preparing report for oncoming nurse.   
Pt found with chest tube in place, atrium completely full, atrium attached to end of bed, and tubing full of fluid. Pt with coughing, complaints of shortness of breath, and \"dry chest.\" New atrium placed and taped to floor to allow for better flow. After tubing allowed to drain, pt expressed relief and ease of breathing.   
Resting quietly at present. NAD noted. Safety maintained through out the shift. Confusion at times. CT intact and patent, 20 cm suction draining serosanguinous drainage. Shift output from right chest tube 100cc. To report off to on coming nurse.  
Resting quietly at present. NAD noted. Safety maintained through out the shift. To report off to on coming nurse.  
Resting quietly at present. NAD noted. Safety maintained through out the shift. To report off to on coming nurse.  
TRANSFER - IN REPORT:    Verbal report received from YUE Rodríguez on Yi Tatum  being received from ED for routine progression of patient care      Report consisted of patient's Situation, Background, Assessment and   Recommendations(SBAR).     Information from the following report(s) Nurse Handoff Report, ED Encounter Summary, ED SBAR, and Adult Overview was reviewed with the receiving nurse.    Opportunity for questions and clarification was provided.      
Wound care done to sacrum per order. Tolerated well. Refused SCD at present. Encouraged to call with needs.   
  I=Independent, Mod I=Modified Independent, S=Supervision, SBA=Standby Assistance, CGA=Contact Guard Assistance,   Min=Minimal Assistance, Mod=Moderate Assistance, Max=Maximal Assistance, Total=Total Assistance, NT=Not Tested    PLAN:   FREQUENCY AND DURATION: 3 times/week for duration of hospital stay or until stated goals are met, whichever comes first.    TREATMENT:   TREATMENT:   Co-Treatment PT/OT necessary due to patient's decreased overall endurance/tolerance levels, as well as need for high level skilled assistance to complete functional transfers/mobility and functional tasks  Therapeutic Activity (23 Minutes): Therapeutic activity included Rolling, Supine to Sit, Scooting, Transfer Training, Ambulation on level ground, and Standing balance to improve functional Activity tolerance, Balance, Mobility, and Strength.    TREATMENT GRID:  N/A    AFTER TREATMENT PRECAUTIONS: Alarm Activated, Call light within reach, Chair, Needs within reach, and RN notified    INTERDISCIPLINARY COLLABORATION:  RN/ PCT, PT/ PTA, and OT/ MEDINA    EDUCATION:      TIME IN/OUT:  Time In: 1127  Time Out: 1150  Minutes: 23    CHARLOTTE LAWRENCE PTA    
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 *      Objective:   Patient Vitals for the past 24 hrs:   Temp Pulse Resp BP SpO2   05/29/24 1452 98.1 °F (36.7 °C) 77 18 (!) 127/49 94 %   05/29/24 0739 98.6 °F (37 °C) 69 17 (!) 127/55 93 %   05/29/24 0420 98.4 °F (36.9 °C) 71 16 (!) 130/53 91 %   05/28/24 2353 98.4 °F (36.9 °C) 73 17 (!) 123/46 94 %   05/28/24 2110 -- 73 16 -- 94 %   05/28/24 2020 98.4 °F (36.9 °C) 78 17 (!) 134/49 93 %   05/28/24 1512 98.2 °F (36.8 °C) 80 18 (!) 138/52 93 %       Oxygen Therapy  SpO2: 94 %  Pulse via Oximetry: 78 beats per minute  Pulse Oximeter Device Mode: Continuous  Pulse Oximeter Device Location: Finger  O2 Device: None (Room air)  O2 Flow Rate (L/min): 1 L/min    Estimated body mass index is 19.22 kg/m² as calculated from the following:    Height as of this encounter: 1.676 m (5' 6\").    Weight as of this encounter: 54 kg (119 lb 1.6 oz).  No intake or output data in the 24 hours ending 05/29/24 1455        Physical Exam:     General:    Well nourished.off oxygen  Head:  Normocephalic, atraumatic  Eyes:  Sclerae appear normal.  Pupils equally round.  ENT:  Nares appear normal.  Moist oral mucosa  Neck:  No restricted ROM.  Trachea midline   CV:   RRR.  No m/r/g.  No jugular venous distension.  Lungs:   Rt sided chest tube, mild coarse breath sounds  Abdomen:   Soft, nontender, nondistended.  Extremities: No cyanosis or clubbing.  No edema  Arthritic changes both hands  Skin:     No rashes.  Normal coloration.   Warm and dry.    Neuro:  CN II-XII grossly intact.    Psych:  Normal mood and affect.      I have personally reviewed labs and tests:  Recent Labs:  Recent Results (from the past 48 
(=127).        Assessment and Plan:  (Medical Decision Making)   Impression: 83 y.o female admitted with massive right pleural effusion seen on CXR. No respiratory distress, on room air, no history of heart failure. ECHO pending. We were consulted for thoracentesis. S/p thoracentesis on 5/23 with 1.4 L removed and incomplete drainage due to cough.     Principal Problem:    Pleural effusion on right  Plan: removed 1.4 L duc fluid from right on 5/23 but had incomplete drainage due to cough. Cell differential show lymphocytotic predominant at 59%. Exudative fluid with protein 4.1 and . Cultures and cytology pending. Suspicious for malignant process given recent weight loss and history of right sided breast cancer.   CXR shows improvement in upper fields but still significant amount of fluid present. CT chest from Feb 2023 did not show any suspicious lung masses at that time.     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      Marie Schmidt MD      
   Potassium 4.8 3.5 - 5.1 mmol/L    Chloride 98 98 - 107 mmol/L    CO2 25 20 - 28 mmol/L    Anion Gap 9 9 - 18 mmol/L    Glucose 100 (H) 70 - 99 mg/dL    BUN 11 8 - 23 MG/DL    Creatinine 0.45 (L) 0.60 - 1.10 MG/DL    Est, Glom Filt Rate >90 >60 ml/min/1.73m2    Calcium 8.2 (L) 8.8 - 10.2 MG/DL       No results for input(s): \"COVID19\" in the last 72 hours.    Current Meds:  Current Facility-Administered Medications   Medication Dose Route Frequency    fentaNYL (SUBLIMAZE) injection    PRN    lidocaine PF 1 % injection    PRN    escitalopram (LEXAPRO) tablet 10 mg  10 mg Oral Daily    memantine (NAMENDA) tablet 10 mg  10 mg Oral BID    sodium chloride flush 0.9 % injection 5-40 mL  5-40 mL IntraVENous 2 times per day    sodium chloride flush 0.9 % injection 5-40 mL  5-40 mL IntraVENous PRN    0.9 % sodium chloride infusion   IntraVENous PRN    potassium chloride (KLOR-CON M) extended release tablet 40 mEq  40 mEq Oral PRN    Or    potassium bicarb-citric acid (EFFER-K) effervescent tablet 40 mEq  40 mEq Oral PRN    Or    potassium chloride 10 mEq/100 mL IVPB (Peripheral Line)  10 mEq IntraVENous PRN    magnesium sulfate 2000 mg in 50 mL IVPB premix  2,000 mg IntraVENous PRN    [Held by provider] enoxaparin (LOVENOX) injection 40 mg  40 mg SubCUTAneous Daily    ondansetron (ZOFRAN-ODT) disintegrating tablet 4 mg  4 mg Oral Q8H PRN    Or    ondansetron (ZOFRAN) injection 4 mg  4 mg IntraVENous Q6H PRN    polyethylene glycol (GLYCOLAX) packet 17 g  17 g Oral Daily PRN    acetaminophen (TYLENOL) tablet 650 mg  650 mg Oral Q6H PRN    Or    acetaminophen (TYLENOL) suppository 650 mg  650 mg Rectal Q6H PRN    [Held by provider] furosemide (LASIX) injection 40 mg  40 mg IntraVENous Daily       Signed:  SELVIN GONZALES MD  
Sit, Scooting, Lateral Scooting, Transfer Training, Ambulation on level ground, Sitting balance , and Standing balance to improve functional Activity tolerance, Balance, Coordination, Mobility, Strength, and ROM.    TREATMENT GRID:  N/A    AFTER TREATMENT PRECAUTIONS: Alarm Activated, Call light within reach, Chair, Needs within reach, and RN notified    INTERDISCIPLINARY COLLABORATION:  RN/ PCT, PT/ PTA, and OT/ MEDINA    EDUCATION: Education Given To: Patient  Education Provided: Role of Therapy;Plan of Care    TIME IN/OUT:  Time In: 0912  Time Out: 0944  Minutes: 32    Bernadette Thacker, PT

## 2024-05-30 NOTE — TELEPHONE ENCOUNTER
----- Message from Bernardo Luna MD sent at 5/29/2024  5:19 PM EDT -----  Please get brain MRI and arrange for outpatient PET for staging.

## 2024-05-30 NOTE — DISCHARGE INSTRUCTIONS
[facility]  [address] [place] [address] 300   [phone]    The pulmonary office will call you in 1-2 business days to arrange follow up in the pulmonary office. If you have not heard from the office after 2 full business days, please call the office to arrange follow up.      Oncology office will call with appointment.  Would need to get PCP  and follow up with PCP at the earliest.  Advised to come back to ER if any new shortness of  breath.

## 2024-05-30 NOTE — DISCHARGE SUMMARY
Negative 02/22/2023 03:30 PM    GLUCOSEU Negative 02/22/2023 03:30 PM    KETUA 40 02/22/2023 03:30 PM    BILIRUBINUR Negative 02/22/2023 03:30 PM    BLOODU Negative 02/22/2023 03:30 PM    UROBILINOGEN 0.2 02/22/2023 03:30 PM    NITRU Negative 02/22/2023 03:30 PM    LEUKOCYTESUR Negative 02/22/2023 03:30 PM    WBCUA 0-4 02/15/2023 04:50 PM    RBCUA 0-5 02/15/2023 04:50 PM    BACTERIA Negative 02/15/2023 04:50 PM    LABCAST 0-2 02/15/2023 04:50 PM        Microbiology:  Results       Procedure Component Value Units Date/Time    Culture, Body Fluid [[id number]] Collected: 05/25/24 0945    Order Status: Canceled Specimen: Body Fluid from Pleural     Culture, Body Fluid [[id number]] Collected: 05/23/24 1355    Order Status: Completed Specimen: Pleural Fluid Updated: 05/25/24 0856     Special Requests NO SPECIAL REQUESTS        Gram Stain FEW WBCS SEEN         NO DEFINITE ORGANISM SEEN        Culture NO GROWTH 2 DAYS               All Labs from Last 24 Hrs:  No results found for this or any previous visit (from the past 24 hour(s)).    No results for input(s): \"COVID19\" in the last 72 hours.    Recent Vital Data:  Patient Vitals for the past 24 hrs:   Temp Pulse Resp BP SpO2   05/30/24 1050 99 °F (37.2 °C) 77 18 (!) 121/50 94 %   05/30/24 0306 97.9 °F (36.6 °C) 70 19 (!) 119/50 93 %   05/29/24 2328 98.1 °F (36.7 °C) 73 19 (!) 135/53 94 %   05/29/24 1908 99.1 °F (37.3 °C) 83 19 (!) 125/52 93 %   05/29/24 1452 98.1 °F (36.7 °C) 77 18 (!) 127/49 94 %       Oxygen Therapy  SpO2: 94 %  Pulse via Oximetry: 78 beats per minute  Pulse Oximeter Device Mode: Continuous  Pulse Oximeter Device Location: Finger  O2 Device: None (Room air)  O2 Flow Rate (L/min): 1 L/min    Estimated body mass index is 19.22 kg/m² as calculated from the following:    Height as of this encounter: 1.676 m (5' 6\").    Weight as of this encounter: 54 kg (119 lb 1.6 oz).    Intake/Output Summary (Last 24 hours) at 5/30/2024 1326  Last data filed at 5/30/2024

## 2024-05-30 NOTE — CARE COORDINATION
CM met with the patient to discuss discharge plans. Patient is agreeable to HH at discharge. [facility] has been ordered. CM following for discharge needs.

## 2024-05-31 ENCOUNTER — TELEPHONE (OUTPATIENT)
Dept: INTERNAL MEDICINE CLINIC | Facility: CLINIC | Age: 84
End: 2024-05-31

## 2024-06-07 ENCOUNTER — HOSPITAL ENCOUNTER (OUTPATIENT)
Dept: PET IMAGING | Age: 84
Discharge: HOME | End: 2024-06-07
Payer: MEDICARE

## 2024-06-07 DIAGNOSIS — C79.9 METASTATIC MALIGNANT NEOPLASM OF UNKNOWN PRIMARY SITE (HCC): ICD-10-CM

## 2024-06-07 DIAGNOSIS — C80.1 METASTATIC MALIGNANT NEOPLASM OF UNKNOWN PRIMARY SITE (HCC): ICD-10-CM

## 2024-06-07 DIAGNOSIS — C78.2 MALIGNANT NEOPLASM METASTATIC TO PLEURA (HCC): ICD-10-CM

## 2024-06-07 LAB
GLUCOSE BLD STRIP.AUTO-MCNC: 112 MG/DL (ref 65–100)
SERVICE CMNT-IMP: ABNORMAL

## 2024-06-07 PROCEDURE — A9609 HC RX DIAGNOSTIC RADIOPHARMACEUTICAL: HCPCS | Performed by: INTERNAL MEDICINE

## 2024-06-07 PROCEDURE — 78815 PET IMAGE W/CT SKULL-THIGH: CPT

## 2024-06-07 PROCEDURE — 82962 GLUCOSE BLOOD TEST: CPT

## 2024-06-07 PROCEDURE — 2580000003 HC RX 258: Performed by: INTERNAL MEDICINE

## 2024-06-07 PROCEDURE — 3430000000 HC RX DIAGNOSTIC RADIOPHARMACEUTICAL: Performed by: INTERNAL MEDICINE

## 2024-06-07 RX ORDER — SODIUM CHLORIDE 0.9 % (FLUSH) 0.9 %
20 SYRINGE (ML) INJECTION AS NEEDED
Status: DISCONTINUED | OUTPATIENT
Start: 2024-06-07 | End: 2024-06-11 | Stop reason: HOSPADM

## 2024-06-07 RX ORDER — FLUDEOXYGLUCOSE F 18 200 MCI/ML
10.68 INJECTION, SOLUTION INTRAVENOUS
Status: COMPLETED | OUTPATIENT
Start: 2024-06-07 | End: 2024-06-07

## 2024-06-07 RX ADMIN — SODIUM CHLORIDE, PRESERVATIVE FREE 20 ML: 5 INJECTION INTRAVENOUS at 13:25

## 2024-06-07 RX ADMIN — FLUDEOXYGLUCOSE F 18 10.68 MILLICURIE: 200 INJECTION, SOLUTION INTRAVENOUS at 13:25

## 2024-06-09 NOTE — PROGRESS NOTES
underwent right breast mastectomy on 3/27/2019, no reported oncologic follow-up who presents in hospital follow-up and discuss treatment options for recurrent ER + (96%)/CA + (2%)/HER2 equivocal/low-2+ by IHC breast cancer with lung, lymph node, pleural and bone involvement.  She was admitted (5/22/2024-5/30/2024) for management of R  pleural effusion,She underwent thoracentesis on 5/23/24, 1.4 L removed.  Cell differential showed lymphocytic predominance at 59%.  Cytology returned + for malignant cells.  IHC consistent with carcinoma of breast origin ER + (96%)/CA + (2%)/HER2 equivocal/low-2+, FISH pending. MRI brain showed no intracranial mets. PET/CT demonstrated lung, lymph node, pleural and bone involvement as noted above.     PLAN:  I reviewed most recent labs and imaging findings with the patient, her friends and shared PET/CT images.  Discussed pathogenesis, natural history, prognosis and management approach to recurrent, unresectable hormone receptor positive breast cancer.  We shall follow FISH testing results to determine HER2 status.  However patient clearly indicated that she does not want any chemotherapy. I discussed the standard of care treatment with AI + CDK4/6 inhibitor.  Based on advanced age and comorbid medical conditions, we shall initiate treatment with anastrozole 1 mg p.o. daily.  I have discussed rationale/logistics/risk/benefit/potential toxicities including but not limited to hot flashes, arthralgias, mood changes, leg edema/fluid retention among others.  Based on tolerance, addition of abemaciclib at low-dose (50 mg p.o. twice daily (would be considered). We shall also send [id number] on peripheral blood to look for potentially targetable mutations.  Her potassium is 5.6 on labs today.  I have prescribed her 3 doses of Lokelma 5 mg p.o. Repeat BMP tomorrow. ROV for tox check in 3 weeks with labs or sooner if needed. All questions were answered to the best of my abilities. Total visit

## 2024-06-10 ENCOUNTER — TELEPHONE (OUTPATIENT)
Dept: ONCOLOGY | Age: 84
End: 2024-06-10

## 2024-06-10 ENCOUNTER — OFFICE VISIT (OUTPATIENT)
Dept: PALLATIVE CARE | Age: 84
End: 2024-06-10
Payer: MEDICARE

## 2024-06-10 ENCOUNTER — HOSPITAL ENCOUNTER (OUTPATIENT)
Dept: LAB | Age: 84
Discharge: HOME | End: 2024-06-13
Payer: MEDICARE

## 2024-06-10 ENCOUNTER — OFFICE VISIT (OUTPATIENT)
Dept: ONCOLOGY | Age: 84
End: 2024-06-10
Payer: MEDICARE

## 2024-06-10 ENCOUNTER — TELEPHONE (OUTPATIENT)
Dept: INTERNAL MEDICINE CLINIC | Facility: CLINIC | Age: 84
End: 2024-06-10

## 2024-06-10 VITALS
SYSTOLIC BLOOD PRESSURE: 165 MMHG | WEIGHT: 118 LBS | TEMPERATURE: 97.5 F | BODY MASS INDEX: 19.05 KG/M2 | OXYGEN SATURATION: 97 % | RESPIRATION RATE: 18 BRPM | DIASTOLIC BLOOD PRESSURE: 57 MMHG | HEART RATE: 76 BPM

## 2024-06-10 DIAGNOSIS — C78.2 MALIGNANT NEOPLASM METASTATIC TO PLEURA (HCC): ICD-10-CM

## 2024-06-10 DIAGNOSIS — C50.911 BREAST CANCER METASTASIZED TO LUNG, RIGHT (HCC): Primary | ICD-10-CM

## 2024-06-10 DIAGNOSIS — C78.00 BREAST CANCER METASTASIZED TO LUNG, RIGHT (HCC): Primary | ICD-10-CM

## 2024-06-10 DIAGNOSIS — C78.2 CARCINOMA OF BREAST METASTATIC TO PLEURA, UNSPECIFIED LATERALITY (HCC): Primary | ICD-10-CM

## 2024-06-10 DIAGNOSIS — Z51.5 ENCOUNTER FOR PALLIATIVE CARE: ICD-10-CM

## 2024-06-10 DIAGNOSIS — E87.5 HYPERKALEMIA: Primary | ICD-10-CM

## 2024-06-10 DIAGNOSIS — C50.919 CARCINOMA OF BREAST METASTATIC TO PLEURA, UNSPECIFIED LATERALITY (HCC): Primary | ICD-10-CM

## 2024-06-10 DIAGNOSIS — G30.9 ALZHEIMER'S DISEASE, UNSPECIFIED (CODE) (HCC): ICD-10-CM

## 2024-06-10 LAB
ALBUMIN SERPL-MCNC: 3.3 G/DL (ref 3.2–4.6)
ALBUMIN/GLOB SERPL: 0.9 (ref 1–1.9)
ALP SERPL-CCNC: 85 U/L (ref 35–104)
ALT SERPL-CCNC: 10 U/L (ref 12–65)
ANION GAP SERPL CALC-SCNC: 9 MMOL/L (ref 9–18)
AST SERPL-CCNC: 26 U/L (ref 15–37)
BASOPHILS # BLD: 0 K/UL (ref 0–0.2)
BASOPHILS NFR BLD: 1 % (ref 0–2)
BILIRUB SERPL-MCNC: 0.5 MG/DL (ref 0–1.2)
BUN SERPL-MCNC: 8 MG/DL (ref 8–23)
CALCIUM SERPL-MCNC: 9.4 MG/DL (ref 8.8–10.2)
CHLORIDE SERPL-SCNC: 102 MMOL/L (ref 98–107)
CO2 SERPL-SCNC: 28 MMOL/L (ref 20–28)
CREAT SERPL-MCNC: 0.58 MG/DL (ref 0.6–1.1)
DIFFERENTIAL METHOD BLD: ABNORMAL
EOSINOPHIL # BLD: 0.1 K/UL (ref 0–0.8)
EOSINOPHIL NFR BLD: 2 % (ref 0.5–7.8)
ERYTHROCYTE [DISTWIDTH] IN BLOOD BY AUTOMATED COUNT: 13.5 % (ref 11.9–14.6)
GLOBULIN SER CALC-MCNC: 3.5 G/DL (ref 2.3–3.5)
GLUCOSE SERPL-MCNC: 93 MG/DL (ref 70–99)
HCT VFR BLD AUTO: 44.3 % (ref 35.8–46.3)
HGB BLD-MCNC: 13.9 G/DL (ref 11.7–15.4)
IMM GRANULOCYTES # BLD AUTO: 0 K/UL (ref 0–0.5)
IMM GRANULOCYTES NFR BLD AUTO: 0 % (ref 0–5)
LYMPHOCYTES # BLD: 1.7 K/UL (ref 0.5–4.6)
LYMPHOCYTES NFR BLD: 28 % (ref 13–44)
MAGNESIUM SERPL-MCNC: 2.1 MG/DL (ref 1.8–2.4)
MCH RBC QN AUTO: 27.9 PG (ref 26.1–32.9)
MCHC RBC AUTO-ENTMCNC: 31.4 G/DL (ref 31.4–35)
MCV RBC AUTO: 88.8 FL (ref 82–102)
MONOCYTES # BLD: 0.3 K/UL (ref 0.1–1.3)
MONOCYTES NFR BLD: 5 % (ref 4–12)
NEUTS SEG # BLD: 4.1 K/UL (ref 1.7–8.2)
NEUTS SEG NFR BLD: 64 % (ref 43–78)
NRBC # BLD: 0 K/UL (ref 0–0.2)
PLATELET # BLD AUTO: 299 K/UL (ref 150–450)
PMV BLD AUTO: 8.8 FL (ref 9.4–12.3)
POTASSIUM SERPL-SCNC: 5.6 MMOL/L (ref 3.5–5.1)
PROT SERPL-MCNC: 6.8 G/DL (ref 6.3–8.2)
RBC # BLD AUTO: 4.99 M/UL (ref 4.05–5.2)
SODIUM SERPL-SCNC: 139 MMOL/L (ref 136–145)
WBC # BLD AUTO: 6.2 K/UL (ref 4.3–11.1)

## 2024-06-10 PROCEDURE — 1036F TOBACCO NON-USER: CPT | Performed by: INTERNAL MEDICINE

## 2024-06-10 PROCEDURE — 85025 COMPLETE CBC W/AUTO DIFF WBC: CPT

## 2024-06-10 PROCEDURE — 1036F TOBACCO NON-USER: CPT | Performed by: PHYSICIAN ASSISTANT

## 2024-06-10 PROCEDURE — G8420 CALC BMI NORM PARAMETERS: HCPCS | Performed by: PHYSICIAN ASSISTANT

## 2024-06-10 PROCEDURE — G8427 DOCREV CUR MEDS BY ELIG CLIN: HCPCS | Performed by: INTERNAL MEDICINE

## 2024-06-10 PROCEDURE — 36415 COLL VENOUS BLD VENIPUNCTURE: CPT

## 2024-06-10 PROCEDURE — 1123F ACP DISCUSS/DSCN MKR DOCD: CPT | Performed by: PHYSICIAN ASSISTANT

## 2024-06-10 PROCEDURE — G8399 PT W/DXA RESULTS DOCUMENT: HCPCS | Performed by: PHYSICIAN ASSISTANT

## 2024-06-10 PROCEDURE — 99215 OFFICE O/P EST HI 40 MIN: CPT | Performed by: INTERNAL MEDICINE

## 2024-06-10 PROCEDURE — 80053 COMPREHEN METABOLIC PANEL: CPT

## 2024-06-10 PROCEDURE — G8428 CUR MEDS NOT DOCUMENT: HCPCS | Performed by: PHYSICIAN ASSISTANT

## 2024-06-10 PROCEDURE — 1111F DSCHRG MED/CURRENT MED MERGE: CPT | Performed by: INTERNAL MEDICINE

## 2024-06-10 PROCEDURE — G8420 CALC BMI NORM PARAMETERS: HCPCS | Performed by: INTERNAL MEDICINE

## 2024-06-10 PROCEDURE — G8399 PT W/DXA RESULTS DOCUMENT: HCPCS | Performed by: INTERNAL MEDICINE

## 2024-06-10 PROCEDURE — 1123F ACP DISCUSS/DSCN MKR DOCD: CPT | Performed by: INTERNAL MEDICINE

## 2024-06-10 PROCEDURE — 1111F DSCHRG MED/CURRENT MED MERGE: CPT | Performed by: PHYSICIAN ASSISTANT

## 2024-06-10 PROCEDURE — 1090F PRES/ABSN URINE INCON ASSESS: CPT | Performed by: INTERNAL MEDICINE

## 2024-06-10 PROCEDURE — 83735 ASSAY OF MAGNESIUM: CPT

## 2024-06-10 PROCEDURE — 99204 OFFICE O/P NEW MOD 45 MIN: CPT | Performed by: PHYSICIAN ASSISTANT

## 2024-06-10 PROCEDURE — 1090F PRES/ABSN URINE INCON ASSESS: CPT | Performed by: PHYSICIAN ASSISTANT

## 2024-06-10 RX ORDER — ANASTROZOLE 1 MG/1
1 TABLET ORAL DAILY
Qty: 30 TABLET | Refills: 3 | Status: SHIPPED | OUTPATIENT
Start: 2024-06-10

## 2024-06-10 ASSESSMENT — PATIENT HEALTH QUESTIONNAIRE - PHQ9
2. FEELING DOWN, DEPRESSED OR HOPELESS: NOT AT ALL
SUM OF ALL RESPONSES TO PHQ QUESTIONS 1-9: 0
1. LITTLE INTEREST OR PLEASURE IN DOING THINGS: NOT AT ALL
SUM OF ALL RESPONSES TO PHQ9 QUESTIONS 1 & 2: 0
SUM OF ALL RESPONSES TO PHQ QUESTIONS 1-9: 0

## 2024-06-10 NOTE — PROGRESS NOTES
acute distress.   HEENT: Normocephalic and atraumatic. Oropharynx is clear, mucous membranes are moist.  Pupils are equal, round, and reactive to light. Extraocular muscles are intact.  Sclerae anicteric. Neck supple without JVD.   Lymph node   deferred   Skin Warm and dry.  No bruising and no rash noted.   Respiratory unlabored respiratory effort.    CVS Normal rate, regular rhythm   Abdomen Soft, nontender and nondistended, normoactive bowel sounds.  No palpable mass.  No hepatosplenomegaly.   Neuro Grossly nonfocal with no obvious sensory or motor deficits.   MSK Normal range of motion in general.  No edema and no tenderness.   Psych Appropriate mood and affect. Poor recall, becomes easily agitated when discussing hospitalization.        Labs:  Recent Results (from the past 24 hour(s))   Comprehensive Metabolic Panel    Collection Time: 06/10/24 11:19 AM   Result Value Ref Range    Sodium 139 136 - 145 mmol/L    Potassium 5.6 (H) 3.5 - 5.1 mmol/L    Chloride 102 98 - 107 mmol/L    CO2 28 20 - 28 mmol/L    Anion Gap 9 9 - 18 mmol/L    Glucose 93 70 - 99 mg/dL    BUN 8 8 - 23 MG/DL    Creatinine 0.58 (L) 0.60 - 1.10 MG/DL    Est, Glom Filt Rate 90 >60 ml/min/1.73m2    Calcium 9.4 8.8 - 10.2 MG/DL    Total Bilirubin 0.5 0.0 - 1.2 MG/DL    ALT PENDING U/L    AST 26 15 - 37 U/L    Alk Phosphatase 85 35 - 104 U/L    Total Protein 6.8 6.3 - 8.2 g/dL    Albumin 3.3 3.2 - 4.6 g/dL    Globulin 3.5 2.3 - 3.5 g/dL    Albumin/Globulin Ratio 0.9 (L) 1.0 - 1.9     Magnesium    Collection Time: 06/10/24 11:19 AM   Result Value Ref Range    Magnesium 2.1 1.8 - 2.4 mg/dL   CBC with Auto Differential    Collection Time: 06/10/24 11:19 AM   Result Value Ref Range    WBC 6.2 4.3 - 11.1 K/uL    RBC 4.99 4.05 - 5.2 M/uL    Hemoglobin 13.9 11.7 - 15.4 g/dL    Hematocrit 44.3 35.8 - 46.3 %    MCV 88.8 82.0 - 102.0 FL    MCH 27.9 26.1 - 32.9 PG    MCHC 31.4 31.4 - 35.0 g/dL    RDW 13.5 11.9 - 14.6 %    Platelets 299 150 - 450 K/uL    MPV

## 2024-06-10 NOTE — TELEPHONE ENCOUNTER
----- Message from Nona Davis MD sent at 6/6/2024  6:19 PM EDT -----  Patient was recently seen in ED/hospital, please call and schedule f/u visit. She had abnormal biopsy and will need to schedule f/u first available, to review results in detail;         ----- Message -----  From: Roselyn Hart Incoming Lab For Copath Pdfs  Sent: 6/6/2024   1:48 PM EDT  To: Nona Davis MD

## 2024-06-10 NOTE — TELEPHONE ENCOUNTER
I have been calling pt's daughter for the past couple days last week and she has not returned my call.  Pt has a hospital follow up w/ Oncology today and a new patient appt w/ Dr. Jordin Clancy @ [facility] 08/06/24.

## 2024-06-10 NOTE — PATIENT INSTRUCTIONS
Final    MCH 06/10/2024 27.9  26.1 - 32.9 PG Final    MCHC 06/10/2024 31.4  31.4 - 35.0 g/dL Final    RDW 06/10/2024 13.5  11.9 - 14.6 % Final    Platelets 06/10/2024 299  150 - 450 K/uL Final    MPV 06/10/2024 8.8 (L)  9.4 - 12.3 FL Final    nRBC 06/10/2024 0.00  0.0 - 0.2 K/uL Final    **Note: Absolute NRBC parameter is now reported with Hemogram**    Neutrophils % 06/10/2024 64  43 - 78 % Final    Lymphocytes % 06/10/2024 28  13 - 44 % Final    Monocytes % 06/10/2024 5  4.0 - 12.0 % Final    Eosinophils % 06/10/2024 2  0.5 - 7.8 % Final    Basophils % 06/10/2024 1  0.0 - 2.0 % Final    Immature Granulocytes % 06/10/2024 0  0.0 - 5.0 % Final    Neutrophils Absolute 06/10/2024 4.1  1.7 - 8.2 K/UL Final    Lymphocytes Absolute 06/10/2024 1.7  0.5 - 4.6 K/UL Final    Monocytes Absolute 06/10/2024 0.3  0.1 - 1.3 K/UL Final    Eosinophils Absolute 06/10/2024 0.1  0.0 - 0.8 K/UL Final    Basophils Absolute 06/10/2024 0.0  0.0 - 0.2 K/UL Final    Immature Granulocytes Absolute 06/10/2024 0.0  0.0 - 0.5 K/UL Final    Differential Type 06/10/2024 AUTOMATED    Final                 Please refer to After Visit Summary or [employer] for upcoming appointment information. Please call our office for rescheduling needs at least 24 hours before your scheduled appointment time.If you have any questions regarding your upcoming schedule please reach out to your care team through [employer] or call [phone].     Please notify your assigned Nurse Navigator of any unplanned hospital admissions or Emergency Room visits within 24 hours of discharge.    -------------------------------------------------------------------------------------------------------------------  Please call our office at [phone] if you have any  of the following symptoms:   Fever of 100.5 or greater  Chills  Shortness of breath  Swelling or pain in one leg    After office hours an answering service is available and will contact a provider for emergencies or if

## 2024-06-10 NOTE — TELEPHONE ENCOUNTER
Attempted to contact - no answer, LVM.  Will send [employer] message as well.     ----- Message from Bernardo Luna MD sent at 6/10/2024  2:00 PM EDT -----  K is high. Rx'd lokelma x 3 doses. Please inform the pt. Repeat BMP tomorrow.

## 2024-06-17 DIAGNOSIS — C78.2 CARCINOMA OF BREAST METASTATIC TO PLEURA, UNSPECIFIED LATERALITY (HCC): ICD-10-CM

## 2024-06-17 DIAGNOSIS — E87.5 HYPERKALEMIA: ICD-10-CM

## 2024-06-17 DIAGNOSIS — C78.2 CARCINOMA OF BREAST METASTATIC TO PLEURA, UNSPECIFIED LATERALITY (HCC): Primary | ICD-10-CM

## 2024-06-17 DIAGNOSIS — C78.2 MALIGNANT NEOPLASM METASTATIC TO PLEURA (HCC): ICD-10-CM

## 2024-06-17 DIAGNOSIS — C50.919 CARCINOMA OF BREAST METASTATIC TO PLEURA, UNSPECIFIED LATERALITY (HCC): Primary | ICD-10-CM

## 2024-06-17 DIAGNOSIS — C50.919 CARCINOMA OF BREAST METASTATIC TO PLEURA, UNSPECIFIED LATERALITY (HCC): ICD-10-CM

## 2024-06-17 LAB
ALBUMIN SERPL-MCNC: 3 G/DL (ref 3.2–4.6)
ALBUMIN/GLOB SERPL: 0.9 (ref 1–1.9)
ALP SERPL-CCNC: 81 U/L (ref 35–104)
ALT SERPL-CCNC: 8 U/L (ref 12–65)
ANION GAP SERPL CALC-SCNC: 10 MMOL/L (ref 9–18)
ANION GAP SERPL CALC-SCNC: 9 MMOL/L (ref 9–18)
AST SERPL-CCNC: 21 U/L (ref 15–37)
BASOPHILS # BLD: 0 K/UL (ref 0–0.2)
BASOPHILS NFR BLD: 0 % (ref 0–2)
BILIRUB SERPL-MCNC: 0.3 MG/DL (ref 0–1.2)
BUN SERPL-MCNC: 11 MG/DL (ref 8–23)
BUN SERPL-MCNC: 11 MG/DL (ref 8–23)
CALCIUM SERPL-MCNC: 8.8 MG/DL (ref 8.8–10.2)
CALCIUM SERPL-MCNC: 8.9 MG/DL (ref 8.8–10.2)
CHLORIDE SERPL-SCNC: 104 MMOL/L (ref 98–107)
CHLORIDE SERPL-SCNC: 105 MMOL/L (ref 98–107)
CO2 SERPL-SCNC: 26 MMOL/L (ref 20–28)
CO2 SERPL-SCNC: 26 MMOL/L (ref 20–28)
CREAT SERPL-MCNC: 0.48 MG/DL (ref 0.6–1.1)
CREAT SERPL-MCNC: 0.51 MG/DL (ref 0.6–1.1)
DIFFERENTIAL METHOD BLD: ABNORMAL
EOSINOPHIL # BLD: 0.1 K/UL (ref 0–0.8)
EOSINOPHIL NFR BLD: 2 % (ref 0.5–7.8)
ERYTHROCYTE [DISTWIDTH] IN BLOOD BY AUTOMATED COUNT: 13.8 % (ref 11.9–14.6)
GLOBULIN SER CALC-MCNC: 3.3 G/DL (ref 2.3–3.5)
GLUCOSE SERPL-MCNC: 129 MG/DL (ref 70–99)
GLUCOSE SERPL-MCNC: 131 MG/DL (ref 70–99)
HCT VFR BLD AUTO: 41.5 % (ref 35.8–46.3)
HGB BLD-MCNC: 12.9 G/DL (ref 11.7–15.4)
IMM GRANULOCYTES # BLD AUTO: 0 K/UL (ref 0–0.5)
IMM GRANULOCYTES NFR BLD AUTO: 0 % (ref 0–5)
LYMPHOCYTES # BLD: 1.6 K/UL (ref 0.5–4.6)
LYMPHOCYTES NFR BLD: 29 % (ref 13–44)
MCH RBC QN AUTO: 27.9 PG (ref 26.1–32.9)
MCHC RBC AUTO-ENTMCNC: 31.1 G/DL (ref 31.4–35)
MCV RBC AUTO: 89.6 FL (ref 82–102)
MONOCYTES # BLD: 0.3 K/UL (ref 0.1–1.3)
MONOCYTES NFR BLD: 6 % (ref 4–12)
NEUTS SEG # BLD: 3.5 K/UL (ref 1.7–8.2)
NEUTS SEG NFR BLD: 63 % (ref 43–78)
NRBC # BLD: 0 K/UL (ref 0–0.2)
PLATELET # BLD AUTO: 246 K/UL (ref 150–450)
PMV BLD AUTO: 11.3 FL (ref 9.4–12.3)
POTASSIUM SERPL-SCNC: 3.8 MMOL/L (ref 3.5–5.1)
POTASSIUM SERPL-SCNC: 3.8 MMOL/L (ref 3.5–5.1)
PROT SERPL-MCNC: 6.3 G/DL (ref 6.3–8.2)
RBC # BLD AUTO: 4.63 M/UL (ref 4.05–5.2)
SODIUM SERPL-SCNC: 140 MMOL/L (ref 136–145)
SODIUM SERPL-SCNC: 140 MMOL/L (ref 136–145)
WBC # BLD AUTO: 5.6 K/UL (ref 4.3–11.1)

## 2024-07-09 DIAGNOSIS — C50.919 CARCINOMA OF BREAST METASTATIC TO PLEURA, UNSPECIFIED LATERALITY (HCC): ICD-10-CM

## 2024-07-09 DIAGNOSIS — C78.2 MALIGNANT NEOPLASM METASTATIC TO PLEURA (HCC): Primary | ICD-10-CM

## 2024-07-09 DIAGNOSIS — C78.2 CARCINOMA OF BREAST METASTATIC TO PLEURA, UNSPECIFIED LATERALITY (HCC): ICD-10-CM

## 2024-07-12 ENCOUNTER — CLINICAL DOCUMENTATION (OUTPATIENT)
Dept: ONCOLOGY | Age: 84
End: 2024-07-12

## 2024-07-12 DIAGNOSIS — C78.2 MALIGNANT NEOPLASM METASTATIC TO PLEURA (HCC): ICD-10-CM

## 2024-07-12 DIAGNOSIS — C50.919 CARCINOMA OF BREAST METASTATIC TO PLEURA, UNSPECIFIED LATERALITY (HCC): Primary | ICD-10-CM

## 2024-07-12 DIAGNOSIS — C78.2 CARCINOMA OF BREAST METASTATIC TO PLEURA, UNSPECIFIED LATERALITY (HCC): Primary | ICD-10-CM

## 2024-07-12 NOTE — PROGRESS NOTES
[employer]     Patient/Staff notified [facility] of transportation needs for future appointment(s).     Ride(s) arranged for the appointment date(s) below. Patient is scheduled to arrive prior to their first appointment time and will receive a message to coordinate will call transportation back once their visit is complete.      Date(s): 7/15   Should appointments be added, altered, or canceled please notify  [facility] so adjustments can be made or additional transports arranged.   *Please see below for [employer] receipt confirmation*

## 2024-12-05 ENCOUNTER — TELEPHONE (OUTPATIENT)
Dept: ONCOLOGY | Age: 84
End: 2024-12-05

## 2024-12-05 NOTE — TELEPHONE ENCOUNTER
Patient is an established patient last seen 06/10/24.    Per Dr. Cheryl Haro schedule follow up with Dr. Luna for History of breast cancer .

## 2024-12-06 NOTE — TELEPHONE ENCOUNTER
Patient phone not in service.  Called patient [facility] and left voicemail asking for a call back to get patient scheduled.   Critical

## (undated) DEVICE — SURGICAL PROCEDURE PACK TOT HIP CDS

## (undated) DEVICE — STERILE POLYISOPRENE POWDER-FREE SURGICAL GLOVES: Brand: PROTEXIS

## (undated) DEVICE — GARMENT,MEDLINE,DVT,INT,CALF,MED, GEN2: Brand: MEDLINE

## (undated) DEVICE — 2000CC GUARDIAN II: Brand: GUARDIAN

## (undated) DEVICE — 48" PROBE COVER W/GEL, ULTRASOUND, STERILE: Brand: SITE-RITE

## (undated) DEVICE — AMD ANTIMICROBIAL NON-ADHERENT PAD,0.2% POLYHEXAMETHYLENE BIGUANIDE HCI (PHMB): Brand: TELFA

## (undated) DEVICE — KIT THORCENT 8FR L5IN POLYUR W/ 18/22/25GA NDL 3 W STPCOCK

## (undated) DEVICE — TROCAR: Brand: KII® SLEEVE

## (undated) DEVICE — SHEARS ENDOSCP HARM 36CM ULTRASONIC CRV TIP UPGRD

## (undated) DEVICE — DRAPE,U/SHT,SPLIT,FILM,60X84,STERILE: Brand: MEDLINE

## (undated) DEVICE — ADHESIVE SKIN CLSR 0.7ML TOP DERMBND ADV

## (undated) DEVICE — SUTURE STRATAFIX SYMMETRIC PDS + SZ 2-0 L18IN ABSRB VLT SXPP1A403

## (undated) DEVICE — Device: Brand: STABLECUT®

## (undated) DEVICE — Device

## (undated) DEVICE — GAUZE,SPONGE,2"X2",8PLY,STERILE,LF,2'S: Brand: MEDLINE

## (undated) DEVICE — INTENDED FOR TISSUE SEPARATION, AND OTHER PROCEDURES THAT REQUIRE A SHARP SURGICAL BLADE TO PUNCTURE OR CUT.: Brand: BARD-PARKER ® STAINLESS STEEL BLADES

## (undated) DEVICE — LOGICUT SCISSOR LENGTH 320MM: Brand: LOGI - LAPAROSCOPIC INSTRUMENT SYSTEM

## (undated) DEVICE — SHEET, DRAPE, SPLIT, STERILE: Brand: MEDLINE

## (undated) DEVICE — SOLUTION IRRIG 1000ML H2O STRL BLT

## (undated) DEVICE — BUTTON SWITCH PENCIL BLADE ELECTRODE, HOLSTER: Brand: EDGE

## (undated) DEVICE — SUTURE ETHBND EXCEL SZ 5 L30IN NONABSORBABLE GRN L40MM V-37 MB66G

## (undated) DEVICE — SPONGE LAP 18X18IN STRL -- 5/PK

## (undated) DEVICE — YANKAUER,POOLE TIP,STERILE,50/CS: Brand: MEDLINE

## (undated) DEVICE — SUTURE PERMAHAND SZ 2-0 L12X18IN NONABSORBABLE BLK SILK A185H

## (undated) DEVICE — SURGICAL PROCEDURE PACK BASIC ST FRANCIS

## (undated) DEVICE — STOCKINETTE TUBE 6X48 -- MEDICHOICE

## (undated) DEVICE — TRAY CATH 16F DRN BG LTX -- CONVERT TO ITEM 363158

## (undated) DEVICE — MEDI-VAC YANKAUER SUCTION HANDLE W/BULBOUS TIP: Brand: CARDINAL HEALTH

## (undated) DEVICE — SUTURE SZ 0 27IN 5/8 CIR UR-6  TAPER PT VIOLET ABSRB VICRYL J603H

## (undated) DEVICE — YANKAUER,BULB TIP,W/O VENT,RIGID,STERILE: Brand: MEDLINE

## (undated) DEVICE — TOTAL TRAY, DB, 100% SILI FOLEY, 16FR 10: Brand: MEDLINE

## (undated) DEVICE — REM POLYHESIVE ADULT PATIENT RETURN ELECTRODE: Brand: VALLEYLAB

## (undated) DEVICE — SUTURE ETHLN SZ 2-0 L18IN NONABSORBABLE BLK L26MM PS 3/8 585H

## (undated) DEVICE — CONTAINER SPEC COLLCTN 8 OZ W/ CAP PLAS STRL

## (undated) DEVICE — SOLUTION IRRIG 3000ML 0.9% SOD CHL FLX CONT 0797208] ICU MEDICAL INC]

## (undated) DEVICE — KENDALL SCD EXPRESS SLEEVES, KNEE LENGTH, MEDIUM: Brand: KENDALL SCD

## (undated) DEVICE — GENERAL LAPAROSCOPY: Brand: MEDLINE INDUSTRIES, INC.

## (undated) DEVICE — (D)PREP SKN CHLRAPRP APPL 26ML -- CONVERT TO ITEM 371833

## (undated) DEVICE — GOWN,AURORA,FABRIC-REINFORCED,2XL: Brand: MEDLINE

## (undated) DEVICE — TUBING, SUCTION, 3/16" X 10', STRAIGHT: Brand: MEDLINE

## (undated) DEVICE — TUBING INSUFFLATION SMK EVAC HI FLO SET PNEUMOCLEAR

## (undated) DEVICE — SLIM BODY SKIN STAPLER: Brand: APPOSE ULC

## (undated) DEVICE — SUTURE MCRYL SZ 2-0 L27IN ABSRB UD CP-1 1 L36MM 1/2 CIR REV Y266H

## (undated) DEVICE — FAN SPRAY KIT: Brand: PULSAVAC®

## (undated) DEVICE — SYR 50ML LR LCK 1ML GRAD NSAF --

## (undated) DEVICE — SUTURE VCRL SZ 3-0 L27IN ABSRB UD L26MM SH 1/2 CIR J416H

## (undated) DEVICE — DRAPE,HIP,W/POUCHES,STERILE: Brand: MEDLINE

## (undated) DEVICE — SYR LR LCK 1ML GRAD NSAF 30ML --

## (undated) DEVICE — SPONGE LAPAROTOMY W18XL18IN WHITE STRUNG RADIOPAQUE STERILE

## (undated) DEVICE — DRAPE,TOP,102X53,STERILE: Brand: MEDLINE

## (undated) DEVICE — INSUFFLATION NEEDLE TO ESTABLISH PNEUMOPERITONEUM.: Brand: INSUFFLATION NEEDLE

## (undated) DEVICE — NEEDLE HYPO 25GA L1.5IN BLU POLYPR HUB S STL REG BVL STR

## (undated) DEVICE — T4 HOOD

## (undated) DEVICE — SOLUTION IRRIG 1000ML 09% SOD CHL USP PIC PLAS CONTAINER

## (undated) DEVICE — 3M™ STERI-DRAPE™ INCISE DRAPE, XL 1051: Brand: STERI-DRAPE™

## (undated) DEVICE — JAR SPEC COLL C30ML 15ML PREFILL VOL POLYPR 10% NEUT BUFF

## (undated) DEVICE — SYSTEM SKIN CLSR 22CM DERMBND PRINEO

## (undated) DEVICE — BLADE SURG NO15 S STL STR DISP GLASSVAN

## (undated) DEVICE — SUTURE PERMAHAND SZ 2-0 L18IN NONABSORBABLE BLK L26MM PS 1588H

## (undated) DEVICE — SUTURE VCRL SZ 2-0 L18IN ABSRB VLT L26MM SH 1/2 CIR J775D

## (undated) DEVICE — SOLUTION IV 1000ML 0.9% SOD CHL

## (undated) DEVICE — SUTURE PDS II SZ 1 L54IN ABSRB VLT L65MM TP-1 1/2 CIR Z879G

## (undated) DEVICE — 3000CC GUARDIAN II: Brand: GUARDIAN

## (undated) DEVICE — DRAIN SURG L49IN DIA1/4IN 10IN H SIL W/O TRCR END PERF

## (undated) DEVICE — JELLY LUBRICATING 10GM PREFIL SYR LUBE

## (undated) DEVICE — HEWSON SUTURE RETRIEVER: Brand: HEWSON SUTURE RETRIEVER

## (undated) DEVICE — TROCAR: Brand: KII FIOS FIRST ENTRY

## (undated) DEVICE — SUTURE PDS II SZ 1 L36IN ABSRB VLT L48MM CTX 1/2 CIR Z371T

## (undated) DEVICE — TRAY PREP DRY W/ PREM GLV 2 APPL 6 SPNG 2 UNDPD 1 OVERWRAP